# Patient Record
Sex: MALE | Race: WHITE | Employment: OTHER | URBAN - METROPOLITAN AREA
[De-identification: names, ages, dates, MRNs, and addresses within clinical notes are randomized per-mention and may not be internally consistent; named-entity substitution may affect disease eponyms.]

---

## 2019-04-27 ENCOUNTER — APPOINTMENT (OUTPATIENT)
Dept: GENERAL RADIOLOGY | Age: 69
End: 2019-04-27
Payer: COMMERCIAL

## 2019-04-27 ENCOUNTER — HOSPITAL ENCOUNTER (EMERGENCY)
Age: 69
Discharge: HOME | End: 2019-04-27
Attending: EMERGENCY MEDICINE
Payer: COMMERCIAL

## 2019-04-27 VITALS
HEART RATE: 85 BPM | RESPIRATION RATE: 18 BRPM | SYSTOLIC BLOOD PRESSURE: 127 MMHG | BODY MASS INDEX: 26.68 KG/M2 | DIASTOLIC BLOOD PRESSURE: 71 MMHG | TEMPERATURE: 98.5 F | WEIGHT: 170 LBS | HEIGHT: 67 IN | OXYGEN SATURATION: 98 %

## 2019-04-27 DIAGNOSIS — J44.1 COPD EXACERBATION (HCC): Primary | ICD-10-CM

## 2019-04-27 LAB
ALBUMIN SERPL-MCNC: 4.6 G/DL (ref 3.5–5.2)
ALP BLD-CCNC: 67 U/L (ref 40–129)
ALT SERPL-CCNC: 26 U/L (ref 0–40)
ANION GAP SERPL CALCULATED.3IONS-SCNC: 10 MMOL/L (ref 7–16)
AST SERPL-CCNC: 23 U/L (ref 0–39)
BASOPHILS ABSOLUTE: 0.05 E9/L (ref 0–0.2)
BASOPHILS RELATIVE PERCENT: 0.5 % (ref 0–2)
BILIRUB SERPL-MCNC: 0.6 MG/DL (ref 0–1.2)
BUN BLDV-MCNC: 11 MG/DL (ref 8–23)
CALCIUM SERPL-MCNC: 9.2 MG/DL (ref 8.6–10.2)
CHLORIDE BLD-SCNC: 93 MMOL/L (ref 98–107)
CO2: 28 MMOL/L (ref 22–29)
CREAT SERPL-MCNC: 0.6 MG/DL (ref 0.7–1.2)
EKG ATRIAL RATE: 90 BPM
EKG P AXIS: 80 DEGREES
EKG P-R INTERVAL: 132 MS
EKG Q-T INTERVAL: 370 MS
EKG QRS DURATION: 98 MS
EKG QTC CALCULATION (BAZETT): 452 MS
EKG R AXIS: 85 DEGREES
EKG T AXIS: 73 DEGREES
EKG VENTRICULAR RATE: 90 BPM
EOSINOPHILS ABSOLUTE: 0.12 E9/L (ref 0.05–0.5)
EOSINOPHILS RELATIVE PERCENT: 1.1 % (ref 0–6)
GFR AFRICAN AMERICAN: >60
GFR NON-AFRICAN AMERICAN: >60 ML/MIN/1.73
GLUCOSE BLD-MCNC: 146 MG/DL (ref 74–99)
HCT VFR BLD CALC: 43.9 % (ref 37–54)
HEMOGLOBIN: 15.1 G/DL (ref 12.5–16.5)
IMMATURE GRANULOCYTES #: 0.04 E9/L
IMMATURE GRANULOCYTES %: 0.4 % (ref 0–5)
LYMPHOCYTES ABSOLUTE: 0.81 E9/L (ref 1.5–4)
LYMPHOCYTES RELATIVE PERCENT: 7.5 % (ref 20–42)
MCH RBC QN AUTO: 31.8 PG (ref 26–35)
MCHC RBC AUTO-ENTMCNC: 34.4 % (ref 32–34.5)
MCV RBC AUTO: 92.4 FL (ref 80–99.9)
MONOCYTES ABSOLUTE: 0.36 E9/L (ref 0.1–0.95)
MONOCYTES RELATIVE PERCENT: 3.4 % (ref 2–12)
NEUTROPHILS ABSOLUTE: 9.35 E9/L (ref 1.8–7.3)
NEUTROPHILS RELATIVE PERCENT: 87.1 % (ref 43–80)
PDW BLD-RTO: 12.8 FL (ref 11.5–15)
PLATELET # BLD: 195 E9/L (ref 130–450)
PMV BLD AUTO: 9.5 FL (ref 7–12)
POTASSIUM SERPL-SCNC: 3.8 MMOL/L (ref 3.5–5)
RBC # BLD: 4.75 E12/L (ref 3.8–5.8)
SODIUM BLD-SCNC: 131 MMOL/L (ref 132–146)
TOTAL PROTEIN: 7 G/DL (ref 6.4–8.3)
TROPONIN: <0.01 NG/ML (ref 0–0.03)
WBC # BLD: 10.7 E9/L (ref 4.5–11.5)

## 2019-04-27 PROCEDURE — 99285 EMERGENCY DEPT VISIT HI MDM: CPT

## 2019-04-27 PROCEDURE — 80053 COMPREHEN METABOLIC PANEL: CPT

## 2019-04-27 PROCEDURE — 85025 COMPLETE CBC W/AUTO DIFF WBC: CPT

## 2019-04-27 PROCEDURE — 71045 X-RAY EXAM CHEST 1 VIEW: CPT

## 2019-04-27 PROCEDURE — 94640 AIRWAY INHALATION TREATMENT: CPT

## 2019-04-27 PROCEDURE — 93005 ELECTROCARDIOGRAM TRACING: CPT | Performed by: EMERGENCY MEDICINE

## 2019-04-27 PROCEDURE — 84484 ASSAY OF TROPONIN QUANT: CPT

## 2019-04-27 PROCEDURE — 94664 DEMO&/EVAL PT USE INHALER: CPT

## 2019-04-27 PROCEDURE — 6370000000 HC RX 637 (ALT 250 FOR IP): Performed by: EMERGENCY MEDICINE

## 2019-04-27 RX ORDER — PREDNISONE 20 MG/1
TABLET ORAL
Qty: 6 TABLET | Refills: 0 | Status: SHIPPED | OUTPATIENT
Start: 2019-04-27 | End: 2019-04-29

## 2019-04-27 RX ORDER — IPRATROPIUM BROMIDE AND ALBUTEROL SULFATE 2.5; .5 MG/3ML; MG/3ML
3 SOLUTION RESPIRATORY (INHALATION) ONCE
Status: COMPLETED | OUTPATIENT
Start: 2019-04-27 | End: 2019-04-27

## 2019-04-27 RX ORDER — DOXYCYCLINE HYCLATE 100 MG
100 TABLET ORAL 2 TIMES DAILY
Qty: 20 TABLET | Refills: 0 | Status: SHIPPED | OUTPATIENT
Start: 2019-04-27 | End: 2019-05-07

## 2019-04-27 RX ADMIN — IPRATROPIUM BROMIDE AND ALBUTEROL SULFATE 3 AMPULE: .5; 3 SOLUTION RESPIRATORY (INHALATION) at 12:57

## 2019-04-27 ASSESSMENT — ENCOUNTER SYMPTOMS
VOMITING: 0
COLOR CHANGE: 0
SHORTNESS OF BREATH: 1
ABDOMINAL PAIN: 0
NAUSEA: 0
COUGH: 1
SORE THROAT: 0

## 2019-04-27 NOTE — ED PROVIDER NOTES
Exam   Constitutional: He is oriented to person, place, and time. He appears well-developed and well-nourished. No distress. HENT:   Head: Normocephalic and atraumatic. Nose: Nose normal.   Mouth/Throat: Oropharynx is clear and moist and mucous membranes are normal.   Eyes: Conjunctivae are normal.   Neck: Normal range of motion. Neck supple. Cardiovascular: Normal rate and regular rhythm. Pulses:       Radial pulses are 2+ on the right side, and 2+ on the left side. Dorsalis pedis pulses are 2+ on the right side, and 2+ on the left side. Pulmonary/Chest: Accessory muscle usage present. Tachypnea noted. He has decreased breath sounds. He has wheezes. He has no rhonchi. He has no rales. Abdominal: Soft. Bowel sounds are normal. There is no tenderness. There is no rigidity, no rebound and no guarding. Musculoskeletal:   Able to move all extremities   Neurological: He is alert and oriented to person, place, and time. No gross focal motor or sensory deficits. Skin: Skin is warm and dry. Capillary refill takes less than 2 seconds. Psychiatric: He has a normal mood and affect. His speech is normal.   Nursing note and vitals reviewed. Procedures    MDM  Number of Diagnoses or Management Options  COPD exacerbation [facility]):   Diagnosis management comments: Patient presents to the ED for shortness of breath. We initially obtained blood work, imaging and ekg. Patient was given DuoNeb treatment for their symptoms with moderate improvement. Patient was able to ambulate in the emergency department without becoming hypoxic or very short of breath. Blood work unremarkable for anemia or leukocytosis. Chest x-ray showed atelectasis, questionable early pneumonia. Patient had been taking Augmentin over the past 2 days and prednisone for his symptoms. We advised patient discontinue Augmentin and start on doxycycline for COPD exacerbation and also started on prednisone 60 mg.  States he is okay with his drugs. Family History: family history is not on file. The patients home medications have been reviewed. Allergies: Patient has no known allergies.     ------------------------------------------------ RESULTS ---------------------------------------------------    LABS:  Results for orders placed or performed during the hospital encounter of 04/27/19   CBC auto differential   Result Value Ref Range    WBC 10.7 4.5 - 11.5 E9/L    RBC 4.75 3.80 - 5.80 E12/L    Hemoglobin 15.1 12.5 - 16.5 g/dL    Hematocrit 43.9 37.0 - 54.0 %    MCV 92.4 80.0 - 99.9 fL    MCH 31.8 26.0 - 35.0 pg    MCHC 34.4 32.0 - 34.5 %    RDW 12.8 11.5 - 15.0 fL    Platelets 437 443 - 517 E9/L    MPV 9.5 7.0 - 12.0 fL    Neutrophils % 87.1 (H) 43.0 - 80.0 %    Immature Granulocytes % 0.4 0.0 - 5.0 %    Lymphocytes % 7.5 (L) 20.0 - 42.0 %    Monocytes % 3.4 2.0 - 12.0 %    Eosinophils % 1.1 0.0 - 6.0 %    Basophils % 0.5 0.0 - 2.0 %    Neutrophils # 9.35 (H) 1.80 - 7.30 E9/L    Immature Granulocytes # 0.04 E9/L    Lymphocytes # 0.81 (L) 1.50 - 4.00 E9/L    Monocytes # 0.36 0.10 - 0.95 E9/L    Eosinophils # 0.12 0.05 - 0.50 E9/L    Basophils # 0.05 0.00 - 0.20 E9/L   Comprehensive Metabolic Panel   Result Value Ref Range    Sodium 131 (L) 132 - 146 mmol/L    Potassium 3.8 3.5 - 5.0 mmol/L    Chloride 93 (L) 98 - 107 mmol/L    CO2 28 22 - 29 mmol/L    Anion Gap 10 7 - 16 mmol/L    Glucose 146 (H) 74 - 99 mg/dL    BUN 11 8 - 23 mg/dL    CREATININE 0.6 (L) 0.7 - 1.2 mg/dL    GFR Non-African American >60 >=60 mL/min/1.73    GFR African American >60     Calcium 9.2 8.6 - 10.2 mg/dL    Total Protein 7.0 6.4 - 8.3 g/dL    Alb 4.6 3.5 - 5.2 g/dL    Total Bilirubin 0.6 0.0 - 1.2 mg/dL    Alkaline Phosphatase 67 40 - 129 U/L    ALT 26 0 - 40 U/L    AST 23 0 - 39 U/L   Troponin   Result Value Ref Range    Troponin <0.01 0.00 - 0.03 ng/mL   EKG 12 Lead   Result Value Ref Range    Ventricular Rate 90 BPM    Atrial Rate 90 BPM    P-R Interval 132 ms    QRS home.  Patient condition is stable. END OF PROVIDER NOTE.             Varinder Gomez DO  Resident  04/27/19 [id number]

## 2019-04-27 NOTE — ED NOTES
Patient ambulated with beginning SpO2 at 94 % on room air, during ambulation SpO2 92-93% on room air.       Marcia Farah RN  04/27/19 [id number]

## 2019-06-18 ENCOUNTER — HOSPITAL ENCOUNTER (OUTPATIENT)
Dept: NON INVASIVE DIAGNOSTICS | Age: 69
Discharge: HOME | End: 2019-06-18
Payer: COMMERCIAL

## 2019-06-18 LAB
LV EF: 60 %
LVEF MODALITY: NORMAL

## 2019-06-18 PROCEDURE — 93306 TTE W/DOPPLER COMPLETE: CPT

## 2019-10-16 ENCOUNTER — HOSPITAL ENCOUNTER (OUTPATIENT)
Age: 69
Discharge: HOME | End: 2019-10-18

## 2019-10-16 PROCEDURE — 88305 TISSUE EXAM BY PATHOLOGIST: CPT

## 2020-03-02 ENCOUNTER — APPOINTMENT (OUTPATIENT)
Dept: GENERAL RADIOLOGY | Age: 70
DRG: 193 | End: 2020-03-02
Payer: COMMERCIAL

## 2020-03-02 ENCOUNTER — HOSPITAL ENCOUNTER (INPATIENT)
Age: 70
LOS: 7 days | Discharge: HOME | DRG: 193 | End: 2020-03-11
Attending: EMERGENCY MEDICINE | Admitting: INTERNAL MEDICINE
Payer: COMMERCIAL

## 2020-03-02 PROBLEM — J44.1 COPD WITH ACUTE EXACERBATION (HCC): Status: ACTIVE | Noted: 2020-03-02

## 2020-03-02 LAB
ALBUMIN SERPL-MCNC: 4.4 G/DL (ref 3.5–5.2)
ALP BLD-CCNC: 62 U/L (ref 40–129)
ALT SERPL-CCNC: 24 U/L (ref 0–40)
ANION GAP SERPL CALCULATED.3IONS-SCNC: 12 MMOL/L (ref 7–16)
AST SERPL-CCNC: 19 U/L (ref 0–39)
B.E.: 3.8 MMOL/L (ref -3–3)
BASOPHILS ABSOLUTE: 0.03 E9/L (ref 0–0.2)
BASOPHILS RELATIVE PERCENT: 0.3 % (ref 0–2)
BILIRUB SERPL-MCNC: 0.7 MG/DL (ref 0–1.2)
BILIRUBIN DIRECT: 0.2 MG/DL (ref 0–0.3)
BILIRUBIN, INDIRECT: 0.5 MG/DL (ref 0–1)
BUN BLDV-MCNC: 11 MG/DL (ref 8–23)
CALCIUM SERPL-MCNC: 8.7 MG/DL (ref 8.6–10.2)
CHLORIDE BLD-SCNC: 92 MMOL/L (ref 98–107)
CO2: 27 MMOL/L (ref 22–29)
COHB: 1.2 % (ref 0–1.5)
CREAT SERPL-MCNC: 0.7 MG/DL (ref 0.7–1.2)
CRITICAL: ABNORMAL
DATE ANALYZED: ABNORMAL
DATE OF COLLECTION: ABNORMAL
EKG ATRIAL RATE: 108 BPM
EKG P AXIS: 71 DEGREES
EKG P-R INTERVAL: 124 MS
EKG Q-T INTERVAL: 316 MS
EKG QRS DURATION: 78 MS
EKG QTC CALCULATION (BAZETT): 423 MS
EKG R AXIS: 98 DEGREES
EKG T AXIS: 83 DEGREES
EKG VENTRICULAR RATE: 108 BPM
EOSINOPHILS ABSOLUTE: 0.01 E9/L (ref 0.05–0.5)
EOSINOPHILS RELATIVE PERCENT: 0.1 % (ref 0–6)
GFR AFRICAN AMERICAN: >60
GFR NON-AFRICAN AMERICAN: >60 ML/MIN/1.73
GLUCOSE BLD-MCNC: 214 MG/DL (ref 74–99)
HCO3: 28.3 MMOL/L (ref 22–26)
HCT VFR BLD CALC: 43.3 % (ref 37–54)
HEMOGLOBIN: 14.9 G/DL (ref 12.5–16.5)
HHB: 3.5 % (ref 0–5)
IMMATURE GRANULOCYTES #: 0.05 E9/L
IMMATURE GRANULOCYTES %: 0.4 % (ref 0–5)
INFLUENZA A BY PCR: DETECTED
INFLUENZA B BY PCR: NOT DETECTED
INR BLD: 1
LAB: ABNORMAL
LACTIC ACID: 2.9 MMOL/L (ref 0.5–2.2)
LACTIC ACID: 4.5 MMOL/L (ref 0.5–2.2)
LIPASE: 22 U/L (ref 13–60)
LYMPHOCYTES ABSOLUTE: 0.24 E9/L (ref 1.5–4)
LYMPHOCYTES RELATIVE PERCENT: 2.1 % (ref 20–42)
Lab: ABNORMAL
MCH RBC QN AUTO: 32.7 PG (ref 26–35)
MCHC RBC AUTO-ENTMCNC: 34.4 % (ref 32–34.5)
MCV RBC AUTO: 95 FL (ref 80–99.9)
METHB: 0.3 % (ref 0–1.5)
MODE: ABNORMAL
MONOCYTES ABSOLUTE: 0.84 E9/L (ref 0.1–0.95)
MONOCYTES RELATIVE PERCENT: 7.3 % (ref 2–12)
NEUTROPHILS ABSOLUTE: 10.34 E9/L (ref 1.8–7.3)
NEUTROPHILS RELATIVE PERCENT: 89.8 % (ref 43–80)
O2 CONTENT: 21 ML/DL
O2 SATURATION: 96.4 % (ref 92–98.5)
O2HB: 95 % (ref 94–97)
OPERATOR ID: ABNORMAL
PATIENT TEMP: 37 C
PCO2: 41.8 MMHG (ref 35–45)
PDW BLD-RTO: 12.3 FL (ref 11.5–15)
PH BLOOD GAS: 7.45 (ref 7.35–7.45)
PLATELET # BLD: 179 E9/L (ref 130–450)
PMV BLD AUTO: 9.2 FL (ref 7–12)
PO2: 79.8 MMHG (ref 75–100)
POTASSIUM REFLEX MAGNESIUM: 3.7 MMOL/L (ref 3.5–5)
PRO-BNP: 69 PG/ML (ref 0–125)
PROTHROMBIN TIME: 11.6 SEC (ref 9.3–12.4)
RBC # BLD: 4.56 E12/L (ref 3.8–5.8)
RBC # BLD: NORMAL 10*6/UL
SODIUM BLD-SCNC: 131 MMOL/L (ref 132–146)
SOURCE, BLOOD GAS: ABNORMAL
THB: 15.7 G/DL (ref 11.5–16.5)
TIME ANALYZED: 1255
TOTAL PROTEIN: 6.8 G/DL (ref 6.4–8.3)
TROPONIN: <0.01 NG/ML (ref 0–0.03)
WBC # BLD: 11.5 E9/L (ref 4.5–11.5)

## 2020-03-02 PROCEDURE — 87502 INFLUENZA DNA AMP PROBE: CPT

## 2020-03-02 PROCEDURE — 80048 BASIC METABOLIC PNL TOTAL CA: CPT

## 2020-03-02 PROCEDURE — G0378 HOSPITAL OBSERVATION PER HR: HCPCS

## 2020-03-02 PROCEDURE — 96374 THER/PROPH/DIAG INJ IV PUSH: CPT

## 2020-03-02 PROCEDURE — 96372 THER/PROPH/DIAG INJ SC/IM: CPT

## 2020-03-02 PROCEDURE — 96376 TX/PRO/DX INJ SAME DRUG ADON: CPT

## 2020-03-02 PROCEDURE — 6370000000 HC RX 637 (ALT 250 FOR IP): Performed by: EMERGENCY MEDICINE

## 2020-03-02 PROCEDURE — 93005 ELECTROCARDIOGRAM TRACING: CPT | Performed by: EMERGENCY MEDICINE

## 2020-03-02 PROCEDURE — 94664 DEMO&/EVAL PT USE INHALER: CPT

## 2020-03-02 PROCEDURE — 85610 PROTHROMBIN TIME: CPT

## 2020-03-02 PROCEDURE — 85025 COMPLETE CBC W/AUTO DIFF WBC: CPT

## 2020-03-02 PROCEDURE — 6370000000 HC RX 637 (ALT 250 FOR IP): Performed by: INTERNAL MEDICINE

## 2020-03-02 PROCEDURE — 6360000002 HC RX W HCPCS: Performed by: INTERNAL MEDICINE

## 2020-03-02 PROCEDURE — 96361 HYDRATE IV INFUSION ADD-ON: CPT

## 2020-03-02 PROCEDURE — 84484 ASSAY OF TROPONIN QUANT: CPT

## 2020-03-02 PROCEDURE — 6360000002 HC RX W HCPCS: Performed by: EMERGENCY MEDICINE

## 2020-03-02 PROCEDURE — 99285 EMERGENCY DEPT VISIT HI MDM: CPT

## 2020-03-02 PROCEDURE — 83880 ASSAY OF NATRIURETIC PEPTIDE: CPT

## 2020-03-02 PROCEDURE — 2700000000 HC OXYGEN THERAPY PER DAY

## 2020-03-02 PROCEDURE — 2580000003 HC RX 258: Performed by: INTERNAL MEDICINE

## 2020-03-02 PROCEDURE — 80076 HEPATIC FUNCTION PANEL: CPT

## 2020-03-02 PROCEDURE — 94640 AIRWAY INHALATION TREATMENT: CPT

## 2020-03-02 PROCEDURE — 83605 ASSAY OF LACTIC ACID: CPT

## 2020-03-02 PROCEDURE — 36415 COLL VENOUS BLD VENIPUNCTURE: CPT

## 2020-03-02 PROCEDURE — 71045 X-RAY EXAM CHEST 1 VIEW: CPT

## 2020-03-02 PROCEDURE — 82805 BLOOD GASES W/O2 SATURATION: CPT

## 2020-03-02 PROCEDURE — 83690 ASSAY OF LIPASE: CPT

## 2020-03-02 PROCEDURE — 94760 N-INVAS EAR/PLS OXIMETRY 1: CPT

## 2020-03-02 RX ORDER — AMLODIPINE BESYLATE AND BENAZEPRIL HYDROCHLORIDE 5; 20 MG/1; MG/1
1 CAPSULE ORAL DAILY
Status: DISCONTINUED | OUTPATIENT
Start: 2020-03-02 | End: 2020-03-04

## 2020-03-02 RX ORDER — IPRATROPIUM BROMIDE AND ALBUTEROL SULFATE 2.5; .5 MG/3ML; MG/3ML
1 SOLUTION RESPIRATORY (INHALATION) EVERY 4 HOURS
Status: DISCONTINUED | OUTPATIENT
Start: 2020-03-03 | End: 2020-03-02 | Stop reason: CLARIF

## 2020-03-02 RX ORDER — BUDESONIDE 0.25 MG/2ML
250 INHALANT ORAL 2 TIMES DAILY
Status: DISCONTINUED | OUTPATIENT
Start: 2020-03-02 | End: 2020-03-03

## 2020-03-02 RX ORDER — TAMSULOSIN HYDROCHLORIDE 0.4 MG/1
0.8 CAPSULE ORAL DAILY
COMMUNITY

## 2020-03-02 RX ORDER — METHYLPREDNISOLONE SODIUM SUCCINATE 40 MG/ML
40 INJECTION, POWDER, LYOPHILIZED, FOR SOLUTION INTRAMUSCULAR; INTRAVENOUS EVERY 6 HOURS
Status: DISCONTINUED | OUTPATIENT
Start: 2020-03-02 | End: 2020-03-03

## 2020-03-02 RX ORDER — SODIUM CHLORIDE 9 MG/ML
INJECTION, SOLUTION INTRAVENOUS CONTINUOUS
Status: DISCONTINUED | OUTPATIENT
Start: 2020-03-02 | End: 2020-03-04

## 2020-03-02 RX ORDER — OSELTAMIVIR PHOSPHATE 75 MG/1
75 CAPSULE ORAL 2 TIMES DAILY
Status: COMPLETED | OUTPATIENT
Start: 2020-03-02 | End: 2020-03-07

## 2020-03-02 RX ORDER — GUAIFENESIN 400 MG/1
1200 TABLET ORAL 2 TIMES DAILY
Status: ON HOLD | COMMUNITY
End: 2021-06-17 | Stop reason: HOSPADM

## 2020-03-02 RX ORDER — LORAZEPAM 0.5 MG/1
0.5 TABLET ORAL 2 TIMES DAILY PRN
Status: DISCONTINUED | OUTPATIENT
Start: 2020-03-02 | End: 2020-03-11 | Stop reason: HOSPADM

## 2020-03-02 RX ORDER — TAMSULOSIN HYDROCHLORIDE 0.4 MG/1
0.8 CAPSULE ORAL DAILY
Status: DISCONTINUED | OUTPATIENT
Start: 2020-03-02 | End: 2020-03-11 | Stop reason: HOSPADM

## 2020-03-02 RX ORDER — METHYLPREDNISOLONE SODIUM SUCCINATE 125 MG/2ML
125 INJECTION, POWDER, LYOPHILIZED, FOR SOLUTION INTRAMUSCULAR; INTRAVENOUS ONCE
Status: COMPLETED | OUTPATIENT
Start: 2020-03-02 | End: 2020-03-02

## 2020-03-02 RX ORDER — ARFORMOTEROL TARTRATE 15 UG/2ML
15 SOLUTION RESPIRATORY (INHALATION) 2 TIMES DAILY
Status: DISCONTINUED | OUTPATIENT
Start: 2020-03-02 | End: 2020-03-03

## 2020-03-02 RX ORDER — GUAIFENESIN 400 MG/1
1200 TABLET ORAL 2 TIMES DAILY
Status: DISCONTINUED | OUTPATIENT
Start: 2020-03-02 | End: 2020-03-02 | Stop reason: ALTCHOICE

## 2020-03-02 RX ORDER — GABAPENTIN 300 MG/1
600 CAPSULE ORAL 4 TIMES DAILY PRN
Status: DISCONTINUED | OUTPATIENT
Start: 2020-03-02 | End: 2020-03-11 | Stop reason: HOSPADM

## 2020-03-02 RX ORDER — IPRATROPIUM BROMIDE AND ALBUTEROL SULFATE 2.5; .5 MG/3ML; MG/3ML
1 SOLUTION RESPIRATORY (INHALATION) EVERY 4 HOURS
Status: DISPENSED | OUTPATIENT
Start: 2020-03-02 | End: 2020-03-03

## 2020-03-02 RX ORDER — IPRATROPIUM BROMIDE AND ALBUTEROL SULFATE 2.5; .5 MG/3ML; MG/3ML
1 SOLUTION RESPIRATORY (INHALATION)
Status: COMPLETED | OUTPATIENT
Start: 2020-03-02 | End: 2020-03-02

## 2020-03-02 RX ORDER — PREDNISONE 20 MG/1
20 TABLET ORAL DAILY
Status: ON HOLD | COMMUNITY
End: 2020-03-11 | Stop reason: HOSPADM

## 2020-03-02 RX ORDER — OSELTAMIVIR PHOSPHATE 75 MG/1
75 CAPSULE ORAL ONCE
Status: COMPLETED | OUTPATIENT
Start: 2020-03-02 | End: 2020-03-02

## 2020-03-02 RX ORDER — GUAIFENESIN 1200 MG/1
1200 TABLET, EXTENDED RELEASE ORAL EVERY 12 HOURS
Status: DISCONTINUED | OUTPATIENT
Start: 2020-03-02 | End: 2020-03-11 | Stop reason: HOSPADM

## 2020-03-02 RX ADMIN — IPRATROPIUM BROMIDE AND ALBUTEROL SULFATE 1 AMPULE: .5; 3 SOLUTION RESPIRATORY (INHALATION) at 15:47

## 2020-03-02 RX ADMIN — IPRATROPIUM BROMIDE AND ALBUTEROL SULFATE 1 AMPULE: .5; 3 SOLUTION RESPIRATORY (INHALATION) at 14:05

## 2020-03-02 RX ADMIN — IPRATROPIUM BROMIDE AND ALBUTEROL SULFATE 1 AMPULE: .5; 3 SOLUTION RESPIRATORY (INHALATION) at 20:41

## 2020-03-02 RX ADMIN — SODIUM CHLORIDE: 9 INJECTION, SOLUTION INTRAVENOUS at 20:19

## 2020-03-02 RX ADMIN — METHYLPREDNISOLONE SODIUM SUCCINATE 40 MG: 40 INJECTION, POWDER, LYOPHILIZED, FOR SOLUTION INTRAMUSCULAR; INTRAVENOUS at 22:34

## 2020-03-02 RX ADMIN — GUAIFENESIN 1200 MG: 1200 TABLET, EXTENDED RELEASE ORAL at 23:05

## 2020-03-02 RX ADMIN — IPRATROPIUM BROMIDE AND ALBUTEROL SULFATE 1 AMPULE: .5; 3 SOLUTION RESPIRATORY (INHALATION) at 14:08

## 2020-03-02 RX ADMIN — TAMSULOSIN HYDROCHLORIDE 0.8 MG: 0.4 CAPSULE ORAL at 23:05

## 2020-03-02 RX ADMIN — BUDESONIDE 250 MCG: 0.25 SUSPENSION RESPIRATORY (INHALATION) at 23:23

## 2020-03-02 RX ADMIN — OSELTAMIVIR PHOSPHATE 75 MG: 75 CAPSULE ORAL at 22:35

## 2020-03-02 RX ADMIN — IPRATROPIUM BROMIDE AND ALBUTEROL SULFATE 1 AMPULE: .5; 3 SOLUTION RESPIRATORY (INHALATION) at 14:09

## 2020-03-02 RX ADMIN — IPRATROPIUM BROMIDE AND ALBUTEROL SULFATE 1 AMPULE: .5; 3 SOLUTION RESPIRATORY (INHALATION) at 15:46

## 2020-03-02 RX ADMIN — LORAZEPAM 0.5 MG: 0.5 TABLET ORAL at 23:04

## 2020-03-02 RX ADMIN — METHYLPREDNISOLONE SODIUM SUCCINATE 125 MG: 125 INJECTION, POWDER, FOR SOLUTION INTRAMUSCULAR; INTRAVENOUS at 15:00

## 2020-03-02 RX ADMIN — IPRATROPIUM BROMIDE AND ALBUTEROL SULFATE 1 AMPULE: .5; 3 SOLUTION RESPIRATORY (INHALATION) at 15:48

## 2020-03-02 RX ADMIN — IPRATROPIUM BROMIDE AND ALBUTEROL SULFATE 1 AMPULE: .5; 3 SOLUTION RESPIRATORY (INHALATION) at 23:22

## 2020-03-02 RX ADMIN — OSELTAMIVIR PHOSPHATE 75 MG: 75 CAPSULE ORAL at 15:00

## 2020-03-02 RX ADMIN — ENOXAPARIN SODIUM 40 MG: 40 INJECTION SUBCUTANEOUS at 22:35

## 2020-03-02 RX ADMIN — ARFORMOTEROL TARTRATE 15 MCG: 15 SOLUTION RESPIRATORY (INHALATION) at 23:23

## 2020-03-02 ASSESSMENT — ENCOUNTER SYMPTOMS
STRIDOR: 0
EYE PAIN: 0
ABDOMINAL DISTENTION: 0
BACK PAIN: 0
SHORTNESS OF BREATH: 1
WHEEZING: 1
EYE ITCHING: 0
COUGH: 1
ABDOMINAL PAIN: 0

## 2020-03-02 NOTE — CARE COORDINATION
3/2/2020 introduced myself to patient and described my role as a . Discussed the observation stay and the plan of care for dx sob/Influenza A including testing oxygen nebulizers and consults. He affirmed understanding. He lives in a one floor home with his wife Jaxson Black [phone]. He states that he is independent in mobility. He denies AMEE/HHC in the past. He does not have oxygen at home . He does have a nebulizer at home. He sees Dr Scott Rashid as his pulmonologist. He sees Dr Shira Jerry as his PCP. He goes to [facility] [address] [facility] for his prescriptions Informed him that a  and  will follow him through his stay to assist in the transition of care.  Plan is to discharge to [facility]

## 2020-03-02 NOTE — ED PROVIDER NOTES
Gilbert Cheema is a 71 y.o. male presenting to the ED for cough, sob, beginning 3-4 days ago. The complaint has been intermittent, moderate in severity, and worsened by nothing. Patient is a 70-year-old male with history of known COPD. He follows with pulmonary Dr. Dick Hutchison, and his family doctor Dr. Diomedes Machado.  He states for the past 3 to 4 days he has had an exacerbation. His pulmonologist put him on doxycycline. He is currently taking 30 mg of prednisone. He has been using a lot of albuterol at home. He notes thick green-yellowish sputum production. He denies any leg pain calf pain leg swelling. he denies any fever. He does note possibly some body aches. Denies any vomiting or diarrhea denies any abdominal pain. He denies any chest pain. He is not on home oxygen. Review of Systems:   Pertinent positives and negatives are stated within HPI, all other systems reviewed and are negative. Review of Systems   Constitutional: Positive for chills and fatigue. Negative for activity change, appetite change and fever. HENT: Negative for congestion, dental problem, drooling and ear discharge. Eyes: Negative for pain and itching. Respiratory: Positive for cough, shortness of breath and wheezing. Negative for stridor. Cardiovascular: Negative for chest pain and leg swelling. Gastrointestinal: Negative for abdominal distention and abdominal pain. Endocrine: Negative for cold intolerance. Genitourinary: Negative for difficulty urinating and dysuria. Musculoskeletal: Positive for arthralgias. Negative for back pain. Neurological: Positive for dizziness. Negative for light-headedness. Hematological: Negative for adenopathy.    Psychiatric/Behavioral: Negative for agitation.             --------------------------------------------- PAST HISTORY ---------------------------------------------  Past Medical History:  has a past medical history of Asthma, COPD (chronic obstructive pulmonary disease) ([facility] [address] Hypertension, and Neuropathic pain. Past Surgical History:  has a past surgical history that includes hernia repair and back surgery. Social History:  reports that he has been smoking cigarettes. He has never used smokeless tobacco. He reports current alcohol use of about 4.0 standard drinks of alcohol per week. He reports that he does not use drugs. Family History: family history is not on file. The patients home medications have been reviewed. Allergies: Patient has no known allergies.     -------------------------------------------------- RESULTS -------------------------------------------------  All laboratory and radiology results have been personally reviewed by myself   LABS:  Results for orders placed or performed during the hospital encounter of 03/02/20   Rapid influenza A/B antigens   Result Value Ref Range    Influenza A by PCR DETECTED (A) Not Detected    Influenza B by PCR Not Detected Not Detected   CBC Auto Differential   Result Value Ref Range    WBC 11.5 4.5 - 11.5 E9/L    RBC 4.56 3.80 - 5.80 E12/L    Hemoglobin 14.9 12.5 - 16.5 g/dL    Hematocrit 43.3 37.0 - 54.0 %    MCV 95.0 80.0 - 99.9 fL    MCH 32.7 26.0 - 35.0 pg    MCHC 34.4 32.0 - 34.5 %    RDW 12.3 11.5 - 15.0 fL    Platelets 608 381 - 874 E9/L    MPV 9.2 7.0 - 12.0 fL    Neutrophils % 89.8 (H) 43.0 - 80.0 %    Immature Granulocytes % 0.4 0.0 - 5.0 %    Lymphocytes % 2.1 (L) 20.0 - 42.0 %    Monocytes % 7.3 2.0 - 12.0 %    Eosinophils % 0.1 0.0 - 6.0 %    Basophils % 0.3 0.0 - 2.0 %    Neutrophils Absolute 10.34 (H) 1.80 - 7.30 E9/L    Immature Granulocytes # 0.05 E9/L    Lymphocytes Absolute 0.24 (L) 1.50 - 4.00 E9/L    Monocytes Absolute 0.84 0.10 - 0.95 E9/L    Eosinophils Absolute 0.01 (L) 0.05 - 0.50 E9/L    Basophils Absolute 0.03 0.00 - 0.20 E9/L    RBC Morphology Normal    Lactic Acid, Plasma   Result Value Ref Range    Lactic Acid 2.9 (H) 0.5 - 2.2 mmol/L   Basic Metabolic Panel w/ Reflex to MG   Result Value Ref Range    Sodium 131 (L) 132 - 146 mmol/L    Potassium reflex Magnesium 3.7 3.5 - 5.0 mmol/L    Chloride 92 (L) 98 - 107 mmol/L    CO2 27 22 - 29 mmol/L    Anion Gap 12 7 - 16 mmol/L    Glucose 214 (H) 74 - 99 mg/dL    BUN 11 8 - 23 mg/dL    CREATININE 0.7 0.7 - 1.2 mg/dL    GFR Non-African American >60 >=60 mL/min/1.73    GFR African American >60     Calcium 8.7 8.6 - 10.2 mg/dL   Hepatic Function Panel   Result Value Ref Range    Total Protein 6.8 6.4 - 8.3 g/dL    Alb 4.4 3.5 - 5.2 g/dL    Alkaline Phosphatase 62 40 - 129 U/L    ALT 24 0 - 40 U/L    AST 19 0 - 39 U/L    Total Bilirubin 0.7 0.0 - 1.2 mg/dL    Bilirubin, Direct 0.2 0.0 - 0.3 mg/dL    Bilirubin, Indirect 0.5 0.0 - 1.0 mg/dL   Lipase   Result Value Ref Range    Lipase 22 13 - 60 U/L   Troponin   Result Value Ref Range    Troponin <0.01 0.00 - 0.03 ng/mL   Brain Natriuretic Peptide   Result Value Ref Range    Pro-BNP 69 0 - 125 pg/mL   Protime-INR   Result Value Ref Range    Protime 11.6 9.3 - 12.4 sec    INR 1.0    Blood Gas, Arterial   Result Value Ref Range    Date Analyzed 20200302     Time Analyzed 1255     Source: Blood Arterial     pH, Blood Gas 7.448 7.350 - 7.450    PCO2 41.8 35.0 - 45.0 mmHg    PO2 79.8 75.0 - 100.0 mmHg    HCO3 28.3 (H) 22.0 - 26.0 mmol/L    B.E. 3.8 (H) -3.0 - 3.0 mmol/L    O2 Sat 96.4 92.0 - 98.5 %    O2Hb 95.0 94.0 - 97.0 %    COHb 1.2 0.0 - 1.5 %    MetHb 0.3 0.0 - 1.5 %    O2 Content 21.0 mL/dL    HHb 3.5 0.0 - 5.0 %    tHb (est) 15.7 11.5 - 16.5 g/dL    Mode NC- 2 L     Date Of Collection      Time Collected      Pt Temp 37.0 C     ID [id number]     Lab [id number]     Critical(s) Notified .  No Critical Values    EKG 12 Lead   Result Value Ref Range    Ventricular Rate 108 BPM    Atrial Rate 108 BPM    P-R Interval 124 ms    QRS Duration 78 ms    Q-T Interval 316 ms    QTc Calculation (Bazett) 423 ms    P Axis 71 degrees    R Axis 98 degrees    T Axis 83 degrees       RADIOLOGY:  Interpreted by Radiologist.  XR CHEST PORTABLE   Final Result   Mild pulmonary emphysematous air trapping without evidence of acute   cardiopulmonary pathology. ------------------------- NURSING NOTES AND VITALS REVIEWED ---------------------------   The nursing notes within the ED encounter and vital signs as below have been reviewed. /62   Pulse 99   Temp 98.4 °F (36.9 °C) (Oral)   Resp 20   Wt 175 lb (79.4 kg)   SpO2 (!) 87%   BMI 27.41 kg/m²   Oxygen Saturation Interpretation: Abnormal      ---------------------------------------------------PHYSICAL EXAM--------------------------------------    Physical Exam  Vitals signs and nursing note reviewed. Constitutional:       General: He is not in acute distress. Appearance: Normal appearance. He is not toxic-appearing or diaphoretic. HENT:      Head: Normocephalic and atraumatic. Nose: Nose normal.      Mouth/Throat:      Mouth: Mucous membranes are moist.      Pharynx: Oropharynx is clear. No oropharyngeal exudate. Eyes:      Extraocular Movements: Extraocular movements intact. Conjunctiva/sclera: Conjunctivae normal.      Pupils: Pupils are equal, round, and reactive to light. Neck:      Musculoskeletal: Normal range of motion. No neck rigidity or muscular tenderness. Cardiovascular:      Rate and Rhythm: Regular rhythm. Tachycardia present. Pulses: Normal pulses. Heart sounds: Normal heart sounds. No murmur. Pulmonary:      Effort: No respiratory distress. Breath sounds: No stridor. Wheezing and rhonchi present. No rales. Chest:      Chest wall: No tenderness. Abdominal:      General: Abdomen is flat. Bowel sounds are normal. There is no distension. Tenderness: There is no abdominal tenderness. There is no guarding. Musculoskeletal: Normal range of motion. General: No swelling or tenderness. Right lower leg: No edema. Left lower leg: No edema.    Lymphadenopathy:      Cervical: No Counseling: The emergency provider has spoken with the patient and discussed todays results, in addition to providing specific details for the plan of care and counseling regarding the diagnosis and prognosis. Questions are answered at this time and they are agreeable with the plan.      --------------------------------- IMPRESSION AND DISPOSITION ---------------------------------    IMPRESSION  1. Acute respiratory failure with hypoxia ([address]    2. Influenza with respiratory manifestation other than pneumonia        DISPOSITION  Disposition: Admit to telemetry  Patient condition is fair      NOTE: This report was transcribed using voice recognition software.  Every effort was made to ensure accuracy; however, inadvertent computerized transcription errors may be present       Amarilys Carlson DO  03/02/20 [id number]

## 2020-03-03 PROBLEM — F41.9 ANXIETY: Status: ACTIVE | Noted: 2020-03-03

## 2020-03-03 PROBLEM — I10 HYPERTENSION: Status: ACTIVE | Noted: 2020-03-03

## 2020-03-03 PROBLEM — T50.905A HYPERGLYCEMIA, DRUG-INDUCED: Status: ACTIVE | Noted: 2020-03-03

## 2020-03-03 PROBLEM — E87.20 LACTIC ACIDOSIS: Status: ACTIVE | Noted: 2020-03-03

## 2020-03-03 PROBLEM — R73.9 HYPERGLYCEMIA, DRUG-INDUCED: Status: ACTIVE | Noted: 2020-03-03

## 2020-03-03 PROBLEM — J10.1 INFLUENZA A: Status: ACTIVE | Noted: 2020-03-03

## 2020-03-03 LAB
ANION GAP SERPL CALCULATED.3IONS-SCNC: 8 MMOL/L (ref 7–16)
BUN BLDV-MCNC: 11 MG/DL (ref 8–23)
CALCIUM SERPL-MCNC: 8.3 MG/DL (ref 8.6–10.2)
CHLORIDE BLD-SCNC: 95 MMOL/L (ref 98–107)
CO2: 28 MMOL/L (ref 22–29)
CREAT SERPL-MCNC: 0.6 MG/DL (ref 0.7–1.2)
GFR AFRICAN AMERICAN: >60
GFR NON-AFRICAN AMERICAN: >60 ML/MIN/1.73
GLUCOSE BLD-MCNC: 176 MG/DL (ref 74–99)
HCT VFR BLD CALC: 40.4 % (ref 37–54)
HEMOGLOBIN: 13.7 G/DL (ref 12.5–16.5)
LACTIC ACID: 1.3 MMOL/L (ref 0.5–2.2)
MCH RBC QN AUTO: 32.8 PG (ref 26–35)
MCHC RBC AUTO-ENTMCNC: 33.9 % (ref 32–34.5)
MCV RBC AUTO: 96.7 FL (ref 80–99.9)
PDW BLD-RTO: 12.3 FL (ref 11.5–15)
PLATELET # BLD: 173 E9/L (ref 130–450)
PMV BLD AUTO: 9.2 FL (ref 7–12)
POTASSIUM SERPL-SCNC: 4.6 MMOL/L (ref 3.5–5)
RBC # BLD: 4.18 E12/L (ref 3.8–5.8)
SODIUM BLD-SCNC: 131 MMOL/L (ref 132–146)
WBC # BLD: 6.9 E9/L (ref 4.5–11.5)

## 2020-03-03 PROCEDURE — G0378 HOSPITAL OBSERVATION PER HR: HCPCS

## 2020-03-03 PROCEDURE — 96376 TX/PRO/DX INJ SAME DRUG ADON: CPT

## 2020-03-03 PROCEDURE — 6370000000 HC RX 637 (ALT 250 FOR IP): Performed by: INTERNAL MEDICINE

## 2020-03-03 PROCEDURE — 2580000003 HC RX 258: Performed by: INTERNAL MEDICINE

## 2020-03-03 PROCEDURE — 94640 AIRWAY INHALATION TREATMENT: CPT

## 2020-03-03 PROCEDURE — 6360000002 HC RX W HCPCS: Performed by: INTERNAL MEDICINE

## 2020-03-03 PROCEDURE — 85027 COMPLETE CBC AUTOMATED: CPT

## 2020-03-03 PROCEDURE — 96361 HYDRATE IV INFUSION ADD-ON: CPT

## 2020-03-03 PROCEDURE — 2700000000 HC OXYGEN THERAPY PER DAY

## 2020-03-03 PROCEDURE — 80048 BASIC METABOLIC PNL TOTAL CA: CPT

## 2020-03-03 PROCEDURE — 36415 COLL VENOUS BLD VENIPUNCTURE: CPT

## 2020-03-03 PROCEDURE — 83605 ASSAY OF LACTIC ACID: CPT

## 2020-03-03 PROCEDURE — 96372 THER/PROPH/DIAG INJ SC/IM: CPT

## 2020-03-03 RX ORDER — IPRATROPIUM BROMIDE AND ALBUTEROL SULFATE 2.5; .5 MG/3ML; MG/3ML
1 SOLUTION RESPIRATORY (INHALATION) 4 TIMES DAILY
Status: DISCONTINUED | OUTPATIENT
Start: 2020-03-03 | End: 2020-03-04

## 2020-03-03 RX ORDER — METHYLPREDNISOLONE SODIUM SUCCINATE 125 MG/2ML
60 INJECTION, POWDER, LYOPHILIZED, FOR SOLUTION INTRAMUSCULAR; INTRAVENOUS EVERY 6 HOURS
Status: DISCONTINUED | OUTPATIENT
Start: 2020-03-03 | End: 2020-03-06

## 2020-03-03 RX ADMIN — AMLODIPINE BESYLATE AND BENAZEPRIL HYDROCHLORIDE 1 CAPSULE: 5; 20 CAPSULE ORAL at 08:27

## 2020-03-03 RX ADMIN — SODIUM CHLORIDE: 9 INJECTION, SOLUTION INTRAVENOUS at 12:57

## 2020-03-03 RX ADMIN — METHYLPREDNISOLONE SODIUM SUCCINATE 40 MG: 40 INJECTION, POWDER, LYOPHILIZED, FOR SOLUTION INTRAMUSCULAR; INTRAVENOUS at 09:58

## 2020-03-03 RX ADMIN — BUDESONIDE 250 MCG: 0.25 SUSPENSION RESPIRATORY (INHALATION) at 08:39

## 2020-03-03 RX ADMIN — ENOXAPARIN SODIUM 40 MG: 40 INJECTION SUBCUTANEOUS at 08:28

## 2020-03-03 RX ADMIN — GUAIFENESIN 1200 MG: 1200 TABLET, EXTENDED RELEASE ORAL at 21:43

## 2020-03-03 RX ADMIN — TAMSULOSIN HYDROCHLORIDE 0.8 MG: 0.4 CAPSULE ORAL at 21:42

## 2020-03-03 RX ADMIN — LORAZEPAM 0.5 MG: 0.5 TABLET ORAL at 08:26

## 2020-03-03 RX ADMIN — ARFORMOTEROL TARTRATE 15 MCG: 15 SOLUTION RESPIRATORY (INHALATION) at 08:39

## 2020-03-03 RX ADMIN — METHYLPREDNISOLONE SODIUM SUCCINATE 60 MG: 125 INJECTION, POWDER, LYOPHILIZED, FOR SOLUTION INTRAMUSCULAR; INTRAVENOUS at 23:05

## 2020-03-03 RX ADMIN — OSELTAMIVIR PHOSPHATE 75 MG: 75 CAPSULE ORAL at 21:42

## 2020-03-03 RX ADMIN — METHYLPREDNISOLONE SODIUM SUCCINATE 40 MG: 40 INJECTION, POWDER, LYOPHILIZED, FOR SOLUTION INTRAMUSCULAR; INTRAVENOUS at 04:01

## 2020-03-03 RX ADMIN — GABAPENTIN 600 MG: 300 CAPSULE ORAL at 00:11

## 2020-03-03 RX ADMIN — LORAZEPAM 0.5 MG: 0.5 TABLET ORAL at 23:07

## 2020-03-03 RX ADMIN — SODIUM CHLORIDE: 9 INJECTION, SOLUTION INTRAVENOUS at 04:04

## 2020-03-03 RX ADMIN — GABAPENTIN 600 MG: 300 CAPSULE ORAL at 08:27

## 2020-03-03 RX ADMIN — IPRATROPIUM BROMIDE AND ALBUTEROL SULFATE 1 AMPULE: .5; 3 SOLUTION RESPIRATORY (INHALATION) at 15:43

## 2020-03-03 RX ADMIN — METHYLPREDNISOLONE SODIUM SUCCINATE 60 MG: 125 INJECTION, POWDER, LYOPHILIZED, FOR SOLUTION INTRAMUSCULAR; INTRAVENOUS at 16:07

## 2020-03-03 RX ADMIN — GABAPENTIN 600 MG: 300 CAPSULE ORAL at 21:44

## 2020-03-03 RX ADMIN — IPRATROPIUM BROMIDE AND ALBUTEROL SULFATE 1 AMPULE: .5; 3 SOLUTION RESPIRATORY (INHALATION) at 20:55

## 2020-03-03 RX ADMIN — GUAIFENESIN 1200 MG: 1200 TABLET, EXTENDED RELEASE ORAL at 09:58

## 2020-03-03 RX ADMIN — OSELTAMIVIR PHOSPHATE 75 MG: 75 CAPSULE ORAL at 08:26

## 2020-03-03 RX ADMIN — SODIUM CHLORIDE: 9 INJECTION, SOLUTION INTRAVENOUS at 21:46

## 2020-03-03 ASSESSMENT — PAIN SCALES - GENERAL
PAINLEVEL_OUTOF10: 0

## 2020-03-03 NOTE — H&P
CHIEF COMPLAINT:  Worsening shortness of breath, weeakness and cough      HISTORY OF PRESENT ILLNESS:      The patient is a 71 y.o. male patient of Dr. Kenton Diaz who presents with the above. He presented to the ER yesterday for cough, sob, beginning 3-4 days ago. The complaint has been intermittent, moderate in severity, and worsened by nothing. Patient is a 79-year-old male with history of known COPD. He follows with pulmonary Dr. Gabe Boswell, and his family doctor Dr. Arcelia Dobbs.  He states for the past 3 to 4 days he has had an exacerbation. His pulmonologist put him on doxycycline. He is currently taking 30 mg of prednisone. He has been using a lot of albuterol at home. He notes thick green-yellowish sputum production. He denies any leg pain calf pain leg swelling. he denies any fever. He does note possibly some body aches. Denies any vomiting or diarrhea denies any abdominal pain. He denies any chest pain. He is not on home oxygen. Past Medical History:    Past Medical History:   Diagnosis Date    Asthma     COPD (chronic obstructive pulmonary disease) ([facility] [address]     Hypertension     Neuropathic pain     from back fusion       Past Surgical History:    Past Surgical History:   Procedure Laterality Date    BACK SURGERY      spinal fusion    HERNIA REPAIR         Medications Prior to Admission:    Medications Prior to Admission: Fluticasone-Umeclidin-Vilant (TRELEGY ELLIPTA IN), Inhale into the lungs daily Evening  tamsulosin (FLOMAX) 0.4 MG capsule, Take 0.8 mg by mouth daily  predniSONE (DELTASONE) 20 MG tablet, Take 20 mg by mouth daily  guaiFENesin 400 MG tablet, Take 1,200 mg by mouth 2 times daily  Coenzyme Q10 (CO Q 10 PO), Take by mouth 2 times daily   Omega-3 Fatty Acids (FISH OIL) 1000 MG CAPS, Take 1,000 mg by mouth 2 times daily   Flaxseed, Linseed, 1000 MG CAPS, Take  by mouth 2 times daily. amlodipine-benazepril (LOTREL) 5-20 MG per capsule, Take 1 capsule by mouth daily. lorazepam (ATIVAN) 0.5 MG tablet, Take 0.5 mg by mouth 2 times daily as needed. gabapentin (NEURONTIN) 600 MG tablet, Take 600 mg by mouth 3 times daily. ALBUTEROL IN, Inhale into the lungs as needed   [DISCONTINUED] Budesonide-Formoterol Fumarate (SYMBICORT IN), Inhale  into the lungs. [DISCONTINUED] tiotropium (SPIRIVA) 18 MCG inhalation capsule, Inhale 18 mcg into the lungs daily. Allergies:    Patient has no known allergies. Social History:    reports that he has been smoking cigarettes. He has never used smokeless tobacco. He reports current alcohol use of about 4.0 standard drinks of alcohol per week. He reports that he does not use drugs. Family History:   family history is not on file. REVIEW OF SYSTEMS:  As above in the HPI, otherwise negative. Denies headache, chest pain, nausea, vomit, diarrhea. PHYSICAL EXAM:    Vitals:  BP (!) 166/72   Pulse 109   Temp 99.3 °F (37.4 °C) (Oral)   Resp 22   Ht 5' 7\" (1.702 m)   Wt 177 lb 9.6 oz (80.6 kg)   SpO2 95%   BMI 27.82 kg/m²     General:  Awake, alert, oriented X 3. Well developed, well nourished, well groomed. No apparent distress. HEENT:  Normocephalic, atraumatic. Pupils equal, round, reactive to light. No scleral icterus. No conjunctival injection. Normal lips, teeth, and gums. No nasal discharge. Neck:  Supple  Heart:  RRR, no murmurs, gallops, rubs  Lungs:  CTA bilaterally, bilat symmetrical expansion, no wheeze, rales, or rhonchi  Abdomen: Bowel sounds present, soft, nontender, no masses, no organomegaly, no peritoneal signs.   (See yesterday's ER exam)  Extremities:  No clubbing, cyanosis, or edema  Skin:  Warm and dry, no open lesions or rash  Neuro:  Cranial nerves 2-12 intact, no focal deficits  Breast: deferred  Rectal: deferred  Genitalia:  deferred    LABS:  CBC:   Lab Results   Component Value Date    WBC 6.9 03/03/2020    RBC 4.18 03/03/2020    HGB 13.7 03/03/2020    HCT 40.4 03/03/2020    MCV 96.7 03/03/2020 MCH 32.8 03/03/2020    MCHC 33.9 03/03/2020    RDW 12.3 03/03/2020     03/03/2020    MPV 9.2 03/03/2020     CMP:    Lab Results   Component Value Date     03/03/2020    K 4.6 03/03/2020    K 3.7 03/02/2020    CL 95 03/03/2020    CO2 28 03/03/2020    BUN 11 03/03/2020    CREATININE 0.6 03/03/2020    GFRAA >60 03/03/2020    LABGLOM >60 03/03/2020    GLUCOSE 176 03/03/2020    PROT 6.8 03/02/2020    LABALBU 4.4 03/02/2020    CALCIUM 8.3 03/03/2020    BILITOT 0.7 03/02/2020    ALKPHOS 62 03/02/2020    AST 19 03/02/2020    ALT 24 03/02/2020     BMP:    Lab Results   Component Value Date     03/03/2020    K 4.6 03/03/2020    K 3.7 03/02/2020    CL 95 03/03/2020    CO2 28 03/03/2020    BUN 11 03/03/2020    LABALBU 4.4 03/02/2020    CREATININE 0.6 03/03/2020    CALCIUM 8.3 03/03/2020    GFRAA >60 03/03/2020    LABGLOM >60 03/03/2020    GLUCOSE 176 03/03/2020     Last 3 Troponin:    Lab Results   Component Value Date    TROPONINI <0.01 03/02/2020    TROPONINI <0.01 04/27/2019     ABG:    Lab Results   Component Value Date    PH 7.448 03/02/2020    PCO2 41.8 03/02/2020    PO2 79.8 03/02/2020    HCO3 28.3 03/02/2020    BE 3.8 03/02/2020    O2SAT 96.4 03/02/2020         ASSESSMENT:      Active Problems:    COPD with acute exacerbation ([facility] [address] Worsened/precipitated by Influenza A. Plan: Hydrate. Steroids. Resp tx. Hypertension  Plan: Control in progress. Anxiety  Plan: Compensated. Lactic acidosis  Plan: Resolution with hydration    Influenza A  Plan: Tamiflu started. Resp precautions     Hyperglycemia, drug-induced  Plan: Expect improvement with decrease in steroids and clinical status improvement. PLAN:    See orders.     Reida Form  6:52 AM  3/3/2020

## 2020-03-03 NOTE — PROGRESS NOTES
[facility] Quality Flow/Interdisciplinary Rounds Progress Note        Quality Flow Rounds held on March 3, 2020    Disciplines Attending:  Bedside Nurse, ,  and Nursing Unit Leadership    Joseph Meadows was admitted on 3/2/2020 11:23 AM    Anticipated Discharge Date:  Expected Discharge Date: 03/04/20    Disposition:    Alexander Score:  Alexander Scale Score: 23    Readmission Risk              Risk of Unplanned Readmission:        13           Discussed patient goal for the day, patient clinical progression, and barriers to discharge.   The following Goal(s) of the Day/Commitment(s) have been identified:  Other  wean O2      Paulino Patel  March 3, 2020

## 2020-03-03 NOTE — CARE COORDINATION
3/3/2020  Social Work Discharge Planning:Pt resides in a 1 story home with his spouse and is independent. Pt is currently on 2l o2 here and has none at home. Pt does have a nebulizer.  Electronically signed by ALYSE Dasilva on 3/3/2020 at 10:11 AM

## 2020-03-04 LAB
ANION GAP SERPL CALCULATED.3IONS-SCNC: 8 MMOL/L (ref 7–16)
BUN BLDV-MCNC: 13 MG/DL (ref 8–23)
CALCIUM SERPL-MCNC: 8.2 MG/DL (ref 8.6–10.2)
CHLORIDE BLD-SCNC: 96 MMOL/L (ref 98–107)
CO2: 28 MMOL/L (ref 22–29)
CREAT SERPL-MCNC: 0.6 MG/DL (ref 0.7–1.2)
GFR AFRICAN AMERICAN: >60
GFR NON-AFRICAN AMERICAN: >60 ML/MIN/1.73
GLUCOSE BLD-MCNC: 177 MG/DL (ref 74–99)
HCT VFR BLD CALC: 38.9 % (ref 37–54)
HEMOGLOBIN: 13.2 G/DL (ref 12.5–16.5)
MCH RBC QN AUTO: 32.9 PG (ref 26–35)
MCHC RBC AUTO-ENTMCNC: 33.9 % (ref 32–34.5)
MCV RBC AUTO: 97 FL (ref 80–99.9)
PDW BLD-RTO: 12 FL (ref 11.5–15)
PLATELET # BLD: 160 E9/L (ref 130–450)
PMV BLD AUTO: 9.4 FL (ref 7–12)
POTASSIUM SERPL-SCNC: 4.3 MMOL/L (ref 3.5–5)
RBC # BLD: 4.01 E12/L (ref 3.8–5.8)
SODIUM BLD-SCNC: 132 MMOL/L (ref 132–146)
WBC # BLD: 8.5 E9/L (ref 4.5–11.5)

## 2020-03-04 PROCEDURE — 80048 BASIC METABOLIC PNL TOTAL CA: CPT

## 2020-03-04 PROCEDURE — 96361 HYDRATE IV INFUSION ADD-ON: CPT

## 2020-03-04 PROCEDURE — 6370000000 HC RX 637 (ALT 250 FOR IP): Performed by: INTERNAL MEDICINE

## 2020-03-04 PROCEDURE — 6360000002 HC RX W HCPCS: Performed by: INTERNAL MEDICINE

## 2020-03-04 PROCEDURE — 36415 COLL VENOUS BLD VENIPUNCTURE: CPT

## 2020-03-04 PROCEDURE — 96376 TX/PRO/DX INJ SAME DRUG ADON: CPT

## 2020-03-04 PROCEDURE — 94640 AIRWAY INHALATION TREATMENT: CPT

## 2020-03-04 PROCEDURE — 2580000003 HC RX 258: Performed by: INTERNAL MEDICINE

## 2020-03-04 PROCEDURE — 2060000000 HC ICU INTERMEDIATE R&B

## 2020-03-04 PROCEDURE — 2580000003 HC RX 258

## 2020-03-04 PROCEDURE — 2700000000 HC OXYGEN THERAPY PER DAY

## 2020-03-04 PROCEDURE — 85027 COMPLETE CBC AUTOMATED: CPT

## 2020-03-04 PROCEDURE — 96372 THER/PROPH/DIAG INJ SC/IM: CPT

## 2020-03-04 PROCEDURE — 6360000002 HC RX W HCPCS: Performed by: CLINICAL NURSE SPECIALIST

## 2020-03-04 RX ORDER — SODIUM CHLORIDE 0.9 % (FLUSH) 0.9 %
10 SYRINGE (ML) INJECTION PRN
Status: DISCONTINUED | OUTPATIENT
Start: 2020-03-04 | End: 2020-03-11 | Stop reason: HOSPADM

## 2020-03-04 RX ORDER — SODIUM CHLORIDE 0.9 % (FLUSH) 0.9 %
10 SYRINGE (ML) INJECTION 2 TIMES DAILY
Status: DISCONTINUED | OUTPATIENT
Start: 2020-03-04 | End: 2020-03-11 | Stop reason: HOSPADM

## 2020-03-04 RX ORDER — LISINOPRIL 20 MG/1
20 TABLET ORAL DAILY
Status: DISCONTINUED | OUTPATIENT
Start: 2020-03-04 | End: 2020-03-11 | Stop reason: HOSPADM

## 2020-03-04 RX ORDER — AMLODIPINE BESYLATE 10 MG/1
10 TABLET ORAL DAILY
Status: DISCONTINUED | OUTPATIENT
Start: 2020-03-04 | End: 2020-03-11 | Stop reason: HOSPADM

## 2020-03-04 RX ORDER — ALBUTEROL SULFATE 2.5 MG/3ML
2.5 SOLUTION RESPIRATORY (INHALATION) EVERY 4 HOURS PRN
Status: DISCONTINUED | OUTPATIENT
Start: 2020-03-04 | End: 2020-03-06 | Stop reason: DRUGHIGH

## 2020-03-04 RX ADMIN — METHYLPREDNISOLONE SODIUM SUCCINATE 60 MG: 125 INJECTION, POWDER, LYOPHILIZED, FOR SOLUTION INTRAMUSCULAR; INTRAVENOUS at 11:45

## 2020-03-04 RX ADMIN — METHYLPREDNISOLONE SODIUM SUCCINATE 60 MG: 125 INJECTION, POWDER, LYOPHILIZED, FOR SOLUTION INTRAMUSCULAR; INTRAVENOUS at 06:41

## 2020-03-04 RX ADMIN — METHYLPREDNISOLONE SODIUM SUCCINATE 60 MG: 125 INJECTION, POWDER, LYOPHILIZED, FOR SOLUTION INTRAMUSCULAR; INTRAVENOUS at 17:25

## 2020-03-04 RX ADMIN — Medication 10 ML: at 07:44

## 2020-03-04 RX ADMIN — TAMSULOSIN HYDROCHLORIDE 0.8 MG: 0.4 CAPSULE ORAL at 22:04

## 2020-03-04 RX ADMIN — ENOXAPARIN SODIUM 40 MG: 40 INJECTION SUBCUTANEOUS at 07:40

## 2020-03-04 RX ADMIN — OSELTAMIVIR PHOSPHATE 75 MG: 75 CAPSULE ORAL at 22:04

## 2020-03-04 RX ADMIN — ALBUTEROL SULFATE 2.5 MG: 2.5 SOLUTION RESPIRATORY (INHALATION) at 18:47

## 2020-03-04 RX ADMIN — IPRATROPIUM BROMIDE AND ALBUTEROL SULFATE 1 AMPULE: .5; 3 SOLUTION RESPIRATORY (INHALATION) at 09:36

## 2020-03-04 RX ADMIN — GUAIFENESIN 1200 MG: 1200 TABLET, EXTENDED RELEASE ORAL at 12:36

## 2020-03-04 RX ADMIN — ALBUTEROL SULFATE 2.5 MG: 2.5 SOLUTION RESPIRATORY (INHALATION) at 22:28

## 2020-03-04 RX ADMIN — GABAPENTIN 600 MG: 300 CAPSULE ORAL at 22:04

## 2020-03-04 RX ADMIN — WATER 10 ML: 1 INJECTION INTRAMUSCULAR; INTRAVENOUS; SUBCUTANEOUS at 11:45

## 2020-03-04 RX ADMIN — OSELTAMIVIR PHOSPHATE 75 MG: 75 CAPSULE ORAL at 07:42

## 2020-03-04 RX ADMIN — LISINOPRIL 20 MG: 20 TABLET ORAL at 07:42

## 2020-03-04 RX ADMIN — METHYLPREDNISOLONE SODIUM SUCCINATE 60 MG: 125 INJECTION, POWDER, LYOPHILIZED, FOR SOLUTION INTRAMUSCULAR; INTRAVENOUS at 22:04

## 2020-03-04 RX ADMIN — AMLODIPINE BESYLATE 10 MG: 10 TABLET ORAL at 07:42

## 2020-03-04 RX ADMIN — Medication 10 ML: at 22:05

## 2020-03-04 ASSESSMENT — PAIN SCALES - GENERAL
PAINLEVEL_OUTOF10: 0

## 2020-03-04 NOTE — PROGRESS NOTES
Occupational Therapy    OCCUPATIONAL THERAPY INITIAL EVALUATION      Date:3/4/2020  Patient Name: Rpuinder Payne  MRN: [id number]  : 1950  Room: [address] [place] [address]    Referring Provider:  Flakita Bertrand MD    Evaluating OT: Bernardo Huddleston OTR/L [id number]    AM-PAC Daily Activity Raw Score:     Comments:  Eval only. Patient able to complete ADL activity. No skilled OT needs at this time    Diagnosis: COPD with exac    Pertinent Medical History: neuropathic pain, asthma, back surgery    Precautions:  Falls, O2, Droplet      Home Living: Pt lives with wife, 1 story, steps to enter   Prior Level of Function: Independent with ADLs , Independent  with IADLs; ambulated no device     Pain Level: no pain this;   Cognition:  Ox3, alert and conversing     Judgement/safety:  Fair+              Memory WFLs     Functional Assessment:   Initial Eval Status  Date: 3/4/20   Feeding Independent    Grooming Independent    UB Dressing Independent    LB Dressing Mod I  Able to don shoes seated in chair    Bathing    Toileting Independent    Bed Mobility  Out of bed upon entry    Functional Transfers Independent   Sit-stand from chair    Functional Mobility Mod I, no device,O2  Up walking in room    Balance Sitting:     Static:  Independent     Standing: Independent   Standing at bedside, taking medication and colleting items into bag    Activity Tolerance Fair -   Rest breaks with light activity    Visual/  Perceptual Glasses: no             Right  hand dominant    Strength ROM Additional Info:    RUE  4-/5  WFL good  and wfl FMC/dexterity noted during ADL tasks       LUE 4-/5  WFL good  and wfl FMC/dexterity noted during ADL tasks         Hearing: [facility]   Sensation:  No c/o numbness or tingling     Comments:   Upon arrival, patient sitting in chair. At end of session, patient transported out of room  with call light and phone within reach, all lines and tubes intact.   .      Patient / Family Goal: return home       Low Evaluation   [id number]      Evaluation time includes thorough review of current medical information, gathering information on past medical history/social history and prior level of function, completion of standardized testing/informal observation of tasks, assessment of data, and development of POC/Goals      Yvan Cabrera OTR/L [id number]

## 2020-03-04 NOTE — CONSULTS
hemoptysis. He denies any  palpitation. He denies any diarrhea. MEDICATIONS:  Per medication reconciliation list, he uses Trelegy at  home. He is not using home O2. He is using as well prednisone   between 10 to 20 mg p.o. daily. It was recently increased. He also is  taking lorazepam as needed and Neurontin. PAST MEDICAL HISTORY:  Significant for hypertension and he has severe  COPD with some asthmatic component. SOCIAL HISTORY:  He is a smoker. He is still smoking a few cigarettes a  day. He is advised and at this time he is going to stop. He lives with  his wife. He works full-time. FAMILY HISTORY:  Essentially noncontributory. Denies any recent sick contacts so far to the patient's knowledge. PHYSICAL EXAMINATION:  GENERAL:  The patient is not in respiratory distress, able to speak in  full sentences. VITAL SIGNS:  At this time, blood pressure 125/75. Initially, it was  high at 166/70. On 2 L of O2, he is 97%. He was 87% on room air. Afebrile with 99 degrees T-max. Heart rate between 100 to 55 beats per  minute. HEENT:  Oral mucosa moist.  There is no thrush seen. NECK:  Supple. LUNGS:  Auscultation shows bilateral expiratory wheezes throughout both  remi-lung fields. HEART:  S1 and S2 regular. No gallop. No murmur. ABDOMEN:  Soft and nontender. EXTREMITIES:  Symmetric. No edema. No varix, cyanosis, or clubbing. NEUROLOGIC:  Remains intact. SKIN:  Warm and dry. No ulcers. IMAGING DATA:  X-ray did not show any acute pathology. LABORATORY DATA:  Blood work today shows sodium 131, potassium 4.6, BUN  and creatinine within normal limits. Lactic acid was 2.9. It went up  to 4.5, down to 1.3. CBC within normal limits. Influenza A was  positive. Blood gas, which was done on 2 L, showed pH of 7.44, pCO2 of  41, pO2 of 79, bicarb was 28, 96% sat. ASSESSMENT AND PLAN:  At this time, assessment and plan are as follows:    The patient presented with acute

## 2020-03-04 NOTE — PROGRESS NOTES
Pulmo att'n appreciated. Subjective: Improved, but, still weak and dyspneic. The patient is awake and alert. No problems overnight. Denies chest pain, angina, and dyspnea. Denies abdominal pain. Tolerating diet. No nausea or vomiting. Objective:  BP elevated. BP (!) 171/81   Pulse 78   Temp 97.8 °F (36.6 °C) (Oral)   Resp 20   Ht 5' 7\" (1.702 m)   Wt 176 lb 3.2 oz (79.9 kg)   SpO2 98%   BMI 27.60 kg/m²     Heart:  RRR, no murmurs, gallops, or rubs. Lungs:  CTA bilaterally, no wheeze, rales or rhonchi. Protracted exp phase. Abd: bowel sounds present, nontender, nondistended, no masses  Extrem:  No clubbing, cyanosis, or edema  Ora. Mucosa nl  Trach midline  MM str symmetric  Neuro:  No focal deficits. CBC:   Lab Results   Component Value Date    WBC 8.5 03/04/2020    RBC 4.01 03/04/2020    HGB 13.2 03/04/2020    HCT 38.9 03/04/2020    MCV 97.0 03/04/2020    MCH 32.9 03/04/2020    MCHC 33.9 03/04/2020    RDW 12.0 03/04/2020     03/04/2020    MPV 9.4 03/04/2020     BMP:    Lab Results   Component Value Date     03/04/2020    K 4.3 03/04/2020    K 3.7 03/02/2020    CL 96 03/04/2020    CO2 28 03/04/2020    BUN 13 03/04/2020    LABALBU 4.4 03/02/2020    CREATININE 0.6 03/04/2020    CALCIUM 8.2 03/04/2020    GFRAA >60 03/04/2020    LABGLOM >60 03/04/2020    GLUCOSE 177 03/04/2020        Assessment:  Tx continues. As he is taking fluids, will DC IV. Patient Active Problem List   Diagnosis    COPD with acute exacerbation ([address]    Hypertension    Anxiety    Lactic acidosis    Influenza A    Hyperglycemia, drug-induced       Plan:  GMF OK. DC IV fluids.           Sandra Wright  6:44 AM  3/4/2020

## 2020-03-04 NOTE — PROGRESS NOTES
Pulmonary Progress Note    Admit Date: 3/2/2020                            PCP: Andrews Sanders MD  Active Problems:    COPD with acute exacerbation ([facility] [address]    Hypertension    Anxiety    Lactic acidosis    Influenza A    Hyperglycemia, drug-induced  Resolved Problems:    * No resolved hospital problems. *      Subjective:  Resting in bed on 3L NC. Feels very dyspneic on exertion. Occasional dry cough. Medications:       amLODIPine  10 mg Oral Daily    lisinopril  20 mg Oral Daily    sodium chloride flush  10 mL Intravenous BID    methylPREDNISolone  60 mg Intravenous Q6H    ipratropium-albuterol  1 ampule Inhalation 4x daily    fluticasone-umeclidin-vilant  1 puff Inhalation Daily    oseltamivir  75 mg Oral BID    tamsulosin  0.8 mg Oral Daily    enoxaparin  40 mg Subcutaneous Daily    guaiFENesin  1,200 mg Oral Q12H       Vitals:  VITALS:  BP (!) 177/85   Pulse 92   Temp 97.8 °F (36.6 °C) (Oral)   Resp 18   Ht 5' 7\" (1.702 m)   Wt 176 lb 3.2 oz (79.9 kg)   SpO2 97%   BMI 27.60 kg/m²   24HR INTAKE/OUTPUT:      Intake/Output Summary (Last 24 hours) at 3/4/2020 0939  Last data filed at 3/4/2020 0849  Gross per 24 hour   Intake 3028 ml   Output --   Net 3028 ml     CURRENT PULSE OXIMETRY:  SpO2: 97 %  24HR PULSE OXIMETRY RANGE:  SpO2  Av %  Min: 93 %  Max: 98 %  CVP:    VENT SETTINGS:      Additional Respiratory  Assessments  Pulse: 92  Resp: 18  SpO2: 97 %      EXAM:  General: Alert, in NAD  ENT: No discharge. Pharynx clear. membranes moist   Neck: Supple, Trachea midline. Resp: No accessory muscle use. Non-labored. Lungs tight with exp wheezing throughout   CV: Regular rate. Regular rhythm. No murmur . No edema. ABD: Non-tender. Non-distended. Soft, round . Normal bowel sounds. Skin: Warm and dry. M/S: No cyanosis. No joint deformity. No clubbing. Neuro: Awake. Follows commands. O x 3, APODACA  Psych:  calm and interactive     I/O: I/O last 3 completed shifts:   In: 3028 [P.O.:780; Overlying EKG leads and oxygen tubing are present. Mild pulmonary emphysematous air trapping without evidence of acute cardiopulmonary pathology.         Assessment:  · + influenza A  · Acute exacerbation COPD  · Acute hypoxic respiratory failure   · Lactic acidosis-resolved       Plan:  · Continue O2, currently on 3L NC, baseline is room air at home, wean down as able  · Continue Nebs, Stop duoneb c/o thick mucous, add albuterol, ok for trelegy  · Continue steroid 60 mg Q6H, not ready to reduce   · Continue tamiflu x 5 days total   · Isolation   · Continue mucinex         Electronically signed by KELECHI Vidales on 3/4/2020 at 9:39 AM    See and evaluated in am  Agree with above A/P  Hope to cut down Steroids in am

## 2020-03-05 LAB
ANION GAP SERPL CALCULATED.3IONS-SCNC: 11 MMOL/L (ref 7–16)
BUN BLDV-MCNC: 14 MG/DL (ref 8–23)
CALCIUM SERPL-MCNC: 8.5 MG/DL (ref 8.6–10.2)
CHLORIDE BLD-SCNC: 91 MMOL/L (ref 98–107)
CO2: 29 MMOL/L (ref 22–29)
CREAT SERPL-MCNC: 0.5 MG/DL (ref 0.7–1.2)
GFR AFRICAN AMERICAN: >60
GFR NON-AFRICAN AMERICAN: >60 ML/MIN/1.73
GLUCOSE BLD-MCNC: 248 MG/DL (ref 74–99)
HCT VFR BLD CALC: 41.1 % (ref 37–54)
HEMOGLOBIN: 13.8 G/DL (ref 12.5–16.5)
MCH RBC QN AUTO: 32.5 PG (ref 26–35)
MCHC RBC AUTO-ENTMCNC: 33.6 % (ref 32–34.5)
MCV RBC AUTO: 96.7 FL (ref 80–99.9)
PDW BLD-RTO: 11.9 FL (ref 11.5–15)
PLATELET # BLD: 163 E9/L (ref 130–450)
PMV BLD AUTO: 9.5 FL (ref 7–12)
POTASSIUM SERPL-SCNC: 3.7 MMOL/L (ref 3.5–5)
RBC # BLD: 4.25 E12/L (ref 3.8–5.8)
SODIUM BLD-SCNC: 131 MMOL/L (ref 132–146)
WBC # BLD: 9.6 E9/L (ref 4.5–11.5)

## 2020-03-05 PROCEDURE — 2580000003 HC RX 258: Performed by: INTERNAL MEDICINE

## 2020-03-05 PROCEDURE — 6370000000 HC RX 637 (ALT 250 FOR IP): Performed by: INTERNAL MEDICINE

## 2020-03-05 PROCEDURE — 2060000000 HC ICU INTERMEDIATE R&B

## 2020-03-05 PROCEDURE — 94640 AIRWAY INHALATION TREATMENT: CPT

## 2020-03-05 PROCEDURE — 85027 COMPLETE CBC AUTOMATED: CPT

## 2020-03-05 PROCEDURE — 80048 BASIC METABOLIC PNL TOTAL CA: CPT

## 2020-03-05 PROCEDURE — 6360000002 HC RX W HCPCS: Performed by: INTERNAL MEDICINE

## 2020-03-05 PROCEDURE — 2580000003 HC RX 258

## 2020-03-05 PROCEDURE — 36415 COLL VENOUS BLD VENIPUNCTURE: CPT

## 2020-03-05 PROCEDURE — 6360000002 HC RX W HCPCS: Performed by: CLINICAL NURSE SPECIALIST

## 2020-03-05 PROCEDURE — 2700000000 HC OXYGEN THERAPY PER DAY

## 2020-03-05 RX ADMIN — LORAZEPAM 0.5 MG: 0.5 TABLET ORAL at 00:36

## 2020-03-05 RX ADMIN — LISINOPRIL 20 MG: 20 TABLET ORAL at 08:43

## 2020-03-05 RX ADMIN — GUAIFENESIN 1200 MG: 1200 TABLET, EXTENDED RELEASE ORAL at 09:31

## 2020-03-05 RX ADMIN — WATER 10 ML: 1 INJECTION INTRAMUSCULAR; INTRAVENOUS; SUBCUTANEOUS at 12:32

## 2020-03-05 RX ADMIN — Medication 10 ML: at 08:44

## 2020-03-05 RX ADMIN — METHYLPREDNISOLONE SODIUM SUCCINATE 60 MG: 125 INJECTION, POWDER, LYOPHILIZED, FOR SOLUTION INTRAMUSCULAR; INTRAVENOUS at 04:49

## 2020-03-05 RX ADMIN — METHYLPREDNISOLONE SODIUM SUCCINATE 60 MG: 125 INJECTION, POWDER, LYOPHILIZED, FOR SOLUTION INTRAMUSCULAR; INTRAVENOUS at 18:01

## 2020-03-05 RX ADMIN — AMLODIPINE BESYLATE 10 MG: 10 TABLET ORAL at 08:43

## 2020-03-05 RX ADMIN — ALBUTEROL SULFATE 2.5 MG: 2.5 SOLUTION RESPIRATORY (INHALATION) at 16:19

## 2020-03-05 RX ADMIN — METHYLPREDNISOLONE SODIUM SUCCINATE 60 MG: 125 INJECTION, POWDER, LYOPHILIZED, FOR SOLUTION INTRAMUSCULAR; INTRAVENOUS at 22:02

## 2020-03-05 RX ADMIN — TAMSULOSIN HYDROCHLORIDE 0.8 MG: 0.4 CAPSULE ORAL at 22:02

## 2020-03-05 RX ADMIN — LORAZEPAM 0.5 MG: 0.5 TABLET ORAL at 23:55

## 2020-03-05 RX ADMIN — OSELTAMIVIR PHOSPHATE 75 MG: 75 CAPSULE ORAL at 08:43

## 2020-03-05 RX ADMIN — GUAIFENESIN 1200 MG: 1200 TABLET, EXTENDED RELEASE ORAL at 22:02

## 2020-03-05 RX ADMIN — ENOXAPARIN SODIUM 40 MG: 40 INJECTION SUBCUTANEOUS at 08:43

## 2020-03-05 RX ADMIN — GABAPENTIN 600 MG: 300 CAPSULE ORAL at 22:02

## 2020-03-05 RX ADMIN — ALBUTEROL SULFATE 2.5 MG: 2.5 SOLUTION RESPIRATORY (INHALATION) at 11:15

## 2020-03-05 RX ADMIN — Medication 10 ML: at 22:00

## 2020-03-05 RX ADMIN — LORAZEPAM 0.5 MG: 0.5 TABLET ORAL at 08:53

## 2020-03-05 RX ADMIN — ALBUTEROL SULFATE 2.5 MG: 2.5 SOLUTION RESPIRATORY (INHALATION) at 04:08

## 2020-03-05 RX ADMIN — ALBUTEROL SULFATE 2.5 MG: 2.5 SOLUTION RESPIRATORY (INHALATION) at 21:49

## 2020-03-05 RX ADMIN — METHYLPREDNISOLONE SODIUM SUCCINATE 60 MG: 125 INJECTION, POWDER, LYOPHILIZED, FOR SOLUTION INTRAMUSCULAR; INTRAVENOUS at 12:32

## 2020-03-05 RX ADMIN — GABAPENTIN 600 MG: 300 CAPSULE ORAL at 08:53

## 2020-03-05 RX ADMIN — OSELTAMIVIR PHOSPHATE 75 MG: 75 CAPSULE ORAL at 21:30

## 2020-03-05 ASSESSMENT — PAIN SCALES - GENERAL
PAINLEVEL_OUTOF10: 0
PAINLEVEL_OUTOF10: 0

## 2020-03-05 NOTE — PROGRESS NOTES
Pulmonary Progress Note    Admit Date: 3/2/2020                            PCP: Calvert Kussmaul, MD  Active Problems:    COPD with acute exacerbation ([address]    Hypertension    Anxiety    Lactic acidosis    Influenza A    Hyperglycemia, drug-induced  Resolved Problems:    * No resolved hospital problems. *      Subjective:  Resting in bed on 3L NC. Feels much better, less cough. Afebrile. Medications:       amLODIPine  10 mg Oral Daily    lisinopril  20 mg Oral Daily    sodium chloride flush  10 mL Intravenous BID    methylPREDNISolone  60 mg Intravenous Q6H    fluticasone-umeclidin-vilant  1 puff Inhalation Daily    oseltamivir  75 mg Oral BID    tamsulosin  0.8 mg Oral Daily    enoxaparin  40 mg Subcutaneous Daily    guaiFENesin  1,200 mg Oral Q12H       Vitals:  VITALS:  BP (!) 184/74   Pulse 76   Temp 97.8 °F (36.6 °C) (Oral)   Resp 18   Ht 5' 7\" (1.702 m)   Wt 176 lb 3.2 oz (79.9 kg)   SpO2 98%   BMI 27.60 kg/m²   24HR INTAKE/OUTPUT:      Intake/Output Summary (Last 24 hours) at 3/5/2020 1659  Last data filed at 3/5/2020 1500  Gross per 24 hour   Intake 600 ml   Output --   Net 600 ml     CURRENT PULSE OXIMETRY:  SpO2: 98 %  24HR PULSE OXIMETRY RANGE:  SpO2  Av.3 %  Min: 97 %  Max: 98 % 3-4 lts  CVP:    VENT SETTINGS:      Additional Respiratory  Assessments  Pulse: 76  Resp: 18  SpO2: 98 %      EXAM:  General: Alert, in NAD  ENT: No discharge. Pharynx clear. membranes moist. Not thrush  Neck: Supple, Trachea midline. Resp: No accessory muscle use. Non-labored. Lungs t with less exp wheezing throughout   CV: Regular rate. Regular rhythm. No murmur . No edema. ABD: Non-tender. Non-distended. Soft, round . Normal bowel sounds. Skin: Warm and dry. M/S: No cyanosis. No joint deformity. No clubbing. Neuro: Awake. Follows commands. O x 3, APODACA  Psych:  calm and interactive     I/O: I/O last 3 completed shifts: In: 600 [P.O.:600]  Out: -   No intake/output data recorded. Results:  CBC:   Recent Labs     20  0358 20  0300 20  0355   WBC 6.9 8.5 9.6   HGB 13.7 13.2 13.8   HCT 40.4 38.9 41.1   MCV 96.7 97.0 96.7    160 163     BMP:   Recent Labs     20  0358 20  0300 20  0355   * 132 131*   K 4.6 4.3 3.7   CL 95* 96* 91*   CO2 28 28 29   BUN 11 13 14   CREATININE 0.6* 0.6* 0.5*     LFT:   No results for input(s): ALKPHOS, ALT, AST, PROT, BILITOT, BILIDIR, LABALBU in the last 72 hours. PT/INR:   No results for input(s): PROTIME, INR in the last 72 hours. Results for Georgette Young (MRN [id number]) as of 3/4/2020 09:40   Ref. Range 3/2/2020 12:25   Pro-BNP Latest Ref Range: 0 - 125 pg/mL 69       Procalcitonin: No results for input(s): PROCAL in the last 72 hours. Cultures:  No results for input(s): CULTRESP in the last 72 hours. Results for Georgette Young (MRN [id number]) as of 3/4/2020 09:40   Ref. Range 3/2/2020 12:25   RAPID INFLUENZA A/B ANTIGENS Unknown Rpt (A)   Influenza A by PCR Latest Ref Range: Not Detected  DETECTED (A)   Influenza B by PCR Latest Ref Range: Not Detected  Not Detected         ABG:   No results for input(s): PH, PO2, PCO2, HCO3, BE, O2SAT in the last 72 hours. Films:  Xr Chest Portable    Result Date: 3/2/2020  Patient MRN: [id number] : 1950 Age:  71 years Gender: Male Order Date: 3/2/2020 12:00 PM Exam: XR CHEST PORTABLE Number of Images: 1 view Indication:  Shortness of breath, COPD history Comparison: 2 view upright chest study 2019 Findings: The lungs are symmetrically and mildly hyperinflated, and are clear. There is no evidence of pneumothorax or pleural effusion. Cardiovascular shadows show aortic intimal calcification, but otherwise are normal in appearance. There is no evidence of cardiac enlargement or decompensation. Skeletal structures show no evidence of acute pathology. Overlying EKG leads and oxygen tubing are present.      Mild pulmonary emphysematous air trapping without

## 2020-03-05 NOTE — CARE COORDINATION
Met w/ patient. Explained role of  and plan of care. Initial assessment completed per ER CM. Currently on O2 3lNC-does not wear home O2. DME list offered and declined- requesting [facility] if home O2 needed at discharge- Will need O2 testing prior to discharge and order if needed- DME not notified of referral. Has nebulizer. + influenza A.  on iv steroid. Per pt, plan is to return home on discharge.  Will follow Jan Jimenez

## 2020-03-05 NOTE — PROGRESS NOTES
[facility] Quality Flow/Interdisciplinary Rounds Progress Note        Quality Flow Rounds held on March 5, 2020    Disciplines Attending:  Bedside Nurse, ,  and Nursing Unit Leadership    Georgi Heath was admitted on 3/2/2020 11:23 AM    Anticipated Discharge Date:  Expected Discharge Date: 03/06/20    Disposition:    Alexander Score:  Alexander Scale Score: 22    Readmission Risk              Risk of Unplanned Readmission:        13           Discussed patient goal for the day, patient clinical progression, and barriers to discharge.   The following Goal(s) of the Day/Commitment(s) have been identified:  Wean steroids, Monitor Oxygen de [place]  March 5, 2020

## 2020-03-05 NOTE — PROGRESS NOTES
Subjective:  Generally improving---slowly. The patient is awake and alert. No problems overnight. Denies chest pain, angina,\    Denies abdominal pain. Tolerating diet. No nausea or vomiting. Objective:    BP (!) 169/74   Pulse 66   Temp 98.3 °F (36.8 °C) (Oral)   Resp 18   Ht 5' 7\" (1.702 m)   Wt 176 lb 3.2 oz (79.9 kg)   SpO2 97%   BMI 27.60 kg/m²     Heart:  RRR, no murmurs, gallops, or rubs. Lungs:  CTA bilaterally, no wheeze, rales or rhonchi  Abd: bowel sounds present, nontender, nondistended, no masses  Extrem:  No clubbing, cyanosis, or edema    CBC:   Lab Results   Component Value Date    WBC 9.6 03/05/2020    RBC 4.25 03/05/2020    HGB 13.8 03/05/2020    HCT 41.1 03/05/2020    MCV 96.7 03/05/2020    MCH 32.5 03/05/2020    MCHC 33.6 03/05/2020    RDW 11.9 03/05/2020     03/05/2020    MPV 9.5 03/05/2020     BMP:    Lab Results   Component Value Date     03/05/2020    K 3.7 03/05/2020    K 3.7 03/02/2020    CL 91 03/05/2020    CO2 29 03/05/2020    BUN 14 03/05/2020    LABALBU 4.4 03/02/2020    CREATININE 0.5 03/05/2020    CALCIUM 8.5 03/05/2020    GFRAA >60 03/05/2020    LABGLOM >60 03/05/2020    GLUCOSE 248 03/05/2020        Assessment:    Patient Active Problem List   Diagnosis    COPD with acute exacerbation ([facility] [address]    Hypertension    Anxiety    Lactic acidosis    Influenza A    Hyperglycemia, drug-induced       Plan:  Wean steroids. Continue resp tx. O2 assess.         Drea Sic  6:56 AM  3/5/2020

## 2020-03-06 LAB
ANION GAP SERPL CALCULATED.3IONS-SCNC: 10 MMOL/L (ref 7–16)
BUN BLDV-MCNC: 13 MG/DL (ref 8–23)
CALCIUM SERPL-MCNC: 8.9 MG/DL (ref 8.6–10.2)
CHLORIDE BLD-SCNC: 88 MMOL/L (ref 98–107)
CO2: 34 MMOL/L (ref 22–29)
CREAT SERPL-MCNC: 0.5 MG/DL (ref 0.7–1.2)
GFR AFRICAN AMERICAN: >60
GFR NON-AFRICAN AMERICAN: >60 ML/MIN/1.73
GLUCOSE BLD-MCNC: 189 MG/DL (ref 74–99)
HCT VFR BLD CALC: 43.9 % (ref 37–54)
HEMOGLOBIN: 14.7 G/DL (ref 12.5–16.5)
MCH RBC QN AUTO: 32 PG (ref 26–35)
MCHC RBC AUTO-ENTMCNC: 33.5 % (ref 32–34.5)
MCV RBC AUTO: 95.4 FL (ref 80–99.9)
PDW BLD-RTO: 11.7 FL (ref 11.5–15)
PLATELET # BLD: 145 E9/L (ref 130–450)
PMV BLD AUTO: 9.3 FL (ref 7–12)
POTASSIUM SERPL-SCNC: 4 MMOL/L (ref 3.5–5)
RBC # BLD: 4.6 E12/L (ref 3.8–5.8)
SODIUM BLD-SCNC: 132 MMOL/L (ref 132–146)
WBC # BLD: 8.3 E9/L (ref 4.5–11.5)

## 2020-03-06 PROCEDURE — 80048 BASIC METABOLIC PNL TOTAL CA: CPT

## 2020-03-06 PROCEDURE — 2700000000 HC OXYGEN THERAPY PER DAY

## 2020-03-06 PROCEDURE — 2580000003 HC RX 258: Performed by: INTERNAL MEDICINE

## 2020-03-06 PROCEDURE — 6360000002 HC RX W HCPCS: Performed by: INTERNAL MEDICINE

## 2020-03-06 PROCEDURE — 6370000000 HC RX 637 (ALT 250 FOR IP): Performed by: INTERNAL MEDICINE

## 2020-03-06 PROCEDURE — 1200000000 HC SEMI PRIVATE

## 2020-03-06 PROCEDURE — 6360000002 HC RX W HCPCS: Performed by: CLINICAL NURSE SPECIALIST

## 2020-03-06 PROCEDURE — 85027 COMPLETE CBC AUTOMATED: CPT

## 2020-03-06 PROCEDURE — 36415 COLL VENOUS BLD VENIPUNCTURE: CPT

## 2020-03-06 PROCEDURE — 6360000002 HC RX W HCPCS: Performed by: NURSE PRACTITIONER

## 2020-03-06 PROCEDURE — 2580000003 HC RX 258

## 2020-03-06 PROCEDURE — 94640 AIRWAY INHALATION TREATMENT: CPT

## 2020-03-06 RX ORDER — ALBUTEROL SULFATE 2.5 MG/3ML
2.5 SOLUTION RESPIRATORY (INHALATION) 4 TIMES DAILY
Status: DISCONTINUED | OUTPATIENT
Start: 2020-03-06 | End: 2020-03-07

## 2020-03-06 RX ORDER — CLONIDINE HYDROCHLORIDE 0.1 MG/1
0.1 TABLET ORAL 2 TIMES DAILY
Status: DISCONTINUED | OUTPATIENT
Start: 2020-03-06 | End: 2020-03-11 | Stop reason: HOSPADM

## 2020-03-06 RX ORDER — METHYLPREDNISOLONE SODIUM SUCCINATE 40 MG/ML
40 INJECTION, POWDER, LYOPHILIZED, FOR SOLUTION INTRAMUSCULAR; INTRAVENOUS EVERY 8 HOURS
Status: DISCONTINUED | OUTPATIENT
Start: 2020-03-06 | End: 2020-03-07

## 2020-03-06 RX ADMIN — CLONIDINE HYDROCHLORIDE 0.1 MG: 0.1 TABLET ORAL at 20:43

## 2020-03-06 RX ADMIN — Medication 10 ML: at 09:03

## 2020-03-06 RX ADMIN — SODIUM CHLORIDE, PRESERVATIVE FREE 10 ML: 5 INJECTION INTRAVENOUS at 04:41

## 2020-03-06 RX ADMIN — OSELTAMIVIR PHOSPHATE 75 MG: 75 CAPSULE ORAL at 09:03

## 2020-03-06 RX ADMIN — ALBUTEROL SULFATE 2.5 MG: 2.5 SOLUTION RESPIRATORY (INHALATION) at 05:11

## 2020-03-06 RX ADMIN — METHYLPREDNISOLONE SODIUM SUCCINATE 40 MG: 40 INJECTION, POWDER, LYOPHILIZED, FOR SOLUTION INTRAMUSCULAR; INTRAVENOUS at 20:44

## 2020-03-06 RX ADMIN — METHYLPREDNISOLONE SODIUM SUCCINATE 40 MG: 40 INJECTION, POWDER, LYOPHILIZED, FOR SOLUTION INTRAMUSCULAR; INTRAVENOUS at 12:33

## 2020-03-06 RX ADMIN — GABAPENTIN 300 MG: 300 CAPSULE ORAL at 09:03

## 2020-03-06 RX ADMIN — GUAIFENESIN 1200 MG: 1200 TABLET, EXTENDED RELEASE ORAL at 20:43

## 2020-03-06 RX ADMIN — ALBUTEROL SULFATE 2.5 MG: 2.5 SOLUTION RESPIRATORY (INHALATION) at 16:16

## 2020-03-06 RX ADMIN — ALBUTEROL SULFATE 2.5 MG: 2.5 SOLUTION RESPIRATORY (INHALATION) at 12:56

## 2020-03-06 RX ADMIN — ENOXAPARIN SODIUM 40 MG: 40 INJECTION SUBCUTANEOUS at 09:02

## 2020-03-06 RX ADMIN — GABAPENTIN 600 MG: 300 CAPSULE ORAL at 20:42

## 2020-03-06 RX ADMIN — Medication 10 ML: at 20:45

## 2020-03-06 RX ADMIN — CLONIDINE HYDROCHLORIDE 0.1 MG: 0.1 TABLET ORAL at 09:03

## 2020-03-06 RX ADMIN — WATER 10 ML: 1 INJECTION INTRAMUSCULAR; INTRAVENOUS; SUBCUTANEOUS at 20:44

## 2020-03-06 RX ADMIN — GUAIFENESIN 1200 MG: 1200 TABLET, EXTENDED RELEASE ORAL at 09:03

## 2020-03-06 RX ADMIN — ALBUTEROL SULFATE 2.5 MG: 2.5 SOLUTION RESPIRATORY (INHALATION) at 20:13

## 2020-03-06 RX ADMIN — TAMSULOSIN HYDROCHLORIDE 0.8 MG: 0.4 CAPSULE ORAL at 20:42

## 2020-03-06 RX ADMIN — AMLODIPINE BESYLATE 10 MG: 10 TABLET ORAL at 09:03

## 2020-03-06 RX ADMIN — LORAZEPAM 0.5 MG: 0.5 TABLET ORAL at 09:04

## 2020-03-06 RX ADMIN — METHYLPREDNISOLONE SODIUM SUCCINATE 60 MG: 125 INJECTION, POWDER, LYOPHILIZED, FOR SOLUTION INTRAMUSCULAR; INTRAVENOUS at 04:41

## 2020-03-06 RX ADMIN — ALBUTEROL SULFATE 2.5 MG: 2.5 SOLUTION RESPIRATORY (INHALATION) at 09:48

## 2020-03-06 RX ADMIN — LISINOPRIL 20 MG: 20 TABLET ORAL at 09:02

## 2020-03-06 RX ADMIN — OSELTAMIVIR PHOSPHATE 75 MG: 75 CAPSULE ORAL at 20:43

## 2020-03-06 ASSESSMENT — PAIN SCALES - GENERAL
PAINLEVEL_OUTOF10: 0

## 2020-03-06 NOTE — PROGRESS NOTES
[facility] Quality Flow/Interdisciplinary Rounds Progress Note        Quality Flow Rounds held on March 6, 2020    Disciplines Attending:  Bedside Nurse, ,  and Nursing Unit Leadership    Lyn Nation was admitted on 3/2/2020 11:23 AM    Anticipated Discharge Date:  Expected Discharge Date: 03/06/20    Disposition:    Alexander Score:  Alexander Scale Score: 21    Readmission Risk              Risk of Unplanned Readmission:        12           Discussed patient goal for the day, patient clinical progression, and barriers to discharge. The following Goal(s) of the Day/Commitment(s) have been identified:  Wean Steroids and Oxygen as tolerated.        Adboul Merchant  March 6, 2020

## 2020-03-06 NOTE — PROGRESS NOTES
Resting pulse ox on 3L - 95%  Resting pulse ox on RA - 91%  Ambulating pulse ox on RA - 87%  Recovered pulse ox on 3L - 94%    Pt complained of feeling SOB with activity and at rest without oxygen on.

## 2020-03-06 NOTE — PROGRESS NOTES
completed shifts: In: 1500 [P.O.:1500]  Out: -   No intake/output data recorded. Results:  CBC:   Recent Labs     20  03020  03520  0538   WBC 8.5 9.6 8.3   HGB 13.2 13.8 14.7   HCT 38.9 41.1 43.9   MCV 97.0 96.7 95.4    163 145     BMP:   Recent Labs     20  03520  0538    131* 132   K 4.3 3.7 4.0   CL 96* 91* 88*   CO2 28 29 34*   BUN 13 14 13   CREATININE 0.6* 0.5* 0.5*     LFT:   No results for input(s): ALKPHOS, ALT, AST, PROT, BILITOT, BILIDIR, LABALBU in the last 72 hours. PT/INR:   No results for input(s): PROTIME, INR in the last 72 hours. Results for Bayron Hancock (MRN [id number]) as of 3/4/2020 09:40   Ref. Range 3/2/2020 12:25   Pro-BNP Latest Ref Range: 0 - 125 pg/mL 69       Procalcitonin: No results for input(s): PROCAL in the last 72 hours. Cultures:  No results for input(s): CULTRESP in the last 72 hours. Results for Bayron Hancock (MRN [id number]) as of 3/4/2020 09:40   Ref. Range 3/2/2020 12:25   RAPID INFLUENZA A/B ANTIGENS Unknown Rpt (A)   Influenza A by PCR Latest Ref Range: Not Detected  DETECTED (A)   Influenza B by PCR Latest Ref Range: Not Detected  Not Detected         ABG:   No results for input(s): PH, PO2, PCO2, HCO3, BE, O2SAT in the last 72 hours. Films:  Xr Chest Portable    Result Date: 3/2/2020  Patient MRN: [id number] : 1950 Age:  71 years Gender: Male Order Date: 3/2/2020 12:00 PM Exam: XR CHEST PORTABLE Number of Images: 1 view Indication:  Shortness of breath, COPD history Comparison: 2 view upright chest study 2019 Findings: The lungs are symmetrically and mildly hyperinflated, and are clear. There is no evidence of pneumothorax or pleural effusion. Cardiovascular shadows show aortic intimal calcification, but otherwise are normal in appearance. There is no evidence of cardiac enlargement or decompensation. Skeletal structures show no evidence of acute pathology.  Overlying EKG leads and

## 2020-03-06 NOTE — PROGRESS NOTES
Pt and spouse notified that pt is being transferred to room #532. Report called to Arturo Cedeno on 5S. Pt to be transferred when new room is ready.

## 2020-03-07 LAB
ANION GAP SERPL CALCULATED.3IONS-SCNC: 8 MMOL/L (ref 7–16)
BUN BLDV-MCNC: 17 MG/DL (ref 8–23)
CALCIUM SERPL-MCNC: 8.4 MG/DL (ref 8.6–10.2)
CHLORIDE BLD-SCNC: 91 MMOL/L (ref 98–107)
CO2: 33 MMOL/L (ref 22–29)
CREAT SERPL-MCNC: 0.6 MG/DL (ref 0.7–1.2)
GFR AFRICAN AMERICAN: >60
GFR NON-AFRICAN AMERICAN: >60 ML/MIN/1.73
GLUCOSE BLD-MCNC: 200 MG/DL (ref 74–99)
HCT VFR BLD CALC: 37.5 % (ref 37–54)
HEMOGLOBIN: 12.7 G/DL (ref 12.5–16.5)
MCH RBC QN AUTO: 32.1 PG (ref 26–35)
MCHC RBC AUTO-ENTMCNC: 33.9 % (ref 32–34.5)
MCV RBC AUTO: 94.7 FL (ref 80–99.9)
PDW BLD-RTO: 11.5 FL (ref 11.5–15)
PLATELET # BLD: 130 E9/L (ref 130–450)
PMV BLD AUTO: 9.5 FL (ref 7–12)
POTASSIUM SERPL-SCNC: 4.1 MMOL/L (ref 3.5–5)
RBC # BLD: 3.96 E12/L (ref 3.8–5.8)
SODIUM BLD-SCNC: 132 MMOL/L (ref 132–146)
WBC # BLD: 9.1 E9/L (ref 4.5–11.5)

## 2020-03-07 PROCEDURE — 6360000002 HC RX W HCPCS: Performed by: NURSE PRACTITIONER

## 2020-03-07 PROCEDURE — 2700000000 HC OXYGEN THERAPY PER DAY

## 2020-03-07 PROCEDURE — 1200000000 HC SEMI PRIVATE

## 2020-03-07 PROCEDURE — 36415 COLL VENOUS BLD VENIPUNCTURE: CPT

## 2020-03-07 PROCEDURE — 6360000002 HC RX W HCPCS: Performed by: INTERNAL MEDICINE

## 2020-03-07 PROCEDURE — 2580000003 HC RX 258: Performed by: INTERNAL MEDICINE

## 2020-03-07 PROCEDURE — 85027 COMPLETE CBC AUTOMATED: CPT

## 2020-03-07 PROCEDURE — 80048 BASIC METABOLIC PNL TOTAL CA: CPT

## 2020-03-07 PROCEDURE — 2580000003 HC RX 258

## 2020-03-07 PROCEDURE — 94640 AIRWAY INHALATION TREATMENT: CPT

## 2020-03-07 PROCEDURE — 6370000000 HC RX 637 (ALT 250 FOR IP): Performed by: INTERNAL MEDICINE

## 2020-03-07 RX ORDER — ALBUTEROL SULFATE 2.5 MG/3ML
2.5 SOLUTION RESPIRATORY (INHALATION) EVERY 4 HOURS PRN
Status: DISCONTINUED | OUTPATIENT
Start: 2020-03-07 | End: 2020-03-07

## 2020-03-07 RX ORDER — METHYLPREDNISOLONE SODIUM SUCCINATE 40 MG/ML
40 INJECTION, POWDER, LYOPHILIZED, FOR SOLUTION INTRAMUSCULAR; INTRAVENOUS EVERY 6 HOURS
Status: DISCONTINUED | OUTPATIENT
Start: 2020-03-07 | End: 2020-03-09

## 2020-03-07 RX ORDER — ALBUTEROL SULFATE 90 UG/1
2 AEROSOL, METERED RESPIRATORY (INHALATION) EVERY 4 HOURS PRN
Status: DISCONTINUED | OUTPATIENT
Start: 2020-03-07 | End: 2020-03-11 | Stop reason: HOSPADM

## 2020-03-07 RX ADMIN — WATER 10 ML: 1 INJECTION INTRAMUSCULAR; INTRAVENOUS; SUBCUTANEOUS at 04:52

## 2020-03-07 RX ADMIN — TAMSULOSIN HYDROCHLORIDE 0.8 MG: 0.4 CAPSULE ORAL at 20:49

## 2020-03-07 RX ADMIN — ALBUTEROL SULFATE 2.5 MG: 2.5 SOLUTION RESPIRATORY (INHALATION) at 15:58

## 2020-03-07 RX ADMIN — METHYLPREDNISOLONE SODIUM SUCCINATE 40 MG: 40 INJECTION, POWDER, LYOPHILIZED, FOR SOLUTION INTRAMUSCULAR; INTRAVENOUS at 12:32

## 2020-03-07 RX ADMIN — GUAIFENESIN 1200 MG: 1200 TABLET, EXTENDED RELEASE ORAL at 20:50

## 2020-03-07 RX ADMIN — ENOXAPARIN SODIUM 40 MG: 40 INJECTION SUBCUTANEOUS at 09:20

## 2020-03-07 RX ADMIN — LORAZEPAM 0.5 MG: 0.5 TABLET ORAL at 02:10

## 2020-03-07 RX ADMIN — OSELTAMIVIR PHOSPHATE 75 MG: 75 CAPSULE ORAL at 09:20

## 2020-03-07 RX ADMIN — GABAPENTIN 600 MG: 300 CAPSULE ORAL at 09:27

## 2020-03-07 RX ADMIN — LISINOPRIL 20 MG: 20 TABLET ORAL at 09:20

## 2020-03-07 RX ADMIN — GUAIFENESIN 1200 MG: 1200 TABLET, EXTENDED RELEASE ORAL at 09:22

## 2020-03-07 RX ADMIN — Medication 10 ML: at 20:50

## 2020-03-07 RX ADMIN — LORAZEPAM 0.5 MG: 0.5 TABLET ORAL at 09:27

## 2020-03-07 RX ADMIN — CLONIDINE HYDROCHLORIDE 0.1 MG: 0.1 TABLET ORAL at 09:20

## 2020-03-07 RX ADMIN — Medication 10 ML: at 09:20

## 2020-03-07 RX ADMIN — METHYLPREDNISOLONE SODIUM SUCCINATE 40 MG: 40 INJECTION, POWDER, LYOPHILIZED, FOR SOLUTION INTRAMUSCULAR; INTRAVENOUS at 04:52

## 2020-03-07 RX ADMIN — AMLODIPINE BESYLATE 10 MG: 10 TABLET ORAL at 09:20

## 2020-03-07 RX ADMIN — METHYLPREDNISOLONE SODIUM SUCCINATE 40 MG: 40 INJECTION, POWDER, LYOPHILIZED, FOR SOLUTION INTRAMUSCULAR; INTRAVENOUS at 18:30

## 2020-03-07 RX ADMIN — ALBUTEROL SULFATE 2.5 MG: 2.5 SOLUTION RESPIRATORY (INHALATION) at 08:31

## 2020-03-07 RX ADMIN — CLONIDINE HYDROCHLORIDE 0.1 MG: 0.1 TABLET ORAL at 20:48

## 2020-03-07 ASSESSMENT — PAIN SCALES - GENERAL
PAINLEVEL_OUTOF10: 0
PAINLEVEL_OUTOF10: 0

## 2020-03-07 NOTE — PROGRESS NOTES
Subjective:  Still very easily dyspneic and weak. The patient is awake and alert. No problems overnight. Denies chest pain, angina,   Denies abdominal pain. Tolerating diet. No nausea or vomiting. Objective:  O2 desaturation noted. BP (!) 180/81   Pulse 78   Temp 98 °F (36.7 °C) (Oral)   Resp 16   Ht 5' 7\" (1.702 m)   Wt 172 lb 14.4 oz (78.4 kg)   SpO2 98%   BMI 27.08 kg/m²     Heart:  RRR, no murmurs, gallops, or rubs. Lungs:  CTA bilaterally, no wheeze, rales or rhonchi  Abd: bowel sounds present, nontender, nondistended, no masses  Extrem:  No clubbing, cyanosis, or edema  Oral mucosa nl  Trach midline  MM str symmetric    CBC:   Lab Results   Component Value Date    WBC 9.1 03/07/2020    RBC 3.96 03/07/2020    HGB 12.7 03/07/2020    HCT 37.5 03/07/2020    MCV 94.7 03/07/2020    MCH 32.1 03/07/2020    MCHC 33.9 03/07/2020    RDW 11.5 03/07/2020     03/07/2020    MPV 9.5 03/07/2020     BMP:    Lab Results   Component Value Date     03/07/2020    K 4.1 03/07/2020    K 3.7 03/02/2020    CL 91 03/07/2020    CO2 33 03/07/2020    BUN 17 03/07/2020    LABALBU 4.4 03/02/2020    CREATININE 0.6 03/07/2020    CALCIUM 8.4 03/07/2020    GFRAA >60 03/07/2020    LABGLOM >60 03/07/2020    GLUCOSE 200 03/07/2020        Assessment:      Patient Active Problem List   Diagnosis    COPD with acute exacerbation ([facility] [address]    Hypertension    Anxiety    Lactic acidosis    Influenza A    Hyperglycemia, drug-induced       Plan:  Changing to po pred. Will need home O2.           Vincent Botello  8:13 AM  3/7/2020

## 2020-03-07 NOTE — PROGRESS NOTES
[facility]    59-year-old male with chronic obstructive pulmonary disease admitted with influenza A on 3/2/2020    CC/Overnight events:   Continues to have significant shortness of breath even at rest.  No complaint of cough or mucus production. Chest is tight. Chest seems to get tighter with albuterol nebs. He has occasionally noticed this in the past as well.     Current Facility-Administered Medications   Medication Dose Route Frequency Provider Last Rate Last Dose    methylPREDNISolone sodium (SOLU-MEDROL) injection 40 mg  40 mg Intravenous Q6H Shari Chapin MD        cloNIDine (CATAPRES) tablet 0.1 mg  0.1 mg Oral BID Isabella Orozco MD   0.1 mg at 03/07/20 0920    albuterol (PROVENTIL) nebulizer solution 2.5 mg  2.5 mg Nebulization 4x daily [place], APRN - CNP   2.5 mg at 03/07/20 1558    amLODIPine (NORVASC) tablet 10 mg  10 mg Oral Daily Isabella Orozco MD   10 mg at 03/07/20 0920    lisinopril (PRINIVIL;ZESTRIL) tablet 20 mg  20 mg Oral Daily Isabella Orozco MD   20 mg at 03/07/20 0920    sodium chloride flush 0.9 % injection 10 mL  10 mL Intravenous BID Isabella Orozco MD   10 mL at 03/07/20 0920    sodium chloride flush 0.9 % injection 10 mL  10 mL Intravenous PRN Isabella Orozco MD   10 mL at 03/06/20 0441    fluticasone-umeclidin-vilant (TRELEGY ELLIPTA) 100-62.5-25 MCG/INH inhaler 1 puff  1 puff Inhalation Daily Isabella Orozco MD   1 puff at 03/06/20 2014    LORazepam (ATIVAN) tablet 0.5 mg  0.5 mg Oral BID PRN Isabella Orozco MD   0.5 mg at 03/07/20 0927    tamsulosin (FLOMAX) capsule 0.8 mg  0.8 mg Oral Daily Isabella Orozco MD   0.8 mg at 03/06/20 2042    enoxaparin (LOVENOX) injection 40 mg  40 mg Subcutaneous Daily Isabella Orozco MD   40 mg at 03/07/20 0920    guaiFENesin TB12 1,200 mg  1,200 mg Oral Q12H Isabella Orozco MD   1,200 mg at 03/07/20 4989    gabapentin (NEURONTIN) capsule 600 mg  600 mg Oral 4x Daily PRN Isabella Orozco MD   600 mg at 03/07/20 9736       Objective: reviewed:   XR CHEST PORTABLE   Final Result   Mild pulmonary emphysematous air trapping without evidence of acute   cardiopulmonary pathology. Impression:  1. Influenza A  2. COPD acute exacerbation now with persistent/worsening bronchospasm  3. Acute hypoxemic respiratory failure    Active Problems:    COPD with acute exacerbation (HCC)    Hypertension    Anxiety    Lactic acidosis    Influenza A    Hyperglycemia, drug-induced  Resolved Problems:    * No resolved hospital problems. *      Plans:   1. Increase Solu-Medrol to every 6 hours -not ready for oral steroids  2. Continue oxygen to maintain oxygen saturation of 90 to 96%  3. Continue Tamiflu for 5 days total  4.  Continue Trelegy, try albuterol per inhaler prior to going to [facility] as he seems to tolerate this better    Shawn Saavedra MD, FACP, [facility]

## 2020-03-08 LAB
ANION GAP SERPL CALCULATED.3IONS-SCNC: 9 MMOL/L (ref 7–16)
BUN BLDV-MCNC: 17 MG/DL (ref 8–23)
CALCIUM SERPL-MCNC: 8.8 MG/DL (ref 8.6–10.2)
CHLORIDE BLD-SCNC: 92 MMOL/L (ref 98–107)
CO2: 32 MMOL/L (ref 22–29)
CREAT SERPL-MCNC: 0.5 MG/DL (ref 0.7–1.2)
GFR AFRICAN AMERICAN: >60
GFR NON-AFRICAN AMERICAN: >60 ML/MIN/1.73
GLUCOSE BLD-MCNC: 199 MG/DL (ref 74–99)
HCT VFR BLD CALC: 40.3 % (ref 37–54)
HEMOGLOBIN: 14.1 G/DL (ref 12.5–16.5)
MCH RBC QN AUTO: 32.7 PG (ref 26–35)
MCHC RBC AUTO-ENTMCNC: 35 % (ref 32–34.5)
MCV RBC AUTO: 93.5 FL (ref 80–99.9)
PDW BLD-RTO: 11.5 FL (ref 11.5–15)
PLATELET # BLD: 143 E9/L (ref 130–450)
PMV BLD AUTO: 10 FL (ref 7–12)
POTASSIUM SERPL-SCNC: 4 MMOL/L (ref 3.5–5)
RBC # BLD: 4.31 E12/L (ref 3.8–5.8)
SODIUM BLD-SCNC: 133 MMOL/L (ref 132–146)
WBC # BLD: 9.2 E9/L (ref 4.5–11.5)

## 2020-03-08 PROCEDURE — 6370000000 HC RX 637 (ALT 250 FOR IP): Performed by: INTERNAL MEDICINE

## 2020-03-08 PROCEDURE — 36415 COLL VENOUS BLD VENIPUNCTURE: CPT

## 2020-03-08 PROCEDURE — 6360000002 HC RX W HCPCS: Performed by: INTERNAL MEDICINE

## 2020-03-08 PROCEDURE — 2700000000 HC OXYGEN THERAPY PER DAY

## 2020-03-08 PROCEDURE — 85027 COMPLETE CBC AUTOMATED: CPT

## 2020-03-08 PROCEDURE — 80048 BASIC METABOLIC PNL TOTAL CA: CPT

## 2020-03-08 PROCEDURE — 2580000003 HC RX 258: Performed by: INTERNAL MEDICINE

## 2020-03-08 PROCEDURE — 1200000000 HC SEMI PRIVATE

## 2020-03-08 RX ADMIN — TAMSULOSIN HYDROCHLORIDE 0.8 MG: 0.4 CAPSULE ORAL at 21:44

## 2020-03-08 RX ADMIN — CLONIDINE HYDROCHLORIDE 0.1 MG: 0.1 TABLET ORAL at 09:40

## 2020-03-08 RX ADMIN — METHYLPREDNISOLONE SODIUM SUCCINATE 40 MG: 40 INJECTION, POWDER, LYOPHILIZED, FOR SOLUTION INTRAMUSCULAR; INTRAVENOUS at 12:46

## 2020-03-08 RX ADMIN — GUAIFENESIN 1200 MG: 1200 TABLET, EXTENDED RELEASE ORAL at 09:51

## 2020-03-08 RX ADMIN — GUAIFENESIN 1200 MG: 1200 TABLET, EXTENDED RELEASE ORAL at 21:44

## 2020-03-08 RX ADMIN — LORAZEPAM 0.5 MG: 0.5 TABLET ORAL at 09:51

## 2020-03-08 RX ADMIN — Medication 10 ML: at 21:44

## 2020-03-08 RX ADMIN — METHYLPREDNISOLONE SODIUM SUCCINATE 40 MG: 40 INJECTION, POWDER, LYOPHILIZED, FOR SOLUTION INTRAMUSCULAR; INTRAVENOUS at 00:35

## 2020-03-08 RX ADMIN — LORAZEPAM 0.5 MG: 0.5 TABLET ORAL at 00:35

## 2020-03-08 RX ADMIN — CLONIDINE HYDROCHLORIDE 0.1 MG: 0.1 TABLET ORAL at 21:44

## 2020-03-08 RX ADMIN — ENOXAPARIN SODIUM 40 MG: 40 INJECTION SUBCUTANEOUS at 09:40

## 2020-03-08 RX ADMIN — LISINOPRIL 20 MG: 20 TABLET ORAL at 09:40

## 2020-03-08 RX ADMIN — SODIUM CHLORIDE, PRESERVATIVE FREE 10 ML: 5 INJECTION INTRAVENOUS at 00:35

## 2020-03-08 RX ADMIN — Medication 10 ML: at 09:41

## 2020-03-08 RX ADMIN — AMLODIPINE BESYLATE 10 MG: 10 TABLET ORAL at 09:40

## 2020-03-08 RX ADMIN — GABAPENTIN 600 MG: 300 CAPSULE ORAL at 09:51

## 2020-03-08 RX ADMIN — METHYLPREDNISOLONE SODIUM SUCCINATE 40 MG: 40 INJECTION, POWDER, LYOPHILIZED, FOR SOLUTION INTRAMUSCULAR; INTRAVENOUS at 06:40

## 2020-03-08 RX ADMIN — METHYLPREDNISOLONE SODIUM SUCCINATE 40 MG: 40 INJECTION, POWDER, LYOPHILIZED, FOR SOLUTION INTRAMUSCULAR; INTRAVENOUS at 18:43

## 2020-03-08 RX ADMIN — GABAPENTIN 600 MG: 300 CAPSULE ORAL at 00:35

## 2020-03-08 ASSESSMENT — PAIN SCALES - GENERAL
PAINLEVEL_OUTOF10: 0
PAINLEVEL_OUTOF10: 0

## 2020-03-08 NOTE — PLAN OF CARE
Problem: Falls - Risk of:  Goal: Will remain free from falls  Description: Will remain free from falls  3/7/2020 2223 by Ricci Altamirano RN  Outcome: Met This Shift

## 2020-03-09 LAB
ANION GAP SERPL CALCULATED.3IONS-SCNC: 9 MMOL/L (ref 7–16)
BUN BLDV-MCNC: 18 MG/DL (ref 8–23)
CALCIUM SERPL-MCNC: 8.8 MG/DL (ref 8.6–10.2)
CHLORIDE BLD-SCNC: 90 MMOL/L (ref 98–107)
CO2: 31 MMOL/L (ref 22–29)
CREAT SERPL-MCNC: 0.5 MG/DL (ref 0.7–1.2)
GFR AFRICAN AMERICAN: >60
GFR NON-AFRICAN AMERICAN: >60 ML/MIN/1.73
GLUCOSE BLD-MCNC: 183 MG/DL (ref 74–99)
HCT VFR BLD CALC: 41.6 % (ref 37–54)
HEMOGLOBIN: 14.3 G/DL (ref 12.5–16.5)
MCH RBC QN AUTO: 32 PG (ref 26–35)
MCHC RBC AUTO-ENTMCNC: 34.4 % (ref 32–34.5)
MCV RBC AUTO: 93.1 FL (ref 80–99.9)
PDW BLD-RTO: 11.4 FL (ref 11.5–15)
PLATELET # BLD: 138 E9/L (ref 130–450)
PMV BLD AUTO: 9.4 FL (ref 7–12)
POTASSIUM SERPL-SCNC: 4.2 MMOL/L (ref 3.5–5)
RBC # BLD: 4.47 E12/L (ref 3.8–5.8)
SODIUM BLD-SCNC: 130 MMOL/L (ref 132–146)
WBC # BLD: 11.7 E9/L (ref 4.5–11.5)

## 2020-03-09 PROCEDURE — 2700000000 HC OXYGEN THERAPY PER DAY

## 2020-03-09 PROCEDURE — 6360000002 HC RX W HCPCS: Performed by: INTERNAL MEDICINE

## 2020-03-09 PROCEDURE — 36415 COLL VENOUS BLD VENIPUNCTURE: CPT

## 2020-03-09 PROCEDURE — 6360000002 HC RX W HCPCS: Performed by: CLINICAL NURSE SPECIALIST

## 2020-03-09 PROCEDURE — 2580000003 HC RX 258: Performed by: INTERNAL MEDICINE

## 2020-03-09 PROCEDURE — 6370000000 HC RX 637 (ALT 250 FOR IP): Performed by: INTERNAL MEDICINE

## 2020-03-09 PROCEDURE — 85027 COMPLETE CBC AUTOMATED: CPT

## 2020-03-09 PROCEDURE — 80048 BASIC METABOLIC PNL TOTAL CA: CPT

## 2020-03-09 PROCEDURE — 1200000000 HC SEMI PRIVATE

## 2020-03-09 RX ORDER — METHYLPREDNISOLONE SODIUM SUCCINATE 40 MG/ML
40 INJECTION, POWDER, LYOPHILIZED, FOR SOLUTION INTRAMUSCULAR; INTRAVENOUS EVERY 8 HOURS
Status: DISCONTINUED | OUTPATIENT
Start: 2020-03-09 | End: 2020-03-10

## 2020-03-09 RX ADMIN — METHYLPREDNISOLONE SODIUM SUCCINATE 40 MG: 40 INJECTION, POWDER, LYOPHILIZED, FOR SOLUTION INTRAMUSCULAR; INTRAVENOUS at 15:23

## 2020-03-09 RX ADMIN — LORAZEPAM 0.5 MG: 0.5 TABLET ORAL at 01:10

## 2020-03-09 RX ADMIN — GUAIFENESIN 1200 MG: 1200 TABLET, EXTENDED RELEASE ORAL at 08:19

## 2020-03-09 RX ADMIN — Medication 10 ML: at 21:11

## 2020-03-09 RX ADMIN — ALBUTEROL SULFATE 2 PUFF: 90 AEROSOL, METERED RESPIRATORY (INHALATION) at 21:12

## 2020-03-09 RX ADMIN — CLONIDINE HYDROCHLORIDE 0.1 MG: 0.1 TABLET ORAL at 21:05

## 2020-03-09 RX ADMIN — CLONIDINE HYDROCHLORIDE 0.1 MG: 0.1 TABLET ORAL at 08:06

## 2020-03-09 RX ADMIN — METHYLPREDNISOLONE SODIUM SUCCINATE 40 MG: 40 INJECTION, POWDER, LYOPHILIZED, FOR SOLUTION INTRAMUSCULAR; INTRAVENOUS at 06:36

## 2020-03-09 RX ADMIN — ENOXAPARIN SODIUM 40 MG: 40 INJECTION SUBCUTANEOUS at 08:06

## 2020-03-09 RX ADMIN — METHYLPREDNISOLONE SODIUM SUCCINATE 40 MG: 40 INJECTION, POWDER, LYOPHILIZED, FOR SOLUTION INTRAMUSCULAR; INTRAVENOUS at 01:10

## 2020-03-09 RX ADMIN — GABAPENTIN 600 MG: 300 CAPSULE ORAL at 01:10

## 2020-03-09 RX ADMIN — Medication 10 ML: at 08:07

## 2020-03-09 RX ADMIN — AMLODIPINE BESYLATE 10 MG: 10 TABLET ORAL at 08:06

## 2020-03-09 RX ADMIN — LORAZEPAM 0.5 MG: 0.5 TABLET ORAL at 08:19

## 2020-03-09 RX ADMIN — METHYLPREDNISOLONE SODIUM SUCCINATE 40 MG: 40 INJECTION, POWDER, LYOPHILIZED, FOR SOLUTION INTRAMUSCULAR; INTRAVENOUS at 21:06

## 2020-03-09 RX ADMIN — GUAIFENESIN 1200 MG: 1200 TABLET, EXTENDED RELEASE ORAL at 21:05

## 2020-03-09 RX ADMIN — ALBUTEROL SULFATE 2 PUFF: 90 AEROSOL, METERED RESPIRATORY (INHALATION) at 00:00

## 2020-03-09 RX ADMIN — LISINOPRIL 20 MG: 20 TABLET ORAL at 08:06

## 2020-03-09 RX ADMIN — SODIUM CHLORIDE, PRESERVATIVE FREE 10 ML: 5 INJECTION INTRAVENOUS at 15:23

## 2020-03-09 ASSESSMENT — PAIN SCALES - GENERAL
PAINLEVEL_OUTOF10: 0
PAINLEVEL_OUTOF10: 0

## 2020-03-09 NOTE — PLAN OF CARE
Problem: Breathing Pattern - Ineffective:  Goal: Ability to achieve and maintain a regular respiratory rate will improve  Description: Ability to achieve and maintain a regular respiratory rate will improve  3/9/2020 0152 by Tess Enrique RN  Outcome: Met This Shift

## 2020-03-09 NOTE — PROGRESS NOTES
Subjective:  Persists easily dyspneic,but, medically stable    The patient is awake and alert. No problems overnight. Denies chest pain, angina, and dyspnea. Denies abdominal pain. Tolerating diet. No nausea or vomiting. Objective:  Adequate BP control. BP (!) 150/68   Pulse 64   Temp 98.2 °F (36.8 °C) (Oral)   Resp 16   Ht 5' 7\" (1.702 m)   Wt 174 lb 2.6 oz (79 kg)   SpO2 98%   BMI 27.28 kg/m²     Heart:  RRR, no murmurs, gallops, or rubs. Lungs:  CTA bilaterally, no wheeze, rales or rhonchi  Abd: bowel sounds present, nontender, nondistended, no masses  Extrem:  No clubbing, cyanosis, or edema    CBC:   Lab Results   Component Value Date    WBC 11.7 03/09/2020    RBC 4.47 03/09/2020    HGB 14.3 03/09/2020    HCT 41.6 03/09/2020    MCV 93.1 03/09/2020    MCH 32.0 03/09/2020    MCHC 34.4 03/09/2020    RDW 11.4 03/09/2020     03/09/2020    MPV 9.4 03/09/2020     BMP:    Lab Results   Component Value Date     03/09/2020    K 4.2 03/09/2020    K 3.7 03/02/2020    CL 90 03/09/2020    CO2 31 03/09/2020    BUN 18 03/09/2020    LABALBU 4.4 03/02/2020    CREATININE 0.5 03/09/2020    CALCIUM 8.8 03/09/2020    GFRAA >60 03/09/2020    LABGLOM >60 03/09/2020    GLUCOSE 183 03/09/2020        Assessment:    Patient Active Problem List   Diagnosis    COPD with acute exacerbation ([facility] [address]    Hypertension    Anxiety    Lactic acidosis    Influenza A    Hyperglycemia, drug-induced       Plan:  DC planning in progress. Pulmo input.           Jl Leal  7:15 AM  3/9/2020

## 2020-03-09 NOTE — PROGRESS NOTES
[facility]    70-year-old male with chronic obstructive pulmonary disease admitted with influenza A on 3/2/2020    CC/Overnight events:   Continues to have significant shortness of breath even at rest.  No complaint of cough or mucus production. Chest feels tight after nebz.  States his POX dropped to 86-86 with walking on RA    Current Facility-Administered Medications   Medication Dose Route Frequency Provider Last Rate Last Dose    methylPREDNISolone sodium (SOLU-MEDROL) injection 40 mg  40 mg Intravenous Q6H Loi Lewis MD   40 mg at 03/09/20 0636    albuterol sulfate  (90 Base) MCG/ACT inhaler 2 puff  2 puff Inhalation Q4H PRN Loi Lewis MD   2 puff at 03/09/20 0000    cloNIDine (CATAPRES) tablet 0.1 mg  0.1 mg Oral BID Sania Reyna MD   0.1 mg at 03/09/20 0806    amLODIPine (NORVASC) tablet 10 mg  10 mg Oral Daily Sania Reyna MD   10 mg at 03/09/20 0806    lisinopril (PRINIVIL;ZESTRIL) tablet 20 mg  20 mg Oral Daily Sania Reyna MD   20 mg at 03/09/20 0806    sodium chloride flush 0.9 % injection 10 mL  10 mL Intravenous BID Sania Reyna MD   10 mL at 03/09/20 0807    sodium chloride flush 0.9 % injection 10 mL  10 mL Intravenous PRN Sania Reyna MD   10 mL at 03/08/20 0035    fluticasone-umeclidin-vilant (TRELEGY ELLIPTA) 100-62.5-25 MCG/INH inhaler 1 puff  1 puff Inhalation Daily Sania Reyna MD   1 puff at 03/09/20 0113    LORazepam (ATIVAN) tablet 0.5 mg  0.5 mg Oral BID PRN Sania Reyna MD   0.5 mg at 03/09/20 0819    tamsulosin (FLOMAX) capsule 0.8 mg  0.8 mg Oral Daily Sania Reyna MD   0.8 mg at 03/08/20 2144    enoxaparin (LOVENOX) injection 40 mg  40 mg Subcutaneous Daily Sania Reyna MD   40 mg at 03/09/20 0806    guaiFENesin TB12 1,200 mg  1,200 mg Oral Q12H Sania Reyna MD   1,200 mg at 03/09/20 0819    gabapentin (NEURONTIN) capsule 600 mg  600 mg Oral 4x Daily PRN Sania Reyna MD   600 mg at 03/09/20 0110       Objective:   BP (!) 150/68 Pulse 64   Temp 98.2 °F (36.8 °C) (Oral)   Resp 16   Ht 5' 7\" (1.702 m)   Wt 174 lb 2.6 oz (79 kg)   SpO2 98%   BMI 27.28 kg/m²   No intake or output data in the 24 hours ending 03/09/20 0930    WDWN male with mildly labored breathing at rest  Skin: warm, dry, without rash  HEENT: PERRL, face symmetric, normal moist oral mucosa  Chest: symmetric with equal air entry. Lungs: Reduced air entry and prolonged expiratory phase with minimal air moving but without wheezing today   Cardiac:  S1, S2 slightly distant no murmur   Abdomen: Rounded, soft, non-tender, active bowel sounds. Extremities: No clubbing, cyanosis, or edema  Neuro:  Alert, moves all extremities symmetrically.  Oriented to person, time place  Psych:  Normal affect and insight    CBC with Differential:    Lab Results   Component Value Date    WBC 11.7 03/09/2020    RBC 4.47 03/09/2020    HGB 14.3 03/09/2020    HCT 41.6 03/09/2020     03/09/2020    MCV 93.1 03/09/2020    MCH 32.0 03/09/2020    MCHC 34.4 03/09/2020    RDW 11.4 03/09/2020    LYMPHOPCT 2.1 03/02/2020    MONOPCT 7.3 03/02/2020    BASOPCT 0.3 03/02/2020    MONOSABS 0.84 03/02/2020    LYMPHSABS 0.24 03/02/2020    EOSABS 0.01 03/02/2020    BASOSABS 0.03 03/02/2020     CMP:    Lab Results   Component Value Date     03/09/2020    K 4.2 03/09/2020    K 3.7 03/02/2020    CL 90 03/09/2020    CO2 31 03/09/2020    BUN 18 03/09/2020    CREATININE 0.5 03/09/2020    GFRAA >60 03/09/2020    LABGLOM >60 03/09/2020    GLUCOSE 183 03/09/2020    PROT 6.8 03/02/2020    LABALBU 4.4 03/02/2020    CALCIUM 8.8 03/09/2020    BILITOT 0.7 03/02/2020    ALKPHOS 62 03/02/2020    AST 19 03/02/2020    ALT 24 03/02/2020     ABG:    Lab Results   Component Value Date    PH 7.448 03/02/2020    PCO2 41.8 03/02/2020    PO2 79.8 03/02/2020    HCO3 28.3 03/02/2020    BE 3.8 03/02/2020    O2SAT 96.4 03/02/2020       IMAGING:  CXR   3/2/20:  XR CHEST PORTABLE   Final Result   Mild pulmonary emphysematous air trapping without evidence of acute   cardiopulmonary pathology. Impression:  1. Influenza A+  2. COPD acute exacerbation now with persistent/worsening bronchospasm  3. Acute hypoxemic respiratory failure    Active Problems:    COPD with acute exacerbation (HCC)    Hypertension    Anxiety    Lactic acidosis    Influenza A    Hyperglycemia, drug-induced  Resolved Problems:    * No resolved hospital problems. *      Plans:   1. Ok to decrease Solu-Medrol to every 8 hours -not ready for oral steroids  2. Continue oxygen to maintain oxygen saturation of 90 to 96%, currently on 1L. 3. Failed walking oximetry decreased to 87% on room air, required 3L with ambulation. Will need home O2 set up at [place]   4. Completed  Tamiflu for 5 days total  5.  Continue Trelegy, try albuterol per inhaler prior to going to [facility] as he seems to tolerate this better        Eugenia Lesches [facility]

## 2020-03-10 ENCOUNTER — APPOINTMENT (OUTPATIENT)
Dept: GENERAL RADIOLOGY | Age: 70
DRG: 193 | End: 2020-03-10
Payer: COMMERCIAL

## 2020-03-10 LAB
ANION GAP SERPL CALCULATED.3IONS-SCNC: 9 MMOL/L (ref 7–16)
BUN BLDV-MCNC: 16 MG/DL (ref 8–23)
CALCIUM SERPL-MCNC: 8.7 MG/DL (ref 8.6–10.2)
CHLORIDE BLD-SCNC: 87 MMOL/L (ref 98–107)
CO2: 30 MMOL/L (ref 22–29)
CREAT SERPL-MCNC: 0.5 MG/DL (ref 0.7–1.2)
GFR AFRICAN AMERICAN: >60
GFR NON-AFRICAN AMERICAN: >60 ML/MIN/1.73
GLUCOSE BLD-MCNC: 190 MG/DL (ref 74–99)
POTASSIUM SERPL-SCNC: 3.8 MMOL/L (ref 3.5–5)
SODIUM BLD-SCNC: 126 MMOL/L (ref 132–146)

## 2020-03-10 PROCEDURE — 6360000002 HC RX W HCPCS: Performed by: CLINICAL NURSE SPECIALIST

## 2020-03-10 PROCEDURE — 6360000002 HC RX W HCPCS: Performed by: NURSE PRACTITIONER

## 2020-03-10 PROCEDURE — 2580000003 HC RX 258: Performed by: INTERNAL MEDICINE

## 2020-03-10 PROCEDURE — 80048 BASIC METABOLIC PNL TOTAL CA: CPT

## 2020-03-10 PROCEDURE — 1200000000 HC SEMI PRIVATE

## 2020-03-10 PROCEDURE — 6360000002 HC RX W HCPCS: Performed by: INTERNAL MEDICINE

## 2020-03-10 PROCEDURE — 2700000000 HC OXYGEN THERAPY PER DAY

## 2020-03-10 PROCEDURE — 71046 X-RAY EXAM CHEST 2 VIEWS: CPT

## 2020-03-10 PROCEDURE — 36415 COLL VENOUS BLD VENIPUNCTURE: CPT

## 2020-03-10 PROCEDURE — 6370000000 HC RX 637 (ALT 250 FOR IP): Performed by: INTERNAL MEDICINE

## 2020-03-10 RX ORDER — METHYLPREDNISOLONE SODIUM SUCCINATE 40 MG/ML
40 INJECTION, POWDER, LYOPHILIZED, FOR SOLUTION INTRAMUSCULAR; INTRAVENOUS EVERY 12 HOURS
Status: DISCONTINUED | OUTPATIENT
Start: 2020-03-10 | End: 2020-03-11

## 2020-03-10 RX ADMIN — GABAPENTIN 600 MG: 300 CAPSULE ORAL at 02:24

## 2020-03-10 RX ADMIN — LORAZEPAM 0.5 MG: 0.5 TABLET ORAL at 02:24

## 2020-03-10 RX ADMIN — GABAPENTIN 600 MG: 300 CAPSULE ORAL at 09:48

## 2020-03-10 RX ADMIN — GUAIFENESIN 1200 MG: 1200 TABLET, EXTENDED RELEASE ORAL at 09:47

## 2020-03-10 RX ADMIN — CLONIDINE HYDROCHLORIDE 0.1 MG: 0.1 TABLET ORAL at 21:06

## 2020-03-10 RX ADMIN — GUAIFENESIN 1200 MG: 1200 TABLET, EXTENDED RELEASE ORAL at 21:06

## 2020-03-10 RX ADMIN — METHYLPREDNISOLONE SODIUM SUCCINATE 40 MG: 40 INJECTION, POWDER, LYOPHILIZED, FOR SOLUTION INTRAMUSCULAR; INTRAVENOUS at 17:56

## 2020-03-10 RX ADMIN — SODIUM CHLORIDE, PRESERVATIVE FREE 10 ML: 5 INJECTION INTRAVENOUS at 17:56

## 2020-03-10 RX ADMIN — Medication 10 ML: at 21:07

## 2020-03-10 RX ADMIN — METHYLPREDNISOLONE SODIUM SUCCINATE 40 MG: 40 INJECTION, POWDER, LYOPHILIZED, FOR SOLUTION INTRAMUSCULAR; INTRAVENOUS at 06:24

## 2020-03-10 RX ADMIN — CLONIDINE HYDROCHLORIDE 0.1 MG: 0.1 TABLET ORAL at 09:18

## 2020-03-10 RX ADMIN — LISINOPRIL 20 MG: 20 TABLET ORAL at 09:18

## 2020-03-10 RX ADMIN — AMLODIPINE BESYLATE 10 MG: 10 TABLET ORAL at 09:18

## 2020-03-10 RX ADMIN — ENOXAPARIN SODIUM 40 MG: 40 INJECTION SUBCUTANEOUS at 09:18

## 2020-03-10 RX ADMIN — Medication 10 ML: at 09:19

## 2020-03-10 RX ADMIN — TAMSULOSIN HYDROCHLORIDE 0.8 MG: 0.4 CAPSULE ORAL at 02:25

## 2020-03-10 RX ADMIN — LORAZEPAM 0.5 MG: 0.5 TABLET ORAL at 09:47

## 2020-03-10 ASSESSMENT — PAIN SCALES - GENERAL
PAINLEVEL_OUTOF10: 0
PAINLEVEL_OUTOF10: 0

## 2020-03-10 NOTE — PROGRESS NOTES
Internal Medicine  Progress Note  Chino Saucedo MD     Subjective:     Patient is alert. Patient reports slow improvement. Tolerating diet well no other c/o. Scheduled Meds:   methylPREDNISolone  40 mg Intravenous Q8H    cloNIDine  0.1 mg Oral BID    amLODIPine  10 mg Oral Daily    lisinopril  20 mg Oral Daily    sodium chloride flush  10 mL Intravenous BID    fluticasone-umeclidin-vilant  1 puff Inhalation Daily    tamsulosin  0.8 mg Oral Daily    enoxaparin  40 mg Subcutaneous Daily    guaiFENesin  1,200 mg Oral Q12H     Continuous Infusions:  PRN Meds:albuterol sulfate HFA, sodium chloride flush, LORazepam, gabapentin    Objective:      Physical Exam:  Vitals:   /71   Pulse 75   Temp 98.1 °F (36.7 °C) (Oral)   Resp 18   Ht 5' 7\" (1.702 m)   Wt 174 lb 2.6 oz (79 kg)   SpO2 94%   BMI 27.28 kg/m²   I/O's    Intake/Output Summary (Last 24 hours) at 3/10/2020 0648  Last data filed at 3/9/2020 1302  Gross per 24 hour   Intake 300 ml   Output --   Net 300 ml     Exam:  General appearance: alert, appears stated age and cooperative  Head: Normocephalic, without obvious abnormality, atraumatic  Eyes: conjunctivae/corneas clear. PERRL, EOM's intact. Fundi benign. Throat: lips, mucosa, and tongue normal; teeth and gums normal  Neck: no adenopathy, no carotid bruit, no JVD, supple, symmetrical, trachea midline and thyroid not enlarged, symmetric, no tenderness/mass/nodules  Back: symmetric, no curvature. ROM normal. No CVA tenderness.   Lungs: diminished breath sounds bilaterally  Chest wall: no tenderness  Heart: regular rate and rhythm, S1, S2 normal, no murmur, click, rub or gallop  Abdomen: soft, non-tender; bowel sounds normal; no masses,  no organomegaly  Extremities: extremities normal, atraumatic, no cyanosis or edema  Pulses: 2+ and symmetric  Skin: Skin color, texture, turgor normal. No rashes or lesions  Lymph nodes: Cervical, supraclavicular, and axillary nodes normal.  Neurologic: Grossly normal      Imaging     Chest  Xray:  No results found for this or any previous visit. Results for orders placed during the hospital encounter of 20   XR CHEST PORTABLE    Narrative Patient MRN: [id number]  : 1950  Age:  71 years  Gender: Male  Order Date: 3/2/2020 12:00 PM    Exam: XR CHEST PORTABLE    Number of Images: 1 view    Indication:  Shortness of breath, COPD history    Comparison: 2 view upright chest study 2019    Findings: The lungs are symmetrically and mildly hyperinflated, and are clear. There is no evidence of pneumothorax or pleural effusion. Cardiovascular shadows show aortic intimal calcification, but  otherwise are normal in appearance. There is no evidence of cardiac  enlargement or decompensation. Skeletal structures show no evidence of acute pathology. Overlying EKG  leads and oxygen tubing are present. Impression Mild pulmonary emphysematous air trapping without evidence of acute  cardiopulmonary pathology. Additional Imaging:  none    Lab Review   Recent Results (from the past 24 hour(s))   Basic Metabolic Panel    Collection Time: 03/10/20  3:30 AM   Result Value Ref Range    Sodium 126 (L) 132 - 146 mmol/L    Potassium 3.8 3.5 - 5.0 mmol/L    Chloride 87 (L) 98 - 107 mmol/L    CO2 30 (H) 22 - 29 mmol/L    Anion Gap 9 7 - 16 mmol/L    Glucose 190 (H) 74 - 99 mg/dL    BUN 16 8 - 23 mg/dL    CREATININE 0.5 (L) 0.7 - 1.2 mg/dL    GFR Non-African American >60 >=60 mL/min/1.73    GFR African American >60     Calcium 8.7 8.6 - 10.2 mg/dL     Assessment:     Active Problems:    COPD with acute exacerbation (HCC)    Hypertension    Anxiety    Lactic acidosis    Influenza A    Hyperglycemia, drug-induced  Resolved Problems:    * No resolved hospital problems.  *      Plan:   Continue same improving slowly  Henricynthia Millsfei  3/10/2020  6:48 AM

## 2020-03-10 NOTE — PROGRESS NOTES
[facility] Quality Flow/Interdisciplinary Rounds Progress Note        Quality Flow Rounds held on March 10, 2020    Disciplines Attending:  Bedside Nurse, ,  and Nursing Unit 16 Jose Boggs was admitted on 3/2/2020 11:23 AM    Anticipated Discharge Date:  Expected Discharge Date: 03/10/20    Disposition:    Alexander Score:  Alexander Scale Score: 21    Readmission Risk              Risk of Unplanned Readmission:        12           Discussed patient goal for the day, patient clinical progression, and barriers to discharge.   The following Goal(s) of the Day/Commitment(s) have been identified:  wean O2; d/c planning      Servando Rodriguez  March 10, 2020

## 2020-03-10 NOTE — PROGRESS NOTES
IMAGING:  CXR   3/2/20:  XR CHEST PORTABLE   Final Result   Mild pulmonary emphysematous air trapping without evidence of acute   cardiopulmonary pathology. XR CHEST STANDARD (2 VW)    (Results Pending)           Impression:  1. Influenza A+  2. COPD acute exacerbation now with persistent/worsening bronchospasm  3. Acute hypoxemic respiratory failure    Active Problems:    COPD with acute exacerbation (HCC)    Hypertension    Anxiety    Lactic acidosis    Influenza A    Hyperglycemia, drug-induced  Resolved Problems:    * No resolved hospital problems. *      Plans:   1. Will try to drop solu-medrol to bid as does not seem to be adding any extra benefit. 2. Continue oxygen to maintain oxygen saturation of 90 to 96%, currently on 1L. 3. Failed walking oximetry decreased to 87% on room air, required 3L with ambulation. Will need home O2 set up at [place]   4. Completed  Tamiflu for 5 days total  5. Continue Trelegy, tolerates albuterol HFA over nebs. 6. Repeat CXR today for persistent dyspnea and cough.       Durga Lo, [address] Ja

## 2020-03-11 VITALS
HEART RATE: 74 BPM | DIASTOLIC BLOOD PRESSURE: 72 MMHG | RESPIRATION RATE: 18 BRPM | WEIGHT: 175.44 LBS | HEIGHT: 67 IN | TEMPERATURE: 98 F | BODY MASS INDEX: 27.54 KG/M2 | SYSTOLIC BLOOD PRESSURE: 161 MMHG | OXYGEN SATURATION: 94 %

## 2020-03-11 LAB
ANION GAP SERPL CALCULATED.3IONS-SCNC: 8 MMOL/L (ref 7–16)
BUN BLDV-MCNC: 16 MG/DL (ref 8–23)
CALCIUM SERPL-MCNC: 8.7 MG/DL (ref 8.6–10.2)
CHLORIDE BLD-SCNC: 90 MMOL/L (ref 98–107)
CO2: 34 MMOL/L (ref 22–29)
CREAT SERPL-MCNC: 0.6 MG/DL (ref 0.7–1.2)
GFR AFRICAN AMERICAN: >60
GFR NON-AFRICAN AMERICAN: >60 ML/MIN/1.73
GLUCOSE BLD-MCNC: 138 MG/DL (ref 74–99)
POTASSIUM SERPL-SCNC: 4.7 MMOL/L (ref 3.5–5)
SODIUM BLD-SCNC: 132 MMOL/L (ref 132–146)

## 2020-03-11 PROCEDURE — 2700000000 HC OXYGEN THERAPY PER DAY

## 2020-03-11 PROCEDURE — 80048 BASIC METABOLIC PNL TOTAL CA: CPT

## 2020-03-11 PROCEDURE — 6360000002 HC RX W HCPCS: Performed by: INTERNAL MEDICINE

## 2020-03-11 PROCEDURE — 6360000002 HC RX W HCPCS: Performed by: NURSE PRACTITIONER

## 2020-03-11 PROCEDURE — 6370000000 HC RX 637 (ALT 250 FOR IP): Performed by: CLINICAL NURSE SPECIALIST

## 2020-03-11 PROCEDURE — 2580000003 HC RX 258: Performed by: INTERNAL MEDICINE

## 2020-03-11 PROCEDURE — 36415 COLL VENOUS BLD VENIPUNCTURE: CPT

## 2020-03-11 PROCEDURE — 6370000000 HC RX 637 (ALT 250 FOR IP): Performed by: INTERNAL MEDICINE

## 2020-03-11 RX ORDER — PREDNISONE 10 MG/1
10 TABLET ORAL DAILY
Qty: 3 TABLET | Refills: 0 | Status: SHIPPED | OUTPATIENT
Start: 2020-03-20 | End: 2020-03-11

## 2020-03-11 RX ORDER — PREDNISONE 10 MG/1
10 TABLET ORAL DAILY
Qty: 3 TABLET | Refills: 0 | Status: SHIPPED | OUTPATIENT
Start: 2020-03-20 | End: 2020-03-23

## 2020-03-11 RX ORDER — PREDNISONE 20 MG/1
20 TABLET ORAL DAILY
Qty: 10 TABLET | Refills: 0 | Status: SHIPPED | OUTPATIENT
Start: 2020-03-17 | End: 2020-03-11

## 2020-03-11 RX ORDER — CLONIDINE HYDROCHLORIDE 0.1 MG/1
0.1 TABLET ORAL 2 TIMES DAILY
Qty: 60 TABLET | Refills: 3 | Status: ON HOLD
Start: 2020-03-11 | End: 2021-06-17 | Stop reason: HOSPADM

## 2020-03-11 RX ORDER — PREDNISONE 20 MG/1
40 TABLET ORAL DAILY
Status: DISCONTINUED | OUTPATIENT
Start: 2020-03-11 | End: 2020-03-11 | Stop reason: HOSPADM

## 2020-03-11 RX ORDER — PREDNISONE 10 MG/1
30 TABLET ORAL DAILY
Qty: 9 TABLET | Refills: 0 | Status: SHIPPED | OUTPATIENT
Start: 2020-03-14 | End: 2020-03-17

## 2020-03-11 RX ORDER — PREDNISONE 10 MG/1
30 TABLET ORAL DAILY
Qty: 30 TABLET | Refills: 0 | Status: SHIPPED | OUTPATIENT
Start: 2020-03-14 | End: 2020-03-11

## 2020-03-11 RX ORDER — PREDNISONE 10 MG/1
10 TABLET ORAL DAILY
Qty: 10 TABLET | Refills: 0 | Status: SHIPPED | OUTPATIENT
Start: 2020-03-20 | End: 2020-03-11

## 2020-03-11 RX ORDER — PREDNISONE 20 MG/1
40 TABLET ORAL DAILY
Qty: 6 TABLET | Refills: 0 | Status: SHIPPED | OUTPATIENT
Start: 2020-03-11 | End: 2020-03-14

## 2020-03-11 RX ORDER — PREDNISONE 20 MG/1
20 TABLET ORAL DAILY
Status: DISCONTINUED | OUTPATIENT
Start: 2020-03-17 | End: 2020-03-11 | Stop reason: HOSPADM

## 2020-03-11 RX ORDER — PREDNISONE 20 MG/1
40 TABLET ORAL DAILY
Qty: 20 TABLET | Refills: 0 | Status: SHIPPED | OUTPATIENT
Start: 2020-03-11 | End: 2020-03-11

## 2020-03-11 RX ORDER — PREDNISONE 1 MG/1
10 TABLET ORAL DAILY
Status: DISCONTINUED | OUTPATIENT
Start: 2020-03-20 | End: 2020-03-11 | Stop reason: HOSPADM

## 2020-03-11 RX ORDER — PREDNISONE 20 MG/1
20 TABLET ORAL DAILY
Qty: 3 TABLET | Refills: 0 | Status: SHIPPED | OUTPATIENT
Start: 2020-03-17 | End: 2020-03-20

## 2020-03-11 RX ADMIN — GABAPENTIN 600 MG: 300 CAPSULE ORAL at 01:02

## 2020-03-11 RX ADMIN — GABAPENTIN 600 MG: 300 CAPSULE ORAL at 09:46

## 2020-03-11 RX ADMIN — LORAZEPAM 0.5 MG: 0.5 TABLET ORAL at 09:46

## 2020-03-11 RX ADMIN — AMLODIPINE BESYLATE 10 MG: 10 TABLET ORAL at 09:46

## 2020-03-11 RX ADMIN — CLONIDINE HYDROCHLORIDE 0.1 MG: 0.1 TABLET ORAL at 09:46

## 2020-03-11 RX ADMIN — Medication 10 ML: at 09:48

## 2020-03-11 RX ADMIN — ENOXAPARIN SODIUM 40 MG: 40 INJECTION SUBCUTANEOUS at 09:46

## 2020-03-11 RX ADMIN — PREDNISONE 40 MG: 20 TABLET ORAL at 14:46

## 2020-03-11 RX ADMIN — GUAIFENESIN 1200 MG: 1200 TABLET, EXTENDED RELEASE ORAL at 09:46

## 2020-03-11 RX ADMIN — METHYLPREDNISOLONE SODIUM SUCCINATE 40 MG: 40 INJECTION, POWDER, LYOPHILIZED, FOR SOLUTION INTRAMUSCULAR; INTRAVENOUS at 05:48

## 2020-03-11 RX ADMIN — LISINOPRIL 20 MG: 20 TABLET ORAL at 09:46

## 2020-03-11 RX ADMIN — LORAZEPAM 0.5 MG: 0.5 TABLET ORAL at 01:02

## 2020-03-11 RX ADMIN — TAMSULOSIN HYDROCHLORIDE 0.8 MG: 0.4 CAPSULE ORAL at 01:02

## 2020-03-11 ASSESSMENT — PAIN SCALES - GENERAL: PAINLEVEL_OUTOF10: 0

## 2020-03-11 NOTE — PROGRESS NOTES
Type and Reason for Visit: Initial, RD Nutrition Re-Screen(LOS Assessment, RD Re-Screen Negative)    Nutrition Screen:   · Have you recently lost weight without trying? - 0 to 1 pound (0 points)   · Have you been eating poorly because of a decreased appetite? - No (0 points)   · Malnutrition Screening Tool Score - 0    Dietitian Assessment of Nutrition Re-Screen: Pt assessed per LOS protocol. Chart reviewed. Pt currently eating ~% of most meals and w/ no other significant nutritional issues noted at this time. Will follow-up per policy. Please consult if RD needed.          Electronically signed by Rae Almanza RD, GLORIA on 3/11/20 at 10:00 AM EDT    Contact Number: ext [phone]

## 2020-03-11 NOTE — PROGRESS NOTES
Internal Medicine  Progress Note  Jen Whiteside MD     Subjective:     Patient is alert. Patient reports still trouble expectorating phlegm. Tolerating diet well no other c/o. Scheduled Meds:   methylPREDNISolone  40 mg Intravenous Q12H    cloNIDine  0.1 mg Oral BID    amLODIPine  10 mg Oral Daily    lisinopril  20 mg Oral Daily    sodium chloride flush  10 mL Intravenous BID    fluticasone-umeclidin-vilant  1 puff Inhalation Daily    tamsulosin  0.8 mg Oral Daily    enoxaparin  40 mg Subcutaneous Daily    guaiFENesin  1,200 mg Oral Q12H     Continuous Infusions:  PRN Meds:albuterol sulfate HFA, sodium chloride flush, LORazepam, gabapentin    Objective:      Physical Exam:  Vitals:   BP (!) 172/78   Pulse 72   Temp 97.8 °F (36.6 °C) (Oral)   Resp 18   Ht 5' 7\" (1.702 m)   Wt 175 lb 7 oz (79.6 kg)   SpO2 96%   BMI 27.48 kg/m²   I/O's  No intake or output data in the 24 hours ending 03/11/20 9394  Exam:  General appearance: alert, appears stated age and cooperative  Head: Normocephalic, without obvious abnormality, atraumatic  Eyes: conjunctivae/corneas clear. PERRL, EOM's intact. Fundi benign. Throat: lips, mucosa, and tongue normal; teeth and gums normal  Neck: no adenopathy, no carotid bruit, no JVD, supple, symmetrical, trachea midline and thyroid not enlarged, symmetric, no tenderness/mass/nodules  Back: symmetric, no curvature. ROM normal. No CVA tenderness.   Lungs: diminished breath sounds bilaterally and wheezes expiratory  Chest wall: no tenderness  Heart: regular rate and rhythm  Abdomen: soft, non-tender; bowel sounds normal; no masses,  no organomegaly  Extremities: extremities normal, atraumatic, no cyanosis or edema  Pulses: 2+ and symmetric  Skin: Skin color, texture, turgor normal. No rashes or lesions  Lymph nodes: Cervical, supraclavicular, and axillary nodes normal.  Neurologic: Grossly normal      Imaging     Chest  Xray:  Results for orders placed during the hospital encounter of 20   XR CHEST STANDARD (2 VW)    Narrative Patient MRN: [id number]  : 1950  Age:  71 years  Gender: Male  Order Date: 3/10/2020 12:00 PM  Exam: XR CHEST (2 VW)  Number of Images: 2 view  Indication:   persistent dyspnea and cough   persistent dyspnea and cough  Comparison: 2020    Findings: The heart is unremarkable. The lung fields are hyperinflated. Density is seen in the lung fields suggesting scar. The aorta is tortuous and ectatic. Impression Findings of emphysematous changes. This study was dictated by Michelle Miller PA-C and Jeannette Early MD  reviewed and concurred with the findings. Results for orders placed during the hospital encounter of 20   XR CHEST PORTABLE    Narrative Patient MRN: [id number]  : 1950  Age:  71 years  Gender: Male  Order Date: 3/2/2020 12:00 PM    Exam: XR CHEST PORTABLE    Number of Images: 1 view    Indication:  Shortness of breath, COPD history    Comparison: 2 view upright chest study 2019    Findings: The lungs are symmetrically and mildly hyperinflated, and are clear. There is no evidence of pneumothorax or pleural effusion. Cardiovascular shadows show aortic intimal calcification, but  otherwise are normal in appearance. There is no evidence of cardiac  enlargement or decompensation. Skeletal structures show no evidence of acute pathology. Overlying EKG  leads and oxygen tubing are present. Impression Mild pulmonary emphysematous air trapping without evidence of acute  cardiopulmonary pathology. Additional Imaging:  none    Lab Review   No results found for this or any previous visit (from the past 24 hour(s)). Assessment:     Active Problems:    COPD with acute exacerbation (HCC)    Hypertension    Anxiety    Lactic acidosis    Influenza A    Hyperglycemia, drug-induced  Resolved Problems:    * No resolved hospital problems.  *      Plan:   Improving  Disposition as per mickey Dang Maxcine Cooks  3/11/2020  6:32 AM

## 2020-03-11 NOTE — PROGRESS NOTES
CLINICAL PHARMACY NOTE: [facility]  Total # of Prescriptions Filled: 2   The following medications were delivered to the patient:  Completed: 6prednisone 10 mg  Clonidine 0.1 mg    Time Spent (min): 45    Additional Documentation:

## 2020-03-11 NOTE — CARE COORDINATION
Social Work / Discharge Planning :SW updated patient will need home 02 at discharge today. Patient has COPD. Pulse ox noted as well as DME order. Patient did NOT have a DME preference and referral called to [facility] and await response. SW to follow.  Electronically signed by ALYSE Hernández on 3/11/20 at 10:36 AM EDT                                                                                                                                                                                                                                                                                                                                           vv

## 2020-03-11 NOTE — PLAN OF CARE
Problem: Breathing Pattern - Ineffective:  Goal: Ability to achieve and maintain a regular respiratory rate will improve  Description: Ability to achieve and maintain a regular respiratory rate will improve  Outcome: Ongoing     Problem: Falls - Risk of:  Goal: Will remain free from falls  Description: Will remain free from falls  Outcome: Ongoing  Goal: Absence of physical injury  Description: Absence of physical injury  Outcome: Ongoing

## 2020-03-11 NOTE — PLAN OF CARE
Problem: Breathing Pattern - Ineffective:  Goal: Ability to achieve and maintain a regular respiratory rate will improve  Description: Ability to achieve and maintain a regular respiratory rate will improve  Outcome: Met This Shift     Problem: Falls - Risk of:  Goal: Will remain free from falls  Description: Will remain free from falls  Outcome: Met This Shift     Problem: Falls - Risk of:  Goal: Absence of physical injury  Description: Absence of physical injury  Outcome: Met This Shift

## 2020-04-02 PROBLEM — J10.1 INFLUENZA A: Status: RESOLVED | Noted: 2020-03-03 | Resolved: 2020-04-02

## 2020-04-14 NOTE — DISCHARGE SUMMARY
Patient ID:  Kyle Trotter  [id number]  71 y.o.  1950    Admit date: 3/2/2020    Discharge date and time: 3/11/2020  4:26 PM     Admission Diagnoses: COPD with acute exacerbation ([address] [J44.1]  COPD with acute exacerbation ([address] [J44.1]  COPD with acute exacerbation ([address] [J44.1]  COPD with acute exacerbation ([address] [J44.1]    Discharge Diagnoses:   Patient Active Problem List   Diagnosis    COPD with acute exacerbation ([address]    Hypertension    Anxiety    Lactic acidosis    Hyperglycemia, drug-induced       Consults: pulmonary/intensive care    Procedures:   none    Hospital Course:  Pt admitted with Influenza A and COPD exacerbation - he did well Pulmonary was on consultation and was ultimately discharged home. Discharge Exam:  See progress note from today    Disposition: home    Condition at Discharge:  fair    Patient Instructions:   Discharge Medication List as of 3/11/2020  1:37 PM      START taking these medications    Details   cloNIDine (CATAPRES) 0.1 MG tablet Take 1 tablet by mouth 2 times daily, Disp-60 tablet, R-3Normal         CONTINUE these medications which have CHANGED    Details   ! ! predniSONE (DELTASONE) 20 MG tablet Take 2 tablets by mouth daily for 3 days, Disp-6 tablet, R-0Normal      !! predniSONE (DELTASONE) 10 MG tablet Take 3 tablets by mouth daily for 3 days, Disp-9 tablet, R-0Normal      !! predniSONE (DELTASONE) 20 MG tablet Take 1 tablet by mouth daily for 3 days, Disp-3 tablet, R-0Normal      !! predniSONE (DELTASONE) 10 MG tablet Take 1 tablet by mouth daily for 3 days, Disp-3 tablet, R-0Normal       !! - Potential duplicate medications found. Please discuss with provider.       CONTINUE these medications which have NOT CHANGED    Details   Fluticasone-Umeclidin-Vilant (TRELEGY ELLIPTA IN) Inhale into the lungs daily EveningHistorical Med      tamsulosin (FLOMAX) 0.4 MG capsule Take 0.8 mg by mouth dailyHistorical Med      guaiFENesin 400 MG tablet Take 1,200 mg by mouth 2 times dailyHistorical Med      Coenzyme Q10 (CO Q 10 PO) Take by mouth 2 times daily Historical Med      Omega-3 Fatty Acids (FISH OIL) 1000 MG CAPS Take 1,000 mg by mouth 2 times daily Historical Med      Flaxseed, Linseed, 1000 MG CAPS Take  by mouth 2 times daily. amlodipine-benazepril (LOTREL) 5-20 MG per capsule Take 1 capsule by mouth daily. lorazepam (ATIVAN) 0.5 MG tablet Take 0.5 mg by mouth 2 times daily as needed. Historical Med      gabapentin (NEURONTIN) 600 MG tablet Take 600 mg by mouth 3 times daily. ALBUTEROL IN Inhale into the lungs as needed Historical Med         STOP taking these medications       Budesonide-Formoterol Fumarate (SYMBICORT IN) Comments:   Reason for Stopping:         tiotropium (SPIRIVA) 18 MCG inhalation capsule Comments:   Reason for Stopping:             Activity: activity as tolerated  Diet: regular diet    Follow-up with Dr Snehal Wick  in 1 week.     Note that over 30 minutes was spent in preparing discharge papers, discussing discharge with patient, medication review, etc.    Signed:  Yeimy Sarkar  4/14/2020  1:38 PM

## 2021-06-06 ENCOUNTER — APPOINTMENT (OUTPATIENT)
Dept: GENERAL RADIOLOGY | Age: 71
DRG: 193 | End: 2021-06-06
Payer: COMMERCIAL

## 2021-06-06 ENCOUNTER — HOSPITAL ENCOUNTER (INPATIENT)
Age: 71
LOS: 11 days | Discharge: HOME HEALTH | DRG: 193 | End: 2021-06-17
Attending: EMERGENCY MEDICINE | Admitting: INTERNAL MEDICINE
Payer: COMMERCIAL

## 2021-06-06 DIAGNOSIS — J44.1 COPD EXACERBATION (HCC): Primary | ICD-10-CM

## 2021-06-06 DIAGNOSIS — J96.21 ACUTE ON CHRONIC RESPIRATORY FAILURE WITH HYPOXIA (HCC): ICD-10-CM

## 2021-06-06 DIAGNOSIS — R50.9 FEVER, UNSPECIFIED FEVER CAUSE: ICD-10-CM

## 2021-06-06 PROBLEM — J96.01 ACUTE RESPIRATORY FAILURE WITH HYPOXIA (HCC): Status: ACTIVE | Noted: 2021-06-06

## 2021-06-06 LAB
ADENOVIRUS BY PCR: NOT DETECTED
ALBUMIN SERPL-MCNC: 4.3 G/DL (ref 3.5–5.2)
ALP BLD-CCNC: 87 U/L (ref 40–129)
ALT SERPL-CCNC: 38 U/L (ref 0–40)
ANION GAP SERPL CALCULATED.3IONS-SCNC: 10 MMOL/L (ref 7–16)
AST SERPL-CCNC: 28 U/L (ref 0–39)
B.E.: 1.5 MMOL/L (ref -3–3)
BASOPHILS ABSOLUTE: 0.06 E9/L (ref 0–0.2)
BASOPHILS RELATIVE PERCENT: 0.4 % (ref 0–2)
BILIRUB SERPL-MCNC: 1.3 MG/DL (ref 0–1.2)
BORDETELLA PARAPERTUSSIS BY PCR: NOT DETECTED
BORDETELLA PERTUSSIS BY PCR: NOT DETECTED
BUN BLDV-MCNC: 10 MG/DL (ref 6–23)
CALCIUM SERPL-MCNC: 9.1 MG/DL (ref 8.6–10.2)
CHLAMYDOPHILIA PNEUMONIAE BY PCR: NOT DETECTED
CHLORIDE BLD-SCNC: 97 MMOL/L (ref 98–107)
CO2: 29 MMOL/L (ref 22–29)
COHB: 1.3 % (ref 0–1.5)
CORONAVIRUS 229E BY PCR: NOT DETECTED
CORONAVIRUS HKU1 BY PCR: NOT DETECTED
CORONAVIRUS NL63 BY PCR: NOT DETECTED
CORONAVIRUS OC43 BY PCR: NOT DETECTED
CREAT SERPL-MCNC: 0.7 MG/DL (ref 0.7–1.2)
CRITICAL: ABNORMAL
DATE ANALYZED: ABNORMAL
DATE OF COLLECTION: ABNORMAL
EKG ATRIAL RATE: 97 BPM
EKG P AXIS: 77 DEGREES
EKG P-R INTERVAL: 120 MS
EKG Q-T INTERVAL: 334 MS
EKG QRS DURATION: 88 MS
EKG QTC CALCULATION (BAZETT): 424 MS
EKG R AXIS: 104 DEGREES
EKG T AXIS: 50 DEGREES
EKG VENTRICULAR RATE: 97 BPM
EOSINOPHILS ABSOLUTE: 0.04 E9/L (ref 0.05–0.5)
EOSINOPHILS RELATIVE PERCENT: 0.3 % (ref 0–6)
GFR AFRICAN AMERICAN: >60
GFR NON-AFRICAN AMERICAN: >60 ML/MIN/1.73
GLUCOSE BLD-MCNC: 120 MG/DL (ref 74–99)
HCO3: 26 MMOL/L (ref 22–26)
HCT VFR BLD CALC: 47 % (ref 37–54)
HEMOGLOBIN: 15.8 G/DL (ref 12.5–16.5)
HHB: 0.7 % (ref 0–5)
HUMAN METAPNEUMOVIRUS BY PCR: NOT DETECTED
HUMAN RHINOVIRUS/ENTEROVIRUS BY PCR: NOT DETECTED
IMMATURE GRANULOCYTES #: 0.07 E9/L
IMMATURE GRANULOCYTES %: 0.5 % (ref 0–5)
INFLUENZA A BY PCR: NOT DETECTED
INFLUENZA A BY PCR: NOT DETECTED
INFLUENZA B BY PCR: NOT DETECTED
INFLUENZA B BY PCR: NOT DETECTED
LAB: ABNORMAL
LACTIC ACID, SEPSIS: 1.5 MMOL/L (ref 0.5–1.9)
LYMPHOCYTES ABSOLUTE: 1.24 E9/L (ref 1.5–4)
LYMPHOCYTES RELATIVE PERCENT: 8.1 % (ref 20–42)
Lab: ABNORMAL
MCH RBC QN AUTO: 32 PG (ref 26–35)
MCHC RBC AUTO-ENTMCNC: 33.6 % (ref 32–34.5)
MCV RBC AUTO: 95.3 FL (ref 80–99.9)
METHB: 0.4 % (ref 0–1.5)
MODE: ABNORMAL
MONOCYTES ABSOLUTE: 1.31 E9/L (ref 0.1–0.95)
MONOCYTES RELATIVE PERCENT: 8.6 % (ref 2–12)
MYCOPLASMA PNEUMONIAE BY PCR: NOT DETECTED
NEUTROPHILS ABSOLUTE: 12.55 E9/L (ref 1.8–7.3)
NEUTROPHILS RELATIVE PERCENT: 82.1 % (ref 43–80)
O2 CONTENT: 21.4 ML/DL
O2 SATURATION: 99.3 % (ref 92–98.5)
O2HB: 97.6 % (ref 94–97)
OPERATOR ID: 913
PARAINFLUENZA VIRUS 1 BY PCR: NOT DETECTED
PARAINFLUENZA VIRUS 2 BY PCR: NOT DETECTED
PARAINFLUENZA VIRUS 3 BY PCR: NOT DETECTED
PARAINFLUENZA VIRUS 4 BY PCR: NOT DETECTED
PATIENT TEMP: 37 C
PCO2: 40.5 MMHG (ref 35–45)
PDW BLD-RTO: 12.9 FL (ref 11.5–15)
PH BLOOD GAS: 7.42 (ref 7.35–7.45)
PLATELET # BLD: 181 E9/L (ref 130–450)
PMV BLD AUTO: 9.6 FL (ref 7–12)
PO2: 168.7 MMHG (ref 75–100)
POTASSIUM REFLEX MAGNESIUM: 3.8 MMOL/L (ref 3.5–5)
PRO-BNP: 70 PG/ML (ref 0–125)
RBC # BLD: 4.93 E12/L (ref 3.8–5.8)
RESPIRATORY SYNCYTIAL VIRUS BY PCR: NOT DETECTED
SARS-COV-2, NAAT: NOT DETECTED
SARS-COV-2, PCR: NOT DETECTED
SODIUM BLD-SCNC: 136 MMOL/L (ref 132–146)
SOURCE, BLOOD GAS: ABNORMAL
STREP GRP A PCR: NEGATIVE
THB: 15.4 G/DL (ref 11.5–16.5)
TIME ANALYZED: 1258
TOTAL PROTEIN: 6.9 G/DL (ref 6.4–8.3)
TROPONIN, HIGH SENSITIVITY: 19 NG/L (ref 0–11)
TROPONIN, HIGH SENSITIVITY: 20 NG/L (ref 0–11)
WBC # BLD: 15.3 E9/L (ref 4.5–11.5)

## 2021-06-06 PROCEDURE — 87880 STREP A ASSAY W/OPTIC: CPT

## 2021-06-06 PROCEDURE — 6370000000 HC RX 637 (ALT 250 FOR IP): Performed by: STUDENT IN AN ORGANIZED HEALTH CARE EDUCATION/TRAINING PROGRAM

## 2021-06-06 PROCEDURE — 93010 ELECTROCARDIOGRAM REPORT: CPT | Performed by: INTERNAL MEDICINE

## 2021-06-06 PROCEDURE — 87635 SARS-COV-2 COVID-19 AMP PRB: CPT

## 2021-06-06 PROCEDURE — 82805 BLOOD GASES W/O2 SATURATION: CPT

## 2021-06-06 PROCEDURE — 1200000000 HC SEMI PRIVATE

## 2021-06-06 PROCEDURE — 0202U NFCT DS 22 TRGT SARS-COV-2: CPT

## 2021-06-06 PROCEDURE — 87040 BLOOD CULTURE FOR BACTERIA: CPT

## 2021-06-06 PROCEDURE — 6360000002 HC RX W HCPCS: Performed by: INTERNAL MEDICINE

## 2021-06-06 PROCEDURE — 87449 NOS EACH ORGANISM AG IA: CPT

## 2021-06-06 PROCEDURE — 87502 INFLUENZA DNA AMP PROBE: CPT

## 2021-06-06 PROCEDURE — 2580000003 HC RX 258: Performed by: INTERNAL MEDICINE

## 2021-06-06 PROCEDURE — 6370000000 HC RX 637 (ALT 250 FOR IP): Performed by: INTERNAL MEDICINE

## 2021-06-06 PROCEDURE — 99285 EMERGENCY DEPT VISIT HI MDM: CPT

## 2021-06-06 PROCEDURE — 71045 X-RAY EXAM CHEST 1 VIEW: CPT

## 2021-06-06 PROCEDURE — 80053 COMPREHEN METABOLIC PANEL: CPT

## 2021-06-06 PROCEDURE — 93005 ELECTROCARDIOGRAM TRACING: CPT | Performed by: STUDENT IN AN ORGANIZED HEALTH CARE EDUCATION/TRAINING PROGRAM

## 2021-06-06 PROCEDURE — 6360000002 HC RX W HCPCS: Performed by: STUDENT IN AN ORGANIZED HEALTH CARE EDUCATION/TRAINING PROGRAM

## 2021-06-06 PROCEDURE — 84484 ASSAY OF TROPONIN QUANT: CPT

## 2021-06-06 PROCEDURE — 94664 DEMO&/EVAL PT USE INHALER: CPT

## 2021-06-06 PROCEDURE — 83880 ASSAY OF NATRIURETIC PEPTIDE: CPT

## 2021-06-06 PROCEDURE — 85025 COMPLETE CBC W/AUTO DIFF WBC: CPT

## 2021-06-06 PROCEDURE — 94640 AIRWAY INHALATION TREATMENT: CPT

## 2021-06-06 PROCEDURE — 83605 ASSAY OF LACTIC ACID: CPT

## 2021-06-06 RX ORDER — ACETAMINOPHEN 325 MG/1
650 TABLET ORAL EVERY 6 HOURS PRN
Status: DISCONTINUED | OUTPATIENT
Start: 2021-06-06 | End: 2021-06-17 | Stop reason: HOSPADM

## 2021-06-06 RX ORDER — SODIUM CHLORIDE 0.9 % (FLUSH) 0.9 %
5-40 SYRINGE (ML) INJECTION PRN
Status: DISCONTINUED | OUTPATIENT
Start: 2021-06-06 | End: 2021-06-17 | Stop reason: HOSPADM

## 2021-06-06 RX ORDER — LORAZEPAM 0.5 MG/1
0.5 TABLET ORAL 2 TIMES DAILY PRN
Status: DISCONTINUED | OUTPATIENT
Start: 2021-06-06 | End: 2021-06-17 | Stop reason: HOSPADM

## 2021-06-06 RX ORDER — ARFORMOTEROL TARTRATE 15 UG/2ML
15 SOLUTION RESPIRATORY (INHALATION) 2 TIMES DAILY
Status: DISCONTINUED | OUTPATIENT
Start: 2021-06-06 | End: 2021-06-17 | Stop reason: HOSPADM

## 2021-06-06 RX ORDER — BUDESONIDE 0.5 MG/2ML
0.25 INHALANT ORAL 2 TIMES DAILY
Status: DISCONTINUED | OUTPATIENT
Start: 2021-06-06 | End: 2021-06-17 | Stop reason: HOSPADM

## 2021-06-06 RX ORDER — ONDANSETRON 4 MG/1
4 TABLET, ORALLY DISINTEGRATING ORAL EVERY 8 HOURS PRN
Status: DISCONTINUED | OUTPATIENT
Start: 2021-06-06 | End: 2021-06-17 | Stop reason: HOSPADM

## 2021-06-06 RX ORDER — IPRATROPIUM BROMIDE AND ALBUTEROL SULFATE 2.5; .5 MG/3ML; MG/3ML
3 SOLUTION RESPIRATORY (INHALATION) ONCE
Status: COMPLETED | OUTPATIENT
Start: 2021-06-06 | End: 2021-06-06

## 2021-06-06 RX ORDER — GABAPENTIN 300 MG/1
600 CAPSULE ORAL 3 TIMES DAILY
Status: DISCONTINUED | OUTPATIENT
Start: 2021-06-06 | End: 2021-06-17 | Stop reason: HOSPADM

## 2021-06-06 RX ORDER — POLYETHYLENE GLYCOL 3350 17 G/17G
17 POWDER, FOR SOLUTION ORAL DAILY PRN
Status: DISCONTINUED | OUTPATIENT
Start: 2021-06-06 | End: 2021-06-17 | Stop reason: HOSPADM

## 2021-06-06 RX ORDER — ZINC GLUCONATE 50 MG
50 TABLET ORAL DAILY
COMMUNITY

## 2021-06-06 RX ORDER — SODIUM CHLORIDE 0.9 % (FLUSH) 0.9 %
5-40 SYRINGE (ML) INJECTION EVERY 12 HOURS SCHEDULED
Status: DISCONTINUED | OUTPATIENT
Start: 2021-06-06 | End: 2021-06-17 | Stop reason: HOSPADM

## 2021-06-06 RX ORDER — ACETAMINOPHEN 650 MG/1
650 SUPPOSITORY RECTAL EVERY 6 HOURS PRN
Status: DISCONTINUED | OUTPATIENT
Start: 2021-06-06 | End: 2021-06-17 | Stop reason: HOSPADM

## 2021-06-06 RX ORDER — AMLODIPINE BESYLATE AND BENAZEPRIL HYDROCHLORIDE 5; 20 MG/1; MG/1
1 CAPSULE ORAL DAILY
Status: DISCONTINUED | OUTPATIENT
Start: 2021-06-06 | End: 2021-06-06 | Stop reason: CLARIF

## 2021-06-06 RX ORDER — LISINOPRIL 20 MG/1
20 TABLET ORAL DAILY
Status: DISCONTINUED | OUTPATIENT
Start: 2021-06-06 | End: 2021-06-17 | Stop reason: HOSPADM

## 2021-06-06 RX ORDER — TAMSULOSIN HYDROCHLORIDE 0.4 MG/1
0.8 CAPSULE ORAL DAILY
Status: DISCONTINUED | OUTPATIENT
Start: 2021-06-06 | End: 2021-06-17 | Stop reason: HOSPADM

## 2021-06-06 RX ORDER — ALBUTEROL SULFATE 2.5 MG/3ML
2.5 SOLUTION RESPIRATORY (INHALATION)
Status: DISCONTINUED | OUTPATIENT
Start: 2021-06-06 | End: 2021-06-07

## 2021-06-06 RX ORDER — LORATADINE 10 MG/1
10 CAPSULE, LIQUID FILLED ORAL DAILY
COMMUNITY

## 2021-06-06 RX ORDER — AMLODIPINE BESYLATE 5 MG/1
5 TABLET ORAL DAILY
Status: DISCONTINUED | OUTPATIENT
Start: 2021-06-06 | End: 2021-06-17 | Stop reason: HOSPADM

## 2021-06-06 RX ORDER — SODIUM CHLORIDE 9 MG/ML
25 INJECTION, SOLUTION INTRAVENOUS PRN
Status: DISCONTINUED | OUTPATIENT
Start: 2021-06-06 | End: 2021-06-17 | Stop reason: HOSPADM

## 2021-06-06 RX ORDER — METHYLPREDNISOLONE SODIUM SUCCINATE 40 MG/ML
40 INJECTION, POWDER, LYOPHILIZED, FOR SOLUTION INTRAMUSCULAR; INTRAVENOUS EVERY 8 HOURS
Status: DISCONTINUED | OUTPATIENT
Start: 2021-06-06 | End: 2021-06-07

## 2021-06-06 RX ORDER — DEXAMETHASONE SODIUM PHOSPHATE 10 MG/ML
10 INJECTION, SOLUTION INTRAMUSCULAR; INTRAVENOUS ONCE
Status: COMPLETED | OUTPATIENT
Start: 2021-06-06 | End: 2021-06-06

## 2021-06-06 RX ORDER — GUAIFENESIN 400 MG/1
400 TABLET ORAL 4 TIMES DAILY
Status: DISCONTINUED | OUTPATIENT
Start: 2021-06-06 | End: 2021-06-17 | Stop reason: HOSPADM

## 2021-06-06 RX ORDER — SODIUM CHLORIDE 9 MG/ML
INJECTION, SOLUTION INTRAVENOUS CONTINUOUS
Status: ACTIVE | OUTPATIENT
Start: 2021-06-06 | End: 2021-06-07

## 2021-06-06 RX ORDER — CLONIDINE HYDROCHLORIDE 0.1 MG/1
0.1 TABLET ORAL 2 TIMES DAILY
Status: DISCONTINUED | OUTPATIENT
Start: 2021-06-06 | End: 2021-06-17 | Stop reason: HOSPADM

## 2021-06-06 RX ORDER — ACETAMINOPHEN 500 MG
1000 TABLET ORAL ONCE
Status: COMPLETED | OUTPATIENT
Start: 2021-06-06 | End: 2021-06-06

## 2021-06-06 RX ORDER — ONDANSETRON 2 MG/ML
4 INJECTION INTRAMUSCULAR; INTRAVENOUS EVERY 6 HOURS PRN
Status: DISCONTINUED | OUTPATIENT
Start: 2021-06-06 | End: 2021-06-17 | Stop reason: HOSPADM

## 2021-06-06 RX ADMIN — ACETAMINOPHEN 1000 MG: 500 TABLET ORAL at 13:01

## 2021-06-06 RX ADMIN — DEXAMETHASONE SODIUM PHOSPHATE 10 MG: 10 INJECTION, SOLUTION INTRAMUSCULAR; INTRAVENOUS at 15:00

## 2021-06-06 RX ADMIN — Medication 10 ML: at 22:21

## 2021-06-06 RX ADMIN — TAMSULOSIN HYDROCHLORIDE 0.8 MG: 0.4 CAPSULE ORAL at 22:00

## 2021-06-06 RX ADMIN — ARFORMOTEROL TARTRATE 15 MCG: 15 SOLUTION RESPIRATORY (INHALATION) at 20:17

## 2021-06-06 RX ADMIN — ALBUTEROL SULFATE 2.5 MG: 2.5 SOLUTION RESPIRATORY (INHALATION) at 20:16

## 2021-06-06 RX ADMIN — IPRATROPIUM BROMIDE 0.5 MG: 0.5 SOLUTION RESPIRATORY (INHALATION) at 20:17

## 2021-06-06 RX ADMIN — BUDESONIDE 250 MCG: 0.5 SUSPENSION RESPIRATORY (INHALATION) at 20:18

## 2021-06-06 RX ADMIN — GABAPENTIN 600 MG: 300 CAPSULE ORAL at 22:20

## 2021-06-06 RX ADMIN — SODIUM CHLORIDE: 9 INJECTION, SOLUTION INTRAVENOUS at 22:30

## 2021-06-06 RX ADMIN — IPRATROPIUM BROMIDE AND ALBUTEROL SULFATE 3 AMPULE: .5; 3 SOLUTION RESPIRATORY (INHALATION) at 13:38

## 2021-06-06 RX ADMIN — LORAZEPAM 0.5 MG: 0.5 TABLET ORAL at 22:20

## 2021-06-06 RX ADMIN — GUAIFENESIN 400 MG: 400 TABLET ORAL at 22:20

## 2021-06-06 RX ADMIN — ENOXAPARIN SODIUM 40 MG: 40 INJECTION SUBCUTANEOUS at 22:21

## 2021-06-06 ASSESSMENT — ENCOUNTER SYMPTOMS
WHEEZING: 0
EYE PAIN: 0
ABDOMINAL PAIN: 0
EYE DISCHARGE: 0
EYE REDNESS: 0
DIARRHEA: 0
COUGH: 0
BACK PAIN: 0
NAUSEA: 0
VOMITING: 0
SINUS PRESSURE: 0
SHORTNESS OF BREATH: 1
SORE THROAT: 1

## 2021-06-06 ASSESSMENT — PAIN SCALES - GENERAL
PAINLEVEL_OUTOF10: 0
PAINLEVEL_OUTOF10: 0

## 2021-06-06 NOTE — ED PROVIDER NOTES
Patient is a 70-year-old male presenting the emergency department for shortness of breath. He was diagnosed today related to urgent care with influenza A. He has had fevers, arthralgias, shortness of breath, was found to be 86% at home on his home pulse ox, gradual in onset, persistent, worsens with exertion, nothing makes it better, associate with a fever. He denies any chest pain, does state some chest tightness, denies any ear pain, eye pain. Does have a sore throat and stuffy nose. Review of Systems   Constitutional: Positive for fever. Negative for chills. HENT: Positive for sore throat. Negative for ear pain and sinus pressure. Eyes: Negative for pain, discharge and redness. Respiratory: Positive for shortness of breath. Negative for cough and wheezing. Cardiovascular: Negative for chest pain. Gastrointestinal: Negative for abdominal pain, diarrhea, nausea and vomiting. Genitourinary: Negative for dysuria and frequency. Musculoskeletal: Positive for arthralgias and myalgias. Negative for back pain. Skin: Negative for rash and wound. Neurological: Positive for headaches. Negative for weakness. Hematological: Negative for adenopathy. All other systems reviewed and are negative. Physical Exam  Vitals and nursing note reviewed. Constitutional:       Appearance: He is well-developed. HENT:      Head: Normocephalic and atraumatic. Eyes:      Conjunctiva/sclera: Conjunctivae normal.   Cardiovascular:      Rate and Rhythm: Normal rate and regular rhythm. Heart sounds: Normal heart sounds. No murmur heard. Pulmonary:      Effort: Pulmonary effort is normal. No respiratory distress. Breath sounds: Wheezing (Wheezes throughout the lungs) present. No rales. Abdominal:      General: Bowel sounds are normal.      Palpations: Abdomen is soft. Tenderness: There is no abdominal tenderness. There is no guarding or rebound.    Musculoskeletal: General: No tenderness or deformity. Cervical back: Normal range of motion and neck supple. Skin:     General: Skin is warm and dry. Neurological:      Mental Status: He is alert and oriented to person, place, and time. Cranial Nerves: No cranial nerve deficit. Coordination: Coordination normal.          Procedures     [facility]     ED Course as of Jun 06 1548   Sun Jun 06, 2021   1320 EKG: This EKG is signed by emergency department physician. Rate: 97  Rhythm: Sinus  Interpretation: Right axis deviation  Comparison: stable as compared to patient's most recent EKG         [JG]   1334 Patient was fairly wheezing on exam, will give duo nebs,    [JG]   1410 Patient been saturating well on oxygen, 7 elevated troponin, will send off respiratory panel, as well as a strep test, likely admit patient, put out consult to Dr. Polina Corbin to speak about patient    [JG]   [id number] Spoke to Dr. Keeley Moralez for Dr. Jonathon Cochran, will check delta Trope, if significantly elevated, will give a call back    [JG]      ED Course User Index  [JG] Emilee Crooks MD      Patient is a 80-year-old male presenting to emergency department for hypoxic respiratory failure. Took his pulse ox at home was found to be 86%, does not always have to wear oxygen but does have history of COPD. Appear to be in a COPD exacerbation, treated patient for COPD, he was concerned that he might have flu a as he said he tested positive at a urgent care, was negative here, ordered respiratory panel, as well as a strep test in case he was confusing it with group A strep. ED Course as of Jun 06 1549   Sun Jun 06, 2021   1320 EKG: This EKG is signed by emergency department physician.     Rate: 97  Rhythm: Sinus  Interpretation: Right axis deviation  Comparison: stable as compared to patient's most recent EKG         [JG]   1334 Patient was fairly wheezing on exam, will give duo nebs,    [JG]   1410 Patient been saturating well on oxygen, 7 elevated troponin, will send off respiratory panel, as well as a strep test, likely admit patient, put out consult to Dr. Henna Rico to speak about patient    [J]   [id number] Spoke to Dr. Eloy Cooks for Dr. Chin Finley, will check delta Trope, if significantly elevated, will give a call back    [JG]      ED Course User Index  [JG] Adrián Li MD       --------------------------------------------- PAST HISTORY ---------------------------------------------  Past Medical History:  has a past medical history of Asthma, COPD (chronic obstructive pulmonary disease) ([address] Hypertension, and Neuropathic pain. Past Surgical History:  has a past surgical history that includes hernia repair and back surgery. Social History:  reports that he has been smoking cigarettes. He has never used smokeless tobacco. He reports current alcohol use of about 4.0 standard drinks of alcohol per week. He reports that he does not use drugs. Family History: family history is not on file. The patients home medications have been reviewed. Allergies: Patient has no known allergies.     -------------------------------------------------- RESULTS -------------------------------------------------    LABS:  Results for orders placed or performed during the hospital encounter of 06/06/21   COVID-19, Rapid    Specimen: Nasopharyngeal Swab   Result Value Ref Range    SARS-CoV-2, NAAT Not Detected Not Detected   Rapid influenza A/B antigens    Specimen: Nasopharyngeal   Result Value Ref Range    Influenza A by PCR Not Detected Not Detected    Influenza B by PCR Not Detected Not Detected   Strep Screen Group A Throat    Specimen: Throat   Result Value Ref Range    Strep Grp A PCR Negative Negative   CBC Auto Differential   Result Value Ref Range    WBC 15.3 (H) 4.5 - 11.5 E9/L    RBC 4.93 3.80 - 5.80 E12/L    Hemoglobin 15.8 12.5 - 16.5 g/dL    Hematocrit 47.0 37.0 - 54.0 %    MCV 95.3 80.0 - 99.9 fL    MCH 32.0 26.0 - 35.0 pg    MCHC 33.6 32.0 - 34.5 %    RDW 12.9 11.5 - 15.0 fL Platelets 997 156 - 669 E9/L    MPV 9.6 7.0 - 12.0 fL    Neutrophils % 82.1 (H) 43.0 - 80.0 %    Immature Granulocytes % 0.5 0.0 - 5.0 %    Lymphocytes % 8.1 (L) 20.0 - 42.0 %    Monocytes % 8.6 2.0 - 12.0 %    Eosinophils % 0.3 0.0 - 6.0 %    Basophils % 0.4 0.0 - 2.0 %    Neutrophils Absolute 12.55 (H) 1.80 - 7.30 E9/L    Immature Granulocytes # 0.07 E9/L    Lymphocytes Absolute 1.24 (L) 1.50 - 4.00 E9/L    Monocytes Absolute 1.31 (H) 0.10 - 0.95 E9/L    Eosinophils Absolute 0.04 (L) 0.05 - 0.50 E9/L    Basophils Absolute 0.06 0.00 - 0.20 E9/L   Comprehensive Metabolic Panel w/ Reflex to MG   Result Value Ref Range    Sodium 136 132 - 146 mmol/L    Potassium reflex Magnesium 3.8 3.5 - 5.0 mmol/L    Chloride 97 (L) 98 - 107 mmol/L    CO2 29 22 - 29 mmol/L    Anion Gap 10 7 - 16 mmol/L    Glucose 120 (H) 74 - 99 mg/dL    BUN 10 6 - 23 mg/dL    CREATININE 0.7 0.7 - 1.2 mg/dL    GFR Non-African American >60 >=60 mL/min/1.73    GFR African American >60     Calcium 9.1 8.6 - 10.2 mg/dL    Total Protein 6.9 6.4 - 8.3 g/dL    Albumin 4.3 3.5 - 5.2 g/dL    Total Bilirubin 1.3 (H) 0.0 - 1.2 mg/dL    Alkaline Phosphatase 87 40 - 129 U/L    ALT 38 0 - 40 U/L    AST 28 0 - 39 U/L   Troponin   Result Value Ref Range    Troponin, High Sensitivity 20 (H) 0 - 11 ng/L   Brain Natriuretic Peptide   Result Value Ref Range    Pro-BNP 70 0 - 125 pg/mL   Lactate, Sepsis   Result Value Ref Range    Lactic Acid, Sepsis 1.5 0.5 - 1.9 mmol/L   Blood Gas, Arterial   Result Value Ref Range    Date Analyzed 20210606     Time Analyzed 1258     Source: Blood Arterial     pH, Blood Gas 7.425 7.350 - 7.450    PCO2 40.5 35.0 - 45.0 mmHg    PO2 168.7 (H) 75.0 - 100.0 mmHg    HCO3 26.0 22.0 - 26.0 mmol/L    B.E. 1.5 -3.0 - 3.0 mmol/L    O2 Sat 99.3 (H) 92.0 - 98.5 %    O2Hb 97.6 (H) 94.0 - 97.0 %    COHb 1.3 0.0 - 1.5 %    MetHb 0.4 0.0 - 1.5 %    O2 Content 21.4 mL/dL    HHb 0.7 0.0 - 5.0 %    tHb (est) 15.4 11.5 - 16.5 g/dL    Mode NC- 4 L examination, re-evaluation prior to disposition, multiple bedside re-evaluations, IV medications, cardiac monitoring and continuous pulse oximetry    This patient has remained hemodynamically stable during their ED course. Diagnosis:  1. COPD exacerbation ([facility] [address]    2. Acute on chronic respiratory failure with hypoxia (HCC)    3. Fever, unspecified fever cause        Disposition:  Patient's disposition: Admit to telemetry  Patient's condition is stable.               Susannah Rodriges MD  Resident  06/06/21 9210

## 2021-06-07 PROBLEM — R77.8 ELEVATED TROPONIN: Status: ACTIVE | Noted: 2021-06-07

## 2021-06-07 PROBLEM — E11.9 TYPE 2 DIABETES MELLITUS (HCC): Status: ACTIVE | Noted: 2021-06-07

## 2021-06-07 PROBLEM — R79.89 ELEVATED TROPONIN: Status: ACTIVE | Noted: 2021-06-07

## 2021-06-07 LAB
ADENOVIRUS BY PCR: NOT DETECTED
ANION GAP SERPL CALCULATED.3IONS-SCNC: 9 MMOL/L (ref 7–16)
BASOPHILS ABSOLUTE: 0.01 E9/L (ref 0–0.2)
BASOPHILS RELATIVE PERCENT: 0.1 % (ref 0–2)
BORDETELLA PARAPERTUSSIS BY PCR: NOT DETECTED
BORDETELLA PERTUSSIS BY PCR: NOT DETECTED
BUN BLDV-MCNC: 17 MG/DL (ref 6–23)
CALCIUM SERPL-MCNC: 8.9 MG/DL (ref 8.6–10.2)
CHLAMYDOPHILIA PNEUMONIAE BY PCR: NOT DETECTED
CHLORIDE BLD-SCNC: 98 MMOL/L (ref 98–107)
CO2: 29 MMOL/L (ref 22–29)
CORONAVIRUS 229E BY PCR: NOT DETECTED
CORONAVIRUS HKU1 BY PCR: NOT DETECTED
CORONAVIRUS NL63 BY PCR: NOT DETECTED
CORONAVIRUS OC43 BY PCR: NOT DETECTED
CREAT SERPL-MCNC: 0.6 MG/DL (ref 0.7–1.2)
EOSINOPHILS ABSOLUTE: 0 E9/L (ref 0.05–0.5)
EOSINOPHILS RELATIVE PERCENT: 0 % (ref 0–6)
GFR AFRICAN AMERICAN: >60
GFR NON-AFRICAN AMERICAN: >60 ML/MIN/1.73
GLUCOSE BLD-MCNC: 206 MG/DL (ref 74–99)
HCT VFR BLD CALC: 40.2 % (ref 37–54)
HEMOGLOBIN: 13.4 G/DL (ref 12.5–16.5)
HUMAN METAPNEUMOVIRUS BY PCR: NOT DETECTED
HUMAN RHINOVIRUS/ENTEROVIRUS BY PCR: NOT DETECTED
IMMATURE GRANULOCYTES #: 0.07 E9/L
IMMATURE GRANULOCYTES %: 0.5 % (ref 0–5)
INFLUENZA A BY PCR: NOT DETECTED
INFLUENZA B BY PCR: NOT DETECTED
L. PNEUMOPHILA SEROGP 1 UR AG: NORMAL
LYMPHOCYTES ABSOLUTE: 0.37 E9/L (ref 1.5–4)
LYMPHOCYTES RELATIVE PERCENT: 2.5 % (ref 20–42)
MCH RBC QN AUTO: 32.1 PG (ref 26–35)
MCHC RBC AUTO-ENTMCNC: 33.3 % (ref 32–34.5)
MCV RBC AUTO: 96.4 FL (ref 80–99.9)
MONOCYTES ABSOLUTE: 0.5 E9/L (ref 0.1–0.95)
MONOCYTES RELATIVE PERCENT: 3.4 % (ref 2–12)
MYCOPLASMA PNEUMONIAE BY PCR: NOT DETECTED
NEUTROPHILS ABSOLUTE: 13.65 E9/L (ref 1.8–7.3)
NEUTROPHILS RELATIVE PERCENT: 93.5 % (ref 43–80)
PARAINFLUENZA VIRUS 1 BY PCR: NOT DETECTED
PARAINFLUENZA VIRUS 2 BY PCR: NOT DETECTED
PARAINFLUENZA VIRUS 3 BY PCR: NOT DETECTED
PARAINFLUENZA VIRUS 4 BY PCR: NOT DETECTED
PDW BLD-RTO: 12.9 FL (ref 11.5–15)
PLATELET # BLD: 180 E9/L (ref 130–450)
PMV BLD AUTO: 10 FL (ref 7–12)
POTASSIUM REFLEX MAGNESIUM: 4.9 MMOL/L (ref 3.5–5)
PROCALCITONIN: 0.04 NG/ML (ref 0–0.08)
RBC # BLD: 4.17 E12/L (ref 3.8–5.8)
RESPIRATORY SYNCYTIAL VIRUS BY PCR: NOT DETECTED
SARS-COV-2, PCR: NOT DETECTED
SODIUM BLD-SCNC: 136 MMOL/L (ref 132–146)
STREP PNEUMONIAE ANTIGEN, URINE: NORMAL
WBC # BLD: 14.6 E9/L (ref 4.5–11.5)

## 2021-06-07 PROCEDURE — 2580000003 HC RX 258: Performed by: INTERNAL MEDICINE

## 2021-06-07 PROCEDURE — 36415 COLL VENOUS BLD VENIPUNCTURE: CPT

## 2021-06-07 PROCEDURE — 6370000000 HC RX 637 (ALT 250 FOR IP): Performed by: INTERNAL MEDICINE

## 2021-06-07 PROCEDURE — 2580000003 HC RX 258

## 2021-06-07 PROCEDURE — 6360000002 HC RX W HCPCS: Performed by: INTERNAL MEDICINE

## 2021-06-07 PROCEDURE — 0202U NFCT DS 22 TRGT SARS-COV-2: CPT

## 2021-06-07 PROCEDURE — 99223 1ST HOSP IP/OBS HIGH 75: CPT | Performed by: INTERNAL MEDICINE

## 2021-06-07 PROCEDURE — 87070 CULTURE OTHR SPECIMN AEROBIC: CPT

## 2021-06-07 PROCEDURE — 85025 COMPLETE CBC W/AUTO DIFF WBC: CPT

## 2021-06-07 PROCEDURE — 80048 BASIC METABOLIC PNL TOTAL CA: CPT

## 2021-06-07 PROCEDURE — 6370000000 HC RX 637 (ALT 250 FOR IP): Performed by: NURSE PRACTITIONER

## 2021-06-07 PROCEDURE — 1200000000 HC SEMI PRIVATE

## 2021-06-07 PROCEDURE — 6360000002 HC RX W HCPCS: Performed by: NURSE PRACTITIONER

## 2021-06-07 PROCEDURE — 94640 AIRWAY INHALATION TREATMENT: CPT

## 2021-06-07 PROCEDURE — 84145 PROCALCITONIN (PCT): CPT

## 2021-06-07 PROCEDURE — 2700000000 HC OXYGEN THERAPY PER DAY

## 2021-06-07 PROCEDURE — 87205 SMEAR GRAM STAIN: CPT

## 2021-06-07 PROCEDURE — 87206 SMEAR FLUORESCENT/ACID STAI: CPT

## 2021-06-07 RX ORDER — METHYLPREDNISOLONE SODIUM SUCCINATE 40 MG/ML
40 INJECTION, POWDER, LYOPHILIZED, FOR SOLUTION INTRAMUSCULAR; INTRAVENOUS EVERY 12 HOURS
Status: DISCONTINUED | OUTPATIENT
Start: 2021-06-07 | End: 2021-06-12

## 2021-06-07 RX ORDER — TAMSULOSIN HYDROCHLORIDE 0.4 MG/1
0.8 CAPSULE ORAL DAILY
Status: CANCELLED | OUTPATIENT
Start: 2021-06-08

## 2021-06-07 RX ORDER — IPRATROPIUM BROMIDE AND ALBUTEROL SULFATE 2.5; .5 MG/3ML; MG/3ML
1 SOLUTION RESPIRATORY (INHALATION)
Status: DISCONTINUED | OUTPATIENT
Start: 2021-06-07 | End: 2021-06-08

## 2021-06-07 RX ORDER — OSELTAMIVIR PHOSPHATE 6 MG/ML
75 FOR SUSPENSION ORAL 2 TIMES DAILY
Status: DISCONTINUED | OUTPATIENT
Start: 2021-06-07 | End: 2021-06-10 | Stop reason: SDUPTHER

## 2021-06-07 RX ADMIN — METHYLPREDNISOLONE SODIUM SUCCINATE 40 MG: 40 INJECTION, POWDER, LYOPHILIZED, FOR SOLUTION INTRAMUSCULAR; INTRAVENOUS at 21:24

## 2021-06-07 RX ADMIN — BUDESONIDE 250 MCG: 0.5 SUSPENSION RESPIRATORY (INHALATION) at 08:08

## 2021-06-07 RX ADMIN — LORAZEPAM 0.5 MG: 0.5 TABLET ORAL at 09:33

## 2021-06-07 RX ADMIN — GUAIFENESIN 400 MG: 400 TABLET ORAL at 21:23

## 2021-06-07 RX ADMIN — ARFORMOTEROL TARTRATE 15 MCG: 15 SOLUTION RESPIRATORY (INHALATION) at 08:08

## 2021-06-07 RX ADMIN — IPRATROPIUM BROMIDE AND ALBUTEROL SULFATE 1 AMPULE: .5; 3 SOLUTION RESPIRATORY (INHALATION) at 20:23

## 2021-06-07 RX ADMIN — AMLODIPINE BESYLATE 5 MG: 5 TABLET ORAL at 09:09

## 2021-06-07 RX ADMIN — GUAIFENESIN 400 MG: 400 TABLET ORAL at 13:58

## 2021-06-07 RX ADMIN — BUDESONIDE 250 MCG: 0.5 SUSPENSION RESPIRATORY (INHALATION) at 20:23

## 2021-06-07 RX ADMIN — GUAIFENESIN 400 MG: 400 TABLET ORAL at 18:40

## 2021-06-07 RX ADMIN — ARFORMOTEROL TARTRATE 15 MCG: 15 SOLUTION RESPIRATORY (INHALATION) at 20:23

## 2021-06-07 RX ADMIN — TAMSULOSIN HYDROCHLORIDE 0.8 MG: 0.4 CAPSULE ORAL at 21:27

## 2021-06-07 RX ADMIN — OSELTAMIVIR PHOSPHATE 75 MG: 6 POWDER, FOR SUSPENSION ORAL at 14:06

## 2021-06-07 RX ADMIN — ALBUTEROL SULFATE 2.5 MG: 2.5 SOLUTION RESPIRATORY (INHALATION) at 12:28

## 2021-06-07 RX ADMIN — LISINOPRIL 20 MG: 20 TABLET ORAL at 09:09

## 2021-06-07 RX ADMIN — CLONIDINE HYDROCHLORIDE 0.1 MG: 0.1 TABLET ORAL at 09:09

## 2021-06-07 RX ADMIN — IPRATROPIUM BROMIDE 0.5 MG: 0.5 SOLUTION RESPIRATORY (INHALATION) at 12:28

## 2021-06-07 RX ADMIN — IPRATROPIUM BROMIDE 0.5 MG: 0.5 SOLUTION RESPIRATORY (INHALATION) at 08:08

## 2021-06-07 RX ADMIN — CLONIDINE HYDROCHLORIDE 0.1 MG: 0.1 TABLET ORAL at 21:23

## 2021-06-07 RX ADMIN — GUAIFENESIN 400 MG: 400 TABLET ORAL at 09:09

## 2021-06-07 RX ADMIN — Medication 5 ML: at 09:10

## 2021-06-07 RX ADMIN — GABAPENTIN 600 MG: 300 CAPSULE ORAL at 21:24

## 2021-06-07 RX ADMIN — ALBUTEROL SULFATE 2.5 MG: 2.5 SOLUTION RESPIRATORY (INHALATION) at 08:08

## 2021-06-07 RX ADMIN — METHYLPREDNISOLONE SODIUM SUCCINATE 40 MG: 40 INJECTION, POWDER, LYOPHILIZED, FOR SOLUTION INTRAMUSCULAR; INTRAVENOUS at 09:09

## 2021-06-07 RX ADMIN — METHYLPREDNISOLONE SODIUM SUCCINATE 40 MG: 40 INJECTION, POWDER, LYOPHILIZED, FOR SOLUTION INTRAMUSCULAR; INTRAVENOUS at 03:00

## 2021-06-07 RX ADMIN — ENOXAPARIN SODIUM 40 MG: 40 INJECTION SUBCUTANEOUS at 21:23

## 2021-06-07 RX ADMIN — GABAPENTIN 600 MG: 300 CAPSULE ORAL at 09:08

## 2021-06-07 RX ADMIN — WATER 10 ML: 1 INJECTION INTRAMUSCULAR; INTRAVENOUS; SUBCUTANEOUS at 21:24

## 2021-06-07 RX ADMIN — OSELTAMIVIR PHOSPHATE 75 MG: 6 POWDER, FOR SUSPENSION ORAL at 21:24

## 2021-06-07 RX ADMIN — Medication 10 ML: at 21:24

## 2021-06-07 ASSESSMENT — PAIN SCALES - GENERAL
PAINLEVEL_OUTOF10: 0
PAINLEVEL_OUTOF10: 0

## 2021-06-07 NOTE — PLAN OF CARE
Problem: Falls - Risk of:  Goal: Will remain free from falls  Description: Will remain free from falls  Outcome: Ongoing  Goal: Absence of physical injury  Description: Absence of physical injury  Outcome: Ongoing     Problem: Breathing Pattern - Ineffective:  Goal: Ability to achieve and maintain a regular respiratory rate will improve  Description: Ability to achieve and maintain a regular respiratory rate will improve  6/1/4027 [id number] by Misty Bonds RN  Outcome: Ongoing  6/7/2021 0615 by Monika Sarah RN  Outcome: Met This Shift

## 2021-06-07 NOTE — CONSULTS
Pulmonary Consultation    Admit Date: 6/6/2021  Requesting Physician: Say Browning MD    CC:  COPD exacerbation    HPI:  Darrick Lopez is a pleasant 79 y.o. 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 He has a past medical history of COPD/asthma and is being worked up in [place] with Dr. Dave Lennon for transplant or lung reduction surgery. I am unable to obtain those records at this time. He has chronic hypoxic respiratory failure that he has home O2 for but does not wear it unless he needs it. He also has HTN, HLD, lumbar stenosis, chronic pain, anxiety. He presented to [facility] and was told he had a positive result for Influenza A along with hypoxia and he was transferred to the ED via ambulance. Friday night he started with a cough that progressed and now has green sputum. He has increased dyspnea, chest tightness and his home pulse ox was as low as 87%. He had a fever 101.6 at home and 102.9 at [facility]. He has no sick contacts. His respiratory panel in the ED was negative for Influenza A/B and negative for Covid. I personally called [facility] and spoke with OCTAVIO Serrano who confirmed his test there was positive for Flu A.       PMH:    Past Medical History:   Diagnosis Date    Asthma     COPD (chronic obstructive pulmonary disease) ([facility] [address]     Hypertension     Neuropathic pain     from back fusion     PSH:    Past Surgical History:   Procedure Laterality Date    BACK SURGERY      spinal fusion    HERNIA REPAIR            Respiratory ROS: dyspnea, cough, chest tightness, hypoxemia. Otherwise, a complete review of systems is undertaken and is negative.     Social History:  Social History     Socioeconomic History    Marital status:      Spouse name: Not on file    Number of children: Not on file    Years of education: Not on file    Highest education level: Not on file   Occupational History    Not on file Tobacco Use    Smoking status: Current Some Day Smoker     Types: Cigarettes     Last attempt to quit: 10/12/2011     Years since quittin.6    Smokeless tobacco: Never Used   Substance and Sexual Activity    Alcohol use: Yes     Alcohol/week: 4.0 standard drinks     Types: 4 Cans of beer per week     Comment: daily    Drug use: No    Sexual activity: Not on file   Other Topics Concern    Not on file   Social History Narrative    Not on file     Social Determinants of Health     Financial Resource Strain:     Difficulty of Paying Living Expenses:    Food Insecurity:     Worried About Running Out of Food in the Last Year:     [address] in the Last Year:    Transportation Needs:     Lack of Transportation (Medical):  Lack of Transportation (Non-Medical):    Physical Activity:     Days of Exercise per Week:     Minutes of Exercise per Session:    Stress:     Feeling of Stress :    Social Connections:     Frequency of Communication with Friends and Family:     Frequency of Social Gatherings with Friends and Family:     Attends [employer] Services:     Active Member of Clubs or Organizations:     Attends Club or Organization Meetings:     Marital Status:    Intimate Partner Violence:     Fear of Current or Ex-Partner:     Emotionally Abused:     Physically Abused:     Sexually Abused:        Family History:  History reviewed. No pertinent family history.     Medications:     sodium chloride        cloNIDine  0.1 mg Oral BID    gabapentin  600 mg Oral TID    guaiFENesin  400 mg Oral 4x daily    tamsulosin  0.8 mg Oral Daily    sodium chloride flush  5-40 mL Intravenous 2 times per day    enoxaparin  40 mg Subcutaneous Daily    albuterol  2.5 mg Nebulization Q4H WA    methylPREDNISolone  40 mg Intravenous Q8H    budesonide  0.25 mg Nebulization BID    Arformoterol Tartrate  15 mcg Nebulization BID    ipratropium  0.5 mg Nebulization TID    amLODIPine  5 mg Oral Daily    And  lisinopril  20 mg Oral Daily         Vitals:    VITALS:  BP (!) 155/71   Pulse 87   Temp 97.4 °F (36.3 °C) (Oral)   Resp 18   Ht 5' 7\" (1.702 m)   Wt 174 lb 6 oz (79.1 kg)   SpO2 95%   BMI 27.31 kg/m²   24HR INTAKE/OUTPUT:      Intake/Output Summary (Last 24 hours) at 2021 1233  Last data filed at 2021 1035  Gross per 24 hour   Intake 180 ml   Output --   Net 180 ml     CURRENT PULSE OXIMETRY:  SpO2: 95 %  24HR PULSE OXIMETRY RANGE:  SpO2  Av.5 %  Min: 95 %  Max: 99 %      EXAM:  General: No distress. Currently on 3L nc. Eyes: PERRL. No sclera icterus. No conjunctival injection. ENT: No discharge. Pharynx clear. Mardene Morelle noted on tongue. Neck: Trachea midline. Normal thyroid. Resp: No accessory muscle use. Diminished throughout with exp wheezing. No crackles. No rhonchi. CV: Regular rate. Regular rhythm. No mumur or rub. ABD: Non-tender. Non-distended. No masses. No organmegaly. Normal bowel sounds. Skin: Warm and dry. No nodule on exposed extremities. No rash on exposed extremities. Lymph: No cervical LAD. No supraclavicular LAD. Ext: No joint deformity. No clubbing. No cyanosis. No edema  Neuro: Awake. Follows commands      Lab Results:  CBC:   Recent Labs     21  1237 21  0345   WBC 15.3* 14.6*   HGB 15.8 13.4   HCT 47.0 40.2   MCV 95.3 96.4    180     BMP:   Recent Labs     21  1237 21  0345    136   K 3.8 4.9   CL 97* 98   CO2 29 29   BUN 10 17   CREATININE 0.7 0.6*     LFT:   Recent Labs     21  1237   ALKPHOS 87   ALT 38   AST 28   PROT 6.9   BILITOT 1.3*   LABALBU 4.3     PT/INR: No results for input(s): PROTIME, INR in the last 72 hours. Cultures:  No results for input(s): CULTRESP in the last 72 hours. ABG:   Recent Labs     21  1258   PH 7.425   PO2 168.7*   PCO2 40.5   HCO3 26.0   BE 1.5   O2SAT 99.3*     Results for Lexie Dhaliwal (MRN [id number]) as of 2021 12:35   Ref.  Range 2021 12:37   Influenza A by PCR Latest Ref Range: Not Detected  Not Detected   Influenza B by PCR Latest Ref Range: Not Detected  Not Detected   Adenovirus by PCR Latest Ref Range: Not Detected  Not Detected   Coronavirus 229E by PCR Latest Ref Range: Not Detected  Not Detected   Coronavirus HKU1 by PCR Latest Ref Range: Not Detected  Not Detected   Coronavirus NL63 by PCR Latest Ref Range: Not Detected  Not Detected   Coronavirus OC43 by PCR Latest Ref Range: Not Detected  Not Detected   Human Metapneumovirus by PCR Latest Ref Range: Not Detected  Not Detected   Human Rhinovirus/Enterovirus by PCR Latest Ref Range: Not Detected  Not Detected   Parainfluenza Virus 1 by PCR Latest Ref Range: Not Detected  Not Detected   Parainfluenza Virus 2 by PCR Latest Ref Range: Not Detected  Not Detected   Parainfluenza Virus 3 by PCR Latest Ref Range: Not Detected  Not Detected   Parainfluenza Virus 4 by PCR Latest Ref Range: Not Detected  Not Detected   Respiratory Syncytial Virus by PCR Latest Ref Range: Not Detected  Not Detected   Bordetella parapertussis by PCR Latest Ref Range: Not Detected  Not Detected   Chlamydophilia pneumoniae by PCR Latest Ref Range: Not Detected  Not Detected   Mycoplasma pneumoniae by PCR Latest Ref Range: Not Detected  Not Detected   SARS-CoV-2, PCR Latest Ref Range: Not Detected  Not Detected   Bordetella pertussis by PCR Latest Ref Range: Not Detected  Not Detected       Films:  XR CHEST PORTABLE 6/6/2021: No airspace opacity or pleural effusion. The heart is normal size. No pneumothorax. No free air beneath the hemidiaphragms. Hyperinflation of both lungs notable in the upper lobes. 1.  No pneumonia or pleural effusion. 2.  Emphysematous changes. Assessment:  · COPD exacerbation  · Acute on chronic hypoxic respiratory failure  · Hypertension      Plan:  · Continue O2 at 3L nc. Ambulatory pulse prior to d/c.   · Respiratory panel negative, including Flu A. Will repeat respiratory panel and start Tamiflu. · Legionella/strep pneumoniae negative. Sputum culture sent - pending  · Afebrile and elevated WBC. Procalcitonin 0.04   · Will continue nebulizers - pulmicort and brovana;  duonebs  · Continue solumedrol another day - change to q12  · Cardiology consulted with elevated troponin - no further work up per conversation with cards      Thank you for allowing us to see this patient in consultation. Please contact us with any questions. CLAUDINE Mtz    Electronically signed by KELECHI Mtz CNP on 6/7/2021 at 12:33 PM    Attending Attestation Note:    Patient seen and examined with NP. I agree with above.     In addition, the following apply:    - apparent outpatient influenza swab positive for influenza A  - respiratory panel negative 6/6/2021 at [address]   - start tamiflu  - repeat respiratory panel  - patient is known to Dr. Jo Ramsay at [facility] as he is in consideration to lung transplantation but not severe enough at this time  - O2, IS  - flonase  - supportive care to continue     Amol Rodriguez MD  6/7/2021  5:22 PM

## 2021-06-07 NOTE — H&P
daily.    ALBUTEROL IN, Inhale into the lungs as needed     Allergies:    Patient has no known allergies. Social History:    reports that he has been smoking cigarettes. He has never used smokeless tobacco. He reports current alcohol use of about 4.0 standard drinks of alcohol per week. He reports that he does not use drugs. Family History:   family history is not on file. REVIEW OF SYSTEMS:  As above in the HPI, otherwise negative    Vital Signs:  /68   Pulse 69   Temp 98.5 °F (36.9 °C) (Oral)   Resp 20   Ht 5' 7\" (1.702 m)   Wt 174 lb 6 oz (79.1 kg)   SpO2 97%   BMI 27.31 kg/m²     Physical Exam:    General appearance: alert, appears stated age and cooperative  Head: Normocephalic, without obvious abnormality, atraumatic  Eyes: conjunctivae/corneas clear. PERRL, EOM's intact. Fundi benign. Throat: lips, mucosa, and tongue normal; teeth and gums normal  Neck: no adenopathy, no carotid bruit, no JVD, supple, symmetrical, trachea midline and thyroid not enlarged, symmetric, no tenderness/mass/nodules  Back: symmetric, no curvature. ROM normal. No CVA tenderness.   Lungs: clear to auscultation bilaterally  Chest wall: no tenderness  Heart: regular rate and rhythm, S1, S2 normal, no murmur, click, rub or gallop  Abdomen: soft, non-tender; bowel sounds normal; no masses,  no organomegaly  Extremities: extremities normal, atraumatic, no cyanosis or edema  Pulses: 2+ and symmetric  Skin: Skin color, texture, turgor normal. No rashes or lesions  Lymph nodes: Cervical, supraclavicular, and axillary nodes normal.  Neurologic: Grossly normal  Rectal: defferred    LABS:    Recent Results (from the past 24 hour(s))   EKG 12 Lead    Collection Time: 06/06/21 11:59 AM   Result Value Ref Range    Ventricular Rate 97 BPM    Atrial Rate 97 BPM    P-R Interval 120 ms    QRS Duration 88 ms    Q-T Interval 334 ms    QTc Calculation (Bazett) 424 ms    P Axis 77 degrees    R Axis 104 degrees    T Axis 50 degrees CBC Auto Differential    Collection Time: 06/06/21 12:37 PM   Result Value Ref Range    WBC 15.3 (H) 4.5 - 11.5 E9/L    RBC 4.93 3.80 - 5.80 E12/L    Hemoglobin 15.8 12.5 - 16.5 g/dL    Hematocrit 47.0 37.0 - 54.0 %    MCV 95.3 80.0 - 99.9 fL    MCH 32.0 26.0 - 35.0 pg    MCHC 33.6 32.0 - 34.5 %    RDW 12.9 11.5 - 15.0 fL    Platelets 781 030 - 568 E9/L    MPV 9.6 7.0 - 12.0 fL    Neutrophils % 82.1 (H) 43.0 - 80.0 %    Immature Granulocytes % 0.5 0.0 - 5.0 %    Lymphocytes % 8.1 (L) 20.0 - 42.0 %    Monocytes % 8.6 2.0 - 12.0 %    Eosinophils % 0.3 0.0 - 6.0 %    Basophils % 0.4 0.0 - 2.0 %    Neutrophils Absolute 12.55 (H) 1.80 - 7.30 E9/L    Immature Granulocytes # 0.07 E9/L    Lymphocytes Absolute 1.24 (L) 1.50 - 4.00 E9/L    Monocytes Absolute 1.31 (H) 0.10 - 0.95 E9/L    Eosinophils Absolute 0.04 (L) 0.05 - 0.50 E9/L    Basophils Absolute 0.06 0.00 - 0.20 E9/L   Comprehensive Metabolic Panel w/ Reflex to MG    Collection Time: 06/06/21 12:37 PM   Result Value Ref Range    Sodium 136 132 - 146 mmol/L    Potassium reflex Magnesium 3.8 3.5 - 5.0 mmol/L    Chloride 97 (L) 98 - 107 mmol/L    CO2 29 22 - 29 mmol/L    Anion Gap 10 7 - 16 mmol/L    Glucose 120 (H) 74 - 99 mg/dL    BUN 10 6 - 23 mg/dL    CREATININE 0.7 0.7 - 1.2 mg/dL    GFR Non-African American >60 >=60 mL/min/1.73    GFR African American >60     Calcium 9.1 8.6 - 10.2 mg/dL    Total Protein 6.9 6.4 - 8.3 g/dL    Albumin 4.3 3.5 - 5.2 g/dL    Total Bilirubin 1.3 (H) 0.0 - 1.2 mg/dL    Alkaline Phosphatase 87 40 - 129 U/L    ALT 38 0 - 40 U/L    AST 28 0 - 39 U/L   Troponin    Collection Time: 06/06/21 12:37 PM   Result Value Ref Range    Troponin, High Sensitivity 20 (H) 0 - 11 ng/L   Brain Natriuretic Peptide    Collection Time: 06/06/21 12:37 PM   Result Value Ref Range    Pro-BNP 70 0 - 125 pg/mL   Lactate, Sepsis    Collection Time: 06/06/21 12:37 PM   Result Value Ref Range    Lactic Acid, Sepsis 1.5 0.5 - 1.9 mmol/L   COVID-19, Rapid Collection Time: 06/06/21 12:37 PM    Specimen: Nasopharyngeal Swab   Result Value Ref Range    SARS-CoV-2, NAAT Not Detected Not Detected   Rapid influenza A/B antigens    Collection Time: 06/06/21 12:37 PM    Specimen: Nasopharyngeal   Result Value Ref Range    Influenza A by PCR Not Detected Not Detected    Influenza B by PCR Not Detected Not Detected   Respiratory Panel, Molecular, with COVID-19 (Restricted: peds pts or suitable admitted adults)    Collection Time: 06/06/21 12:37 PM    Specimen: Nasopharyngeal   Result Value Ref Range    Adenovirus by PCR Not Detected Not Detected    Bordetella parapertussis by PCR Not Detected Not Detected    Bordetella pertussis by PCR Not Detected Not Detected    Chlamydophilia pneumoniae by PCR Not Detected Not Detected    Coronavirus 229E by PCR Not Detected Not Detected    Coronavirus HKU1 by PCR Not Detected Not Detected    Coronavirus NL63 by PCR Not Detected Not Detected    Coronavirus OC43 by PCR Not Detected Not Detected    SARS-CoV-2, PCR Not Detected Not Detected    Human Metapneumovirus by PCR Not Detected Not Detected    Human Rhinovirus/Enterovirus by PCR Not Detected Not Detected    Influenza A by PCR Not Detected Not Detected    Influenza B by PCR Not Detected Not Detected    Mycoplasma pneumoniae by PCR Not Detected Not Detected    Parainfluenza Virus 1 by PCR Not Detected Not Detected    Parainfluenza Virus 2 by PCR Not Detected Not Detected    Parainfluenza Virus 3 by PCR Not Detected Not Detected    Parainfluenza Virus 4 by PCR Not Detected Not Detected    Respiratory Syncytial Virus by PCR Not Detected Not Detected   Blood Gas, Arterial    Collection Time: 06/06/21 12:58 PM   Result Value Ref Range    Date Analyzed 20210606     Time Analyzed 1258     Source: Blood Arterial     pH, Blood Gas 7.425 7.350 - 7.450    PCO2 40.5 35.0 - 45.0 mmHg    PO2 168.7 (H) 75.0 - 100.0 mmHg    HCO3 26.0 22.0 - 26.0 mmol/L    B.E. 1.5 -3.0 - 3.0 mmol/L    O2 Sat 99.3 (H) 92.0 - 98.5 %    O2Hb 97.6 (H) 94.0 - 97.0 %    COHb 1.3 0.0 - 1.5 %    MetHb 0.4 0.0 - 1.5 %    O2 Content 21.4 mL/dL    HHb 0.7 0.0 - 5.0 %    tHb (est) 15.4 11.5 - 16.5 g/dL    Mode NC- 4 L     Date Of Collection      Time Collected      Pt Temp 37.0 C     ID [id number]     Lab [id number]     Critical(s) Notified .  No Critical Values    Troponin    Collection Time: 06/06/21  3:02 PM   Result Value Ref Range    Troponin, High Sensitivity 19 (H) 0 - 11 ng/L   Strep Screen Group A Throat    Collection Time: 06/06/21  3:02 PM    Specimen: Throat   Result Value Ref Range    Strep Grp A PCR Negative Negative   Basic Metabolic Panel w/ Reflex to MG    Collection Time: 06/07/21  3:45 AM   Result Value Ref Range    Sodium 136 132 - 146 mmol/L    Potassium reflex Magnesium 4.9 3.5 - 5.0 mmol/L    Chloride 98 98 - 107 mmol/L    CO2 29 22 - 29 mmol/L    Anion Gap 9 7 - 16 mmol/L    Glucose 206 (H) 74 - 99 mg/dL    BUN 17 6 - 23 mg/dL    CREATININE 0.6 (L) 0.7 - 1.2 mg/dL    GFR Non-African American >60 >=60 mL/min/1.73    GFR African American >60     Calcium 8.9 8.6 - 10.2 mg/dL   CBC auto differential    Collection Time: 06/07/21  3:45 AM   Result Value Ref Range    WBC 14.6 (H) 4.5 - 11.5 E9/L    RBC 4.17 3.80 - 5.80 E12/L    Hemoglobin 13.4 12.5 - 16.5 g/dL    Hematocrit 40.2 37.0 - 54.0 %    MCV 96.4 80.0 - 99.9 fL    MCH 32.1 26.0 - 35.0 pg    MCHC 33.3 32.0 - 34.5 %    RDW 12.9 11.5 - 15.0 fL    Platelets 998 148 - 038 E9/L    MPV 10.0 7.0 - 12.0 fL    Neutrophils % 93.5 (H) 43.0 - 80.0 %    Immature Granulocytes % 0.5 0.0 - 5.0 %    Lymphocytes % 2.5 (L) 20.0 - 42.0 %    Monocytes % 3.4 2.0 - 12.0 %    Eosinophils % 0.0 0.0 - 6.0 %    Basophils % 0.1 0.0 - 2.0 %    Neutrophils Absolute 13.65 (H) 1.80 - 7.30 E9/L    Immature Granulocytes # 0.07 E9/L    Lymphocytes Absolute 0.37 (L) 1.50 - 4.00 E9/L    Monocytes Absolute 0.50 0.10 - 0.95 E9/L    Eosinophils Absolute 0.00 (L) 0.05 - 0.50 E9/L    Basophils Absolute 0.01 0.00 - 0.20 E9/L       Radiology:     Chest  Xray:  Results for orders placed during the hospital encounter of 20    XR CHEST STANDARD (2 VW)    Narrative  Patient MRN: [id number]  : 1950  Age:  71 years  Gender: Male  Order Date: 3/10/2020 12:00 PM  Exam: XR CHEST (2 VW)  Number of Images: 2 view  Indication:   persistent dyspnea and cough  persistent dyspnea and cough  Comparison: 2020    Findings: The heart is unremarkable. The lung fields are hyperinflated. Density is seen in the lung fields suggesting scar. The aorta is tortuous and ectatic. Impression  Findings of emphysematous changes. This study was dictated by Ofelia Eastman PA-C and Salty Ernst MD  reviewed and concurred with the findings. Results for orders placed during the hospital encounter of 21    XR CHEST PORTABLE    Narrative  EXAMINATION:  ONE XRAY VIEW OF THE CHEST    2021 1:14 pm    COMPARISON:  March 10, 2020    HISTORY:  ORDERING SYSTEM PROVIDED HISTORY: r/o pna  TECHNOLOGIST PROVIDED HISTORY:  Reason for exam:->r/o pna    FINDINGS:  No airspace opacity or pleural effusion. The heart is normal size. No  pneumothorax. No free air beneath the hemidiaphragms. Hyperinflation of both  lungs notable in the upper lobes. Impression  1. No pneumonia or pleural effusion. 2.  Emphysematous changes. Other:  None       ASSESSMENT:      Principal Problem:    Acute respiratory failure with hypoxia (HCC)  Active Problems:    Type 2 diabetes mellitus (HCC)    Elevated troponin  Resolved Problems:    * No resolved hospital problems.  *      PLAN:    Will ask Cardio to see RE elevated troponin    Dora Martino MD  6:24 AM  2021

## 2021-06-07 NOTE — CARE COORDINATION
Social work / Discharge Planning:       Social work met with patient for initial assessment. He lives with his wife and is independent. Patient purchased home oxygen from [employer] and he self regulates his liter flow. Currently on 3 liters and his home device goes up to 5 liters. He denies having any discharge needs. Social work will follow.  Electronically signed by ALYSE Nieto on 6/7/2021 at 12:09 PM

## 2021-06-08 LAB — GRAM STAIN ORDERABLE: NORMAL

## 2021-06-08 PROCEDURE — 6360000002 HC RX W HCPCS: Performed by: NURSE PRACTITIONER

## 2021-06-08 PROCEDURE — 6360000002 HC RX W HCPCS: Performed by: INTERNAL MEDICINE

## 2021-06-08 PROCEDURE — 94640 AIRWAY INHALATION TREATMENT: CPT

## 2021-06-08 PROCEDURE — 2700000000 HC OXYGEN THERAPY PER DAY

## 2021-06-08 PROCEDURE — 1200000000 HC SEMI PRIVATE

## 2021-06-08 PROCEDURE — 2580000003 HC RX 258

## 2021-06-08 PROCEDURE — 6370000000 HC RX 637 (ALT 250 FOR IP): Performed by: INTERNAL MEDICINE

## 2021-06-08 PROCEDURE — 2580000003 HC RX 258: Performed by: INTERNAL MEDICINE

## 2021-06-08 RX ORDER — LEVALBUTEROL 1.25 MG/.5ML
1.25 SOLUTION, CONCENTRATE RESPIRATORY (INHALATION) EVERY 4 HOURS PRN
Status: DISCONTINUED | OUTPATIENT
Start: 2021-06-08 | End: 2021-06-17 | Stop reason: HOSPADM

## 2021-06-08 RX ADMIN — WATER 10 ML: 1 INJECTION INTRAMUSCULAR; INTRAVENOUS; SUBCUTANEOUS at 21:23

## 2021-06-08 RX ADMIN — AMLODIPINE BESYLATE 5 MG: 5 TABLET ORAL at 09:30

## 2021-06-08 RX ADMIN — METHYLPREDNISOLONE SODIUM SUCCINATE 40 MG: 40 INJECTION, POWDER, LYOPHILIZED, FOR SOLUTION INTRAMUSCULAR; INTRAVENOUS at 09:30

## 2021-06-08 RX ADMIN — GUAIFENESIN 400 MG: 400 TABLET ORAL at 21:24

## 2021-06-08 RX ADMIN — GABAPENTIN 600 MG: 300 CAPSULE ORAL at 09:30

## 2021-06-08 RX ADMIN — GABAPENTIN 600 MG: 300 CAPSULE ORAL at 13:50

## 2021-06-08 RX ADMIN — TAMSULOSIN HYDROCHLORIDE 0.8 MG: 0.4 CAPSULE ORAL at 21:24

## 2021-06-08 RX ADMIN — LORAZEPAM 0.5 MG: 0.5 TABLET ORAL at 09:30

## 2021-06-08 RX ADMIN — GUAIFENESIN 400 MG: 400 TABLET ORAL at 13:50

## 2021-06-08 RX ADMIN — LEVALBUTEROL 1.25 MG: 1.25 SOLUTION, CONCENTRATE RESPIRATORY (INHALATION) at 07:25

## 2021-06-08 RX ADMIN — CLONIDINE HYDROCHLORIDE 0.1 MG: 0.1 TABLET ORAL at 21:24

## 2021-06-08 RX ADMIN — Medication 10 ML: at 21:24

## 2021-06-08 RX ADMIN — GUAIFENESIN 400 MG: 400 TABLET ORAL at 15:59

## 2021-06-08 RX ADMIN — ARFORMOTEROL TARTRATE 15 MCG: 15 SOLUTION RESPIRATORY (INHALATION) at 20:35

## 2021-06-08 RX ADMIN — GABAPENTIN 600 MG: 300 CAPSULE ORAL at 21:24

## 2021-06-08 RX ADMIN — LORAZEPAM 0.5 MG: 0.5 TABLET ORAL at 21:24

## 2021-06-08 RX ADMIN — BUDESONIDE 250 MCG: 0.5 SUSPENSION RESPIRATORY (INHALATION) at 20:36

## 2021-06-08 RX ADMIN — BUDESONIDE 250 MCG: 0.5 SUSPENSION RESPIRATORY (INHALATION) at 07:26

## 2021-06-08 RX ADMIN — LISINOPRIL 20 MG: 20 TABLET ORAL at 09:30

## 2021-06-08 RX ADMIN — LEVALBUTEROL 1.25 MG: 1.25 SOLUTION, CONCENTRATE RESPIRATORY (INHALATION) at 20:36

## 2021-06-08 RX ADMIN — CLONIDINE HYDROCHLORIDE 0.1 MG: 0.1 TABLET ORAL at 09:30

## 2021-06-08 RX ADMIN — GUAIFENESIN 400 MG: 400 TABLET ORAL at 09:30

## 2021-06-08 RX ADMIN — OSELTAMIVIR PHOSPHATE 75 MG: 6 POWDER, FOR SUSPENSION ORAL at 21:23

## 2021-06-08 RX ADMIN — LEVALBUTEROL 1.25 MG: 1.25 SOLUTION, CONCENTRATE RESPIRATORY (INHALATION) at 17:16

## 2021-06-08 RX ADMIN — ENOXAPARIN SODIUM 40 MG: 40 INJECTION SUBCUTANEOUS at 21:23

## 2021-06-08 RX ADMIN — Medication 10 ML: at 09:30

## 2021-06-08 RX ADMIN — ARFORMOTEROL TARTRATE 15 MCG: 15 SOLUTION RESPIRATORY (INHALATION) at 07:26

## 2021-06-08 RX ADMIN — METHYLPREDNISOLONE SODIUM SUCCINATE 40 MG: 40 INJECTION, POWDER, LYOPHILIZED, FOR SOLUTION INTRAMUSCULAR; INTRAVENOUS at 21:23

## 2021-06-08 ASSESSMENT — PAIN SCALES - GENERAL
PAINLEVEL_OUTOF10: 0
PAINLEVEL_OUTOF10: 0

## 2021-06-08 NOTE — PROGRESS NOTES
Internal Medicine  Progress Note  Nakul Ramírez MD     Subjective:     Patient is alert. Patient reports worst night of his life! . Tolerating diet well no other c/o. Scheduled Meds:   methylPREDNISolone  40 mg Intravenous Q12H    oseltamivir 6mg/ml  75 mg Oral BID    cloNIDine  0.1 mg Oral BID    gabapentin  600 mg Oral TID    guaiFENesin  400 mg Oral 4x daily    tamsulosin  0.8 mg Oral Daily    sodium chloride flush  5-40 mL Intravenous 2 times per day    enoxaparin  40 mg Subcutaneous Daily    budesonide  0.25 mg Nebulization BID    Arformoterol Tartrate  15 mcg Nebulization BID    amLODIPine  5 mg Oral Daily    And    lisinopril  20 mg Oral Daily     Continuous Infusions:   sodium chloride       PRN Meds:levalbuterol, LORazepam, sodium chloride flush, sodium chloride, ondansetron **OR** ondansetron, polyethylene glycol, acetaminophen **OR** acetaminophen    Objective:      Physical Exam:  Vitals:   BP (!) 157/69   Pulse 86   Temp 98.3 °F (36.8 °C) (Oral)   Resp 18   Ht 5' 7\" (1.702 m)   Wt 174 lb 6 oz (79.1 kg)   SpO2 97%   BMI 27.31 kg/m²   I/O's    Intake/Output Summary (Last 24 hours) at 6/8/2021 5975  Last data filed at 6/7/2021 1035  Gross per 24 hour   Intake 180 ml   Output --   Net 180 ml     Exam:  General appearance: alert, appears stated age, cooperative and moderate distress  Head: Normocephalic, without obvious abnormality, atraumatic  Eyes: conjunctivae/corneas clear. PERRL, EOM's intact. Fundi benign.   Throat: lips, mucosa, and tongue normal; teeth and gums normal  Neck: no adenopathy, no carotid bruit, no JVD and supple, symmetrical, trachea midline  Back: negative  Lungs: diminished breath sounds bilaterally and poor air movement throughout  Chest wall: no tenderness  Heart: tachy  Abdomen: soft, non-tender; bowel sounds normal; no masses,  no organomegaly  Extremities: extremities normal, atraumatic, no cyanosis or edema  Pulses: 2+ and symmetric  Skin: Skin color, texture, turgor normal. No rashes or lesions  Lymph nodes: Cervical, supraclavicular, and axillary nodes normal.  Neurologic: Grossly normal      Imaging     Chest  Xray:  Results for orders placed during the hospital encounter of 20    XR CHEST STANDARD (2 VW)    Narrative  Patient MRN: [id number]  : 1950  Age:  71 years  Gender: Male  Order Date: 3/10/2020 12:00 PM  Exam: XR CHEST (2 VW)  Number of Images: 2 view  Indication:   persistent dyspnea and cough  persistent dyspnea and cough  Comparison: 2020    Findings: The heart is unremarkable. The lung fields are hyperinflated. Density is seen in the lung fields suggesting scar. The aorta is tortuous and ectatic. Impression  Findings of emphysematous changes. This study was dictated by Crow Adams PA-C and Fabiola Elliott MD  reviewed and concurred with the findings. Results for orders placed during the hospital encounter of 21    XR CHEST PORTABLE    Narrative  EXAMINATION:  ONE XRAY VIEW OF THE CHEST    2021 1:14 pm    COMPARISON:  March 10, 2020    HISTORY:  ORDERING SYSTEM PROVIDED HISTORY: r/o pna  TECHNOLOGIST PROVIDED HISTORY:  Reason for exam:->r/o pna    FINDINGS:  No airspace opacity or pleural effusion. The heart is normal size. No  pneumothorax. No free air beneath the hemidiaphragms. Hyperinflation of both  lungs notable in the upper lobes. Impression  1. No pneumonia or pleural effusion. 2.  Emphysematous changes.       Additional Imaging:  none    Lab Review   Recent Results (from the past 24 hour(s))   Respiratory Panel, Molecular, with COVID-19 (Restricted: peds pts or suitable admitted adults)    Collection Time: 21  2:20 PM    Specimen: Nasopharyngeal; Nasal   Result Value Ref Range    Adenovirus by PCR Not Detected Not Detected    Bordetella parapertussis by PCR Not Detected Not Detected    Bordetella pertussis by PCR Not Detected Not Detected    Chlamydophilia pneumoniae by PCR Not Detected Not Detected    Coronavirus 229E by PCR Not Detected Not Detected    Coronavirus HKU1 by PCR Not Detected Not Detected    Coronavirus NL63 by PCR Not Detected Not Detected    Coronavirus OC43 by PCR Not Detected Not Detected    SARS-CoV-2, PCR Not Detected Not Detected    Human Metapneumovirus by PCR Not Detected Not Detected    Human Rhinovirus/Enterovirus by PCR Not Detected Not Detected    Influenza A by PCR Not Detected Not Detected    Influenza B by PCR Not Detected Not Detected    Mycoplasma pneumoniae by PCR Not Detected Not Detected    Parainfluenza Virus 1 by PCR Not Detected Not Detected    Parainfluenza Virus 2 by PCR Not Detected Not Detected    Parainfluenza Virus 3 by PCR Not Detected Not Detected    Parainfluenza Virus 4 by PCR Not Detected Not Detected    Respiratory Syncytial Virus by PCR Not Detected Not Detected     Assessment:     Principal Problem:    Acute respiratory failure with hypoxia (HCC)  Active Problems:    Type 2 diabetes mellitus (HCC)    Elevated troponin  Resolved Problems:    * No resolved hospital problems.  *      Plan:   Seems to be having adverse reaction to albuterol he blames ipratropium - had last year too - in listening seems he may be getting tachy making a bad situation worse - try xopenex see if tolerates better  Shashi Pierre MD  6/8/2021  6:38 AM

## 2021-06-08 NOTE — PLAN OF CARE
Problem: Falls - Risk of:  Goal: Will remain free from falls  Description: Will remain free from falls  Outcome: Met This Shift  Goal: Absence of physical injury  Description: Absence of physical injury  Outcome: Met This Shift     Problem: Breathing Pattern - Ineffective:  Goal: Ability to achieve and maintain a regular respiratory rate will improve  Description: Ability to achieve and maintain a regular respiratory rate will improve  Outcome: Met This Shift     Problem: Daily Care:  Goal: Daily care needs are met  Description: Daily care needs are met  Outcome: Met This Shift

## 2021-06-08 NOTE — CARE COORDINATION
Social work / Discharge Planning:       Discharge plan is home. Has home oxygen he purchased from [employer] which he self regulates. It will accommodate 5 liters. Social work will follow to assist with any discharge needs identified.   Electronically signed by ALYSE Quiñones on 6/8/2021 at 9:42 AM

## 2021-06-08 NOTE — PROGRESS NOTES
and dry. M/S: No cyanosis. No joint deformity. No clubbing. Neuro: Awake. Follows commands. O x 3, [place]  Psych:  calm and interactive     I/O: I/O last 3 completed shifts: In: 180 [P.O.:180]  Out: -   No intake/output data recorded. Results:  CBC:   Recent Labs     06/06/21  1237 06/07/21  0345   WBC 15.3* 14.6*   HGB 15.8 13.4   HCT 47.0 40.2   MCV 95.3 96.4    180     BMP:   Recent Labs     06/06/21  1237 06/07/21  0345    136   K 3.8 4.9   CL 97* 98   CO2 29 29   BUN 10 17   CREATININE 0.7 0.6*     LFT:   Recent Labs     06/06/21  1237   ALKPHOS 87   ALT 38   AST 28   PROT 6.9   BILITOT 1.3*   LABALBU 4.3     PT/INR: No results for input(s): PROTIME, INR in the last 72 hours. Procalcitonin:   Recent Labs     06/07/21  0345   PROCAL 0.04     Cultures:  Recent Labs     06/07/21  0852   CULTRESP Oral Pharyngeal Wendy present   Gram Stain Orderable  Gram Stain  Collected: 06/07/21 6175   Result status: Final   Resulting lab: [facility]   Value: Group 5: >25 PMN's/LPF and <10 Epithelial cells/LPF   Moderate Polymorphonuclear leukocytes   Rare Epithelial cells   Few Gram positive cocci in pairs   Moderate Gram positive cocci in chains   Few Gram negative rods   Few Gram positive cocci in clusters      Results for Neto Panda (MRN [id number]) as of 6/8/2021 10:11   Ref.  Range 6/7/2021 14:20   Influenza A by PCR Latest Ref Range: Not Detected  Not Detected   Influenza B by PCR Latest Ref Range: Not Detected  Not Detected   Adenovirus by PCR Latest Ref Range: Not Detected  Not Detected   Coronavirus 229E by PCR Latest Ref Range: Not Detected  Not Detected   Coronavirus HKU1 by PCR Latest Ref Range: Not Detected  Not Detected   Coronavirus NL63 by PCR Latest Ref Range: Not Detected  Not Detected   Coronavirus OC43 by PCR Latest Ref Range: Not Detected  Not Detected   Human Metapneumovirus by PCR Latest Ref Range: Not Detected  Not Detected   Human Ambulatory pulse prior to d/c to evaluate liter flow  · Respiratory panel negative, including Flu A. Will repeat respiratory panel second test negative    · On  Tamiflu. tested + influenza A at 910 rapid care on Saturday   · Legionella/strep pneumoniae negative. Sputum culture sent - pending  · Afebrile and elevated WBC. Procalcitonin 0.04   · Will continue nebulizers - pulmicort and brovana;  duonebs  · Continue solumedrol q12H for now, o2 needs slightly up and tighness noted on exam   · Cardiology consulted with elevated troponin - no further work up   · IS for pulmonary hygiene     Electronically signed by KELECHI Hammond on 6/8/2021 at 10:10 AM    Attending Attestation Note:    Patient seen and examined with NP. I agree with above.     In addition, the following apply:    - tamiflu for 5 days  - O2, IS  - look to D/C planning   - repeat respiratory panel negative for influenza, outpatient rapid positive, will err on the side of caution and treat with full 5 day course of tamiflu   - wean steroids  - nebulizers to continue     Jason Zheng MD  6/8/2021  5:59 PM

## 2021-06-09 ENCOUNTER — APPOINTMENT (OUTPATIENT)
Dept: CT IMAGING | Age: 71
DRG: 193 | End: 2021-06-09
Payer: COMMERCIAL

## 2021-06-09 LAB
CULTURE, RESPIRATORY: NORMAL
EKG ATRIAL RATE: 108 BPM
EKG P AXIS: 64 DEGREES
EKG P-R INTERVAL: 122 MS
EKG Q-T INTERVAL: 312 MS
EKG QRS DURATION: 80 MS
EKG QTC CALCULATION (BAZETT): 418 MS
EKG R AXIS: 86 DEGREES
EKG T AXIS: 56 DEGREES
EKG VENTRICULAR RATE: 108 BPM
SMEAR, RESPIRATORY: NORMAL
TROPONIN, HIGH SENSITIVITY: 28 NG/L (ref 0–11)

## 2021-06-09 PROCEDURE — 6360000002 HC RX W HCPCS: Performed by: INTERNAL MEDICINE

## 2021-06-09 PROCEDURE — 6370000000 HC RX 637 (ALT 250 FOR IP): Performed by: INTERNAL MEDICINE

## 2021-06-09 PROCEDURE — 1200000000 HC SEMI PRIVATE

## 2021-06-09 PROCEDURE — 36415 COLL VENOUS BLD VENIPUNCTURE: CPT

## 2021-06-09 PROCEDURE — 2700000000 HC OXYGEN THERAPY PER DAY

## 2021-06-09 PROCEDURE — 71275 CT ANGIOGRAPHY CHEST: CPT

## 2021-06-09 PROCEDURE — 2580000003 HC RX 258: Performed by: INTERNAL MEDICINE

## 2021-06-09 PROCEDURE — 94640 AIRWAY INHALATION TREATMENT: CPT

## 2021-06-09 PROCEDURE — 2580000003 HC RX 258

## 2021-06-09 PROCEDURE — 84484 ASSAY OF TROPONIN QUANT: CPT

## 2021-06-09 PROCEDURE — 6360000004 HC RX CONTRAST MEDICATION: Performed by: RADIOLOGY

## 2021-06-09 PROCEDURE — 6370000000 HC RX 637 (ALT 250 FOR IP): Performed by: NURSE PRACTITIONER

## 2021-06-09 PROCEDURE — 93005 ELECTROCARDIOGRAM TRACING: CPT | Performed by: INTERNAL MEDICINE

## 2021-06-09 PROCEDURE — 93010 ELECTROCARDIOGRAM REPORT: CPT | Performed by: INTERNAL MEDICINE

## 2021-06-09 PROCEDURE — 6360000002 HC RX W HCPCS: Performed by: NURSE PRACTITIONER

## 2021-06-09 RX ORDER — DOXYCYCLINE HYCLATE 100 MG/1
100 CAPSULE ORAL EVERY 12 HOURS SCHEDULED
Status: DISCONTINUED | OUTPATIENT
Start: 2021-06-09 | End: 2021-06-15

## 2021-06-09 RX ADMIN — ARFORMOTEROL TARTRATE 15 MCG: 15 SOLUTION RESPIRATORY (INHALATION) at 09:58

## 2021-06-09 RX ADMIN — TAMSULOSIN HYDROCHLORIDE 0.8 MG: 0.4 CAPSULE ORAL at 21:09

## 2021-06-09 RX ADMIN — ARFORMOTEROL TARTRATE 15 MCG: 15 SOLUTION RESPIRATORY (INHALATION) at 20:34

## 2021-06-09 RX ADMIN — DOXYCYCLINE HYCLATE 100 MG: 100 CAPSULE ORAL at 21:09

## 2021-06-09 RX ADMIN — Medication 10 ML: at 10:31

## 2021-06-09 RX ADMIN — METHYLPREDNISOLONE SODIUM SUCCINATE 40 MG: 40 INJECTION, POWDER, LYOPHILIZED, FOR SOLUTION INTRAMUSCULAR; INTRAVENOUS at 21:09

## 2021-06-09 RX ADMIN — Medication 10 ML: at 21:11

## 2021-06-09 RX ADMIN — GUAIFENESIN 400 MG: 400 TABLET ORAL at 21:09

## 2021-06-09 RX ADMIN — GUAIFENESIN 400 MG: 400 TABLET ORAL at 09:40

## 2021-06-09 RX ADMIN — CLONIDINE HYDROCHLORIDE 0.1 MG: 0.1 TABLET ORAL at 09:40

## 2021-06-09 RX ADMIN — LISINOPRIL 20 MG: 20 TABLET ORAL at 09:40

## 2021-06-09 RX ADMIN — BUDESONIDE 250 MCG: 0.5 SUSPENSION RESPIRATORY (INHALATION) at 20:34

## 2021-06-09 RX ADMIN — ENOXAPARIN SODIUM 40 MG: 40 INJECTION SUBCUTANEOUS at 21:08

## 2021-06-09 RX ADMIN — GABAPENTIN 600 MG: 300 CAPSULE ORAL at 15:02

## 2021-06-09 RX ADMIN — LEVALBUTEROL 1.25 MG: 1.25 SOLUTION, CONCENTRATE RESPIRATORY (INHALATION) at 02:08

## 2021-06-09 RX ADMIN — WATER 10 ML: 1 INJECTION INTRAMUSCULAR; INTRAVENOUS; SUBCUTANEOUS at 21:09

## 2021-06-09 RX ADMIN — IOPAMIDOL 80 ML: 755 INJECTION, SOLUTION INTRAVENOUS at 12:28

## 2021-06-09 RX ADMIN — GUAIFENESIN 400 MG: 400 TABLET ORAL at 15:02

## 2021-06-09 RX ADMIN — GABAPENTIN 600 MG: 300 CAPSULE ORAL at 09:40

## 2021-06-09 RX ADMIN — METHYLPREDNISOLONE SODIUM SUCCINATE 40 MG: 40 INJECTION, POWDER, LYOPHILIZED, FOR SOLUTION INTRAMUSCULAR; INTRAVENOUS at 09:40

## 2021-06-09 RX ADMIN — BUDESONIDE 250 MCG: 0.5 SUSPENSION RESPIRATORY (INHALATION) at 09:58

## 2021-06-09 RX ADMIN — OSELTAMIVIR PHOSPHATE 75 MG: 6 POWDER, FOR SUSPENSION ORAL at 09:41

## 2021-06-09 RX ADMIN — GABAPENTIN 600 MG: 300 CAPSULE ORAL at 21:09

## 2021-06-09 RX ADMIN — SODIUM CHLORIDE, PRESERVATIVE FREE 10 ML: 5 INJECTION INTRAVENOUS at 10:59

## 2021-06-09 RX ADMIN — LEVALBUTEROL 1.25 MG: 1.25 SOLUTION, CONCENTRATE RESPIRATORY (INHALATION) at 20:35

## 2021-06-09 RX ADMIN — LORAZEPAM 0.5 MG: 0.5 TABLET ORAL at 21:09

## 2021-06-09 RX ADMIN — LEVALBUTEROL 1.25 MG: 1.25 SOLUTION, CONCENTRATE RESPIRATORY (INHALATION) at 09:58

## 2021-06-09 RX ADMIN — CLONIDINE HYDROCHLORIDE 0.1 MG: 0.1 TABLET ORAL at 21:09

## 2021-06-09 RX ADMIN — LORAZEPAM 0.5 MG: 0.5 TABLET ORAL at 10:16

## 2021-06-09 RX ADMIN — AMLODIPINE BESYLATE 5 MG: 5 TABLET ORAL at 09:40

## 2021-06-09 RX ADMIN — OSELTAMIVIR PHOSPHATE 75 MG: 6 POWDER, FOR SUSPENSION ORAL at 21:12

## 2021-06-09 ASSESSMENT — PAIN SCALES - GENERAL
PAINLEVEL_OUTOF10: 0
PAINLEVEL_OUTOF10: 0

## 2021-06-09 NOTE — PLAN OF CARE
Problem: Daily Care:  Goal: Daily care needs are met  Description: Daily care needs are met  6/9/2021 0147 by Gladys Pena RN  Outcome: Met This Shift  6/8/2021 1821 by Shilpa Patiño RN  Outcome: Met This Shift

## 2021-06-09 NOTE — PROGRESS NOTES
Pulmonary Progress Note    Admit Date: 2021                            PCP: Rayna De Jesus MD  Principal Problem:    Acute respiratory failure with hypoxia [facility])  Active Problems:    Type 2 diabetes mellitus ([address]    Elevated troponin  Resolved Problems:    * No resolved hospital problems. *      Subjective:  Sitting on side of bed  Labored breathing and dyspneic states it started about 2 /12 hrs ago, sudden onset - CTA ordered already  Feel anxious now, asking for increased Ativan order  Complains of cough with dk yellow sputum  On 6L nc with pox 93%    Medications:   sodium chloride          methylPREDNISolone  40 mg Intravenous Q12H    oseltamivir 6mg/ml  75 mg Oral BID    cloNIDine  0.1 mg Oral BID    gabapentin  600 mg Oral TID    guaiFENesin  400 mg Oral 4x daily    tamsulosin  0.8 mg Oral Daily    sodium chloride flush  5-40 mL Intravenous 2 times per day    enoxaparin  40 mg Subcutaneous Daily    budesonide  0.25 mg Nebulization BID    Arformoterol Tartrate  15 mcg Nebulization BID    amLODIPine  5 mg Oral Daily    And    lisinopril  20 mg Oral Daily       Vitals:  VITALS:  /62   Pulse 108   Temp 98.9 °F (37.2 °C) (Oral)   Resp 20   Ht 5' 7\" (1.702 m)   Wt 183 lb 3.2 oz (83.1 kg)   SpO2 91%   BMI 28.69 kg/m²   24HR INTAKE/OUTPUT:      Intake/Output Summary (Last 24 hours) at 2021 1146  Last data filed at 2021 0729  Gross per 24 hour   Intake --   Output 450 ml   Net -450 ml     CURRENT PULSE OXIMETRY:  SpO2: 91 %  24HR PULSE OXIMETRY RANGE:  SpO2  Av.2 %  Min: 86 %  Max: 96 %  CVP:    VENT SETTINGS:   Vent Information  SpO2: 91 %  Additional Respiratory  Assessments  Pulse: 108  Resp: 20  SpO2: 91 %      EXAM:  General: Alert, mild respiratory distress  ENT: No discharge. Pharynx clear. membranes moist   Neck: Supple, Trachea midline. Resp: No accessory muscle use. Non-labored. Lungs diminished minimal air moving with faint exp wheezing upper lobes posterior. No rhonchi, crackles. CV: Regular rate. Regular rhythm. No murmur . No edema. ABD: Non-tender. Non-distended. Soft, round . Normal bowel sounds. Skin: Warm and dry. M/S: No cyanosis. No joint deformity. No clubbing. Neuro: Awake. Follows commands. O x 3, APODACA  Psych: Anxious     I/O: No intake/output data recorded. I/O this shift:  In: -   Out: 450 [Urine:450]     Results:  CBC:   Recent Labs     06/06/21  1237 06/07/21  0345   WBC 15.3* 14.6*   HGB 15.8 13.4   HCT 47.0 40.2   MCV 95.3 96.4    180     BMP:   Recent Labs     06/06/21  1237 06/07/21  0345    136   K 3.8 4.9   CL 97* 98   CO2 29 29   BUN 10 17   CREATININE 0.7 0.6*     LFT:   Recent Labs     06/06/21  1237   ALKPHOS 87   ALT 38   AST 28   PROT 6.9   BILITOT 1.3*   LABALBU 4.3     PT/INR: No results for input(s): PROTIME, INR in the last 72 hours. Procalcitonin:   Recent Labs     06/07/21  0345   PROCAL 0.04     Cultures:  Recent Labs     06/07/21  0852   CULTRESP Oral Pharyngeal Wendy present   Gram Stain Orderable  Gram Stain  Collected: 06/07/21 5025   Result status: Final   Resulting lab: [facility]   Value: Group 5: >25 PMN's/LPF and <10 Epithelial cells/LPF   Moderate Polymorphonuclear leukocytes   Rare Epithelial cells   Few Gram positive cocci in pairs   Moderate Gram positive cocci in chains   Few Gram negative rods   Few Gram positive cocci in clusters      Results for Randy Cazares (MRN [id number]) as of 6/8/2021 10:11   Ref.  Range 6/7/2021 14:20   Influenza A by PCR Latest Ref Range: Not Detected  Not Detected   Influenza B by PCR Latest Ref Range: Not Detected  Not Detected   Adenovirus by PCR Latest Ref Range: Not Detected  Not Detected   Coronavirus 229E by PCR Latest Ref Range: Not Detected  Not Detected   Coronavirus HKU1 by PCR Latest Ref Range: Not Detected  Not Detected   Coronavirus NL63 by PCR Latest Ref Range: Not Detected  Not Detected   Coronavirus OC43 by PCR Latest Ref Range: Not Detected  Not Detected   Human Metapneumovirus by PCR Latest Ref Range: Not Detected  Not Detected   Human Rhinovirus/Enterovirus by PCR Latest Ref Range: Not Detected  Not Detected   Parainfluenza Virus 1 by PCR Latest Ref Range: Not Detected  Not Detected   Parainfluenza Virus 2 by PCR Latest Ref Range: Not Detected  Not Detected   Parainfluenza Virus 3 by PCR Latest Ref Range: Not Detected  Not Detected   Parainfluenza Virus 4 by PCR Latest Ref Range: Not Detected  Not Detected   Respiratory Syncytial Virus by PCR Latest Ref Range: Not Detected  Not Detected   Bordetella parapertussis by PCR Latest Ref Range: Not Detected  Not Detected   Chlamydophilia pneumoniae by PCR Latest Ref Range: Not Detected  Not Detected   Mycoplasma pneumoniae by PCR Latest Ref Range: Not Detected  Not Detected   SARS-CoV-2, PCR Latest Ref Range: Not Detected  Not Detected   Bordetella pertussis by PCR Latest Ref Range: Not Detected  Not Detected         ABG:   Recent Labs     06/06/21  1258   PH 7.425   PO2 168.7*   PCO2 40.5   HCO3 26.0   BE 1.5   O2SAT 99.3*       Films:  XR CHEST PORTABLE 6/6/2021: No airspace opacity or pleural effusion. The heart is normal size. No pneumothorax. No free air beneath the hemidiaphragms. Hyperinflation of both lungs notable in the upper lobes. 1.  No pneumonia or pleural effusion. 2.  Emphysematous changes. Assessment:  Raffaele Frost is a pleasant 79 y.o. male well known to our service, Dr Benji Ch previous smoker of 1ppd for 40 years quitting 1 year ago. He has a past medical history of COPD/asthma and is being worked up in [place] with Dr. Juan David Arteaga for transplant or lung reduction surgery  · COPD exacerbation  · Acute on chronic hypoxic respiratory failure  · Hypertension         Plan:  · Continue O2 currently on 6L nc. Baseline is 2L, keep POX >90% and wean as able. CTA ordered this am - not done yet.  Discussed with nursing who will call radiology to get done STAT.  · Respiratory panel negative, including Flu A. With repeat respiratory panel negative    · Continue Tamiflu for 5 days - Day#3. (Tested + influenza A at [facility] on Saturday)   · Legionella/strep pneumoniae negative. · Sputum culture with moderate Gram + cocci in chains will add doxy. Afebrile and elevated WBC. Procalcitonin 0.04   · Will continue nebulizers - pulmicort and brovana;  duonebs  · Continue solumedrol q12H for now with increased o2 needs and tighness/wheezing noted on exam   · IS for pulmonary hygiene     Electronically signed by KELECHI Mtz CNP on 6/9/2021 at 11:46 AM      KELECHI Mtz CNP  6/9/2021  11:46 AM      Attending Attestation Note:    Patient seen and examined with NP. I agree with above.     In addition, the following apply:    - tamiflu for 5 days  - doxycycline BID x 7 days  - O2, IS  - look to D/C planning   - repeat respiratory panel negative for influenza, outpatient rapid positive, will err on the side of caution and treat with full 5 day course of tamiflu   - wean steroids  - nebulizers to continue   - patient had workup at [facility] for possible Lung Transplantation with Dr. Jo Ramsay who we will contact about direction of care  - CTA negative for pulmonary embolism     Amol Rodriguez MD  6/9/2021  4:31 PM

## 2021-06-09 NOTE — PROGRESS NOTES
Internal Medicine  Progress Note  Janet Nolasco MD     Subjective:     Patient is alert. Patient reports OK. Tolerating diet well no other c/o. Scheduled Meds:   methylPREDNISolone  40 mg Intravenous Q12H    oseltamivir 6mg/ml  75 mg Oral BID    cloNIDine  0.1 mg Oral BID    gabapentin  600 mg Oral TID    guaiFENesin  400 mg Oral 4x daily    tamsulosin  0.8 mg Oral Daily    sodium chloride flush  5-40 mL Intravenous 2 times per day    enoxaparin  40 mg Subcutaneous Daily    budesonide  0.25 mg Nebulization BID    Arformoterol Tartrate  15 mcg Nebulization BID    amLODIPine  5 mg Oral Daily    And    lisinopril  20 mg Oral Daily     Continuous Infusions:   sodium chloride       PRN Meds:levalbuterol, LORazepam, sodium chloride flush, sodium chloride, ondansetron **OR** ondansetron, polyethylene glycol, acetaminophen **OR** acetaminophen    Objective:      Physical Exam:  Vitals:   /62   Pulse 108   Temp 98.9 °F (37.2 °C) (Oral)   Resp 20   Ht 5' 7\" (1.702 m)   Wt 183 lb 3.2 oz (83.1 kg)   SpO2 96%   BMI 28.69 kg/m²   I/O's  No intake or output data in the 24 hours ending 06/09/21 0654  Exam:  General appearance: alert, appears stated age and cooperative  Head: Normocephalic, without obvious abnormality, atraumatic  Eyes: conjunctivae/corneas clear. PERRL, EOM's intact. Fundi benign.   Throat: lips, mucosa, and tongue normal; teeth and gums normal  Neck: no adenopathy, no carotid bruit, no JVD, supple, symmetrical, trachea midline and thyroid not enlarged, symmetric, no tenderness/mass/nodules  Back: negative  Lungs: clear to auscultation bilaterally  Chest wall: no tenderness  Heart: regular rate and rhythm, S1, S2 normal, no murmur, click, rub or gallop  Abdomen: soft, non-tender; bowel sounds normal; no masses,  no organomegaly  Extremities: extremities normal, atraumatic, no cyanosis or edema  Pulses: 2+ and symmetric  Skin: Skin color, texture, turgor normal. No rashes or lesions  Lymph nodes: Cervical, supraclavicular, and axillary nodes normal.  Neurologic: Grossly normal      Imaging     Chest  Xray:  Results for orders placed during the hospital encounter of 20    XR CHEST STANDARD (2 VW)    Narrative  Patient MRN: [id number]  : 1950  Age:  71 years  Gender: Male  Order Date: 3/10/2020 12:00 PM  Exam: XR CHEST (2 VW)  Number of Images: 2 view  Indication:   persistent dyspnea and cough  persistent dyspnea and cough  Comparison: 2020    Findings: The heart is unremarkable. The lung fields are hyperinflated. Density is seen in the lung fields suggesting scar. The aorta is tortuous and ectatic. Impression  Findings of emphysematous changes. This study was dictated by Saranya Schmid PA-C and Miguel A Moreira MD  reviewed and concurred with the findings. Results for orders placed during the hospital encounter of 21    XR CHEST PORTABLE    Narrative  EXAMINATION:  ONE XRAY VIEW OF THE CHEST    2021 1:14 pm    COMPARISON:  March 10, 2020    HISTORY:  ORDERING SYSTEM PROVIDED HISTORY: r/o pna  TECHNOLOGIST PROVIDED HISTORY:  Reason for exam:->r/o pna    FINDINGS:  No airspace opacity or pleural effusion. The heart is normal size. No  pneumothorax. No free air beneath the hemidiaphragms. Hyperinflation of both  lungs notable in the upper lobes. Impression  1. No pneumonia or pleural effusion. 2.  Emphysematous changes. Additional Imaging:  none    Lab Review   No results found for this or any previous visit (from the past 24 hour(s)). Assessment:     Principal Problem:    Acute respiratory failure with hypoxia (HCC)  Active Problems:    Type 2 diabetes mellitus (HCC)    Elevated troponin  Resolved Problems:    * No resolved hospital problems.  *      Plan:   Feels better with Xopenex  Asks about changing trejackygy  Shaji Singh MD  2021  6:54 AM

## 2021-06-09 NOTE — CARE COORDINATION
COVID negative 6/7. Requiring O2 6-8 liters high flow NC. Wears home O2 provided by [employer]-can only provide up to 5 liters. DME list offered and declined- no preference- tentative referral made to [facility] DME- will need O2 testing/ order if unable to wean below 5 liters NC. Continues on iv steroid. Plan remains to return home w/ wife on discharge.  Will follow Will Christiansen RN case manager

## 2021-06-09 NOTE — PROGRESS NOTES
Call placed to Dr Ortega Aaron (Dr Yun Lala covering) about patient increase oxygen demand and dyspnea as well as tachycardia.

## 2021-06-10 PROCEDURE — 6360000002 HC RX W HCPCS: Performed by: INTERNAL MEDICINE

## 2021-06-10 PROCEDURE — 1200000000 HC SEMI PRIVATE

## 2021-06-10 PROCEDURE — 2700000000 HC OXYGEN THERAPY PER DAY

## 2021-06-10 PROCEDURE — 6370000000 HC RX 637 (ALT 250 FOR IP): Performed by: INTERNAL MEDICINE

## 2021-06-10 PROCEDURE — 6370000000 HC RX 637 (ALT 250 FOR IP): Performed by: NURSE PRACTITIONER

## 2021-06-10 PROCEDURE — 94640 AIRWAY INHALATION TREATMENT: CPT

## 2021-06-10 PROCEDURE — 6360000002 HC RX W HCPCS: Performed by: NURSE PRACTITIONER

## 2021-06-10 PROCEDURE — 2580000003 HC RX 258: Performed by: INTERNAL MEDICINE

## 2021-06-10 RX ORDER — OSELTAMIVIR PHOSPHATE 75 MG/1
75 CAPSULE ORAL 2 TIMES DAILY
Status: COMPLETED | OUTPATIENT
Start: 2021-06-10 | End: 2021-06-12

## 2021-06-10 RX ADMIN — OSELTAMIVIR 75 MG: 75 CAPSULE ORAL at 21:14

## 2021-06-10 RX ADMIN — GUAIFENESIN 400 MG: 400 TABLET ORAL at 09:01

## 2021-06-10 RX ADMIN — LISINOPRIL 20 MG: 20 TABLET ORAL at 09:01

## 2021-06-10 RX ADMIN — BUDESONIDE 250 MCG: 0.5 SUSPENSION RESPIRATORY (INHALATION) at 08:22

## 2021-06-10 RX ADMIN — LORAZEPAM 0.5 MG: 0.5 TABLET ORAL at 21:14

## 2021-06-10 RX ADMIN — BUDESONIDE 250 MCG: 0.5 SUSPENSION RESPIRATORY (INHALATION) at 20:58

## 2021-06-10 RX ADMIN — GABAPENTIN 600 MG: 300 CAPSULE ORAL at 09:01

## 2021-06-10 RX ADMIN — GUAIFENESIN 400 MG: 400 TABLET ORAL at 21:14

## 2021-06-10 RX ADMIN — CLONIDINE HYDROCHLORIDE 0.1 MG: 0.1 TABLET ORAL at 21:14

## 2021-06-10 RX ADMIN — GUAIFENESIN 400 MG: 400 TABLET ORAL at 12:58

## 2021-06-10 RX ADMIN — Medication 10 ML: at 09:01

## 2021-06-10 RX ADMIN — GABAPENTIN 600 MG: 300 CAPSULE ORAL at 21:14

## 2021-06-10 RX ADMIN — OSELTAMIVIR 75 MG: 75 CAPSULE ORAL at 09:44

## 2021-06-10 RX ADMIN — DOXYCYCLINE HYCLATE 100 MG: 100 CAPSULE ORAL at 21:13

## 2021-06-10 RX ADMIN — CLONIDINE HYDROCHLORIDE 0.1 MG: 0.1 TABLET ORAL at 09:01

## 2021-06-10 RX ADMIN — ENOXAPARIN SODIUM 40 MG: 40 INJECTION SUBCUTANEOUS at 21:13

## 2021-06-10 RX ADMIN — LEVALBUTEROL 1.25 MG: 1.25 SOLUTION, CONCENTRATE RESPIRATORY (INHALATION) at 01:40

## 2021-06-10 RX ADMIN — ARFORMOTEROL TARTRATE 15 MCG: 15 SOLUTION RESPIRATORY (INHALATION) at 20:57

## 2021-06-10 RX ADMIN — TAMSULOSIN HYDROCHLORIDE 0.8 MG: 0.4 CAPSULE ORAL at 21:13

## 2021-06-10 RX ADMIN — GUAIFENESIN 400 MG: 400 TABLET ORAL at 17:57

## 2021-06-10 RX ADMIN — ARFORMOTEROL TARTRATE 15 MCG: 15 SOLUTION RESPIRATORY (INHALATION) at 08:22

## 2021-06-10 RX ADMIN — AMLODIPINE BESYLATE 5 MG: 5 TABLET ORAL at 09:01

## 2021-06-10 RX ADMIN — METHYLPREDNISOLONE SODIUM SUCCINATE 40 MG: 40 INJECTION, POWDER, LYOPHILIZED, FOR SOLUTION INTRAMUSCULAR; INTRAVENOUS at 21:14

## 2021-06-10 RX ADMIN — Medication 10 ML: at 21:14

## 2021-06-10 RX ADMIN — GABAPENTIN 600 MG: 300 CAPSULE ORAL at 12:58

## 2021-06-10 RX ADMIN — DOXYCYCLINE HYCLATE 100 MG: 100 CAPSULE ORAL at 09:01

## 2021-06-10 RX ADMIN — METHYLPREDNISOLONE SODIUM SUCCINATE 40 MG: 40 INJECTION, POWDER, LYOPHILIZED, FOR SOLUTION INTRAMUSCULAR; INTRAVENOUS at 09:01

## 2021-06-10 ASSESSMENT — PAIN SCALES - GENERAL
PAINLEVEL_OUTOF10: 0
PAINLEVEL_OUTOF10: 0

## 2021-06-10 NOTE — PROGRESS NOTES
Pulmonary Progress Note    Admit Date: 2021                            PCP: Aleyda Downey MD  Principal Problem:    Acute respiratory failure with hypoxia [facility])  Active Problems:    Type 2 diabetes mellitus ([address]    Elevated troponin  Resolved Problems:    * No resolved hospital problems. *      Subjective:  Sitting on side of bed  Labored breathing and dyspneic states it started about 2 /12 hrs ago, sudden onset - CTA ordered already  Feel anxious now, asking for increased Ativan order  Complains of cough with dk yellow sputum  Remains on 6L nc with pox 93%    Medications:   sodium chloride          oseltamivir  75 mg Oral BID    doxycycline hyclate  100 mg Oral 2 times per day    methylPREDNISolone  40 mg Intravenous Q12H    cloNIDine  0.1 mg Oral BID    gabapentin  600 mg Oral TID    guaiFENesin  400 mg Oral 4x daily    tamsulosin  0.8 mg Oral Daily    sodium chloride flush  5-40 mL Intravenous 2 times per day    enoxaparin  40 mg Subcutaneous Daily    budesonide  0.25 mg Nebulization BID    Arformoterol Tartrate  15 mcg Nebulization BID    amLODIPine  5 mg Oral Daily    And    lisinopril  20 mg Oral Daily       Vitals:  VITALS:  BP (!) 145/64   Pulse 101   Temp 97.7 °F (36.5 °C) (Oral)   Resp 22   Ht 5' 7\" (1.702 m)   Wt 183 lb 3.2 oz (83.1 kg)   SpO2 92%   BMI 28.69 kg/m²   24HR INTAKE/OUTPUT:    No intake or output data in the 24 hours ending 06/10/21 1747  CURRENT PULSE OXIMETRY:  SpO2: 92 %  24HR PULSE OXIMETRY RANGE:  SpO2  Av.8 %  Min: 90 %  Max: 97 %  CVP:    VENT SETTINGS:   Vent Information  SpO2: 92 %  Additional Respiratory  Assessments  Pulse: 101  Resp: 22  SpO2: 92 %      EXAM:  General: Alert, mild respiratory distress  ENT: No discharge. Pharynx clear. membranes moist   Neck: Supple, Trachea midline. Resp: No accessory muscle use. Non-labored. Lungs diminished minimal air moving with faint exp wheezing upper lobes posterior. No rhonchi, crackles.    CV: Regular rate. Regular rhythm. No murmur . No edema. ABD: Non-tender. Non-distended. Soft, round . Normal bowel sounds. Skin: Warm and dry. M/S: No cyanosis. No joint deformity. No clubbing. Neuro: Awake. Follows commands. O x 3, APODACA  Psych: Anxious     I/O: No intake/output data recorded. No intake/output data recorded. Results:  CBC:   No results for input(s): WBC, HGB, HCT, MCV, PLT in the last 72 hours. BMP:   No results for input(s): NA, K, CL, CO2, PHOS, BUN, CREATININE in the last 72 hours. Invalid input(s): CA  LFT:   No results for input(s): ALKPHOS, ALT, AST, PROT, BILITOT, BILIDIR, LABALBU in the last 72 hours. PT/INR: No results for input(s): PROTIME, INR in the last 72 hours. Procalcitonin:   No results for input(s): PROCAL in the last 72 hours. Cultures:  No results for input(s): CULTRESP in the last 72 hours. Gram Stain Orderable  Gram Stain  Collected: 06/07/21 0852   Result status: Final   Resulting lab: [facility]   Value: Group 5: >25 PMN's/LPF and <10 Epithelial cells/LPF   Moderate Polymorphonuclear leukocytes   Rare Epithelial cells   Few Gram positive cocci in pairs   Moderate Gram positive cocci in chains   Few Gram negative rods   Few Gram positive cocci in clusters      Results for Debra Ortiz (MRN [id number]) as of 6/8/2021 10:11   Ref.  Range 6/7/2021 14:20   Influenza A by PCR Latest Ref Range: Not Detected  Not Detected   Influenza B by PCR Latest Ref Range: Not Detected  Not Detected   Adenovirus by PCR Latest Ref Range: Not Detected  Not Detected   Coronavirus 229E by PCR Latest Ref Range: Not Detected  Not Detected   Coronavirus HKU1 by PCR Latest Ref Range: Not Detected  Not Detected   Coronavirus NL63 by PCR Latest Ref Range: Not Detected  Not Detected   Coronavirus OC43 by PCR Latest Ref Range: Not Detected  Not Detected   Human Metapneumovirus by PCR Latest Ref Range: Not Detected  Not Detected   Human Rhinovirus/Enterovirus by PCR

## 2021-06-10 NOTE — CARE COORDINATION
Social work / Discharge Planning:       COVID NEGATIVE 6/7/21. Discharge plan is home. Patient has home oxygen which he purchased from [address] [place] [address] but it only goes up to 5 liters. He is currently on 6 liters. CM made a referral to [facility] yesterday if he will need more than 5 liters of oxygen for discharge. Social work continues to follow.   Electronically signed by ALYSE Tello on 6/10/2021 at 8:48 AM

## 2021-06-10 NOTE — PROGRESS NOTES
Internal Medicine  Progress Note  Karen Steinberg MD     Subjective:     Patient is alert. Patient reports worse. Tolerating diet well no other c/o. Scheduled Meds:   doxycycline hyclate  100 mg Oral 2 times per day    methylPREDNISolone  40 mg Intravenous Q12H    oseltamivir 6mg/ml  75 mg Oral BID    cloNIDine  0.1 mg Oral BID    gabapentin  600 mg Oral TID    guaiFENesin  400 mg Oral 4x daily    tamsulosin  0.8 mg Oral Daily    sodium chloride flush  5-40 mL Intravenous 2 times per day    enoxaparin  40 mg Subcutaneous Daily    budesonide  0.25 mg Nebulization BID    Arformoterol Tartrate  15 mcg Nebulization BID    amLODIPine  5 mg Oral Daily    And    lisinopril  20 mg Oral Daily     Continuous Infusions:   sodium chloride       PRN Meds:levalbuterol, LORazepam, sodium chloride flush, sodium chloride, ondansetron **OR** ondansetron, polyethylene glycol, acetaminophen **OR** acetaminophen    Objective:      Physical Exam:  Vitals:   BP (!) 175/77   Pulse 106   Temp 99.1 °F (37.3 °C) (Oral)   Resp 26   Ht 5' 7\" (1.702 m)   Wt 183 lb 3.2 oz (83.1 kg)   SpO2 90%   BMI 28.69 kg/m²   I/O's    Intake/Output Summary (Last 24 hours) at 6/10/2021 0647  Last data filed at 6/9/2021 1206  Gross per 24 hour   Intake 180 ml   Output 450 ml   Net -270 ml     Exam:  General appearance: alert, appears stated age, cooperative and moderate distress  Head: Normocephalic, without obvious abnormality, atraumatic  Eyes: conjunctivae/corneas clear. PERRL, EOM's intact. Fundi benign.   Throat: lips, mucosa, and tongue normal; teeth and gums normal  Neck: no adenopathy, no carotid bruit, no JVD and supple, symmetrical, trachea midline  Back: negative  Lungs: clear to auscultation bilaterally  Chest wall: no tenderness  Heart: regular rate and rhythm  Abdomen: soft, non-tender; bowel sounds normal; no masses,  no organomegaly  Extremities: extremities normal, atraumatic, no cyanosis or edema  Pulses: 2+ and symmetric  Skin: Skin color, texture, turgor normal. No rashes or lesions  Lymph nodes: Cervical, supraclavicular, and axillary nodes normal.  Neurologic: Grossly normal      Imaging     Chest  Xray:  Results for orders placed during the hospital encounter of 20    XR CHEST STANDARD (2 VW)    Narrative  Patient MRN: [id number]  : 1950  Age:  71 years  Gender: Male  Order Date: 3/10/2020 12:00 PM  Exam: XR CHEST (2 VW)  Number of Images: 2 view  Indication:   persistent dyspnea and cough  persistent dyspnea and cough  Comparison: 2020    Findings: The heart is unremarkable. The lung fields are hyperinflated. Density is seen in the lung fields suggesting scar. The aorta is tortuous and ectatic. Impression  Findings of emphysematous changes. This study was dictated by Kathrine Blevins PA-C and Danuta Contreras MD  reviewed and concurred with the findings. Results for orders placed during the hospital encounter of 21    XR CHEST PORTABLE    Narrative  EXAMINATION:  ONE XRAY VIEW OF THE CHEST    2021 1:14 pm    COMPARISON:  March 10, 2020    HISTORY:  ORDERING SYSTEM PROVIDED HISTORY: r/o pna  TECHNOLOGIST PROVIDED HISTORY:  Reason for exam:->r/o pna    FINDINGS:  No airspace opacity or pleural effusion. The heart is normal size. No  pneumothorax. No free air beneath the hemidiaphragms. Hyperinflation of both  lungs notable in the upper lobes. Impression  1. No pneumonia or pleural effusion. 2.  Emphysematous changes.       Additional Imaging:  none    Lab Review   Recent Results (from the past 24 hour(s))   Troponin    Collection Time: 21  9:40 AM   Result Value Ref Range    Troponin, High Sensitivity 28 (H) 0 - 11 ng/L   EKG 12 Lead    Collection Time: 21  9:56 AM   Result Value Ref Range    Ventricular Rate 108 BPM    Atrial Rate 108 BPM    P-R Interval 122 ms    QRS Duration 80 ms    Q-T Interval 312 ms    QTc Calculation (Bazett) 418 ms    P Axis 64 degrees R Axis 86 degrees    T Axis 56 degrees     Assessment:     Principal Problem:    Acute respiratory failure with hypoxia (HCC)  Active Problems:    Type 2 diabetes mellitus (HCC)    Elevated troponin  Resolved Problems:    * No resolved hospital problems.  *      Plan:   CLEARLY no better - one could argue worse  Halley Andres MD  6/10/2021  6:47 AM

## 2021-06-10 NOTE — PROGRESS NOTES
Physician Progress Note      Nash Cote  CSN #:                  [id number]  :                       1950  ADMIT DATE:       2021 11:47 AM  DISCH DATE:  Steffanie Burciaga  PROVIDER #:        Yanely Cotton MD          QUERY TEXT:    Dear Dr. Lida Montano,    Pt admitted with respiratory failure. Noted documentation of COPD   exacerbation in pulmonology consult and PN's. If possible, please document in   progress notes and discharge summary:    The medical record reflects the following:  Risk Factors: COPD, tobacco use  Clinical Indicators: ED \"COPD exacerbation. \"  Pulmonology consult and PN's   \"COPD exacerbation. \"  H&P \"Acute respiratory failure with hypoxia. Elevated troponin. \"  Treatment: oxygen, solumedrol, continue nebulizers-pulmicort and brovana,   duonebs    Thank you,  Shilpa Strong RN, CCDS  Clinical Documentation Improvement Specialist  [email or web address]  Office: [phone]  Cell: [phone]  Options provided:  -- COPD exacerbation confirmed present on admission  -- COPD exacerbation ruled out  -- Other - I will add my own diagnosis  -- Disagree - Not applicable / Not valid  -- Disagree - Clinically unable to determine / Unknown  -- Refer to Clinical Documentation Reviewer    PROVIDER RESPONSE TEXT:    The diagnosis of COPD exacerbation was confirmed as present on admission.     Query created by: Fabián Pollard on 2021 10:56 AM      Electronically signed by:  Yanely Cotton MD 6/10/2021 6:46 AM

## 2021-06-11 LAB
BLOOD CULTURE, ROUTINE: NORMAL
CULTURE, BLOOD 2: NORMAL

## 2021-06-11 PROCEDURE — 94640 AIRWAY INHALATION TREATMENT: CPT

## 2021-06-11 PROCEDURE — 2700000000 HC OXYGEN THERAPY PER DAY

## 2021-06-11 PROCEDURE — 6360000002 HC RX W HCPCS: Performed by: INTERNAL MEDICINE

## 2021-06-11 PROCEDURE — 6370000000 HC RX 637 (ALT 250 FOR IP): Performed by: NURSE PRACTITIONER

## 2021-06-11 PROCEDURE — 6370000000 HC RX 637 (ALT 250 FOR IP): Performed by: INTERNAL MEDICINE

## 2021-06-11 PROCEDURE — 1200000000 HC SEMI PRIVATE

## 2021-06-11 PROCEDURE — 2580000003 HC RX 258: Performed by: INTERNAL MEDICINE

## 2021-06-11 PROCEDURE — 6360000002 HC RX W HCPCS: Performed by: NURSE PRACTITIONER

## 2021-06-11 RX ADMIN — METHYLPREDNISOLONE SODIUM SUCCINATE 40 MG: 40 INJECTION, POWDER, LYOPHILIZED, FOR SOLUTION INTRAMUSCULAR; INTRAVENOUS at 21:34

## 2021-06-11 RX ADMIN — GABAPENTIN 600 MG: 300 CAPSULE ORAL at 09:46

## 2021-06-11 RX ADMIN — GABAPENTIN 600 MG: 300 CAPSULE ORAL at 14:46

## 2021-06-11 RX ADMIN — CLONIDINE HYDROCHLORIDE 0.1 MG: 0.1 TABLET ORAL at 09:47

## 2021-06-11 RX ADMIN — TAMSULOSIN HYDROCHLORIDE 0.8 MG: 0.4 CAPSULE ORAL at 21:44

## 2021-06-11 RX ADMIN — METHYLPREDNISOLONE SODIUM SUCCINATE 40 MG: 40 INJECTION, POWDER, LYOPHILIZED, FOR SOLUTION INTRAMUSCULAR; INTRAVENOUS at 09:47

## 2021-06-11 RX ADMIN — Medication 10 ML: at 09:47

## 2021-06-11 RX ADMIN — ARFORMOTEROL TARTRATE 15 MCG: 15 SOLUTION RESPIRATORY (INHALATION) at 21:26

## 2021-06-11 RX ADMIN — DOXYCYCLINE HYCLATE 100 MG: 100 CAPSULE ORAL at 09:46

## 2021-06-11 RX ADMIN — LISINOPRIL 20 MG: 20 TABLET ORAL at 09:47

## 2021-06-11 RX ADMIN — ENOXAPARIN SODIUM 40 MG: 40 INJECTION SUBCUTANEOUS at 21:33

## 2021-06-11 RX ADMIN — GABAPENTIN 600 MG: 300 CAPSULE ORAL at 21:33

## 2021-06-11 RX ADMIN — LORAZEPAM 0.5 MG: 0.5 TABLET ORAL at 23:48

## 2021-06-11 RX ADMIN — DOXYCYCLINE HYCLATE 100 MG: 100 CAPSULE ORAL at 21:33

## 2021-06-11 RX ADMIN — GUAIFENESIN 400 MG: 400 TABLET ORAL at 09:46

## 2021-06-11 RX ADMIN — OSELTAMIVIR 75 MG: 75 CAPSULE ORAL at 09:46

## 2021-06-11 RX ADMIN — CLONIDINE HYDROCHLORIDE 0.1 MG: 0.1 TABLET ORAL at 21:33

## 2021-06-11 RX ADMIN — GUAIFENESIN 400 MG: 400 TABLET ORAL at 17:21

## 2021-06-11 RX ADMIN — BUDESONIDE 250 MCG: 0.5 SUSPENSION RESPIRATORY (INHALATION) at 10:21

## 2021-06-11 RX ADMIN — GUAIFENESIN 400 MG: 400 TABLET ORAL at 21:32

## 2021-06-11 RX ADMIN — AMLODIPINE BESYLATE 5 MG: 5 TABLET ORAL at 09:46

## 2021-06-11 RX ADMIN — GUAIFENESIN 400 MG: 400 TABLET ORAL at 12:21

## 2021-06-11 RX ADMIN — BUDESONIDE 250 MCG: 0.5 SUSPENSION RESPIRATORY (INHALATION) at 21:27

## 2021-06-11 RX ADMIN — LEVALBUTEROL 1.25 MG: 1.25 SOLUTION, CONCENTRATE RESPIRATORY (INHALATION) at 06:22

## 2021-06-11 RX ADMIN — ARFORMOTEROL TARTRATE 15 MCG: 15 SOLUTION RESPIRATORY (INHALATION) at 10:20

## 2021-06-11 RX ADMIN — Medication 10 ML: at 21:34

## 2021-06-11 RX ADMIN — OSELTAMIVIR 75 MG: 75 CAPSULE ORAL at 21:33

## 2021-06-11 ASSESSMENT — PAIN SCALES - GENERAL
PAINLEVEL_OUTOF10: 0
PAINLEVEL_OUTOF10: 0

## 2021-06-11 NOTE — PROGRESS NOTES
Nutrition Note    Pt adm  with Acute respiratory failure with hypoxia. After reviewing the EMR pt is at low nutritional risk. Pt has no wt loss, poor appetite or wounds. Current PO intake is at %.     Electronically signed by Delfino Steven RD, LD on 6/11/21 at 1:22 PM EDT    Contact: [phone]

## 2021-06-11 NOTE — CARE COORDINATION
Pt still dyspneic; on 6lnc; tamiflu rx for flu A.has [employer] , but only goes up to 5L; any increased 02 demands will require HOME 02; will need pox testing prior to discharge. Per pulm note;  at [facility] following for eventual lung reduction surgery. Not a lung transplant surgery. Will need orders for home 02 if needed. plan is home with family. Ro Bates.

## 2021-06-11 NOTE — PROGRESS NOTES
Pulmonary Progress Note    Admit Date: 2021                            PCP: Celso Waterman MD  Principal Problem:    Acute respiratory failure with hypoxia [facility])  Active Problems:    Type 2 diabetes mellitus ([address]    Elevated troponin  Resolved Problems:    * No resolved hospital problems. *      Subjective:  Sitting up on side of bed with wife and daughter. On 6L. Feeling better today. May questions answered regarding prognosis . Medications:   sodium chloride          oseltamivir  75 mg Oral BID    doxycycline hyclate  100 mg Oral 2 times per day    methylPREDNISolone  40 mg Intravenous Q12H    cloNIDine  0.1 mg Oral BID    gabapentin  600 mg Oral TID    guaiFENesin  400 mg Oral 4x daily    tamsulosin  0.8 mg Oral Daily    sodium chloride flush  5-40 mL Intravenous 2 times per day    enoxaparin  40 mg Subcutaneous Daily    budesonide  0.25 mg Nebulization BID    Arformoterol Tartrate  15 mcg Nebulization BID    amLODIPine  5 mg Oral Daily    And    lisinopril  20 mg Oral Daily       Vitals:  VITALS:  BP (!) 150/63   Pulse 93   Temp 97.5 °F (36.4 °C) (Oral)   Resp 19   Ht 5' 7\" (1.702 m)   Wt 183 lb 3.2 oz (83.1 kg)   SpO2 93%   BMI 28.69 kg/m²   24HR INTAKE/OUTPUT:  No intake or output data in the 24 hours ending 21 1432  CURRENT PULSE OXIMETRY:  SpO2: 93 %  24HR PULSE OXIMETRY RANGE:  SpO2  Av %  Min: 92 %  Max: 98 %  CVP:    VENT SETTINGS:   Vent Information  SpO2: 93 %  Additional Respiratory  Assessments  Pulse: 93  Resp: 19  SpO2: 93 %      EXAM:  General: Alert, in NAD  ENT: No discharge. Pharynx clear. membranes moist   Neck: Supple, Trachea midline. Resp: No accessory muscle use. Non-labored. Lungs diminished fine exp wheeze and rales at bases   CV: Regular rate. Regular rhythm. No murmur . No edema. ABD: Non-tender. Non-distended. Soft, round . Normal bowel sounds. Skin: Warm and dry. M/S: No cyanosis. No joint deformity. No clubbing. Neuro: Awake. Follows commands. O x 3, APODACA  Psych:  calm and interactive     I/O: No intake/output data recorded. No intake/output data recorded. Results:  Results for Arya Colon (MRN [id number]) as of 6/11/2021 14:33   Ref.  Range 6/7/2021 03:45   Sodium Latest Ref Range: 132 - 146 mmol/L 136   Potassium Latest Ref Range: 3.5 - 5.0 mmol/L 4.9   Chloride Latest Ref Range: 98 - 107 mmol/L 98   CO2 Latest Ref Range: 22 - 29 mmol/L 29   BUN Latest Ref Range: 6 - 23 mg/dL 17   Creatinine Latest Ref Range: 0.7 - 1.2 mg/dL 0.6 (L)   Anion Gap Latest Ref Range: 7 - 16 mmol/L 9   GFR Non- Latest Ref Range: >=60 mL/min/1.73 >60   GFR African American Unknown >60   Glucose Latest Ref Range: 74 - 99 mg/dL 206 (H)   Calcium Latest Ref Range: 8.6 - 10.2 mg/dL 8.9   Procalcitonin Latest Ref Range: 0.00 - 0.08 ng/mL 0.04   WBC Latest Ref Range: 4.5 - 11.5 E9/L 14.6 (H)   RBC Latest Ref Range: 3.80 - 5.80 E12/L 4.17   Hemoglobin Quant Latest Ref Range: 12.5 - 16.5 g/dL 13.4   Hematocrit Latest Ref Range: 37.0 - 54.0 % 40.2   MCV Latest Ref Range: 80.0 - 99.9 fL 96.4   MCH Latest Ref Range: 26.0 - 35.0 pg 32.1   MCHC Latest Ref Range: 32.0 - 34.5 % 33.3   MPV Latest Ref Range: 7.0 - 12.0 fL 10.0   RDW Latest Ref Range: 11.5 - 15.0 fL 12.9   Platelet Count Latest Ref Range: 130 - 450 E9/L 180   Neutrophils % Latest Ref Range: 43.0 - 80.0 % 93.5 (H)   Immature Granulocytes % Latest Ref Range: 0.0 - 5.0 % 0.5   Lymphocyte % Latest Ref Range: 20.0 - 42.0 % 2.5 (L)   Monocytes % Latest Ref Range: 2.0 - 12.0 % 3.4   Eosinophils % Latest Ref Range: 0.0 - 6.0 % 0.0   Basophils % Latest Ref Range: 0.0 - 2.0 % 0.1   Neutrophils Absolute Latest Ref Range: 1.80 - 7.30 E9/L 13.65 (H)   Immature Granulocytes # Latest Units: E9/L 0.07   Lymphocytes Absolute Latest Ref Range: 1.50 - 4.00 E9/L 0.37 (L)   Monocytes Absolute Latest Ref Range: 0.10 - 0.95 E9/L 0.50   Eosinophils Absolute Latest Ref Range: 0.05 - 0.50 E9/L 0.00 (L)   Basophils Absolute Latest Ref Range: 0.00 - 0.20 E9/L 0.01   Procalcitonin: Results for Bella Langley (MRN [id number]) as of 6/11/2021 14:33   Ref. Range 6/7/2021 03:45   Procalcitonin Latest Ref Range: 0.00 - 0.08 ng/mL 0.04     Cultures:  Results for Bella Langley (MRN [id number]) as of 6/11/2021 14:33   Ref. Range 6/6/2021 12:37   Influenza A by PCR Latest Ref Range: Not Detected  Not Detected   Influenza B by PCR Latest Ref Range: Not Detected  Not Detected   Adenovirus by PCR Latest Ref Range: Not Detected  Not Detected   Coronavirus 229E by PCR Latest Ref Range: Not Detected  Not Detected   Coronavirus HKU1 by PCR Latest Ref Range: Not Detected  Not Detected   Coronavirus NL63 by PCR Latest Ref Range: Not Detected  Not Detected   Coronavirus OC43 by PCR Latest Ref Range: Not Detected  Not Detected   Human Metapneumovirus by PCR Latest Ref Range: Not Detected  Not Detected   Human Rhinovirus/Enterovirus by PCR Latest Ref Range: Not Detected  Not Detected   Parainfluenza Virus 1 by PCR Latest Ref Range: Not Detected  Not Detected   Parainfluenza Virus 2 by PCR Latest Ref Range: Not Detected  Not Detected   Parainfluenza Virus 3 by PCR Latest Ref Range: Not Detected  Not Detected   Parainfluenza Virus 4 by PCR Latest Ref Range: Not Detected  Not Detected   Respiratory Syncytial Virus by PCR Latest Ref Range: Not Detected  Not Detected   Bordetella parapertussis by PCR Latest Ref Range: Not Detected  Not Detected   Chlamydophilia pneumoniae by PCR Latest Ref Range: Not Detected  Not Detected   Mycoplasma pneumoniae by PCR Latest Ref Range: Not Detected  Not Detected   SARS-CoV-2, PCR Latest Ref Range: Not Detected  Not Detected   Bordetella pertussis by PCR Latest Ref Range: Not Detected  Not Detected     Gram Stain Orderable  Gram Stain  Collected: 06/07/21 0856   Result status: Final   Resulting lab: [facility]   Value: Group 5: >25 PMN's/LPF and <10 Epithelial cells/LPF   Moderate Polymorphonuclear leukocytes   Rare Epithelial cells   Few Gram positive cocci in pairs   Moderate Gram positive cocci in chains   Few Gram negative rods   Few Gram positive cocci in clusters          ABG:   Results for Suze Ariza (MRN [id number]) as of 6/11/2021 14:33   Ref. Range 6/6/2021 12:58   Source: Unknown Blood Arterial   pH, Blood Gas Latest Ref Range: 7.350 - 7.450  7.425   PCO2 Latest Ref Range: 35.0 - 45.0 mmHg 40.5   pO2 Latest Ref Range: 75.0 - 100.0 mmHg 168.7 (H)   HCO3 Latest Ref Range: 22.0 - 26.0 mmol/L 26.0   Base Excess Latest Ref Range: -3.0 - 3.0 mmol/L 1.5   O2 Sat Latest Ref Range: 92.0 - 98.5 % 99.3 (H)   tHb (est) Latest Ref Range: 11.5 - 16.5 g/dL 15.4   O2Hb Latest Ref Range: 94.0 - 97.0 % 97.6 (H)   COHb Latest Ref Range: 0.0 - 1.5 % 1.3   MetHb Latest Ref Range: 0.0 - 1.5 % 0.4   HHb Latest Ref Range: 0.0 - 5.0 % 0.7   O2 Content Latest Units: mL/dL 21.4   Pt Temp Latest Units: C 37.0   Critical(s) Notified Unknown . No Critical Values   Time Analyzed Unknown 1258   Mode Unknown NC- 4 L       Films:  XR CHEST PORTABLE    Result Date: 6/6/2021  EXAMINATION: ONE XRAY VIEW OF THE CHEST 6/6/2021 1:14 pm COMPARISON: March 10, 2020 HISTORY: ORDERING SYSTEM PROVIDED HISTORY: r/o pna TECHNOLOGIST PROVIDED HISTORY: Reason for exam:->r/o pna FINDINGS: No airspace opacity or pleural effusion. The heart is normal size. No pneumothorax. No free air beneath the hemidiaphragms. Hyperinflation of both lungs notable in the upper lobes. 1.  No pneumonia or pleural effusion. 2.  Emphysematous changes.          Assessment:  Torsten Stanley is a pleasant 70 y. o. 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  He has a past medical history of COPD/asthma and is being worked up in [facility] with Dr. Rosemarie Billingsley for transplant or lung reduction surgery  · COPD exacerbation  · Acute on chronic hypoxic respiratory failure  · Hypertension       Plan:  · Continue O2 currently on 6L nc. Baseline is 2L as needed , keep POX >90% and wean as able. · CTA negative for PE   · Respiratory panel negative, including Flu A. With repeat respiratory panel negative    · Continue Tamiflu for 5 days - Day#4.  (Tested + influenza A at [facility] on Saturday)   · Legionella/strep pneumoniae negative. · Sputum culture with moderate Gram + cocci in chains continue  Doxy x 7 days . Afebrile and elevated WBC. Procalcitonin 0.04   · Will continue nebulizers - pulmicort and brovana;  duonebs stopped made tachycardic, on xopenex now   · Continue solumedrol q12H , keep one more day and plan for oral taper tomorrow   · IS for pulmonary hygiene   · Activity as tolerated   Dr. Jorge A Beltrán at [facility] 6/10/2021 and patient not candidate for lung transplantation but can be soon assessed for lung volume reduction surgery vs endobronchial stent placement for advanced emphysematous disease       Electronically signed by KELECHI De La Garza on 6/11/2021 at 2:32 PM    Attending Attestation Note:    Patient seen and examined with NP. I agree with above.     In addition, the following apply:    - await D/C planning, patient will ambulate today around the floor with oxygen supplementation in place  - supportive care to continue     Canelo Reed MD  6/11/2021  3:09 PM

## 2021-06-12 LAB
ANION GAP SERPL CALCULATED.3IONS-SCNC: 7 MMOL/L (ref 7–16)
BASOPHILS ABSOLUTE: 0.02 E9/L (ref 0–0.2)
BASOPHILS RELATIVE PERCENT: 0.2 % (ref 0–2)
BUN BLDV-MCNC: 23 MG/DL (ref 6–23)
CALCIUM SERPL-MCNC: 9 MG/DL (ref 8.6–10.2)
CHLORIDE BLD-SCNC: 93 MMOL/L (ref 98–107)
CO2: 32 MMOL/L (ref 22–29)
CREAT SERPL-MCNC: 0.6 MG/DL (ref 0.7–1.2)
EOSINOPHILS ABSOLUTE: 0 E9/L (ref 0.05–0.5)
EOSINOPHILS RELATIVE PERCENT: 0 % (ref 0–6)
GFR AFRICAN AMERICAN: >60
GFR NON-AFRICAN AMERICAN: >60 ML/MIN/1.73
GLUCOSE BLD-MCNC: 208 MG/DL (ref 74–99)
HCT VFR BLD CALC: 38.5 % (ref 37–54)
HEMOGLOBIN: 13.4 G/DL (ref 12.5–16.5)
IMMATURE GRANULOCYTES #: 0.11 E9/L
IMMATURE GRANULOCYTES %: 1 % (ref 0–5)
LYMPHOCYTES ABSOLUTE: 0.48 E9/L (ref 1.5–4)
LYMPHOCYTES RELATIVE PERCENT: 4.4 % (ref 20–42)
MCH RBC QN AUTO: 33.2 PG (ref 26–35)
MCHC RBC AUTO-ENTMCNC: 34.8 % (ref 32–34.5)
MCV RBC AUTO: 95.3 FL (ref 80–99.9)
MONOCYTES ABSOLUTE: 0.54 E9/L (ref 0.1–0.95)
MONOCYTES RELATIVE PERCENT: 4.9 % (ref 2–12)
NEUTROPHILS ABSOLUTE: 9.79 E9/L (ref 1.8–7.3)
NEUTROPHILS RELATIVE PERCENT: 89.5 % (ref 43–80)
PDW BLD-RTO: 12.1 FL (ref 11.5–15)
PLATELET # BLD: 193 E9/L (ref 130–450)
PMV BLD AUTO: 9.5 FL (ref 7–12)
POTASSIUM REFLEX MAGNESIUM: 4.7 MMOL/L (ref 3.5–5)
RBC # BLD: 4.04 E12/L (ref 3.8–5.8)
RBC # BLD: NORMAL 10*6/UL
SODIUM BLD-SCNC: 132 MMOL/L (ref 132–146)
WBC # BLD: 10.9 E9/L (ref 4.5–11.5)

## 2021-06-12 PROCEDURE — 6360000002 HC RX W HCPCS: Performed by: INTERNAL MEDICINE

## 2021-06-12 PROCEDURE — 94640 AIRWAY INHALATION TREATMENT: CPT

## 2021-06-12 PROCEDURE — 80048 BASIC METABOLIC PNL TOTAL CA: CPT

## 2021-06-12 PROCEDURE — 2580000003 HC RX 258: Performed by: INTERNAL MEDICINE

## 2021-06-12 PROCEDURE — 6370000000 HC RX 637 (ALT 250 FOR IP): Performed by: INTERNAL MEDICINE

## 2021-06-12 PROCEDURE — 85025 COMPLETE CBC W/AUTO DIFF WBC: CPT

## 2021-06-12 PROCEDURE — 2700000000 HC OXYGEN THERAPY PER DAY

## 2021-06-12 PROCEDURE — 1200000000 HC SEMI PRIVATE

## 2021-06-12 PROCEDURE — 6360000002 HC RX W HCPCS: Performed by: NURSE PRACTITIONER

## 2021-06-12 PROCEDURE — 6370000000 HC RX 637 (ALT 250 FOR IP): Performed by: NURSE PRACTITIONER

## 2021-06-12 PROCEDURE — 36415 COLL VENOUS BLD VENIPUNCTURE: CPT

## 2021-06-12 RX ORDER — PREDNISONE 20 MG/1
40 TABLET ORAL DAILY
Status: COMPLETED | OUTPATIENT
Start: 2021-06-13 | End: 2021-06-15

## 2021-06-12 RX ORDER — PREDNISONE 1 MG/1
10 TABLET ORAL DAILY
Status: DISCONTINUED | OUTPATIENT
Start: 2021-06-22 | End: 2021-06-17 | Stop reason: HOSPADM

## 2021-06-12 RX ORDER — PREDNISONE 20 MG/1
20 TABLET ORAL DAILY
Status: DISCONTINUED | OUTPATIENT
Start: 2021-06-19 | End: 2021-06-17 | Stop reason: HOSPADM

## 2021-06-12 RX ADMIN — ENOXAPARIN SODIUM 40 MG: 40 INJECTION SUBCUTANEOUS at 20:31

## 2021-06-12 RX ADMIN — DOXYCYCLINE HYCLATE 100 MG: 100 CAPSULE ORAL at 09:00

## 2021-06-12 RX ADMIN — LEVALBUTEROL 1.25 MG: 1.25 SOLUTION, CONCENTRATE RESPIRATORY (INHALATION) at 00:00

## 2021-06-12 RX ADMIN — DOXYCYCLINE HYCLATE 100 MG: 100 CAPSULE ORAL at 20:30

## 2021-06-12 RX ADMIN — GUAIFENESIN 400 MG: 400 TABLET ORAL at 14:05

## 2021-06-12 RX ADMIN — LORAZEPAM 0.5 MG: 0.5 TABLET ORAL at 21:58

## 2021-06-12 RX ADMIN — LEVALBUTEROL 1.25 MG: 1.25 SOLUTION, CONCENTRATE RESPIRATORY (INHALATION) at 20:33

## 2021-06-12 RX ADMIN — GABAPENTIN 600 MG: 300 CAPSULE ORAL at 09:04

## 2021-06-12 RX ADMIN — GUAIFENESIN 400 MG: 400 TABLET ORAL at 08:59

## 2021-06-12 RX ADMIN — LISINOPRIL 20 MG: 20 TABLET ORAL at 09:00

## 2021-06-12 RX ADMIN — CLONIDINE HYDROCHLORIDE 0.1 MG: 0.1 TABLET ORAL at 20:30

## 2021-06-12 RX ADMIN — GABAPENTIN 600 MG: 300 CAPSULE ORAL at 20:30

## 2021-06-12 RX ADMIN — Medication 10 ML: at 20:31

## 2021-06-12 RX ADMIN — CLONIDINE HYDROCHLORIDE 0.1 MG: 0.1 TABLET ORAL at 09:00

## 2021-06-12 RX ADMIN — BUDESONIDE 250 MCG: 0.5 SUSPENSION RESPIRATORY (INHALATION) at 08:31

## 2021-06-12 RX ADMIN — GUAIFENESIN 400 MG: 400 TABLET ORAL at 20:30

## 2021-06-12 RX ADMIN — ARFORMOTEROL TARTRATE 15 MCG: 15 SOLUTION RESPIRATORY (INHALATION) at 20:33

## 2021-06-12 RX ADMIN — AMLODIPINE BESYLATE 5 MG: 5 TABLET ORAL at 09:00

## 2021-06-12 RX ADMIN — BUDESONIDE 250 MCG: 0.5 SUSPENSION RESPIRATORY (INHALATION) at 20:33

## 2021-06-12 RX ADMIN — TAMSULOSIN HYDROCHLORIDE 0.8 MG: 0.4 CAPSULE ORAL at 20:30

## 2021-06-12 RX ADMIN — GUAIFENESIN 400 MG: 400 TABLET ORAL at 16:59

## 2021-06-12 RX ADMIN — OSELTAMIVIR 75 MG: 75 CAPSULE ORAL at 09:00

## 2021-06-12 RX ADMIN — ARFORMOTEROL TARTRATE 15 MCG: 15 SOLUTION RESPIRATORY (INHALATION) at 08:31

## 2021-06-12 RX ADMIN — METHYLPREDNISOLONE SODIUM SUCCINATE 40 MG: 40 INJECTION, POWDER, LYOPHILIZED, FOR SOLUTION INTRAMUSCULAR; INTRAVENOUS at 09:01

## 2021-06-12 RX ADMIN — GABAPENTIN 600 MG: 300 CAPSULE ORAL at 14:05

## 2021-06-12 RX ADMIN — LEVALBUTEROL 1.25 MG: 1.25 SOLUTION, CONCENTRATE RESPIRATORY (INHALATION) at 08:32

## 2021-06-12 RX ADMIN — Medication 10 ML: at 09:04

## 2021-06-12 ASSESSMENT — PAIN SCALES - GENERAL
PAINLEVEL_OUTOF10: 0
PAINLEVEL_OUTOF10: 0

## 2021-06-12 NOTE — PROGRESS NOTES
Patient not in room when I attempted to see patient. I will see at a later time.     Saroj Quinones MD  6/12/2021  2:00 PM

## 2021-06-12 NOTE — PROGRESS NOTES
Subjective:  Still very easily dyspneic. The patient is awake and alert. Denies chest pain, angina. Denies abdominal pain. Tolerating diet. No nausea or vomiting. Objective:  Weaning O2 slightly. Resp tx continues. Appetite better. BP (!) 143/72   Pulse 60   Temp 97.5 °F (36.4 °C) (Oral)   Resp 16   Ht 5' 7\" (1.702 m)   Wt 184 lb 5 oz (83.6 kg)   SpO2 93%   BMI 28.87 kg/m²     Heart:  RRR, no murmurs, gallops, or rubs. Lungs:  Scattered rhonchi with prolonged exp phase. Abd: bowel sounds present, nontender, nondistended, no masses  Extrem:  No clubbing, cyanosis, or edema  Oral mucosa OK    CBC:   Lab Results   Component Value Date    WBC 10.9 06/12/2021    RBC 4.04 06/12/2021    HGB 13.4 06/12/2021    HCT 38.5 06/12/2021    MCV 95.3 06/12/2021    MCH 33.2 06/12/2021    MCHC 34.8 06/12/2021    RDW 12.1 06/12/2021     06/12/2021    MPV 9.5 06/12/2021     CMP:    Lab Results   Component Value Date     06/12/2021    K 4.7 06/12/2021    CL 93 06/12/2021    CO2 32 06/12/2021    BUN 23 06/12/2021    CREATININE 0.6 06/12/2021    GFRAA >60 06/12/2021    LABGLOM >60 06/12/2021    GLUCOSE 208 06/12/2021    PROT 6.9 06/06/2021    LABALBU 4.3 06/06/2021    CALCIUM 9.0 06/12/2021    BILITOT 1.3 06/06/2021    ALKPHOS 87 06/06/2021    AST 28 06/06/2021    ALT 38 06/06/2021     BMP:    Lab Results   Component Value Date     06/12/2021    K 4.7 06/12/2021    CL 93 06/12/2021    CO2 32 06/12/2021    BUN 23 06/12/2021    LABALBU 4.3 06/06/2021    CREATININE 0.6 06/12/2021    CALCIUM 9.0 06/12/2021    GFRAA >60 06/12/2021    LABGLOM >60 06/12/2021    GLUCOSE 208 06/12/2021        Assessment:    Patient Active Problem List   Diagnosis    COPD with acute exacerbation ([facility] [address]    Hypertension    Anxiety    Lactic acidosis    Hyperglycemia, drug-induced    Acute respiratory failure with hypoxia (HCC)    Type 2 diabetes mellitus (HCC)    Elevated troponin       Plan:  Present tx.   Wean steroids as tolerated.           Carolyn Steen MD  6:55 AM  6/12/2021

## 2021-06-12 NOTE — PROGRESS NOTES
Pulmonary Progress Note    Admit Date: 2021                            PCP: Carolyn Steen MD  Principal Problem:    Acute respiratory failure with hypoxia [facility])  Active Problems:    Type 2 diabetes mellitus ([address]    Elevated troponin  Resolved Problems:    * No resolved hospital problems. *      Subjective:  Resting in bed on 6L. l feels about the same today. States he ambulated with 6L on and his personal POX dropped into 80's. Medications:   sodium chloride          doxycycline hyclate  100 mg Oral 2 times per day    methylPREDNISolone  40 mg Intravenous Q12H    cloNIDine  0.1 mg Oral BID    gabapentin  600 mg Oral TID    guaiFENesin  400 mg Oral 4x daily    tamsulosin  0.8 mg Oral Daily    sodium chloride flush  5-40 mL Intravenous 2 times per day    enoxaparin  40 mg Subcutaneous Daily    budesonide  0.25 mg Nebulization BID    Arformoterol Tartrate  15 mcg Nebulization BID    amLODIPine  5 mg Oral Daily    And    lisinopril  20 mg Oral Daily       Vitals:  VITALS:  BP (!) 149/70   Pulse 68   Temp 97.9 °F (36.6 °C) (Oral)   Resp 14   Ht 5' 7\" (1.702 m)   Wt 184 lb 5 oz (83.6 kg)   SpO2 94%   BMI 28.87 kg/m²   24HR INTAKE/OUTPUT:  No intake or output data in the 24 hours ending 21 0958  CURRENT PULSE OXIMETRY:  SpO2: 94 %  24HR PULSE OXIMETRY RANGE:  SpO2  Av %  Min: 93 %  Max: 97 %  CVP:    VENT SETTINGS:   Vent Information  SpO2: 94 %  Additional Respiratory  Assessments  Pulse: 68  Resp: 14  SpO2: 94 %      EXAM:  General: Alert, in NAD  ENT: No discharge. Pharynx clear. membranes moist   Neck: Supple, Trachea midline. Resp: No accessory muscle use. Non-labored. Lungs diminished  Throughout   CV: Regular rate. Regular rhythm. No murmur . No edema. ABD: Non-tender. Non-distended. Soft, round . Normal bowel sounds. Skin: Warm and dry. M/S: No cyanosis. No joint deformity. No clubbing. Neuro: Awake. Follows commands.  O x 3, APODACA  Psych:  calm and interactive I/O: No intake/output data recorded. No intake/output data recorded. Results:  Results for Neto Panda (MRN [id number]) as of 6/11/2021 14:33   Ref.  Range 6/7/2021 03:45   Sodium Latest Ref Range: 132 - 146 mmol/L 136   Potassium Latest Ref Range: 3.5 - 5.0 mmol/L 4.9   Chloride Latest Ref Range: 98 - 107 mmol/L 98   CO2 Latest Ref Range: 22 - 29 mmol/L 29   BUN Latest Ref Range: 6 - 23 mg/dL 17   Creatinine Latest Ref Range: 0.7 - 1.2 mg/dL 0.6 (L)   Anion Gap Latest Ref Range: 7 - 16 mmol/L 9   GFR Non- Latest Ref Range: >=60 mL/min/1.73 >60   GFR African American Unknown >60   Glucose Latest Ref Range: 74 - 99 mg/dL 206 (H)   Calcium Latest Ref Range: 8.6 - 10.2 mg/dL 8.9   Procalcitonin Latest Ref Range: 0.00 - 0.08 ng/mL 0.04   WBC Latest Ref Range: 4.5 - 11.5 E9/L 14.6 (H)   RBC Latest Ref Range: 3.80 - 5.80 E12/L 4.17   Hemoglobin Quant Latest Ref Range: 12.5 - 16.5 g/dL 13.4   Hematocrit Latest Ref Range: 37.0 - 54.0 % 40.2   MCV Latest Ref Range: 80.0 - 99.9 fL 96.4   MCH Latest Ref Range: 26.0 - 35.0 pg 32.1   MCHC Latest Ref Range: 32.0 - 34.5 % 33.3   MPV Latest Ref Range: 7.0 - 12.0 fL 10.0   RDW Latest Ref Range: 11.5 - 15.0 fL 12.9   Platelet Count Latest Ref Range: 130 - 450 E9/L 180   Neutrophils % Latest Ref Range: 43.0 - 80.0 % 93.5 (H)   Immature Granulocytes % Latest Ref Range: 0.0 - 5.0 % 0.5   Lymphocyte % Latest Ref Range: 20.0 - 42.0 % 2.5 (L)   Monocytes % Latest Ref Range: 2.0 - 12.0 % 3.4   Eosinophils % Latest Ref Range: 0.0 - 6.0 % 0.0   Basophils % Latest Ref Range: 0.0 - 2.0 % 0.1   Neutrophils Absolute Latest Ref Range: 1.80 - 7.30 E9/L 13.65 (H)   Immature Granulocytes # Latest Units: E9/L 0.07   Lymphocytes Absolute Latest Ref Range: 1.50 - 4.00 E9/L 0.37 (L)   Monocytes Absolute Latest Ref Range: 0.10 - 0.95 E9/L 0.50   Eosinophils Absolute Latest Ref Range: 0.05 - 0.50 E9/L 0.00 (L)   Basophils Absolute Latest Ref Range: 0.00 - 0.20 E9/L 0.01 Procalcitonin: Results for Jessica Harris (MRN [id number]) as of 6/11/2021 14:33   Ref. Range 6/7/2021 03:45   Procalcitonin Latest Ref Range: 0.00 - 0.08 ng/mL 0.04     Cultures:  Results for Jessica Harris (MRN [id number]) as of 6/11/2021 14:33   Ref. Range 6/6/2021 12:37   Influenza A by PCR Latest Ref Range: Not Detected  Not Detected   Influenza B by PCR Latest Ref Range: Not Detected  Not Detected   Adenovirus by PCR Latest Ref Range: Not Detected  Not Detected   Coronavirus 229E by PCR Latest Ref Range: Not Detected  Not Detected   Coronavirus HKU1 by PCR Latest Ref Range: Not Detected  Not Detected   Coronavirus NL63 by PCR Latest Ref Range: Not Detected  Not Detected   Coronavirus OC43 by PCR Latest Ref Range: Not Detected  Not Detected   Human Metapneumovirus by PCR Latest Ref Range: Not Detected  Not Detected   Human Rhinovirus/Enterovirus by PCR Latest Ref Range: Not Detected  Not Detected   Parainfluenza Virus 1 by PCR Latest Ref Range: Not Detected  Not Detected   Parainfluenza Virus 2 by PCR Latest Ref Range: Not Detected  Not Detected   Parainfluenza Virus 3 by PCR Latest Ref Range: Not Detected  Not Detected   Parainfluenza Virus 4 by PCR Latest Ref Range: Not Detected  Not Detected   Respiratory Syncytial Virus by PCR Latest Ref Range: Not Detected  Not Detected   Bordetella parapertussis by PCR Latest Ref Range: Not Detected  Not Detected   Chlamydophilia pneumoniae by PCR Latest Ref Range: Not Detected  Not Detected   Mycoplasma pneumoniae by PCR Latest Ref Range: Not Detected  Not Detected   SARS-CoV-2, PCR Latest Ref Range: Not Detected  Not Detected   Bordetella pertussis by PCR Latest Ref Range: Not Detected  Not Detected     Gram Stain Orderable  Gram Stain  Collected: 06/07/21 0852   Result status: Final   Resulting lab: [facility]   Value: Group 5: >25 PMN's/LPF and <10 Epithelial cells/LPF   Moderate Polymorphonuclear leukocytes   Rare Epithelial cells needed , keep POX >90% and wean as able. · Will need ambulatory POX to evaluate home o2 needs. He has a inogen machine only at home he purchased, no stationary tank or portables--will need set up before DC   · CTA negative for PE   · Respiratory panel negative, including Flu A. With repeat respiratory panel negative    · Continue Tamiflu for 5 days - Day#5.  (Tested + influenza A at [facility] on Saturday)   · Legionella/strep pneumoniae negative. · Sputum culture with moderate Gram + cocci in chains continue  Doxy x 7 days . Afebrile and elevated WBC.  Procalcitonin 0.04   · Will continue nebulizers - pulmicort and brovana;  duonebs stopped made tachycardic, on xopenex now , make sure to order xopenex nebz for home at DC all he has is albuterol at home   · [id number] Sandy Vazquez for oral prednisone taper 40x3, 30x3, 20x3, 10x3   · IS for pulmonary hygiene   · Activity as tolerated   Dr. Michelle Velez at [facility] 6/10/2021 and patient not candidate for lung transplantation but can be soon assessed for lung volume reduction surgery vs endobronchial stent placement for advanced emphysematous disease       Electronically signed by KELECHI Patel on 6/12/2021 at 9:58 AM

## 2021-06-13 LAB
ANION GAP SERPL CALCULATED.3IONS-SCNC: 5 MMOL/L (ref 7–16)
BASOPHILS ABSOLUTE: 0.04 E9/L (ref 0–0.2)
BASOPHILS RELATIVE PERCENT: 0.3 % (ref 0–2)
BUN BLDV-MCNC: 19 MG/DL (ref 6–23)
CALCIUM SERPL-MCNC: 8.6 MG/DL (ref 8.6–10.2)
CHLORIDE BLD-SCNC: 93 MMOL/L (ref 98–107)
CO2: 32 MMOL/L (ref 22–29)
CREAT SERPL-MCNC: 0.6 MG/DL (ref 0.7–1.2)
EOSINOPHILS ABSOLUTE: 0.01 E9/L (ref 0.05–0.5)
EOSINOPHILS RELATIVE PERCENT: 0.1 % (ref 0–6)
GFR AFRICAN AMERICAN: >60
GFR NON-AFRICAN AMERICAN: >60 ML/MIN/1.73
GLUCOSE BLD-MCNC: 117 MG/DL (ref 74–99)
HCT VFR BLD CALC: 37.4 % (ref 37–54)
HEMOGLOBIN: 12.3 G/DL (ref 12.5–16.5)
IMMATURE GRANULOCYTES #: 0.32 E9/L
IMMATURE GRANULOCYTES %: 2.3 % (ref 0–5)
LYMPHOCYTES ABSOLUTE: 1.39 E9/L (ref 1.5–4)
LYMPHOCYTES RELATIVE PERCENT: 9.9 % (ref 20–42)
MCH RBC QN AUTO: 31.5 PG (ref 26–35)
MCHC RBC AUTO-ENTMCNC: 32.9 % (ref 32–34.5)
MCV RBC AUTO: 95.7 FL (ref 80–99.9)
MONOCYTES ABSOLUTE: 1.4 E9/L (ref 0.1–0.95)
MONOCYTES RELATIVE PERCENT: 10 % (ref 2–12)
NEUTROPHILS ABSOLUTE: 10.81 E9/L (ref 1.8–7.3)
NEUTROPHILS RELATIVE PERCENT: 77.4 % (ref 43–80)
PDW BLD-RTO: 12.2 FL (ref 11.5–15)
PLATELET # BLD: 200 E9/L (ref 130–450)
PMV BLD AUTO: 9.5 FL (ref 7–12)
POTASSIUM REFLEX MAGNESIUM: 4.1 MMOL/L (ref 3.5–5)
RBC # BLD: 3.91 E12/L (ref 3.8–5.8)
SODIUM BLD-SCNC: 130 MMOL/L (ref 132–146)
WBC # BLD: 14 E9/L (ref 4.5–11.5)

## 2021-06-13 PROCEDURE — 2580000003 HC RX 258: Performed by: INTERNAL MEDICINE

## 2021-06-13 PROCEDURE — 85025 COMPLETE CBC W/AUTO DIFF WBC: CPT

## 2021-06-13 PROCEDURE — 6370000000 HC RX 637 (ALT 250 FOR IP): Performed by: CLINICAL NURSE SPECIALIST

## 2021-06-13 PROCEDURE — 6360000002 HC RX W HCPCS: Performed by: INTERNAL MEDICINE

## 2021-06-13 PROCEDURE — 1200000000 HC SEMI PRIVATE

## 2021-06-13 PROCEDURE — 2700000000 HC OXYGEN THERAPY PER DAY

## 2021-06-13 PROCEDURE — 36415 COLL VENOUS BLD VENIPUNCTURE: CPT

## 2021-06-13 PROCEDURE — 94640 AIRWAY INHALATION TREATMENT: CPT

## 2021-06-13 PROCEDURE — 6370000000 HC RX 637 (ALT 250 FOR IP): Performed by: INTERNAL MEDICINE

## 2021-06-13 PROCEDURE — 80048 BASIC METABOLIC PNL TOTAL CA: CPT

## 2021-06-13 PROCEDURE — 6370000000 HC RX 637 (ALT 250 FOR IP): Performed by: NURSE PRACTITIONER

## 2021-06-13 RX ADMIN — GABAPENTIN 600 MG: 300 CAPSULE ORAL at 09:30

## 2021-06-13 RX ADMIN — ARFORMOTEROL TARTRATE 15 MCG: 15 SOLUTION RESPIRATORY (INHALATION) at 20:15

## 2021-06-13 RX ADMIN — DOXYCYCLINE HYCLATE 100 MG: 100 CAPSULE ORAL at 21:24

## 2021-06-13 RX ADMIN — LEVALBUTEROL 1.25 MG: 1.25 SOLUTION, CONCENTRATE RESPIRATORY (INHALATION) at 08:19

## 2021-06-13 RX ADMIN — PREDNISONE 40 MG: 20 TABLET ORAL at 09:29

## 2021-06-13 RX ADMIN — LORAZEPAM 0.5 MG: 0.5 TABLET ORAL at 23:08

## 2021-06-13 RX ADMIN — Medication 10 ML: at 09:31

## 2021-06-13 RX ADMIN — LORAZEPAM 0.5 MG: 0.5 TABLET ORAL at 09:30

## 2021-06-13 RX ADMIN — GABAPENTIN 600 MG: 300 CAPSULE ORAL at 13:30

## 2021-06-13 RX ADMIN — DOXYCYCLINE HYCLATE 100 MG: 100 CAPSULE ORAL at 09:30

## 2021-06-13 RX ADMIN — ARFORMOTEROL TARTRATE 15 MCG: 15 SOLUTION RESPIRATORY (INHALATION) at 08:18

## 2021-06-13 RX ADMIN — LEVALBUTEROL 1.25 MG: 1.25 SOLUTION, CONCENTRATE RESPIRATORY (INHALATION) at 23:20

## 2021-06-13 RX ADMIN — ENOXAPARIN SODIUM 40 MG: 40 INJECTION SUBCUTANEOUS at 21:24

## 2021-06-13 RX ADMIN — GABAPENTIN 600 MG: 300 CAPSULE ORAL at 21:24

## 2021-06-13 RX ADMIN — CLONIDINE HYDROCHLORIDE 0.1 MG: 0.1 TABLET ORAL at 09:30

## 2021-06-13 RX ADMIN — GUAIFENESIN 400 MG: 400 TABLET ORAL at 21:24

## 2021-06-13 RX ADMIN — BUDESONIDE 250 MCG: 0.5 SUSPENSION RESPIRATORY (INHALATION) at 20:15

## 2021-06-13 RX ADMIN — Medication 10 ML: at 21:24

## 2021-06-13 RX ADMIN — TAMSULOSIN HYDROCHLORIDE 0.8 MG: 0.4 CAPSULE ORAL at 21:24

## 2021-06-13 RX ADMIN — GUAIFENESIN 400 MG: 400 TABLET ORAL at 09:30

## 2021-06-13 RX ADMIN — GUAIFENESIN 400 MG: 400 TABLET ORAL at 17:31

## 2021-06-13 RX ADMIN — AMLODIPINE BESYLATE 5 MG: 5 TABLET ORAL at 09:29

## 2021-06-13 RX ADMIN — CLONIDINE HYDROCHLORIDE 0.1 MG: 0.1 TABLET ORAL at 21:24

## 2021-06-13 RX ADMIN — LISINOPRIL 20 MG: 20 TABLET ORAL at 09:29

## 2021-06-13 RX ADMIN — GUAIFENESIN 400 MG: 400 TABLET ORAL at 13:30

## 2021-06-13 RX ADMIN — BUDESONIDE 250 MCG: 0.5 SUSPENSION RESPIRATORY (INHALATION) at 08:18

## 2021-06-13 ASSESSMENT — PAIN SCALES - GENERAL
PAINLEVEL_OUTOF10: 0
PAINLEVEL_OUTOF10: 0

## 2021-06-13 NOTE — PROGRESS NOTES
Subjective:  Easily dyspneic. The patient is awake and alert. No new problems overnight. Denies chest pain, angina. Still very easily dyspneic. Denies abdominal pain. Tolerating diet. No nausea or vomiting. Objective:  Desaturates easily. BP (!) 148/89   Pulse 68   Temp 98.2 °F (36.8 °C) (Oral)   Resp 22   Ht 5' 7\" (1.702 m)   Wt 184 lb 5 oz (83.6 kg)   SpO2 92%   BMI 28.87 kg/m²     Heart:  RRR, no murmurs, gallops, or rubs. Lungs:  CTA bilaterally, no wheeze, rales or rhonchi(just had a treatment)  Abd: bowel sounds present, nontender, nondistended, no masses  Extrem:  No clubbing, cyanosis, or edema    BMP:    Lab Results   Component Value Date     06/13/2021    K 4.1 06/13/2021    CL 93 06/13/2021    CO2 32 06/13/2021    BUN 19 06/13/2021    LABALBU 4.3 06/06/2021    CREATININE 0.6 06/13/2021    CALCIUM 8.6 06/13/2021    GFRAA >60 06/13/2021    LABGLOM >60 06/13/2021    GLUCOSE 117 06/13/2021        Assessment:    Patient Active Problem List   Diagnosis    COPD with acute exacerbation ([facility] [address]    Hypertension    Anxiety    Lactic acidosis    Hyperglycemia, drug-induced    Acute respiratory failure with hypoxia (HCC)    Type 2 diabetes mellitus (HCC)    Elevated troponin       Plan:   Present tx. Will need home O2 arrangements.         Jeremy Hart MD  9:07 AM  6/13/2021

## 2021-06-13 NOTE — PROGRESS NOTES
Pulmonary Progress Note    Admit Date: 2021                            PCP: Ivory Thomas MD  Principal Problem:    Acute respiratory failure with hypoxia [facility])  Active Problems:    Type 2 diabetes mellitus ([facility] [address]    Elevated troponin  Resolved Problems:    * No resolved hospital problems. *      Subjective:  Resting in bed on 5L. Feels about the same. Winded and desaturates with any exertion. Cough is improved. Medications:   sodium chloride          predniSONE  40 mg Oral Daily    Followed by   Wylene Socks ON 2021] predniSONE  30 mg Oral Daily    Followed by   Wylene Socks ON 2021] predniSONE  20 mg Oral Daily    Followed by   Wylene Socks ON 2021] predniSONE  10 mg Oral Daily    doxycycline hyclate  100 mg Oral 2 times per day    cloNIDine  0.1 mg Oral BID    gabapentin  600 mg Oral TID    guaiFENesin  400 mg Oral 4x daily    tamsulosin  0.8 mg Oral Daily    sodium chloride flush  5-40 mL Intravenous 2 times per day    enoxaparin  40 mg Subcutaneous Daily    budesonide  0.25 mg Nebulization BID    Arformoterol Tartrate  15 mcg Nebulization BID    amLODIPine  5 mg Oral Daily    And    lisinopril  20 mg Oral Daily       Vitals:  VITALS:  BP (!) 148/89   Pulse 68   Temp 98.2 °F (36.8 °C) (Oral)   Resp 22   Ht 5' 7\" (1.702 m)   Wt 184 lb 5 oz (83.6 kg)   SpO2 92%   BMI 28.87 kg/m²   24HR INTAKE/OUTPUT:  No intake or output data in the 24 hours ending 21 0920  CURRENT PULSE OXIMETRY:  SpO2: 92 %  24HR PULSE OXIMETRY RANGE:  SpO2  Av %  Min: 92 %  Max: 94 %  CVP:    VENT SETTINGS:   Vent Information  SpO2: 92 %  Additional Respiratory  Assessments  Pulse: 68  Resp: 22  SpO2: 92 %      EXAM:  General: Alert, in NAD  ENT: No discharge. Pharynx clear. membranes moist   Neck: Supple, Trachea midline. Resp: No accessory muscle use. Non-labored. Lungs diminished  Throughout   CV: Regular rate. Regular rhythm. No murmur . No edema. ABD: Non-tender. Non-distended. Soft, round . [facility]   Value: Group 5: >25 PMN's/LPF and <10 Epithelial cells/LPF   Moderate Polymorphonuclear leukocytes   Rare Epithelial cells   Few Gram positive cocci in pairs   Moderate Gram positive cocci in chains   Few Gram negative rods   Few Gram positive cocci in clusters          ABG:   Results for Yehuda Perry (MRN [id number]) as of 6/11/2021 14:33   Ref. Range 6/6/2021 12:58   Source: Unknown Blood Arterial   pH, Blood Gas Latest Ref Range: 7.350 - 7.450  7.425   PCO2 Latest Ref Range: 35.0 - 45.0 mmHg 40.5   pO2 Latest Ref Range: 75.0 - 100.0 mmHg 168.7 (H)   HCO3 Latest Ref Range: 22.0 - 26.0 mmol/L 26.0   Base Excess Latest Ref Range: -3.0 - 3.0 mmol/L 1.5   O2 Sat Latest Ref Range: 92.0 - 98.5 % 99.3 (H)   tHb (est) Latest Ref Range: 11.5 - 16.5 g/dL 15.4   O2Hb Latest Ref Range: 94.0 - 97.0 % 97.6 (H)   COHb Latest Ref Range: 0.0 - 1.5 % 1.3   MetHb Latest Ref Range: 0.0 - 1.5 % 0.4   HHb Latest Ref Range: 0.0 - 5.0 % 0.7   O2 Content Latest Units: mL/dL 21.4   Pt Temp Latest Units: C 37.0   Critical(s) Notified Unknown . No Critical Values   Time Analyzed Unknown 1258   Mode Unknown NC- 4 L       Films:  XR CHEST PORTABLE    Result Date: 6/6/2021  EXAMINATION: ONE XRAY VIEW OF THE CHEST 6/6/2021 1:14 pm COMPARISON: March 10, 2020 HISTORY: ORDERING SYSTEM PROVIDED HISTORY: r/o pna TECHNOLOGIST PROVIDED HISTORY: Reason for exam:->r/o pna FINDINGS: No airspace opacity or pleural effusion. The heart is normal size. No pneumothorax. No free air beneath the hemidiaphragms. Hyperinflation of both lungs notable in the upper lobes. 1.  No pneumonia or pleural effusion. 2.  Emphysematous changes.          Assessment:  Torsten Stanley is a pleasant 70 y. o. male well known to our service, Dr Morris Clarity previous smoker of 1ppd for 40 years quitting 1 year ago.  He has a past medical history of COPD/asthma and is being worked up in [facility] with Dr. Mikal Hancock for transplant or lung reduction surgery  · COPD exacerbation  · Acute on chronic hypoxic respiratory failure  · Hypertension         Plan:  · Continue O2 currently on 5L nc. Baseline is 2L as needed , keep POX >90% and wean as able. · Will need ambulatory POX to evaluate home o2 needs. He has a inogen machine only at home he purchased, no stationary tank or portables--will need set up before DC   · CTA negative for PE   · Respiratory panel negative, including Flu A. With repeat respiratory panel negative    · Continue Tamiflu for 5 days - Day#5.  (Tested + influenza A at [facility] care on Saturday)   · Legionella/strep pneumoniae negative. · Sputum culture with moderate Gram + cocci in chains continue  Doxy x 7 days . (day 4) Afebrile and elevated WBC. Procalcitonin 0.04   · Will continue nebulizers - pulmicort and brovana;  duonebs stopped made tachycardic, on xopenex now , make sure to order xopenex nebz for home at DC all he has is albuterol at home   · [facility] for oral prednisone taper 40x3, 30x3, 20x3, 10x3   · IS for pulmonary hygiene   · Activity as tolerated   Dr. Caitlin Damon at [facility] 6/10/2021 and patient not candidate for lung transplantation but can be soon assessed for lung volume reduction surgery vs endobronchial stent placement for advanced emphysematous disease  · Needs physical conditioning very weak--outpatient pulmonary rehab?       Electronically signed by KELECHI Xie on 6/13/2021 at 9:20 AM    Attending Attestation Note:    Patient seen and examined with NP. I agree with above.     In addition, the following apply:    - slow prednisone taper   - PT/OT, weight bearing exercises  - supportive care to continue     Wes Macias MD  6/13/2021  3:12 PM

## 2021-06-14 LAB
ANION GAP SERPL CALCULATED.3IONS-SCNC: 7 MMOL/L (ref 7–16)
BASOPHILS ABSOLUTE: 0.04 E9/L (ref 0–0.2)
BASOPHILS RELATIVE PERCENT: 0.3 % (ref 0–2)
BUN BLDV-MCNC: 13 MG/DL (ref 6–23)
CALCIUM SERPL-MCNC: 8.9 MG/DL (ref 8.6–10.2)
CHLORIDE BLD-SCNC: 93 MMOL/L (ref 98–107)
CO2: 34 MMOL/L (ref 22–29)
CREAT SERPL-MCNC: 0.6 MG/DL (ref 0.7–1.2)
EOSINOPHILS ABSOLUTE: 0.07 E9/L (ref 0.05–0.5)
EOSINOPHILS RELATIVE PERCENT: 0.6 % (ref 0–6)
GFR AFRICAN AMERICAN: >60
GFR NON-AFRICAN AMERICAN: >60 ML/MIN/1.73
GLUCOSE BLD-MCNC: 128 MG/DL (ref 74–99)
HCT VFR BLD CALC: 40.8 % (ref 37–54)
HEMOGLOBIN: 14.4 G/DL (ref 12.5–16.5)
IMMATURE GRANULOCYTES #: 0.55 E9/L
IMMATURE GRANULOCYTES %: 4.5 % (ref 0–5)
LYMPHOCYTES ABSOLUTE: 2.03 E9/L (ref 1.5–4)
LYMPHOCYTES RELATIVE PERCENT: 16.7 % (ref 20–42)
MCH RBC QN AUTO: 33.1 PG (ref 26–35)
MCHC RBC AUTO-ENTMCNC: 35.3 % (ref 32–34.5)
MCV RBC AUTO: 93.8 FL (ref 80–99.9)
MONOCYTES ABSOLUTE: 0.97 E9/L (ref 0.1–0.95)
MONOCYTES RELATIVE PERCENT: 8 % (ref 2–12)
NEUTROPHILS ABSOLUTE: 8.49 E9/L (ref 1.8–7.3)
NEUTROPHILS RELATIVE PERCENT: 69.9 % (ref 43–80)
PDW BLD-RTO: 12.2 FL (ref 11.5–15)
PLATELET # BLD: 224 E9/L (ref 130–450)
PMV BLD AUTO: 9.4 FL (ref 7–12)
POTASSIUM REFLEX MAGNESIUM: 3.8 MMOL/L (ref 3.5–5)
RBC # BLD: 4.35 E12/L (ref 3.8–5.8)
SODIUM BLD-SCNC: 134 MMOL/L (ref 132–146)
WBC # BLD: 12.2 E9/L (ref 4.5–11.5)

## 2021-06-14 PROCEDURE — 36415 COLL VENOUS BLD VENIPUNCTURE: CPT

## 2021-06-14 PROCEDURE — 6360000002 HC RX W HCPCS: Performed by: INTERNAL MEDICINE

## 2021-06-14 PROCEDURE — 6370000000 HC RX 637 (ALT 250 FOR IP): Performed by: CLINICAL NURSE SPECIALIST

## 2021-06-14 PROCEDURE — 6370000000 HC RX 637 (ALT 250 FOR IP): Performed by: INTERNAL MEDICINE

## 2021-06-14 PROCEDURE — 94640 AIRWAY INHALATION TREATMENT: CPT

## 2021-06-14 PROCEDURE — 1200000000 HC SEMI PRIVATE

## 2021-06-14 PROCEDURE — 85025 COMPLETE CBC W/AUTO DIFF WBC: CPT

## 2021-06-14 PROCEDURE — 2700000000 HC OXYGEN THERAPY PER DAY

## 2021-06-14 PROCEDURE — 2580000003 HC RX 258: Performed by: INTERNAL MEDICINE

## 2021-06-14 PROCEDURE — 6370000000 HC RX 637 (ALT 250 FOR IP): Performed by: NURSE PRACTITIONER

## 2021-06-14 PROCEDURE — 80048 BASIC METABOLIC PNL TOTAL CA: CPT

## 2021-06-14 RX ADMIN — TAMSULOSIN HYDROCHLORIDE 0.8 MG: 0.4 CAPSULE ORAL at 21:34

## 2021-06-14 RX ADMIN — AMLODIPINE BESYLATE 5 MG: 5 TABLET ORAL at 08:51

## 2021-06-14 RX ADMIN — GABAPENTIN 600 MG: 300 CAPSULE ORAL at 08:51

## 2021-06-14 RX ADMIN — CLONIDINE HYDROCHLORIDE 0.1 MG: 0.1 TABLET ORAL at 21:34

## 2021-06-14 RX ADMIN — GABAPENTIN 600 MG: 300 CAPSULE ORAL at 12:30

## 2021-06-14 RX ADMIN — LISINOPRIL 20 MG: 20 TABLET ORAL at 08:52

## 2021-06-14 RX ADMIN — LORAZEPAM 0.5 MG: 0.5 TABLET ORAL at 23:16

## 2021-06-14 RX ADMIN — ARFORMOTEROL TARTRATE 15 MCG: 15 SOLUTION RESPIRATORY (INHALATION) at 08:45

## 2021-06-14 RX ADMIN — GUAIFENESIN 400 MG: 400 TABLET ORAL at 08:51

## 2021-06-14 RX ADMIN — LEVALBUTEROL 1.25 MG: 1.25 SOLUTION, CONCENTRATE RESPIRATORY (INHALATION) at 17:41

## 2021-06-14 RX ADMIN — BUDESONIDE 250 MCG: 0.5 SUSPENSION RESPIRATORY (INHALATION) at 21:45

## 2021-06-14 RX ADMIN — GABAPENTIN 600 MG: 300 CAPSULE ORAL at 21:34

## 2021-06-14 RX ADMIN — LEVALBUTEROL 1.25 MG: 1.25 SOLUTION, CONCENTRATE RESPIRATORY (INHALATION) at 21:45

## 2021-06-14 RX ADMIN — ARFORMOTEROL TARTRATE 15 MCG: 15 SOLUTION RESPIRATORY (INHALATION) at 21:46

## 2021-06-14 RX ADMIN — GUAIFENESIN 400 MG: 400 TABLET ORAL at 12:30

## 2021-06-14 RX ADMIN — DOXYCYCLINE HYCLATE 100 MG: 100 CAPSULE ORAL at 08:51

## 2021-06-14 RX ADMIN — BUDESONIDE 250 MCG: 0.5 SUSPENSION RESPIRATORY (INHALATION) at 08:45

## 2021-06-14 RX ADMIN — CLONIDINE HYDROCHLORIDE 0.1 MG: 0.1 TABLET ORAL at 08:51

## 2021-06-14 RX ADMIN — ENOXAPARIN SODIUM 40 MG: 40 INJECTION SUBCUTANEOUS at 21:34

## 2021-06-14 RX ADMIN — Medication 10 ML: at 21:35

## 2021-06-14 RX ADMIN — DOXYCYCLINE HYCLATE 100 MG: 100 CAPSULE ORAL at 21:34

## 2021-06-14 RX ADMIN — Medication 10 ML: at 08:52

## 2021-06-14 RX ADMIN — PREDNISONE 40 MG: 20 TABLET ORAL at 08:51

## 2021-06-14 RX ADMIN — GUAIFENESIN 400 MG: 400 TABLET ORAL at 16:54

## 2021-06-14 RX ADMIN — GUAIFENESIN 400 MG: 400 TABLET ORAL at 21:34

## 2021-06-14 ASSESSMENT — PAIN SCALES - GENERAL
PAINLEVEL_OUTOF10: 0
PAINLEVEL_OUTOF10: 0

## 2021-06-14 NOTE — PLAN OF CARE
Problem: Falls - Risk of:  Goal: Will remain free from falls  Description: Will remain free from falls  6/14/2021 0520 by Dariana Lorenzana RN  Outcome: Met This Shift  6/13/2021 1843 by Rich Corbett RN  Outcome: Met This Shift  Goal: Absence of physical injury  Description: Absence of physical injury  6/14/2021 0520 by Dariana Lorenzana RN  Outcome: Met This Shift  6/13/2021 1843 by Rich Corbett RN  Outcome: Met This Shift     Problem: Breathing Pattern - Ineffective:  Goal: Ability to achieve and maintain a regular respiratory rate will improve  Description: Ability to achieve and maintain a regular respiratory rate will improve  6/14/2021 0520 by Dariana Lorenzana RN  Outcome: Met This Shift  6/13/2021 1843 by Rich Corbett RN  Outcome: Met This Shift     Problem: Daily Care:  Goal: Daily care needs are met  Description: Daily care needs are met  6/14/2021 0520 by Dariana Lorenzana RN  Outcome: Met This Shift  6/13/2021 1843 by Rich Corbett RN  Outcome: Met This Shift

## 2021-06-14 NOTE — PROGRESS NOTES
Pulmonary Progress Note    Admit Date: 2021                            PCP: Hector Ruelas MD  Principal Problem:    Acute respiratory failure with hypoxia [facility])  Active Problems:    Type 2 diabetes mellitus ([facility] [address]    Elevated troponin  Resolved Problems:    * No resolved hospital problems. *      Subjective:  Sitting in bed  On 5L nc  Continues to complain of increase dyspnea  Cough with occ sputum   States pox decreased to low/mid 80's while sitting, pox 89-91%  Pt discussed possible [facility] for eval as he feels like nothing can be done here    Medications:   sodium chloride          predniSONE  40 mg Oral Daily    Followed by   Buddie Blush ON 2021] predniSONE  30 mg Oral Daily    Followed by   Buddie Blush ON 2021] predniSONE  20 mg Oral Daily    Followed by   Buddie Blush ON 2021] predniSONE  10 mg Oral Daily    doxycycline hyclate  100 mg Oral 2 times per day    cloNIDine  0.1 mg Oral BID    gabapentin  600 mg Oral TID    guaiFENesin  400 mg Oral 4x daily    tamsulosin  0.8 mg Oral Daily    sodium chloride flush  5-40 mL Intravenous 2 times per day    enoxaparin  40 mg Subcutaneous Daily    budesonide  0.25 mg Nebulization BID    Arformoterol Tartrate  15 mcg Nebulization BID    amLODIPine  5 mg Oral Daily    And    lisinopril  20 mg Oral Daily       Vitals:  VITALS:  BP (!) 140/67   Pulse 73   Temp 98 °F (36.7 °C) (Oral)   Resp 18   Ht 5' 7\" (1.702 m)   Wt 184 lb 5 oz (83.6 kg)   SpO2 92%   BMI 28.87 kg/m²   24HR INTAKE/OUTPUT:  No intake or output data in the 24 hours ending 21 1131  CURRENT PULSE OXIMETRY:  SpO2: 92 %  24HR PULSE OXIMETRY RANGE:  SpO2  Av %  Min: 90 %  Max: 94 %  CVP:    VENT SETTINGS:   Vent Information  SpO2: 92 %  Additional Respiratory  Assessments  Pulse: 73  Resp: 18  SpO2: 92 %      EXAM:  General: Alert, in NAD  ENT: No discharge. Pharynx clear. membranes moist   Neck: Supple, Trachea midline. Resp: No accessory muscle use. Non-labored. Lungs diminished throughout with exp wheezing  CV: Regular rate. Regular rhythm. No murmur . No edema. ABD: Non-tender. Non-distended. Soft, round . Normal bowel sounds. Skin: Warm and dry. M/S: No cyanosis. No joint deformity. No clubbing. BLE edema, LUE with edema  Neuro: Awake. Follows commands. O x 3, APODACA  Psych:  calm and interactive     I/O: No intake/output data recorded. No intake/output data recorded. Results:  Results for Yosef Evans (MRN [id number]) as of 6/11/2021 14:33   Ref.  Range 6/7/2021 03:45   Sodium Latest Ref Range: 132 - 146 mmol/L 136   Potassium Latest Ref Range: 3.5 - 5.0 mmol/L 4.9   Chloride Latest Ref Range: 98 - 107 mmol/L 98   CO2 Latest Ref Range: 22 - 29 mmol/L 29   BUN Latest Ref Range: 6 - 23 mg/dL 17   Creatinine Latest Ref Range: 0.7 - 1.2 mg/dL 0.6 (L)   Anion Gap Latest Ref Range: 7 - 16 mmol/L 9   GFR Non- Latest Ref Range: >=60 mL/min/1.73 >60   GFR African American Unknown >60   Glucose Latest Ref Range: 74 - 99 mg/dL 206 (H)   Calcium Latest Ref Range: 8.6 - 10.2 mg/dL 8.9   Procalcitonin Latest Ref Range: 0.00 - 0.08 ng/mL 0.04   WBC Latest Ref Range: 4.5 - 11.5 E9/L 14.6 (H)   RBC Latest Ref Range: 3.80 - 5.80 E12/L 4.17   Hemoglobin Quant Latest Ref Range: 12.5 - 16.5 g/dL 13.4   Hematocrit Latest Ref Range: 37.0 - 54.0 % 40.2   MCV Latest Ref Range: 80.0 - 99.9 fL 96.4   MCH Latest Ref Range: 26.0 - 35.0 pg 32.1   MCHC Latest Ref Range: 32.0 - 34.5 % 33.3   MPV Latest Ref Range: 7.0 - 12.0 fL 10.0   RDW Latest Ref Range: 11.5 - 15.0 fL 12.9   Platelet Count Latest Ref Range: 130 - 450 E9/L 180   Neutrophils % Latest Ref Range: 43.0 - 80.0 % 93.5 (H)   Immature Granulocytes % Latest Ref Range: 0.0 - 5.0 % 0.5   Lymphocyte % Latest Ref Range: 20.0 - 42.0 % 2.5 (L)   Monocytes % Latest Ref Range: 2.0 - 12.0 % 3.4   Eosinophils % Latest Ref Range: 0.0 - 6.0 % 0.0   Basophils % Latest Ref Range: 0.0 - 2.0 % 0.1   Neutrophils Absolute Latest Ref Range: 1.80 - 7.30 E9/L 13.65 (H)   Immature Granulocytes # Latest Units: E9/L 0.07   Lymphocytes Absolute Latest Ref Range: 1.50 - 4.00 E9/L 0.37 (L)   Monocytes Absolute Latest Ref Range: 0.10 - 0.95 E9/L 0.50   Eosinophils Absolute Latest Ref Range: 0.05 - 0.50 E9/L 0.00 (L)   Basophils Absolute Latest Ref Range: 0.00 - 0.20 E9/L 0.01   Procalcitonin: Results for Juan Byrnes (MRN [id number]) as of 6/11/2021 14:33   Ref. Range 6/7/2021 03:45   Procalcitonin Latest Ref Range: 0.00 - 0.08 ng/mL 0.04     Cultures:  Results for Juan Byrnes (MRN [id number]) as of 6/11/2021 14:33   Ref.  Range 6/6/2021 12:37   Influenza A by PCR Latest Ref Range: Not Detected  Not Detected   Influenza B by PCR Latest Ref Range: Not Detected  Not Detected   Adenovirus by PCR Latest Ref Range: Not Detected  Not Detected   Coronavirus 229E by PCR Latest Ref Range: Not Detected  Not Detected   Coronavirus HKU1 by PCR Latest Ref Range: Not Detected  Not Detected   Coronavirus NL63 by PCR Latest Ref Range: Not Detected  Not Detected   Coronavirus OC43 by PCR Latest Ref Range: Not Detected  Not Detected   Human Metapneumovirus by PCR Latest Ref Range: Not Detected  Not Detected   Human Rhinovirus/Enterovirus by PCR Latest Ref Range: Not Detected  Not Detected   Parainfluenza Virus 1 by PCR Latest Ref Range: Not Detected  Not Detected   Parainfluenza Virus 2 by PCR Latest Ref Range: Not Detected  Not Detected   Parainfluenza Virus 3 by PCR Latest Ref Range: Not Detected  Not Detected   Parainfluenza Virus 4 by PCR Latest Ref Range: Not Detected  Not Detected   Respiratory Syncytial Virus by PCR Latest Ref Range: Not Detected  Not Detected   Bordetella parapertussis by PCR Latest Ref Range: Not Detected  Not Detected   Chlamydophilia pneumoniae by PCR Latest Ref Range: Not Detected  Not Detected   Mycoplasma pneumoniae by PCR Latest Ref Range: Not Detected  Not Detected   SARS-CoV-2, PCR Latest Ref Range: Not Detected  Not Detected Bordetella pertussis by PCR Latest Ref Range: Not Detected  Not Detected     Gram Stain Orderable  Gram Stain  Collected: 06/07/21 0852   Result status: Final   Resulting lab[facility]   Value: Group 5: >25 PMN's/LPF and <10 Epithelial cells/LPF   Moderate Polymorphonuclear leukocytes   Rare Epithelial cells   Few Gram positive cocci in pairs   Moderate Gram positive cocci in chains   Few Gram negative rods   Few Gram positive cocci in clusters          ABG:   Results for Mert Lloyd (MRN [id number]) as of 6/11/2021 14:33   Ref. Range 6/6/2021 12:58   Source: Unknown Blood Arterial   pH, Blood Gas Latest Ref Range: 7.350 - 7.450  7.425   PCO2 Latest Ref Range: 35.0 - 45.0 mmHg 40.5   pO2 Latest Ref Range: 75.0 - 100.0 mmHg 168.7 (H)   HCO3 Latest Ref Range: 22.0 - 26.0 mmol/L 26.0   Base Excess Latest Ref Range: -3.0 - 3.0 mmol/L 1.5   O2 Sat Latest Ref Range: 92.0 - 98.5 % 99.3 (H)   tHb (est) Latest Ref Range: 11.5 - 16.5 g/dL 15.4   O2Hb Latest Ref Range: 94.0 - 97.0 % 97.6 (H)   COHb Latest Ref Range: 0.0 - 1.5 % 1.3   MetHb Latest Ref Range: 0.0 - 1.5 % 0.4   HHb Latest Ref Range: 0.0 - 5.0 % 0.7   O2 Content Latest Units: mL/dL 21.4   Pt Temp Latest Units: C 37.0   Critical(s) Notified Unknown . No Critical Values   Time Analyzed Unknown 1258   Mode Unknown NC- 4 L       Films:  XR CHEST PORTABLE    Result Date: 6/6/2021  EXAMINATION: ONE XRAY VIEW OF THE CHEST 6/6/2021 1:14 pm COMPARISON: March 10, 2020 HISTORY: ORDERING SYSTEM PROVIDED HISTORY: r/o pna TECHNOLOGIST PROVIDED HISTORY: Reason for exam:->r/o pna FINDINGS: No airspace opacity or pleural effusion. The heart is normal size. No pneumothorax. No free air beneath the hemidiaphragms. Hyperinflation of both lungs notable in the upper lobes. 1.  No pneumonia or pleural effusion. 2.  Emphysematous changes.          Assessment:  Torsten Stanley is a pleasant 70 y. o. male well known to our service, Dr Jabari Ceron previous smoker of 1ppd for 40 years quitting 1 year ago. He has a past medical history of COPD/asthma and is being worked up in [facility] with Dr. Wayne Matthew for transplant or lung reduction surgery    Patient [facility] for [facility] but negative here patient was treated with Tamiflu    Covid: (-) / Influenza A/B: (-) / Strep: (-) /RPanel: (-) / Trop: 20, 19   BC x2: ___ / Strep/Leg: presump(-)   CXR: Emphysematous changes. CTA Chest: No PE or aortic dissection. Advanced centrilobular emphysema with patchy infiltrates/consolidation in the left lower lobe and to a lesser extent in the right upper lobe and right middle lobe concerning for multifocal pneumonia. · COPD exacerbation  · Acute on chronic hypoxic respiratory failure usually on 2 L at rest and 3 L with exertion  · Hypertension  · Severe emphysema   · History of left effusion  · previous admission hospitalized 3/11/2020 with influenza A         Plan:  · Continue O2 currently on 5L nc. Baseline is 2L as needed , keep POX >90% and wean as able. · Ambulatory pulse decreased to 89% when walking to bathroom WITH 5L O2 on. He has a inogen machine only at home he purchased, no stationary tank or portables--will need set up before DC   · CTA negative for PE   · Respiratory panel negative, including Flu A. With repeat respiratory panel negative    · Completed Tamiflu for + influenza A [facility]   · Sputum culture with moderate Gram + cocci in chains continue  Doxy x 7 days, day #6. Afebrile and elevated WBC.  Procalcitonin 0.04   · Will continue nebulizers - pulmicort and brovana;  duonebs stopped made tachycardic, on xopenex now , make sure to order xopenex nebz for home at DC all he has is albuterol at home   · Continue prednisone taper 40x3, 30x3, 20x3, 10x3   · IS for pulmonary hygiene   · Activity as tolerated   · Follows with Dr. Wayne Matthew at [facility].  6/10/2021 Patient not candidate for lung transplantation but can be soon assessed for lung volume reduction surgery vs endobronchial stent placement for advanced emphysematous disease. Will discuss with Dr Fercho Duvall for possible transfer to [facility] since being worked up there? KELECHI Hatch - CNP  6/14/2021  11:31 AM      I have personally participated in the history, exam, medical decision making with the NP on the date of service and I agree with all of the pertinent clinical information unless otherwise noted. I have also reviewed and agree with the past medical, family, and social history unless otherwise noted.   Complain that he is requiring 4 L when he moves around  Coughing up productive secretions  Agree with doxycycline for bronchitis  Patient needs to go in as an outpatient for evaluation for any sort of elective procedures  On prednisone taper  Last time he had influenza A it took a long time for him to recover and get his oxygen level back to baseline

## 2021-06-14 NOTE — PROGRESS NOTES
Subjective:  Still weak and easily dyspneic. The patient is awake and alert. No new problems overnight. Denies chest pain, angina. Denies abdominal pain. Tolerating diet. No nausea or vomiting. Objective:    BP (!) 159/74   Pulse 70   Temp 97.4 °F (36.3 °C) (Oral)   Resp 20   Ht 5' 7\" (1.702 m)   Wt 184 lb 5 oz (83.6 kg)   SpO2 94%   BMI 28.87 kg/m²     Heart:  RRR, no murmurs, gallops, or rubs. Lungs:  Diminished breath sounds with scattered rhonchi and tightness. Abd: bowel sounds present, nontender, nondistended, no masses  Extrem:  No clubbing, cyanosis, or edema  Oral mucosa nl    CBC:   Lab Results   Component Value Date    WBC 14.0 06/13/2021    RBC 3.91 06/13/2021    HGB 12.3 06/13/2021    HCT 37.4 06/13/2021    MCV 95.7 06/13/2021    MCH 31.5 06/13/2021    MCHC 32.9 06/13/2021    RDW 12.2 06/13/2021     06/13/2021    MPV 9.5 06/13/2021     BMP:    Lab Results   Component Value Date     06/13/2021    K 4.1 06/13/2021    CL 93 06/13/2021    CO2 32 06/13/2021    BUN 19 06/13/2021    LABALBU 4.3 06/06/2021    CREATININE 0.6 06/13/2021    CALCIUM 8.6 06/13/2021    GFRAA >60 06/13/2021    LABGLOM >60 06/13/2021    GLUCOSE 117 06/13/2021        Assessment:    Patient Active Problem List   Diagnosis    COPD with acute exacerbation ([facility] [address]    Hypertension    Anxiety    Lactic acidosis    Hyperglycemia, drug-induced    Acute respiratory failure with hypoxia (HCC)    Type 2 diabetes mellitus (HCC)    Elevated troponin       Plan:  Resp tx. Up. Diet encouraged. Per Pulmonary.           Cher Haq MD  7:08 AM  6/14/2021

## 2021-06-14 NOTE — CARE COORDINATION
Pt remains on 5Lnc; if home is the plan; will need switched to [facility] as pt has [employer], only goes up to [facility]. Left VM message for wife Artis re; d/c plans. await call back. PT/OT evals ordered. pulm note made mention of poss [facility] referral? Will follow. Jimmy Duran. Pt was  Sleeping;Spoke with pt's wife debbie re; d/c planning. Artis states she works and would not be able to care for Ray if discharged to home. PT/OT evals ordered; if [facility] indicated; she would be agreeable. Await evals and will advise her and provide list. Jimmy Duran.

## 2021-06-15 LAB
ANION GAP SERPL CALCULATED.3IONS-SCNC: 5 MMOL/L (ref 7–16)
BASOPHILS ABSOLUTE: 0 E9/L (ref 0–0.2)
BASOPHILS RELATIVE PERCENT: 0 % (ref 0–2)
BUN BLDV-MCNC: 12 MG/DL (ref 6–23)
CALCIUM SERPL-MCNC: 8.5 MG/DL (ref 8.6–10.2)
CHLORIDE BLD-SCNC: 95 MMOL/L (ref 98–107)
CO2: 34 MMOL/L (ref 22–29)
CREAT SERPL-MCNC: 0.6 MG/DL (ref 0.7–1.2)
EOSINOPHILS ABSOLUTE: 0 E9/L (ref 0.05–0.5)
EOSINOPHILS RELATIVE PERCENT: 0 % (ref 0–6)
GFR AFRICAN AMERICAN: >60
GFR NON-AFRICAN AMERICAN: >60 ML/MIN/1.73
GLUCOSE BLD-MCNC: 110 MG/DL (ref 74–99)
HCT VFR BLD CALC: 36.5 % (ref 37–54)
HEMOGLOBIN: 12.6 G/DL (ref 12.5–16.5)
LYMPHOCYTES ABSOLUTE: 2.37 E9/L (ref 1.5–4)
LYMPHOCYTES RELATIVE PERCENT: 20.8 % (ref 20–42)
MCH RBC QN AUTO: 32.8 PG (ref 26–35)
MCHC RBC AUTO-ENTMCNC: 34.5 % (ref 32–34.5)
MCV RBC AUTO: 95.1 FL (ref 80–99.9)
METAMYELOCYTES RELATIVE PERCENT: 0.9 % (ref 0–1)
MONOCYTES ABSOLUTE: 0.56 E9/L (ref 0.1–0.95)
MONOCYTES RELATIVE PERCENT: 5.2 % (ref 2–12)
MYELOCYTE PERCENT: 0.9 % (ref 0–0)
NEUTROPHILS ABSOLUTE: 8.36 E9/L (ref 1.8–7.3)
NEUTROPHILS RELATIVE PERCENT: 72.2 % (ref 43–80)
NUCLEATED RED BLOOD CELLS: 0 /100 WBC
PDW BLD-RTO: 12.2 FL (ref 11.5–15)
PLATELET # BLD: 228 E9/L (ref 130–450)
PMV BLD AUTO: 9.6 FL (ref 7–12)
POTASSIUM REFLEX MAGNESIUM: 4 MMOL/L (ref 3.5–5)
RBC # BLD: 3.84 E12/L (ref 3.8–5.8)
RBC # BLD: NORMAL 10*6/UL
SODIUM BLD-SCNC: 134 MMOL/L (ref 132–146)
WBC # BLD: 11.3 E9/L (ref 4.5–11.5)

## 2021-06-15 PROCEDURE — 97161 PT EVAL LOW COMPLEX 20 MIN: CPT

## 2021-06-15 PROCEDURE — 2700000000 HC OXYGEN THERAPY PER DAY

## 2021-06-15 PROCEDURE — 94640 AIRWAY INHALATION TREATMENT: CPT

## 2021-06-15 PROCEDURE — 80048 BASIC METABOLIC PNL TOTAL CA: CPT

## 2021-06-15 PROCEDURE — 6370000000 HC RX 637 (ALT 250 FOR IP): Performed by: NURSE PRACTITIONER

## 2021-06-15 PROCEDURE — 6360000002 HC RX W HCPCS: Performed by: INTERNAL MEDICINE

## 2021-06-15 PROCEDURE — 36415 COLL VENOUS BLD VENIPUNCTURE: CPT

## 2021-06-15 PROCEDURE — 2580000003 HC RX 258: Performed by: INTERNAL MEDICINE

## 2021-06-15 PROCEDURE — 85025 COMPLETE CBC W/AUTO DIFF WBC: CPT

## 2021-06-15 PROCEDURE — 97530 THERAPEUTIC ACTIVITIES: CPT

## 2021-06-15 PROCEDURE — 1200000000 HC SEMI PRIVATE

## 2021-06-15 PROCEDURE — 97165 OT EVAL LOW COMPLEX 30 MIN: CPT

## 2021-06-15 PROCEDURE — 6370000000 HC RX 637 (ALT 250 FOR IP): Performed by: INTERNAL MEDICINE

## 2021-06-15 PROCEDURE — 6370000000 HC RX 637 (ALT 250 FOR IP): Performed by: CLINICAL NURSE SPECIALIST

## 2021-06-15 RX ADMIN — LISINOPRIL 20 MG: 20 TABLET ORAL at 09:25

## 2021-06-15 RX ADMIN — ARFORMOTEROL TARTRATE 15 MCG: 15 SOLUTION RESPIRATORY (INHALATION) at 21:36

## 2021-06-15 RX ADMIN — ENOXAPARIN SODIUM 40 MG: 40 INJECTION SUBCUTANEOUS at 21:16

## 2021-06-15 RX ADMIN — Medication 10 ML: at 09:25

## 2021-06-15 RX ADMIN — GABAPENTIN 600 MG: 300 CAPSULE ORAL at 21:17

## 2021-06-15 RX ADMIN — BUDESONIDE 250 MCG: 0.5 SUSPENSION RESPIRATORY (INHALATION) at 21:36

## 2021-06-15 RX ADMIN — CLONIDINE HYDROCHLORIDE 0.1 MG: 0.1 TABLET ORAL at 09:30

## 2021-06-15 RX ADMIN — LEVALBUTEROL 1.25 MG: 1.25 SOLUTION, CONCENTRATE RESPIRATORY (INHALATION) at 12:34

## 2021-06-15 RX ADMIN — LEVALBUTEROL 1.25 MG: 1.25 SOLUTION, CONCENTRATE RESPIRATORY (INHALATION) at 21:36

## 2021-06-15 RX ADMIN — GUAIFENESIN 400 MG: 400 TABLET ORAL at 16:59

## 2021-06-15 RX ADMIN — Medication 10 ML: at 21:17

## 2021-06-15 RX ADMIN — LORAZEPAM 0.5 MG: 0.5 TABLET ORAL at 23:08

## 2021-06-15 RX ADMIN — LEVALBUTEROL 1.25 MG: 1.25 SOLUTION, CONCENTRATE RESPIRATORY (INHALATION) at 16:52

## 2021-06-15 RX ADMIN — PREDNISONE 40 MG: 20 TABLET ORAL at 09:28

## 2021-06-15 RX ADMIN — LORAZEPAM 0.5 MG: 0.5 TABLET ORAL at 09:25

## 2021-06-15 RX ADMIN — ARFORMOTEROL TARTRATE 15 MCG: 15 SOLUTION RESPIRATORY (INHALATION) at 09:19

## 2021-06-15 RX ADMIN — GUAIFENESIN 400 MG: 400 TABLET ORAL at 09:25

## 2021-06-15 RX ADMIN — DOXYCYCLINE HYCLATE 100 MG: 100 CAPSULE ORAL at 09:25

## 2021-06-15 RX ADMIN — GABAPENTIN 600 MG: 300 CAPSULE ORAL at 13:00

## 2021-06-15 RX ADMIN — CLONIDINE HYDROCHLORIDE 0.1 MG: 0.1 TABLET ORAL at 21:17

## 2021-06-15 RX ADMIN — GUAIFENESIN 400 MG: 400 TABLET ORAL at 13:00

## 2021-06-15 RX ADMIN — AMLODIPINE BESYLATE 5 MG: 5 TABLET ORAL at 09:25

## 2021-06-15 RX ADMIN — GUAIFENESIN 400 MG: 400 TABLET ORAL at 21:18

## 2021-06-15 RX ADMIN — LEVALBUTEROL 1.25 MG: 1.25 SOLUTION, CONCENTRATE RESPIRATORY (INHALATION) at 09:19

## 2021-06-15 RX ADMIN — TAMSULOSIN HYDROCHLORIDE 0.8 MG: 0.4 CAPSULE ORAL at 21:16

## 2021-06-15 RX ADMIN — BUDESONIDE 250 MCG: 0.5 SUSPENSION RESPIRATORY (INHALATION) at 09:21

## 2021-06-15 RX ADMIN — GABAPENTIN 600 MG: 300 CAPSULE ORAL at 09:25

## 2021-06-15 ASSESSMENT — PAIN SCALES - GENERAL
PAINLEVEL_OUTOF10: 0
PAINLEVEL_OUTOF10: 0

## 2021-06-15 NOTE — CARE COORDINATION
Discussed d/c planning with pt; discussed [facility] score 18/24; [facility]; pt desires to return [facility] at discharge ; [facility]; [facility] to [facility]; accepted ; orders on chart. Will also need home 02 ; prefers [address] DME; referral called to [facility]; will need pox testing; orders at discharge. pt states the plan is for further work up at [facility] as opt. Pt agreeable to plan. Left VM message for wife Ade Hinton to update her. Await call back. Sosa Jimenez. Spoke to wife debbie by phone; updated her as to discharge plan; she understands and is agreeable. Sosa Jimenez.

## 2021-06-15 NOTE — PROGRESS NOTES
Physical Therapy    Facility/Department: [address] MED SURG/TELE  Initial Assessment    NAME: Harry Sauceda  : 1950  MRN: [id number]    Date of Service: 6/15/2021       REQUIRES PT FOLLOW UP: Yes       Patient Diagnosis(es): The primary encounter diagnosis was COPD exacerbation ([facility] [address] Diagnoses of Acute on chronic respiratory failure with hypoxia (HCC) and Fever, unspecified fever cause were also pertinent to this visit. has a past medical history of Asthma, COPD (chronic obstructive pulmonary disease) ([facility] [address] Hypertension, and Neuropathic pain. has a past surgical history that includes hernia repair and back surgery. Evaluating Therapist: Kirit Casas, PT     Referring Provider:   Dr. Belinda Cabezas     PT order :  PT eval and treat     Room #:  546   DIAGNOSIS:  Acute resp failure with hypoxia     PRECAUTIONS: falls, O2 @ 3 LNC     Social:  Pt lives with  Wife  in a  1 floor plan  3  steps and  1  rails to enter. Prior to admission pt walked with no AD in his home. Used pulsed O2 PRN at home      Initial Evaluation  Date:  6/15/2021  Treatment      Short Term/ Long Term   Goals   Was pt agreeable to Eval/treatment? yes       Does pt have pain?  None reported      Bed Mobility  Rolling:  Independent   Supine to sit:  S/I  Sit to supine:  S/I  Scooting:  S/I    independent    Transfers Sit to stand:  S/I  Stand to sit:  S/I  Stand pivot:  NT    independent    Ambulation     40  feet with no AD  with S/I    150  feet with  No AD  with  Independent        Stair negotiation: ascended and descended NT    4  steps with  1  rail with  Independent    LE ROM  WFL     LE strength  4/ 5      AM- PAC RAW score   18/ 24            Pt is alert and Oriented x  4     Balance:  S/I, no LOB with gait , but fall risk due to decreased activity tolerance and decreased sensation B LEs   Sensation:  Neuropathy B LEs, L  Worse than R , reports neuropathic pain/weakness  if he walks too much   Edema: mild B LEs   Endurance: Endurance Training to improve activity tolerance during functional mobility   [x] Transfer Training to improve safety and independence with all functional transfers   [x] Gait Training to improve gait mechanics, endurance and assess need for appropriate assistive device  [x] Stair Training in preparation for safe discharge home and/or into the community   [] Positioning to prevent skin breakdown and contractures  [x] Safety and Education Training   [x] Patient/Caregiver Education   [] HEP  [] Other     Frequency of treatments will be 2-5x/week x  7-10days. Time in: 1002   Time out: 1028       Evaluation Time includes thorough review of current medical information, gathering information on past medical history/social history and prior level of function, completion of standardized testing/informal observation of tasks, assessment of data and education on plan of care and goals.     CPT codes:  [x] Low Complexity PT evaluation 35909  [] Moderate Complexity PT evaluation 51153  [] High Complexity PT evaluation 73605  [] PT Re-evaluation 12306  [] Gait training 93739  minutes  [x] Therapeutic activities 24670 8 minutes  [] Therapeutic exercises 97000  minutes  [] Neuromuscular reeducation 19492  minutes       Anabel Barnett number:  PT [phone]

## 2021-06-15 NOTE — PROGRESS NOTES
Physician Progress Note      Priyanka Peterson  [facility] #:                  [id number]  :                       1950  ADMIT DATE:       2021 11:47 AM  DISCH DATE:  RESPONDING  PROVIDER #:        Vianney Perez MD          QUERY TEXT:    Patient admitted with COPD exacerbation. Noted documentation of + Influenza A   at [facility] and negative respiratory panel x2 this admission. In order   to support the diagnosis of Influenza A, please include additional clinical   indicators in your documentation. Or please document if the diagnosis of   Influenza A has been ruled out after further study. The medical record reflects the following:  Risk Factors: COPD exacerbation on lung transplant list  Clinical Indicators: ED \"he was concerned that he might have flu a as he said   he tested positive at a urgent care, was negative here, ordered respiratory   panel, as well as a strep test in case he was confusing it with group A   strep. \"  Pulmonology consult  \"I personally called [facility] and spoke with   OCTAVIO Serrano who confirmed his test there was positive for Flu A... Respiratory panel negative, including Flu A. Will repeat respiratory panel and   start Tamiflu. \"  Pulmonology 6/10 PN \" Respiratory panel negative, including Flu A. With repeat   respiratory panel negative. Continue Tamiflu for 5 days - Day#3. (Tested +   influenza A at [facility] on Saturday)\"  Treatment: Tamiflu    Thank you,  Twan Horan RN, CCDS  Clinical Documentation Improvement Specialist  [email or web address]  Office: [phone]  Cell: [phone]  Options provided:  -- Influenza A present as evidenced by, Please document evidence.   -- Influenza A was ruled out  -- Other - I will add my own diagnosis  -- Disagree - Not applicable / Not valid  -- Disagree - Clinically unable to determine / Unknown  -- Refer to Clinical Documentation Reviewer    PROVIDER RESPONSE TEXT:    Influenza A is present as evidenced by + influenza test done at outpt facility   and concurrent treatment    Query created by: Yeni Pepe on 6/14/2021 1:34 PM      Electronically signed by:  Andreas Fu MD 6/15/2021 2:03 PM

## 2021-06-15 NOTE — PROGRESS NOTES
Occupational Therapy  OCCUPATIONAL THERAPY INITIAL EVALUATION      Date:6/15/2021  Patient Name: David Egan  MRN: [id number]  : 1950  [facility] M.D. [facility], [place]         Date:6/15/2021                                                  Patient Name: David Egan    MRN: [id number]    : 1950    Room: 8683/9564-V      Evaluating OT: Remigio Min OTR/L   [id number]      Referring Meenakshi Smith MD    Specific Provider Orders/Date:OT eval and treat 2021      Diagnosis: Acute Resp with hypoxia       Pertinent Medical History: asthma,COPD, neuropathic pain,back surgery    Precautions:  Fall Risk, O2, alarm      Assessment of current deficits    [x] Functional mobility  [x]ADLs  [x] Strength               [x]Cognition    [x] Functional transfers   [x] IADLs         [x] Safety Awareness   [x]Endurance    [] Fine Coordination              [x] Balance      [] Vision/perception   [x]Sensation     []Gross Motor Coordination  [] ROM  [] Delirium                   [] Motor Control     OT PLAN OF CARE   OT POC based on physician orders, patient diagnosis and results of clinical assessment    Frequency/Duration  1-3days/wk for 2 weeks PRN   Specific OT Treatment Interventions to include:   [x]ADL retraining/adapted techniques and AE recommendations to increase functional independence within precautions                    [x]Energy conservation techniques to improve tolerance for selfcare routine   [x]Functional transfer/mobility training/DME recommendations for increased independence, safety and fall prevention         [x]Patient/family education to increase safety and functional independence             [x]Environmental modifications for safe mobility and completion of ADLs                             [x]Therapeutic activity to improve functional performance during ADLs.                                         [x]Therapeutic exercise to improve tolerance and functional strength for ADLs   [x]Balance retraining/tolerance tasks for facilitation of postural control with dynamic challenges during ADLs .    [x]Positioning to improve functional independence  []    Recommended Adaptive Equipment: TBD     Home Living: Pt lives with wife, 1 story with 3 steps/rail to enter   Bathroom setup: walk in [employer] owned: Home O2,walker,cane, shower chair    Prior Level of Function: Independent  with ADLs , assist with IADLs; ambulated with no device  Driving: yes   Occupation: retired banker     Pain Level: no pain;   Cognition: A&O: 4/4;    Memory:  Good    Sequencing:  good   Problem solving:  Fair    Judgement/safety:  Fair      Functional Assessment:  AM-PAC Daily Activity Raw Score: 17/24   Initial Eval Status  Date: 6/15/21 Treatment Status  Date: STGs = LTGs  Time frame: 10-14 days   Feeding Independent      Grooming SBA/set-up,seated   Decrease standing tolerance   Mod I    UB Dressing SBA/set-up   Mod I    LB Dressing Min A  Able to lift/cross leg to reach socks    Increase time and assist  needed to complete ADL activity d/t SOB    Mod I    Bathing Min A  Mod I    Toileting Independent      Bed Mobility  Independent   Supine to sit      Functional Transfers Supervision   Sit-stand from bed, commode   Mod I    Functional Mobility SBA, no device, O2  Ambulated to/from bathroom     Mod I  with good tolerance    Balance Sitting:     Static:  Independent     Dynamic:SBA   Standing: SBA  Independent    Activity Tolerance Patient on 3 1/2 L   At rest 90  Ambulating to bathroom and returning O2 sats 87- 88%  SOB noted   Seated in chair, resting improved to 89-90%    Respiratory present in room after session for breathing tx  Good  with ADL activity    Visual/  Perceptual Glasses: yes                 Hand Dominance right    AROM (PROM) Strength Additional Info:    CAROLANN WFL WFL good  and wfl FMC/dexterity noted during ADL tasks       LUE WFL WFL good  and wfl FMC/dexterity noted during ADL tasks       Hearing: WFL   Sensation:  No c/o numbness or tingling   Tone: WFL   Edema: none observed     Comments: Upon arrival patient lying in bed . At end of session, patient sitting in chair with call light and phone within reach, all lines and tubes intact. Overall patient demonstrated  decreased independence and safety during completion of ADL/functional transfer/mobility tasks. Pt would benefit from continued skilled OT to increase safety and independence with completion of ADL/IADL tasks for functional independence and quality of life. Rehab Potential: good for established goals     Patient / Family Goal: return home      Patient and/or family were instructed on functional diagnosis, prognosis/goals and OT plan of care. Demonstrated good  understanding. Eval Complexity: Low    Time In: 0900  Time Out: 0920      Min Units   OT Eval Low 97165 x  1   OT Eval Medium 85695      OT Eval High 63272      OT Re-Eval [id number]       Therapeutic Ex 49200       Therapeutic Activities 29142       ADL/Self Care 76357       Orthotic Management 97572       Manual [id number]     Neuro Re-Ed 14425       Non-Billable Time          Evaluation Time additionally includes thorough review of current medical information, gathering information on past medical history/social history and prior level of function, interpretation of standardized testing/informal observation of tasks, assessment of data and development of plan of care and goals.             Tuan Dennis  OTR/L  OT [id number]

## 2021-06-15 NOTE — CARE COORDINATION
Await further PT/OT evals; pt was sleeping yesterday; spoke to wife re; d/c planning; (see cm note). wife Corrine Slaughter works, for now cannot care for pt at home. Investigating possible ST skilled if recommended by therapy. will D/W pt/wife  If indicated. Also awaiting further decision by pulmonary as to whether plan is for transfer to [facility] or not. Will follow. Lavern Cid.

## 2021-06-15 NOTE — PROGRESS NOTES
bowel sounds. Skin: Warm and dry. M/S: No cyanosis. No joint deformity. No clubbing. BLE edema, LUE with edema  Neuro: Awake. Follows commands. O x 3, [place]  Psych:  calm and interactive     I/O: No intake/output data recorded. No intake/output data recorded. Results:  Results for Nate Chin (MRN [id number]) as of 6/11/2021 14:33   Ref.  Range 6/7/2021 03:45   Sodium Latest Ref Range: 132 - 146 mmol/L 136   Potassium Latest Ref Range: 3.5 - 5.0 mmol/L 4.9   Chloride Latest Ref Range: 98 - 107 mmol/L 98   CO2 Latest Ref Range: 22 - 29 mmol/L 29   BUN Latest Ref Range: 6 - 23 mg/dL 17   Creatinine Latest Ref Range: 0.7 - 1.2 mg/dL 0.6 (L)   Anion Gap Latest Ref Range: 7 - 16 mmol/L 9   GFR Non- Latest Ref Range: >=60 mL/min/1.73 >60   GFR African American Unknown >60   Glucose Latest Ref Range: 74 - 99 mg/dL 206 (H)   Calcium Latest Ref Range: 8.6 - 10.2 mg/dL 8.9   Procalcitonin Latest Ref Range: 0.00 - 0.08 ng/mL 0.04   WBC Latest Ref Range: 4.5 - 11.5 E9/L 14.6 (H)   RBC Latest Ref Range: 3.80 - 5.80 E12/L 4.17   Hemoglobin Quant Latest Ref Range: 12.5 - 16.5 g/dL 13.4   Hematocrit Latest Ref Range: 37.0 - 54.0 % 40.2   MCV Latest Ref Range: 80.0 - 99.9 fL 96.4   MCH Latest Ref Range: 26.0 - 35.0 pg 32.1   MCHC Latest Ref Range: 32.0 - 34.5 % 33.3   MPV Latest Ref Range: 7.0 - 12.0 fL 10.0   RDW Latest Ref Range: 11.5 - 15.0 fL 12.9   Platelet Count Latest Ref Range: 130 - 450 E9/L 180   Neutrophils % Latest Ref Range: 43.0 - 80.0 % 93.5 (H)   Immature Granulocytes % Latest Ref Range: 0.0 - 5.0 % 0.5   Lymphocyte % Latest Ref Range: 20.0 - 42.0 % 2.5 (L)   Monocytes % Latest Ref Range: 2.0 - 12.0 % 3.4   Eosinophils % Latest Ref Range: 0.0 - 6.0 % 0.0   Basophils % Latest Ref Range: 0.0 - 2.0 % 0.1   Neutrophils Absolute Latest Ref Range: 1.80 - 7.30 E9/L 13.65 (H)   Immature Granulocytes # Latest Units: E9/L 0.07   Lymphocytes Absolute Latest Ref Range: 1.50 - 4.00 E9/L 0.37 (L) Monocytes Absolute Latest Ref Range: 0.10 - 0.95 E9/L 0.50   Eosinophils Absolute Latest Ref Range: 0.05 - 0.50 E9/L 0.00 (L)   Basophils Absolute Latest Ref Range: 0.00 - 0.20 E9/L 0.01   Procalcitonin: Results for Matt Casillas (MRN [id number]) as of 6/11/2021 14:33   Ref. Range 6/7/2021 03:45   Procalcitonin Latest Ref Range: 0.00 - 0.08 ng/mL 0.04     Cultures:  Results for Matt Casillas (MRN [id number]) as of 6/11/2021 14:33   Ref.  Range 6/6/2021 12:37   Influenza A by PCR Latest Ref Range: Not Detected  Not Detected   Influenza B by PCR Latest Ref Range: Not Detected  Not Detected   Adenovirus by PCR Latest Ref Range: Not Detected  Not Detected   Coronavirus 229E by PCR Latest Ref Range: Not Detected  Not Detected   Coronavirus HKU1 by PCR Latest Ref Range: Not Detected  Not Detected   Coronavirus NL63 by PCR Latest Ref Range: Not Detected  Not Detected   Coronavirus OC43 by PCR Latest Ref Range: Not Detected  Not Detected   Human Metapneumovirus by PCR Latest Ref Range: Not Detected  Not Detected   Human Rhinovirus/Enterovirus by PCR Latest Ref Range: Not Detected  Not Detected   Parainfluenza Virus 1 by PCR Latest Ref Range: Not Detected  Not Detected   Parainfluenza Virus 2 by PCR Latest Ref Range: Not Detected  Not Detected   Parainfluenza Virus 3 by PCR Latest Ref Range: Not Detected  Not Detected   Parainfluenza Virus 4 by PCR Latest Ref Range: Not Detected  Not Detected   Respiratory Syncytial Virus by PCR Latest Ref Range: Not Detected  Not Detected   Bordetella parapertussis by PCR Latest Ref Range: Not Detected  Not Detected   Chlamydophilia pneumoniae by PCR Latest Ref Range: Not Detected  Not Detected   Mycoplasma pneumoniae by PCR Latest Ref Range: Not Detected  Not Detected   SARS-CoV-2, PCR Latest Ref Range: Not Detected  Not Detected   Bordetella pertussis by PCR Latest Ref Range: Not Detected  Not Detected     Gram Stain Orderable  Gram Stain  Collected: 06/07/21 0852   Result status: Final Resulting lab: [facility]   Value: Group 5: >25 PMN's/LPF and <10 Epithelial cells/LPF   Moderate Polymorphonuclear leukocytes   Rare Epithelial cells   Few Gram positive cocci in pairs   Moderate Gram positive cocci in chains   Few Gram negative rods   Few Gram positive cocci in clusters          ABG:   Results for Janet Servin (MRN [id number]) as of 6/11/2021 14:33   Ref. Range 6/6/2021 12:58   Source: Unknown Blood Arterial   pH, Blood Gas Latest Ref Range: 7.350 - 7.450  7.425   PCO2 Latest Ref Range: 35.0 - 45.0 mmHg 40.5   pO2 Latest Ref Range: 75.0 - 100.0 mmHg 168.7 (H)   HCO3 Latest Ref Range: 22.0 - 26.0 mmol/L 26.0   Base Excess Latest Ref Range: -3.0 - 3.0 mmol/L 1.5   O2 Sat Latest Ref Range: 92.0 - 98.5 % 99.3 (H)   tHb (est) Latest Ref Range: 11.5 - 16.5 g/dL 15.4   O2Hb Latest Ref Range: 94.0 - 97.0 % 97.6 (H)   COHb Latest Ref Range: 0.0 - 1.5 % 1.3   MetHb Latest Ref Range: 0.0 - 1.5 % 0.4   HHb Latest Ref Range: 0.0 - 5.0 % 0.7   O2 Content Latest Units: mL/dL 21.4   Pt Temp Latest Units: C 37.0   Critical(s) Notified Unknown . No Critical Values   Time Analyzed Unknown 1258   Mode Unknown NC- 4 L       Films:  XR CHEST PORTABLE    Result Date: 6/6/2021  EXAMINATION: ONE XRAY VIEW OF THE CHEST 6/6/2021 1:14 pm COMPARISON: March 10, 2020 HISTORY: ORDERING SYSTEM PROVIDED HISTORY: r/o pna TECHNOLOGIST PROVIDED HISTORY: Reason for exam:->r/o pna FINDINGS: No airspace opacity or pleural effusion. The heart is normal size. No pneumothorax. No free air beneath the hemidiaphragms. Hyperinflation of both lungs notable in the upper lobes. 1.  No pneumonia or pleural effusion. 2.  Emphysematous changes.          Assessment:  Torsten Stanley is a pleasant 70 y. o. male well known to our service, Dr Caldwell Dear previous smoker of 1ppd for 40 years quitting 1 year ago.  He has a past medical history of COPD/asthma and is being worked up in [facility] [employer] with Dr. Jennifer Hernández for transplant or lung reduction surgery    Patient tested positive for influenza A at rapid care but negative here patient was treated with Tamiflu    Covid: (-) / Influenza A/B: (-) / Strep: (-) /RPanel: (-) / Trop: 20, 19   BC x2: ___ / Strep/Leg: presump(-)   CXR: Emphysematous changes. CTA Chest: No PE or aortic dissection. Advanced centrilobular emphysema with patchy infiltrates/consolidation in the left lower lobe and to a lesser extent in the right upper lobe and right middle lobe concerning for multifocal pneumonia. · COPD exacerbation  · Acute on chronic hypoxic respiratory failure usually on 2 L at rest and 3 L with exertion  · Hypertension  · Severe emphysema   · History of left effusion  · previous admission hospitalized 3/11/2020 with influenza A         Plan:  · Continue O2 currently on 3L nc. Baseline is 2L as needed , keep POX >90% and wean as able. · Ambulatory pulse decreased to 89% when walking to bathroom WITH 5L O2 on. He has a inogen machine only at home he purchased, no stationary tank or portables--will need set up before DC !!  · CTA negative for PE   · Respiratory panel negative, including Flu A. With repeat respiratory panel negative    · Completed Tamiflu for + influenza A at [facility]   · Sputum culture with moderate Gram + cocci in chains completed doxy x 7 days . Afebrile and elevated WBC. Procalcitonin 0.04   · Will continue nebulizers - pulmicort and brovana;  duonebs stopped made tachycardic, on xopenex now , make sure to order xopenex nebz for home at DC all he has is albuterol at home   · Continue prednisone taper 40x3, 30x3, 20x3, 10x3   · IS for pulmonary hygiene   · Activity as tolerated   Follows with [facility]. Marek Whaley at [facility].  6/10/2021 Patient not candidate for lung transplantation but can be soon assessed for lung volume reduction surgery vs endobronchial stent placement for advanced emphysematous disease.    Patient will need to per joe more extensive work up and possible lung surgeries as an out patient . Continue therapy, may benefit from pulmonary rehab as outpatient .     Lina Burger, KELECHI - CNS  6/15/2021  12:49 PM    Seen and evaluated, agree with above assessment and plan  Hope to be discharged soon, if so follow-up with pulmonary in 2 to 4 weeks  Definitely benefit of outpatient rehab  Walking O2 prior to discharge

## 2021-06-15 NOTE — PLAN OF CARE
Problem: Falls - Risk of:  Goal: Will remain free from falls  Description: Will remain free from falls  6/15/2021 1730 by Kalin Gonzalez RN  Outcome: Met This Shift  6/15/2021 0615 by Agata Dave RN  Outcome: Met This Shift  Goal: Absence of physical injury  Description: Absence of physical injury  6/15/2021 1730 by Kalin Gonzalez RN  Outcome: Met This Shift  6/15/2021 0615 by Agata Dave RN  Outcome: Met This Shift     Problem: Breathing Pattern - Ineffective:  Goal: Ability to achieve and maintain a regular respiratory rate will improve  Description: Ability to achieve and maintain a regular respiratory rate will improve  6/15/2021 1730 by Kalin Gonzalez RN  Outcome: Met This Shift  6/15/2021 0615 by Agata Dave RN  Outcome: Met This Shift     Problem: Daily Care:  Goal: Daily care needs are met  Description: Daily care needs are met  6/15/2021 1730 by Kalin Gonzalez RN  Outcome: Met This Shift  6/15/2021 0615 by Agata Dave RN  Outcome: Met This Shift

## 2021-06-16 LAB
ANION GAP SERPL CALCULATED.3IONS-SCNC: 4 MMOL/L (ref 7–16)
BASOPHILS ABSOLUTE: 0 E9/L (ref 0–0.2)
BASOPHILS RELATIVE PERCENT: 0 % (ref 0–2)
BUN BLDV-MCNC: 10 MG/DL (ref 6–23)
CALCIUM SERPL-MCNC: 8.8 MG/DL (ref 8.6–10.2)
CHLORIDE BLD-SCNC: 96 MMOL/L (ref 98–107)
CO2: 34 MMOL/L (ref 22–29)
CREAT SERPL-MCNC: 0.7 MG/DL (ref 0.7–1.2)
EOSINOPHILS ABSOLUTE: 0.13 E9/L (ref 0.05–0.5)
EOSINOPHILS RELATIVE PERCENT: 1 % (ref 0–6)
GFR AFRICAN AMERICAN: >60
GFR NON-AFRICAN AMERICAN: >60 ML/MIN/1.73
GLUCOSE BLD-MCNC: 149 MG/DL (ref 74–99)
HCT VFR BLD CALC: 38.1 % (ref 37–54)
HEMOGLOBIN: 13 G/DL (ref 12.5–16.5)
LYMPHOCYTES ABSOLUTE: 3.18 E9/L (ref 1.5–4)
LYMPHOCYTES RELATIVE PERCENT: 25 % (ref 20–42)
MCH RBC QN AUTO: 32.8 PG (ref 26–35)
MCHC RBC AUTO-ENTMCNC: 34.1 % (ref 32–34.5)
MCV RBC AUTO: 96.2 FL (ref 80–99.9)
MONOCYTES ABSOLUTE: 0.89 E9/L (ref 0.1–0.95)
MONOCYTES RELATIVE PERCENT: 7 % (ref 2–12)
MYELOCYTE PERCENT: 2 % (ref 0–0)
NEUTROPHILS ABSOLUTE: 8.51 E9/L (ref 1.8–7.3)
NEUTROPHILS RELATIVE PERCENT: 65 % (ref 43–80)
PDW BLD-RTO: 12.3 FL (ref 11.5–15)
PLATELET # BLD: 254 E9/L (ref 130–450)
PMV BLD AUTO: 9.4 FL (ref 7–12)
POTASSIUM REFLEX MAGNESIUM: 4 MMOL/L (ref 3.5–5)
RBC # BLD: 3.96 E12/L (ref 3.8–5.8)
RBC # BLD: NORMAL 10*6/UL
SODIUM BLD-SCNC: 134 MMOL/L (ref 132–146)
WBC # BLD: 12.7 E9/L (ref 4.5–11.5)

## 2021-06-16 PROCEDURE — 94640 AIRWAY INHALATION TREATMENT: CPT

## 2021-06-16 PROCEDURE — 6360000002 HC RX W HCPCS: Performed by: INTERNAL MEDICINE

## 2021-06-16 PROCEDURE — 36415 COLL VENOUS BLD VENIPUNCTURE: CPT

## 2021-06-16 PROCEDURE — 6370000000 HC RX 637 (ALT 250 FOR IP): Performed by: CLINICAL NURSE SPECIALIST

## 2021-06-16 PROCEDURE — 2580000003 HC RX 258: Performed by: INTERNAL MEDICINE

## 2021-06-16 PROCEDURE — 1200000000 HC SEMI PRIVATE

## 2021-06-16 PROCEDURE — 80048 BASIC METABOLIC PNL TOTAL CA: CPT

## 2021-06-16 PROCEDURE — 85025 COMPLETE CBC W/AUTO DIFF WBC: CPT

## 2021-06-16 PROCEDURE — 2700000000 HC OXYGEN THERAPY PER DAY

## 2021-06-16 PROCEDURE — 6370000000 HC RX 637 (ALT 250 FOR IP): Performed by: INTERNAL MEDICINE

## 2021-06-16 RX ADMIN — CLONIDINE HYDROCHLORIDE 0.1 MG: 0.1 TABLET ORAL at 09:06

## 2021-06-16 RX ADMIN — ARFORMOTEROL TARTRATE 15 MCG: 15 SOLUTION RESPIRATORY (INHALATION) at 08:34

## 2021-06-16 RX ADMIN — GABAPENTIN 600 MG: 300 CAPSULE ORAL at 12:20

## 2021-06-16 RX ADMIN — GUAIFENESIN 400 MG: 400 TABLET ORAL at 21:07

## 2021-06-16 RX ADMIN — GABAPENTIN 600 MG: 300 CAPSULE ORAL at 09:06

## 2021-06-16 RX ADMIN — GUAIFENESIN 400 MG: 400 TABLET ORAL at 12:20

## 2021-06-16 RX ADMIN — BUDESONIDE 250 MCG: 0.5 SUSPENSION RESPIRATORY (INHALATION) at 08:34

## 2021-06-16 RX ADMIN — CLONIDINE HYDROCHLORIDE 0.1 MG: 0.1 TABLET ORAL at 21:07

## 2021-06-16 RX ADMIN — LEVALBUTEROL 1.25 MG: 1.25 SOLUTION, CONCENTRATE RESPIRATORY (INHALATION) at 08:33

## 2021-06-16 RX ADMIN — Medication 5 ML: at 21:31

## 2021-06-16 RX ADMIN — ARFORMOTEROL TARTRATE 15 MCG: 15 SOLUTION RESPIRATORY (INHALATION) at 22:11

## 2021-06-16 RX ADMIN — PREDNISONE 30 MG: 20 TABLET ORAL at 09:06

## 2021-06-16 RX ADMIN — GUAIFENESIN 400 MG: 400 TABLET ORAL at 16:33

## 2021-06-16 RX ADMIN — GABAPENTIN 600 MG: 300 CAPSULE ORAL at 21:07

## 2021-06-16 RX ADMIN — LEVALBUTEROL 1.25 MG: 1.25 SOLUTION, CONCENTRATE RESPIRATORY (INHALATION) at 18:44

## 2021-06-16 RX ADMIN — LISINOPRIL 20 MG: 20 TABLET ORAL at 09:06

## 2021-06-16 RX ADMIN — Medication 10 ML: at 09:07

## 2021-06-16 RX ADMIN — AMLODIPINE BESYLATE 5 MG: 5 TABLET ORAL at 09:06

## 2021-06-16 RX ADMIN — GUAIFENESIN 400 MG: 400 TABLET ORAL at 09:06

## 2021-06-16 RX ADMIN — BUDESONIDE 250 MCG: 0.5 SUSPENSION RESPIRATORY (INHALATION) at 22:12

## 2021-06-16 ASSESSMENT — PAIN SCALES - GENERAL
PAINLEVEL_OUTOF10: 0

## 2021-06-16 NOTE — PLAN OF CARE
Problem: Falls - Risk of:  Goal: Will remain free from falls  Description: Will remain free from falls  6/16/2021 0553 by Ruma Justice RN  Outcome: Met This Shift  6/16/2021 0001 by Ruma Justice RN  Outcome: Met This Shift  6/15/2021 1730 by Romain Galvan RN  Outcome: Met This Shift  Goal: Absence of physical injury  Description: Absence of physical injury  6/16/2021 0553 by Ruma Justice RN  Outcome: Met This Shift  6/16/2021 0001 by Ruma Justice RN  Outcome: Met This Shift  6/15/2021 1730 by Romain Galvan RN  Outcome: Met This Shift     Problem: Breathing Pattern - Ineffective:  Goal: Ability to achieve and maintain a regular respiratory rate will improve  Description: Ability to achieve and maintain a regular respiratory rate will improve  6/16/2021 0553 by Ruma Justice RN  Outcome: Met This Shift  6/16/2021 0001 by Ruma Justice RN  Outcome: Met This Shift  6/15/2021 1730 by Romain Galvan RN  Outcome: Met This Shift     Problem: Daily Care:  Goal: Daily care needs are met  Description: Daily care needs are met  6/16/2021 0553 by Ruma Justice RN  Outcome: Met This Shift  6/16/2021 0001 by Ruma Justice RN  Outcome: Met This Shift  6/15/2021 1730 by Romain Galvan RN  Outcome: Met This Shift

## 2021-06-16 NOTE — PROGRESS NOTES
Subjective: The patient is awake and alert. No problems overnight. Denies chest pain, angina, and worsening dyspnea. Denies abdominal pain. Tolerating diet. No nausea or vomiting. Objective:    /60   Pulse 79   Temp 97.8 °F (36.6 °C) (Oral)   Resp 16   Ht 5' 7\" (1.702 m)   Wt 184 lb 5 oz (83.6 kg)   SpO2 96%   BMI 28.87 kg/m²     Heart:  RRR, no murmurs, gallops, or rubs. Lungs:  CTA bilaterally with generally diminished sounds, no wheeze, rales or rhonchi  Abd: bowel sounds present, nontender, nondistended, no masses  Extrem:  No clubbing, cyanosis, or edema    CBC:   Lab Results   Component Value Date    WBC 11.3 06/15/2021    RBC 3.84 06/15/2021    HGB 12.6 06/15/2021    HCT 36.5 06/15/2021    MCV 95.1 06/15/2021    MCH 32.8 06/15/2021    MCHC 34.5 06/15/2021    RDW 12.2 06/15/2021     06/15/2021    MPV 9.6 06/15/2021     BMP:    Lab Results   Component Value Date     06/15/2021    K 4.0 06/15/2021    CL 95 06/15/2021    CO2 34 06/15/2021    BUN 12 06/15/2021    LABALBU 4.3 06/06/2021    CREATININE 0.6 06/15/2021    CALCIUM 8.5 06/15/2021    GFRAA >60 06/15/2021    LABGLOM >60 06/15/2021    GLUCOSE 110 06/15/2021        Assessment:    Patient Active Problem List   Diagnosis    COPD with acute exacerbation ([address]    Hypertension    Anxiety    Lactic acidosis    Hyperglycemia, drug-induced    Acute respiratory failure with hypoxia (HCC)    Type 2 diabetes mellitus (HCC)    Elevated troponin       Plan:  Present tx. Per Pulmonary. DC planning in progress.           Say Browning MD  7:03 AM  6/16/2021

## 2021-06-16 NOTE — PROGRESS NOTES
Pulmonary Progress Note    Admit Date: 2021                            PCP: Guadalupe Patterson MD  Principal Problem:    Acute respiratory failure with hypoxia [facility])  Active Problems:    Type 2 diabetes mellitus ([facility] [address]    Elevated troponin  Resolved Problems:    * No resolved hospital problems. *      Subjective:  Resting in bed  On 3L nc  Complains of dyspnea  Fearful to go home and have breathing issues - checks pox when ambulating and drops to 90%. He will rest and it will come up to 94%. Medications:   sodium chloride          predniSONE  30 mg Oral Daily    Followed by   Tiffanie Farias ON 2021] predniSONE  20 mg Oral Daily    Followed by   Tiffanie Farias ON 2021] predniSONE  10 mg Oral Daily    cloNIDine  0.1 mg Oral BID    gabapentin  600 mg Oral TID    guaiFENesin  400 mg Oral 4x daily    tamsulosin  0.8 mg Oral Daily    sodium chloride flush  5-40 mL Intravenous 2 times per day    enoxaparin  40 mg Subcutaneous Daily    budesonide  0.25 mg Nebulization BID    Arformoterol Tartrate  15 mcg Nebulization BID    amLODIPine  5 mg Oral Daily    And    lisinopril  20 mg Oral Daily       Vitals:  VITALS:  /60   Pulse 69   Temp 97.3 °F (36.3 °C) (Temporal)   Resp 16   Ht 5' 7\" (1.702 m)   Wt 184 lb 5 oz (83.6 kg)   SpO2 96%   BMI 28.87 kg/m²   24HR INTAKE/OUTPUT:      Intake/Output Summary (Last 24 hours) at 2021 1328  Last data filed at 2021 0753  Gross per 24 hour   Intake 120 ml   Output --   Net 120 ml     CURRENT PULSE OXIMETRY:  SpO2: 96 %  24HR PULSE OXIMETRY RANGE:  SpO2  Av.8 %  Min: 90 %  Max: 96 %  CVP:    VENT SETTINGS:   Vent Information  SpO2: 96 %  Additional Respiratory  Assessments  Pulse: 69  Resp: 16  SpO2: 96 %      EXAM:  General: Alert, in NAD  ENT: No discharge. Pharynx clear. membranes moist   Neck: Supple, Trachea midline. Resp: No accessory muscle use. Non-labored. Lungs diminished throughout with fine exp wheezing  CV: Regular rate.  Regular rhythm. No murmur . No edema. ABD: Non-tender. Non-distended. Soft, round . Normal bowel sounds. Skin: Warm and dry. M/S: No cyanosis. No joint deformity. No clubbing. BLE edema, LUE with edema  Neuro: Awake. Follows commands. O x 3, [place]  Psych:  calm and interactive     I/O: No intake/output data recorded. I/O this shift:  In: 120 [P.O.:120]  Out: -      Results:  Results for Randy Cazares (MRN [id number]) as of 6/11/2021 14:33   Ref.  Range 6/7/2021 03:45   Sodium Latest Ref Range: 132 - 146 mmol/L 136   Potassium Latest Ref Range: 3.5 - 5.0 mmol/L 4.9   Chloride Latest Ref Range: 98 - 107 mmol/L 98   CO2 Latest Ref Range: 22 - 29 mmol/L 29   BUN Latest Ref Range: 6 - 23 mg/dL 17   Creatinine Latest Ref Range: 0.7 - 1.2 mg/dL 0.6 (L)   Anion Gap Latest Ref Range: 7 - 16 mmol/L 9   GFR Non- Latest Ref Range: >=60 mL/min/1.73 >60   GFR African American Unknown >60   Glucose Latest Ref Range: 74 - 99 mg/dL 206 (H)   Calcium Latest Ref Range: 8.6 - 10.2 mg/dL 8.9   Procalcitonin Latest Ref Range: 0.00 - 0.08 ng/mL 0.04   WBC Latest Ref Range: 4.5 - 11.5 E9/L 14.6 (H)   RBC Latest Ref Range: 3.80 - 5.80 E12/L 4.17   Hemoglobin Quant Latest Ref Range: 12.5 - 16.5 g/dL 13.4   Hematocrit Latest Ref Range: 37.0 - 54.0 % 40.2   MCV Latest Ref Range: 80.0 - 99.9 fL 96.4   MCH Latest Ref Range: 26.0 - 35.0 pg 32.1   MCHC Latest Ref Range: 32.0 - 34.5 % 33.3   MPV Latest Ref Range: 7.0 - 12.0 fL 10.0   RDW Latest Ref Range: 11.5 - 15.0 fL 12.9   Platelet Count Latest Ref Range: 130 - 450 E9/L 180   Neutrophils % Latest Ref Range: 43.0 - 80.0 % 93.5 (H)   Immature Granulocytes % Latest Ref Range: 0.0 - 5.0 % 0.5   Lymphocyte % Latest Ref Range: 20.0 - 42.0 % 2.5 (L)   Monocytes % Latest Ref Range: 2.0 - 12.0 % 3.4   Eosinophils % Latest Ref Range: 0.0 - 6.0 % 0.0   Basophils % Latest Ref Range: 0.0 - 2.0 % 0.1   Neutrophils Absolute Latest Ref Range: 1.80 - 7.30 E9/L 13.65 (H)   Immature Granulocytes # Latest Units: E9/L 0.07   Lymphocytes Absolute Latest Ref Range: 1.50 - 4.00 E9/L 0.37 (L)   Monocytes Absolute Latest Ref Range: 0.10 - 0.95 E9/L 0.50   Eosinophils Absolute Latest Ref Range: 0.05 - 0.50 E9/L 0.00 (L)   Basophils Absolute Latest Ref Range: 0.00 - 0.20 E9/L 0.01   Procalcitonin: Results for Janet Servin (MRN [id number]) as of 6/11/2021 14:33   Ref. Range 6/7/2021 03:45   Procalcitonin Latest Ref Range: 0.00 - 0.08 ng/mL 0.04     Cultures:  Results for Janet Servin (MRN [id number]) as of 6/11/2021 14:33   Ref.  Range 6/6/2021 12:37   Influenza A by PCR Latest Ref Range: Not Detected  Not Detected   Influenza B by PCR Latest Ref Range: Not Detected  Not Detected   Adenovirus by PCR Latest Ref Range: Not Detected  Not Detected   Coronavirus 229E by PCR Latest Ref Range: Not Detected  Not Detected   Coronavirus HKU1 by PCR Latest Ref Range: Not Detected  Not Detected   Coronavirus NL63 by PCR Latest Ref Range: Not Detected  Not Detected   Coronavirus OC43 by PCR Latest Ref Range: Not Detected  Not Detected   Human Metapneumovirus by PCR Latest Ref Range: Not Detected  Not Detected   Human Rhinovirus/Enterovirus by PCR Latest Ref Range: Not Detected  Not Detected   Parainfluenza Virus 1 by PCR Latest Ref Range: Not Detected  Not Detected   Parainfluenza Virus 2 by PCR Latest Ref Range: Not Detected  Not Detected   Parainfluenza Virus 3 by PCR Latest Ref Range: Not Detected  Not Detected   Parainfluenza Virus 4 by PCR Latest Ref Range: Not Detected  Not Detected   Respiratory Syncytial Virus by PCR Latest Ref Range: Not Detected  Not Detected   Bordetella parapertussis by PCR Latest Ref Range: Not Detected  Not Detected   Chlamydophilia pneumoniae by PCR Latest Ref Range: Not Detected  Not Detected   Mycoplasma pneumoniae by PCR Latest Ref Range: Not Detected  Not Detected   SARS-CoV-2, PCR Latest Ref Range: Not Detected  Not Detected   Bordetella pertussis by PCR Latest Ref Range: Not Detected  Not Detected     Gram Stain Orderable  Gram Stain  Collected: 06/07/21 0852   Result status: Final   Resulting lab: [facility]   Value: Group 5: >25 PMN's/LPF and <10 Epithelial cells/LPF   Moderate Polymorphonuclear leukocytes   Rare Epithelial cells   Few Gram positive cocci in pairs   Moderate Gram positive cocci in chains   Few Gram negative rods   Few Gram positive cocci in clusters          ABG:   Results for Arya Colon (MRN [id number]) as of 6/11/2021 14:33   Ref. Range 6/6/2021 12:58   Source: Unknown Blood Arterial   pH, Blood Gas Latest Ref Range: 7.350 - 7.450  7.425   PCO2 Latest Ref Range: 35.0 - 45.0 mmHg 40.5   pO2 Latest Ref Range: 75.0 - 100.0 mmHg 168.7 (H)   HCO3 Latest Ref Range: 22.0 - 26.0 mmol/L 26.0   Base Excess Latest Ref Range: -3.0 - 3.0 mmol/L 1.5   O2 Sat Latest Ref Range: 92.0 - 98.5 % 99.3 (H)   tHb (est) Latest Ref Range: 11.5 - 16.5 g/dL 15.4   O2Hb Latest Ref Range: 94.0 - 97.0 % 97.6 (H)   COHb Latest Ref Range: 0.0 - 1.5 % 1.3   MetHb Latest Ref Range: 0.0 - 1.5 % 0.4   HHb Latest Ref Range: 0.0 - 5.0 % 0.7   O2 Content Latest Units: mL/dL 21.4   Pt Temp Latest Units: C 37.0   Critical(s) Notified Unknown . No Critical Values   Time Analyzed Unknown 1258   Mode Unknown NC- 4 L       Films:  XR CHEST PORTABLE    Result Date: 6/6/2021  EXAMINATION: ONE XRAY VIEW OF THE CHEST 6/6/2021 1:14 pm COMPARISON: March 10, 2020 HISTORY: ORDERING SYSTEM PROVIDED HISTORY: r/o pna TECHNOLOGIST PROVIDED HISTORY: Reason for exam:->r/o pna FINDINGS: No airspace opacity or pleural effusion. The heart is normal size. No pneumothorax. No free air beneath the hemidiaphragms. Hyperinflation of both lungs notable in the upper lobes. 1.  No pneumonia or pleural effusion. 2.  Emphysematous changes.          Assessment:  Torsten Stanley is a pleasant 70 y. o. male well known to our service, Dr Suri Mitchell previous smoker of 1ppd for 40 years quitting 1 year ago.  He has a past medical history of COPD/asthma and is being worked up in [facility] with Dr. Kasey Severance for transplant or lung reduction surgery    Patient tested positive for influenza [facility] at [facility] but negative here patient was treated with Tamiflu    Covid: (-) / Influenza A/B: (-) / Strep: (-) /RPanel: (-) / Trop: 20, 19   BC x2: ___ / Strep/Leg: presump(-)   CXR: Emphysematous changes. CTA Chest: No PE or aortic dissection. Advanced centrilobular emphysema with patchy infiltrates/consolidation in the left lower lobe and to a lesser extent in the right upper lobe and right middle lobe concerning for multifocal pneumonia. · COPD exacerbation  · Acute on chronic hypoxic respiratory failure usually on 2 L at rest and 3 L with exertion  · Hypertension  · Severe emphysema   · History of left effusion  · previous admission hospitalized 3/11/2020 with influenza A         Plan:  · Continue O2 currently on 3L nc. Wean as able. Keep POX >90%. · Ambulatory pulse ox decreased while on 5L nc. Will repeat ambulatory pulse ox. O2 for home is being set up through [facility]. · CTA negative for PE   · Respiratory panel negative, including Flu A. With repeat respiratory panel negative. Completed Tamiflu for + influenza A at [facility]   · Sputum culture with moderate Gram + cocci in chains completed doxy x 7 days . Afebrile and elevated WBC. Procalcitonin 0.04   · Will continue nebulizers - pulmicort and brovana; xopenex. Will need Xopenex as outpt. Also prefers to continue pulmicort and brovana nebs as outpt. · Continue prednisone taper 30x3, 20x3, 10x3 - continue on d/c.  · IS for pulmonary hygiene   · Activity as tolerated   · Follows with Dr. Kasey Severance at [facility].  6/10/2021 Patient not candidate for lung transplantation but can be soon assessed for lung volume reduction surgery vs endobronchial stent placement for advanced emphysematous disease.  Patient will need to per joe more extensive work up and possible lung surgeries as an out patient .    · Pulmonary rehab as outpatient  · Home tomorrow with follow up with [facility] in 2-4 weeks    KELECHI Smith CNP  6/16/2021  1:28 PM     Seen and evaluated, agree with above assessment and plan  Able to wean down the O2 to 3 L at rest and exertion  Okay to be discharged home from pulmonary perspective  Follow-up with us in 2 to 4-week

## 2021-06-16 NOTE — CARE COORDINATION
Discussed d/c planning with Guillermo Bonds RN NP with pulmonology; await pox testing; will order 02. Plan is for discharge tomorrow with [facility] and home 02; orders on chart. [facility] aware. Updated wife debbie. Margaret Jimenez.    02 orders on chart; [facility] aware; plan for discharge in am. Pt agreeable. Margaret Jimenez.

## 2021-06-16 NOTE — PLAN OF CARE
Problem: Falls - Risk of:  Goal: Will remain free from falls  Description: Will remain free from falls  6/16/2021 0001 by Ruma Justice RN  Outcome: Met This Shift  6/15/2021 1730 by Romain Galvan RN  Outcome: Met This Shift  Goal: Absence of physical injury  Description: Absence of physical injury  6/16/2021 0001 by Ruma Justice RN  Outcome: Met This Shift  6/15/2021 1730 by Romain Galvan RN  Outcome: Met This Shift     Problem: Breathing Pattern - Ineffective:  Goal: Ability to achieve and maintain a regular respiratory rate will improve  Description: Ability to achieve and maintain a regular respiratory rate will improve  6/16/2021 0001 by Ruma Justice RN  Outcome: Met This Shift  6/15/2021 1730 by Romain Galvan RN  Outcome: Met This Shift     Problem: Daily Care:  Goal: Daily care needs are met  Description: Daily care needs are met  6/16/2021 0001 by Ruma Justice RN  Outcome: Met This Shift  6/15/2021 1730 by Romain Galvan RN  Outcome: Met This Shift

## 2021-06-16 NOTE — PLAN OF CARE
Problem: Falls - Risk of:  Goal: Will remain free from falls  Description: Will remain free from falls  0/67/8678 3223 by Lucas Chester RN  Outcome: Ongoing  5/76/9746 0175 by Lucas Chester RN  Outcome: Ongoing  6/16/2021 0553 by Ani Funez RN  Outcome: Met This Shift  Goal: Absence of physical injury  Description: Absence of physical injury  3/66/1913 4429 by Lucas Chester RN  Outcome: Ongoing  2/12/9555 1890 by Lucas Chester RN  Outcome: Ongoing  6/16/2021 0553 by Ani Funez RN  Outcome: Met This Shift     Problem: Breathing Pattern - Ineffective:  Goal: Ability to achieve and maintain a regular respiratory rate will improve  Description: Ability to achieve and maintain a regular respiratory rate will improve  3/43/2156 6500 by Lucas Chester RN  Outcome: Ongoing  8/43/1653 9270 by Lucas Chester RN  Outcome: Ongoing  6/16/2021 0553 by Ani Funez RN  Outcome: Met This Shift     Problem: Daily Care:  Goal: Daily care needs are met  Description: Daily care needs are met  2/69/0127 6058 by Lucas Chester RN  Outcome: Ongoing  9/39/9252 6197 by Lucas Chester RN  Outcome: Ongoing  6/16/2021 0553 by Ani Funez RN  Outcome: Met This Shift

## 2021-06-16 NOTE — CARE COORDINATION
Pt currently on 3lnc; to pox test today; will need home 02 orders; [facility] to follow post discharge; orders on chart. Await clearance for discharge by medical team. Plan is for OPT follow-up at [facility]. Will follow. Jewel Solis.

## 2021-06-16 NOTE — PROGRESS NOTES
Patient currently on 3 liters. Room air sitting 88 percent and ambulating room air 82 percent.  Recovered to 93 percent on 3 liters

## 2021-06-17 VITALS
OXYGEN SATURATION: 93 % | TEMPERATURE: 97.4 F | HEIGHT: 67 IN | BODY MASS INDEX: 28.93 KG/M2 | RESPIRATION RATE: 18 BRPM | SYSTOLIC BLOOD PRESSURE: 138 MMHG | HEART RATE: 66 BPM | DIASTOLIC BLOOD PRESSURE: 65 MMHG | WEIGHT: 184.31 LBS

## 2021-06-17 LAB
ANION GAP SERPL CALCULATED.3IONS-SCNC: 4 MMOL/L (ref 7–16)
BASOPHILS ABSOLUTE: 0.03 E9/L (ref 0–0.2)
BASOPHILS RELATIVE PERCENT: 0.3 % (ref 0–2)
BUN BLDV-MCNC: 12 MG/DL (ref 6–23)
CALCIUM SERPL-MCNC: 8.5 MG/DL (ref 8.6–10.2)
CHLORIDE BLD-SCNC: 97 MMOL/L (ref 98–107)
CO2: 33 MMOL/L (ref 22–29)
CREAT SERPL-MCNC: 0.6 MG/DL (ref 0.7–1.2)
EOSINOPHILS ABSOLUTE: 0.05 E9/L (ref 0.05–0.5)
EOSINOPHILS RELATIVE PERCENT: 0.4 % (ref 0–6)
GFR AFRICAN AMERICAN: >60
GFR NON-AFRICAN AMERICAN: >60 ML/MIN/1.73
GLUCOSE BLD-MCNC: 103 MG/DL (ref 74–99)
HCT VFR BLD CALC: 37.6 % (ref 37–54)
HEMOGLOBIN: 12.4 G/DL (ref 12.5–16.5)
IMMATURE GRANULOCYTES #: 0.48 E9/L
IMMATURE GRANULOCYTES %: 4.2 % (ref 0–5)
LYMPHOCYTES ABSOLUTE: 2.56 E9/L (ref 1.5–4)
LYMPHOCYTES RELATIVE PERCENT: 22.3 % (ref 20–42)
MCH RBC QN AUTO: 31.6 PG (ref 26–35)
MCHC RBC AUTO-ENTMCNC: 33 % (ref 32–34.5)
MCV RBC AUTO: 95.9 FL (ref 80–99.9)
MONOCYTES ABSOLUTE: 0.7 E9/L (ref 0.1–0.95)
MONOCYTES RELATIVE PERCENT: 6.1 % (ref 2–12)
NEUTROPHILS ABSOLUTE: 7.65 E9/L (ref 1.8–7.3)
NEUTROPHILS RELATIVE PERCENT: 66.7 % (ref 43–80)
PDW BLD-RTO: 12.4 FL (ref 11.5–15)
PLATELET # BLD: 254 E9/L (ref 130–450)
PMV BLD AUTO: 9.2 FL (ref 7–12)
POTASSIUM REFLEX MAGNESIUM: 4 MMOL/L (ref 3.5–5)
RBC # BLD: 3.92 E12/L (ref 3.8–5.8)
SODIUM BLD-SCNC: 134 MMOL/L (ref 132–146)
WBC # BLD: 11.5 E9/L (ref 4.5–11.5)

## 2021-06-17 PROCEDURE — 6370000000 HC RX 637 (ALT 250 FOR IP): Performed by: CLINICAL NURSE SPECIALIST

## 2021-06-17 PROCEDURE — 6360000002 HC RX W HCPCS: Performed by: INTERNAL MEDICINE

## 2021-06-17 PROCEDURE — 6370000000 HC RX 637 (ALT 250 FOR IP): Performed by: INTERNAL MEDICINE

## 2021-06-17 PROCEDURE — 94640 AIRWAY INHALATION TREATMENT: CPT

## 2021-06-17 PROCEDURE — 97535 SELF CARE MNGMENT TRAINING: CPT

## 2021-06-17 PROCEDURE — 80048 BASIC METABOLIC PNL TOTAL CA: CPT

## 2021-06-17 PROCEDURE — 2580000003 HC RX 258: Performed by: INTERNAL MEDICINE

## 2021-06-17 PROCEDURE — 2700000000 HC OXYGEN THERAPY PER DAY

## 2021-06-17 PROCEDURE — 36415 COLL VENOUS BLD VENIPUNCTURE: CPT

## 2021-06-17 PROCEDURE — 85025 COMPLETE CBC W/AUTO DIFF WBC: CPT

## 2021-06-17 RX ORDER — BUDESONIDE 0.5 MG/2ML
0.25 INHALANT ORAL 2 TIMES DAILY
Qty: 60 AMPULE | Refills: 3 | Status: ON HOLD | OUTPATIENT
Start: 2021-06-17 | End: 2022-10-01

## 2021-06-17 RX ORDER — GUAIFENESIN 400 MG/1
400 TABLET ORAL 4 TIMES DAILY
Qty: 56 TABLET | Refills: 0 | Status: SHIPPED | OUTPATIENT
Start: 2021-06-17

## 2021-06-17 RX ORDER — ARFORMOTEROL TARTRATE 15 UG/2ML
15 SOLUTION RESPIRATORY (INHALATION) 2 TIMES DAILY
Qty: 120 ML | Refills: 3 | Status: SHIPPED | OUTPATIENT
Start: 2021-06-17

## 2021-06-17 RX ORDER — ONDANSETRON 4 MG/1
4 TABLET, ORALLY DISINTEGRATING ORAL EVERY 8 HOURS PRN
Qty: 20 TABLET | Refills: 0 | Status: ON HOLD | OUTPATIENT
Start: 2021-06-17 | End: 2022-10-01

## 2021-06-17 RX ORDER — LEVALBUTEROL 1.25 MG/.5ML
1 SOLUTION, CONCENTRATE RESPIRATORY (INHALATION) EVERY 4 HOURS PRN
Qty: 60 EACH | Refills: 3 | Status: SHIPPED | OUTPATIENT
Start: 2021-06-17

## 2021-06-17 RX ORDER — PREDNISONE 20 MG/1
20 TABLET ORAL DAILY
Qty: 10 TABLET | Refills: 0 | Status: SHIPPED | OUTPATIENT
Start: 2021-06-19 | End: 2021-06-29

## 2021-06-17 RX ORDER — PREDNISONE 10 MG/1
30 TABLET ORAL DAILY
Qty: 30 TABLET | Refills: 0 | Status: SHIPPED | OUTPATIENT
Start: 2021-06-18 | End: 2021-06-28

## 2021-06-17 RX ORDER — PREDNISONE 10 MG/1
10 TABLET ORAL DAILY
Qty: 10 TABLET | Refills: 0 | Status: SHIPPED | OUTPATIENT
Start: 2021-06-22 | End: 2021-07-02

## 2021-06-17 RX ADMIN — LEVALBUTEROL 1.25 MG: 1.25 SOLUTION, CONCENTRATE RESPIRATORY (INHALATION) at 00:44

## 2021-06-17 RX ADMIN — CLONIDINE HYDROCHLORIDE 0.1 MG: 0.1 TABLET ORAL at 09:20

## 2021-06-17 RX ADMIN — LISINOPRIL 20 MG: 20 TABLET ORAL at 09:20

## 2021-06-17 RX ADMIN — ARFORMOTEROL TARTRATE 15 MCG: 15 SOLUTION RESPIRATORY (INHALATION) at 08:46

## 2021-06-17 RX ADMIN — LEVALBUTEROL 1.25 MG: 1.25 SOLUTION, CONCENTRATE RESPIRATORY (INHALATION) at 16:29

## 2021-06-17 RX ADMIN — GABAPENTIN 600 MG: 300 CAPSULE ORAL at 15:33

## 2021-06-17 RX ADMIN — BUDESONIDE 250 MCG: 0.5 SUSPENSION RESPIRATORY (INHALATION) at 08:46

## 2021-06-17 RX ADMIN — GUAIFENESIN 400 MG: 400 TABLET ORAL at 11:49

## 2021-06-17 RX ADMIN — GUAIFENESIN 400 MG: 400 TABLET ORAL at 09:19

## 2021-06-17 RX ADMIN — AMLODIPINE BESYLATE 5 MG: 5 TABLET ORAL at 09:20

## 2021-06-17 RX ADMIN — Medication 10 ML: at 09:20

## 2021-06-17 RX ADMIN — LEVALBUTEROL 1.25 MG: 1.25 SOLUTION, CONCENTRATE RESPIRATORY (INHALATION) at 08:47

## 2021-06-17 RX ADMIN — PREDNISONE 30 MG: 20 TABLET ORAL at 10:38

## 2021-06-17 RX ADMIN — LORAZEPAM 0.5 MG: 0.5 TABLET ORAL at 00:39

## 2021-06-17 RX ADMIN — GABAPENTIN 600 MG: 300 CAPSULE ORAL at 09:19

## 2021-06-17 ASSESSMENT — PAIN SCALES - GENERAL: PAINLEVEL_OUTOF10: 0

## 2021-06-17 NOTE — PLAN OF CARE
Problem: Falls - Risk of:  Goal: Will remain free from falls  Description: Will remain free from falls  6/17/2021 1650 by Caden Holbrook RN  Outcome: Completed  6/17/2021 1032 by Caden Holbrook RN  Outcome: Ongoing  6/17/2021 0440 by Melba Altamirano RN  Outcome: Ongoing  Goal: Absence of physical injury  Description: Absence of physical injury  6/17/2021 1650 by Caden Holbrook RN  Outcome: Completed  6/17/2021 1032 by Caden Holbrook RN  Outcome: Ongoing  6/17/2021 0440 by Melba Altamirano RN  Outcome: Ongoing     Problem: Breathing Pattern - Ineffective:  Goal: Ability to achieve and maintain a regular respiratory rate will improve  Description: Ability to achieve and maintain a regular respiratory rate will improve  6/17/2021 1650 by Caden Holbrook RN  Outcome: Completed  6/17/2021 1032 by Caden Holbrook RN  Outcome: Ongoing  6/17/2021 0440 by Melba Altamirano RN  Outcome: Ongoing     Problem: Daily Care:  Goal: Daily care needs are met  Description: Daily care needs are met  6/17/2021 1650 by Caden Holbrook RN  Outcome: Completed  6/17/2021 1032 by Caden Holbrook RN  Outcome: Ongoing  6/17/2021 0440 by Melba Altamirano RN  Outcome: Ongoing

## 2021-06-17 NOTE — CARE COORDINATION
Pt for probable d/c today; [facility] for home 02; orders on chart. Agency aware; pt/wife aware. Await discharge by medical team. Bong Thomas.

## 2021-06-17 NOTE — PROGRESS NOTES
6/17/2021 discharge instructions given with verbal understanding of f/o and home o2 and home health. Medications sent to [facility]. Iv removed, tele box removed.

## 2021-06-17 NOTE — PLAN OF CARE
Problem: Falls - Risk of:  Goal: Will remain free from falls  Description: Will remain free from falls  6/17/2021 1032 by Tomer Karimi RN  Outcome: Ongoing  6/17/2021 0440 by Mile Carey RN  Outcome: Ongoing  Goal: Absence of physical injury  Description: Absence of physical injury  6/17/2021 1032 by Tomer Karimi RN  Outcome: Ongoing  6/17/2021 0440 by Mile Carey RN  Outcome: Ongoing     Problem: Breathing Pattern - Ineffective:  Goal: Ability to achieve and maintain a regular respiratory rate will improve  Description: Ability to achieve and maintain a regular respiratory rate will improve  6/17/2021 1032 by Tomer Karimi RN  Outcome: Ongoing  6/17/2021 0440 by Mile Carey RN  Outcome: Ongoing     Problem: Daily Care:  Goal: Daily care needs are met  Description: Daily care needs are met  6/17/2021 1032 by Tomer Karimi RN  Outcome: Ongoing  6/17/2021 0440 by Mile Carey RN  Outcome: Ongoing

## 2021-06-17 NOTE — PROGRESS NOTES
Occupational Therapy  OT BEDSIDE TREATMENT NOTE      Date:2021  Patient Name: Kati Cabrera  MRN: [id number]  : 1950  Room: [facility]-       Evaluating OT: Corine Hale OTR/L   [id number]       Referring Jarred Tracey MD    Specific Provider Orders/Date:OT eval and treat 2021       Diagnosis: Acute Resp with hypoxia       Pertinent Medical History: asthma,COPD, neuropathic pain,back surgery    Precautions:  Fall Risk, O2      Assessment of current deficits    [x]? Functional mobility            [x]?ADLs           [x]? Strength                  [x]? Cognition    [x]? Functional transfers          [x]? IADLs         [x]? Safety Awareness   [x]? Endurance    []? Fine Coordination                         [x]? Balance      []? Vision/perception   [x]? Sensation      []? Gross Motor Coordination             []? ROM           []? Delirium                   []? Motor Control      OT PLAN OF CARE   OT POC based on physician orders, patient diagnosis and results of clinical assessment     Frequency/Duration  1-3days/wk for 2 weeks PRN   Specific OT Treatment Interventions to include:   [x]? ?ADL retraining/adapted techniques and AE recommendations to increase functional independence within precautions                    [x]? ? Energy conservation techniques to improve tolerance for selfcare routine   [x]? ? Functional transfer/mobility training/DME recommendations for increased independence, safety and fall prevention         [x]? ?Patient/family education to increase safety and functional independence             [x]? ? Environmental modifications for safe mobility and completion of ADLs                             [x]? ? Therapeutic activity to improve functional performance during ADLs.                                         [x]? ? Therapeutic exercise to improve tolerance and functional strength for ADLs   [x]? ? Balance retraining/tolerance tasks for facilitation of postural control with dynamic challenges during ADLs .   [x]? Positioning to improve functional independence  []? ?      Recommended Adaptive Equipment: TBD      Home Living: Pt lives with wife, 1 story with 3 steps/rail to enter   Bathroom setup: walk in [employer] owned: Home O2,walker,cane, shower chair     Prior Level of Function: Independent  with ADLs , assist with IADLs; ambulated with no device  Driving: yes   Occupation: retired banker      Pain Level: Pt declined having pain. Cognition: Alert and oriented. Functional Assessment:  AM-PAC Daily Activity Raw Score: 19/24    Initial Eval Status  Date: 6/15/21 Treatment Status  Date: 6/17/21  STGs = LTGs  Time frame: 10-14 days   Feeding Independent        Grooming SBA/set-up,seated   Decrease standing tolerance  Independent   Mod I    UB Dressing SBA/set-up   independent  Mod I    LB Dressing Min A  Able to lift/cross leg to reach socks     Increase time and assist  needed to complete ADL activity d/t SOB    independent  Mod I    Bathing Min A Recommend use of shower chair for energy conservation purposes. Mod I    Toileting Independent        Bed Mobility  Independent   Supine to sit        Functional Transfers Supervision   Sit-stand from bed, commode   independent  Mod I    Functional Mobility SBA, no device, O2  Ambulated to/from bathroom     Independent in room without device.   Mod I  with good tolerance    Balance Sitting:     Static:  Independent     Dynamic:SBA   Standing: SBA   Independent    Activity Tolerance Patient on 3 1/2 L   At rest 90  Ambulating to bathroom and returning O2 sats 87- 88%  SOB noted   Seated in chair, resting improved to 89-90%     Respiratory present in room after session for breathing tx Pt with fair tolerance for activity.   Good knowledge of energy conservation techniques verbalized.    Good  with ADL activity        Comments:  Pt walking in room upon therapist arrival.  States he is feeling better and is anticipating going home today.  Pt with good O2 line management in room. Remained seated on couch at end of the session. Education/treatment:  ADL retraining with facilitation of movement and implementation of energy conservation techniques for self care skills. Pt education of home safety. · Pt has made  progress towards set goals.    ·     Time In: 1:20  Time Out: 1:30      Min Units   Therapeutic Ex 94669     Therapeutic Activities 60424     ADL/Self Care 90833 10 1   Orthotic Management 30982     Neuro Re-Ed 11079     Non-Billable Time     TOTAL TIMED TREATMENT 10 300 [facility] GORDO/L [id number]

## 2021-06-17 NOTE — PROGRESS NOTES
Subjective:  Still easily dyspneic,but improved. The patient is awake and alert. No problems overnight. Denies chest pain, angina, and   Denies abdominal pain. Tolerating diet. No nausea or vomiting. Objective:    /65   Pulse 66   Temp 97.4 °F (36.3 °C) (Oral)   Resp 18   Ht 5' 7\" (1.702 m)   Wt 184 lb 5 oz (83.6 kg)   SpO2 93%   BMI 28.87 kg/m²     Heart:  RRR, no murmurs, gallops, or rubs. Lungs:  CTA bilaterally, no wheeze, rales or rhonchi  Abd: bowel sounds present, nontender, nondistended, no masses  Extrem:  No clubbing, cyanosis, or edema    CBC:   Lab Results   Component Value Date    WBC 11.5 06/17/2021    RBC 3.92 06/17/2021    HGB 12.4 06/17/2021    HCT 37.6 06/17/2021    MCV 95.9 06/17/2021    MCH 31.6 06/17/2021    MCHC 33.0 06/17/2021    RDW 12.4 06/17/2021     06/17/2021    MPV 9.2 06/17/2021     BMP:    Lab Results   Component Value Date     06/17/2021    K 4.0 06/17/2021    CL 97 06/17/2021    CO2 33 06/17/2021    BUN 12 06/17/2021    LABALBU 4.3 06/06/2021    CREATININE 0.6 06/17/2021    CALCIUM 8.5 06/17/2021    GFRAA >60 06/17/2021    LABGLOM >60 06/17/2021    GLUCOSE 103 06/17/2021        Assessment:    Patient Active Problem List   Diagnosis    COPD with acute exacerbation ([address]    Hypertension    Anxiety    Lactic acidosis    Hyperglycemia, drug-induced    Acute respiratory failure with hypoxia (HCC)    Type 2 diabetes mellitus (HCC)    Elevated troponin       Plan:  DC planning in progress. Will DC after Pulmo sees.         Javier Wellington MD  12:31 PM  6/17/2021

## 2021-07-07 PROBLEM — R79.89 ELEVATED TROPONIN: Status: RESOLVED | Noted: 2021-06-07 | Resolved: 2021-07-07

## 2021-07-07 PROBLEM — R77.8 ELEVATED TROPONIN: Status: RESOLVED | Noted: 2021-06-07 | Resolved: 2021-07-07

## 2021-07-18 NOTE — DISCHARGE SUMMARY
Physician Discharge Summary     Patient ID:  Woody Garcia  [id number]  79 y.o.  1950    Admit date: 6/6/2021    Discharge date and time: 6/17/2021  6:07 PM     Admission Diagnoses: Acute respiratory failure with hypoxia ([facility] [J96.01]    Discharge Diagnoses: COPD with acute exacerbation ([facility] [address]    Hypertension    Anxiety    Lactic acidosis    Hyperglycemia, drug-induced    Acute respiratory failure with hypoxia (HCC)    Type 2 diabetes mellitus (HCC)    Elevated troponin      Consults: cardiology and pulmonary/intensive care    Procedures: None    Hospital Course: The patient is a 79 y.o. male patient of Dr Mika Wick who presents with as per ER - Patient is a 72-year-old male presenting the emergency department for shortness of breath. He was diagnosed today related to urgent care with influenza A. He has had fevers, arthralgias, shortness of breath, was found to be 86% at home on his home pulse ox, gradual in onset, persistent, worsens with exertion, nothing makes it better, associate with a fever. He denies any chest pain, does state some chest tightness, denies any ear pain, eye pain. Does have a sore throat and stuffy nose. Follows with Dr. Rosemarie Billingsley at [facility].  6/10/2021 Patient not candidate for lung transplantation but can be soon assessed for lung volume reduction surgery vs endobronchial stent placement for advanced emphysematous disease. Patient will need to per joe more extensive work up and possible lung surgeries as an out patient . Hospital course consisted of continued antiviral/ATBs and respiratory therapy.     Discharge Exam:  See progress note from today    Disposition: home    Condition at discharge:  Stable    Patient Instructions:   Discharge Medication List as of 6/17/2021  4:38 PM      START taking these medications    Details   Arformoterol Tartrate (BROVANA) 15 MCG/2ML NEBU Take 2 mLs by nebulization 2 times daily, Disp-120 mL, R-3Normal      budesonide (PULMICORT) 0.5 MG/2ML nebulizer suspension Take 1 mL by nebulization 2 times daily, Disp-60 ampule, R-3Normal      levalbuterol (XOPENEX) 1.25 MG/0.5ML nebulizer solution Take 0.5 mLs by nebulization every 4 hours as needed for Wheezing, Disp-60 each, R-3Normal      ondansetron (ZOFRAN-ODT) 4 MG disintegrating tablet Take 1 tablet by mouth every 8 hours as needed for Nausea or Vomiting, Disp-20 tablet, R-0Normal      !! predniSONE (DELTASONE) 10 MG tablet Take 3 tablets by mouth daily for 10 days, Disp-30 tablet, R-0Normal      !! predniSONE (DELTASONE) 20 MG tablet Take 1 tablet by mouth daily for 10 days, Disp-10 tablet, R-0Normal      !! predniSONE (DELTASONE) 10 MG tablet Take 1 tablet by mouth daily for 10 days, Disp-10 tablet, R-0Normal       !! - Potential duplicate medications found. Please discuss with provider. CONTINUE these medications which have CHANGED    Details   guaiFENesin 400 MG tablet Take 1 tablet by mouth 4 times daily, Disp-56 tablet, R-0Normal         CONTINUE these medications which have NOT CHANGED    Details   zinc gluconate 50 MG tablet Take 50 mg by mouth dailyHistorical Med      B-COMPLEX-C PO Take by mouthHistorical Med      loratadine (CLARITIN) 10 MG capsule Take 10 mg by mouth dailyHistorical Med      tamsulosin (FLOMAX) 0.4 MG capsule Take 0.8 mg by mouth dailyHistorical Med      Coenzyme Q10 (CO Q 10 PO) Take by mouth 2 times daily Historical Med      Omega-3 Fatty Acids (FISH OIL) 1000 MG CAPS Take 1,000 mg by mouth 2 times daily Historical Med      Flaxseed, Linseed, 1000 MG CAPS Take  by mouth 2 times daily. amlodipine-benazepril (LOTREL) 5-20 MG per capsule Take 1 capsule by mouth daily. lorazepam (ATIVAN) 0.5 MG tablet Take 0.5 mg by mouth 2 times daily as needed. Historical Med      gabapentin (NEURONTIN) 600 MG tablet Take 600 mg by mouth 3 times daily.            STOP taking these medications       cloNIDine (CATAPRES) 0.1 MG tablet Comments:   Reason for Stopping:

## 2021-12-22 ENCOUNTER — HOSPITAL ENCOUNTER (EMERGENCY)
Age: 71
Discharge: HOME | End: 2021-12-22
Payer: COMMERCIAL

## 2021-12-22 ENCOUNTER — APPOINTMENT (OUTPATIENT)
Dept: CT IMAGING | Age: 71
End: 2021-12-22
Payer: COMMERCIAL

## 2021-12-22 VITALS
RESPIRATION RATE: 16 BRPM | OXYGEN SATURATION: 96 % | WEIGHT: 178 LBS | HEIGHT: 67 IN | TEMPERATURE: 97.9 F | SYSTOLIC BLOOD PRESSURE: 162 MMHG | BODY MASS INDEX: 27.94 KG/M2 | DIASTOLIC BLOOD PRESSURE: 74 MMHG | HEART RATE: 92 BPM

## 2021-12-22 DIAGNOSIS — S32.020A COMPRESSION FRACTURE OF L2 VERTEBRA, INITIAL ENCOUNTER (HCC): Primary | ICD-10-CM

## 2021-12-22 PROCEDURE — 6360000002 HC RX W HCPCS: Performed by: PHYSICIAN ASSISTANT

## 2021-12-22 PROCEDURE — 6370000000 HC RX 637 (ALT 250 FOR IP): Performed by: PHYSICIAN ASSISTANT

## 2021-12-22 PROCEDURE — 96372 THER/PROPH/DIAG INJ SC/IM: CPT

## 2021-12-22 PROCEDURE — 99284 EMERGENCY DEPT VISIT MOD MDM: CPT

## 2021-12-22 PROCEDURE — 72131 CT LUMBAR SPINE W/O DYE: CPT

## 2021-12-22 RX ORDER — OXYCODONE HYDROCHLORIDE AND ACETAMINOPHEN 5; 325 MG/1; MG/1
2 TABLET ORAL ONCE
Status: COMPLETED | OUTPATIENT
Start: 2021-12-22 | End: 2021-12-22

## 2021-12-22 RX ORDER — MORPHINE SULFATE 4 MG/ML
4 INJECTION, SOLUTION INTRAMUSCULAR; INTRAVENOUS ONCE
Status: COMPLETED | OUTPATIENT
Start: 2021-12-22 | End: 2021-12-22

## 2021-12-22 RX ORDER — DIAZEPAM 5 MG/1
10 TABLET ORAL EVERY 8 HOURS PRN
Qty: 15 TABLET | Refills: 0 | Status: SHIPPED | OUTPATIENT
Start: 2021-12-22 | End: 2021-12-25

## 2021-12-22 RX ORDER — OXYCODONE HYDROCHLORIDE AND ACETAMINOPHEN 5; 325 MG/1; MG/1
1 TABLET ORAL EVERY 6 HOURS PRN
Qty: 10 TABLET | Refills: 0 | Status: SHIPPED | OUTPATIENT
Start: 2021-12-22 | End: 2021-12-25

## 2021-12-22 RX ADMIN — OXYCODONE AND ACETAMINOPHEN 2 TABLET: 5; 325 TABLET ORAL at 18:58

## 2021-12-22 RX ADMIN — MORPHINE SULFATE 4 MG: 4 INJECTION, SOLUTION INTRAMUSCULAR; INTRAVENOUS at 18:58

## 2021-12-22 ASSESSMENT — PAIN SCALES - GENERAL: PAINLEVEL_OUTOF10: 9

## 2021-12-22 NOTE — ED PROVIDER NOTES
[address] [place] [address]  Department of Emergency Medicine   ED  Encounter Note  Admit Date/RoomTime: 2021  5:46 PM  ED Room: 37/37    NAME: Mariano Demarco  : 1950  MRN: [id number]     Chief Complaint:  No chief complaint on file. FIRST CONTACT WITH PATIENT: 6:17 PM EST    History of Present Illness       Mariano Demarco is a 70 y.o. old male with a prior history of prior spinal fusion of L54-L5 in  by dr. Debbi Recinos in [address] [place] [address], presents to the emergency department by private vehicle, for traumatic acute, sharp midline upper lumbar spine pain without radiation, for a few hour(s) prior to arrival.  He fell off curb outside and landed on back. Since onset the symptoms have been remaining constant and is moderate-to-severe. He has associated signs & symptoms of nothing additional.   He denies any bladder or bowel incontinence , new weakness, tingling or paresthesias, recent back injection, recent spinal surgery, recent spinal/chiropractic manipulation or history of IVDA. The pain is aggraveated by any movement and standing and relieved by sitting. He is not currently enrolled in an pain management program or managed by PCP or back specialist.        ROS   Pertinent positives and negatives are stated within HPI, all other systems reviewed and are negative. Past Medical History:  has a past medical history of Asthma, COPD (chronic obstructive pulmonary disease) ([address] Hypertension, and Neuropathic pain. Surgical History:  has a past surgical history that includes hernia repair and back surgery. Social History:  reports that he has been smoking cigarettes. He has never used smokeless tobacco. He reports current alcohol use of about 4.0 standard drinks of alcohol per week. He reports that he does not use drugs. Family History: family history is not on file.      Allergies: Onion    Physical Exam   Oxygen Saturation Interpretation: Normal.        ED Triage Vitals [12/22/21 1744]   BP Temp Temp src Pulse Resp SpO2 Height Weight   (!) 162/74 97.9 °F (36.6 °C) -- 92 16 96 % 5' 7\" (1.702 m) 178 lb (80.7 kg)         Constitutional:  Alert, development consistent with age. HEENT:  NC/NT. Airway patent. Neck:  Normal ROM. Supple. Respiratory:  Clear to auscultation and breath sounds equal.  CV:  Regular rate and rhythm, normal heart sounds, without pathological murmurs, ectopy, gallops, or rubs. GI:  Abdomen Soft, nontender, good bowel sounds. No firm or pulsatile mass. Back: upper lumbar spine paraspinal.             Tenderness: Moderate. Swelling: no.              Range of Motion: diminished range of motion. CVA Tenderness: No CVA tenderness. Straight leg raising:  Bilateral negative. Skin:  no wounds, erythema, or swelling. Distal Function:              Motor deficit: none. Sensory deficit: none. Pulse deficit: none. Calf Tenderness:  No Bilateral.               Edema:  none Both lower extremity(s). Deep Tendon Reflexes (DTR's): Bilateral Knee/Patellar reflex (L2-L4):  2 - normal  Gait:  Limited d/t pain. Integument:  Normal turgor. Warm, dry, without visible rash. Lymphatics: No lymphangitis or adenopathy noted. Neurological:  Oriented. Motor functions intact. Lab / Imaging Results   (All laboratory and radiology results have been personally reviewed by myself)  Labs:  No results found for this visit on 12/22/21. Imaging: All Radiology results interpreted by Radiologist unless otherwise noted. CT LUMBAR SPINE WO CONTRAST   Final Result   1. Acute L2 vertebral body fracture without significant height loss. 2. Fusion hardware at L4-L5 appears intact without evidence of loosening. 3. Degenerative changes are present. 4. The abdominal aorta is mildly dilated, measuring up to 2.6 cm in diameter. Please see the recommendation section below. RECOMMENDATIONS:   2.6 cm abdominal aortic dilation. Recommend follow-up every 5 years. Reference: J Am Tamela Radiol 5500;33:580-207. ED Course / Medical Decision Making     Medications   oxyCODONE-acetaminophen (PERCOCET) 5-325 MG per tablet 2 tablet (has no administration in time range)   morphine sulfate (PF) injection 4 mg (has no administration in time range)        Medical Decision Making:    Imaging was obtained based on high suspicion for fracture / bony abnormality, internal hardware / prothesis complication as per history/physical findings. .At this time the patient is without objective evidence of an acute process requiring inpatient management. Patient is stable for outpatient management at this point given the nature of his injury and degree of discomfort. I will refer him to neurosurgery on call. Plan of Care/Counseling:  Snow Burnett reviewed today's visit with the patient in addition to providing specific details for the plan of care and counseling regarding the diagnosis and prognosis. Questions are answered at this time and are agreeable with the plan. Assessment      1. Compression fracture of L2 vertebra, initial encounter [facility])      Plan   Discharged home. Patient condition is good    New Medications     New Prescriptions    DIAZEPAM (VALIUM) 5 MG TABLET    Take 2 tablets by mouth every 8 hours as needed for Anxiety for up to 3 days. OXYCODONE-ACETAMINOPHEN (PERCOCET) 5-325 MG PER TABLET    Take 1 tablet by mouth every 6 hours as needed for Pain for up to 3 days. Electronically signed by MINERVA Burnett   DD: 12/22/21  **This report was transcribed using voice recognition software. Every effort was made to ensure accuracy; however, inadvertent computerized transcription errors may be present.   END OF ED PROVIDER NOTE       Snow Osullivan  12/22/21 5946

## 2022-01-19 ENCOUNTER — HOSPITAL ENCOUNTER (OUTPATIENT)
Age: 72
Discharge: HOME | End: 2022-01-21

## 2022-01-19 PROCEDURE — 88311 DECALCIFY TISSUE: CPT

## 2022-01-19 PROCEDURE — 88307 TISSUE EXAM BY PATHOLOGIST: CPT

## 2022-09-30 PROCEDURE — 99285 EMERGENCY DEPT VISIT HI MDM: CPT

## 2022-09-30 PROCEDURE — 96375 TX/PRO/DX INJ NEW DRUG ADDON: CPT

## 2022-09-30 PROCEDURE — 96365 THER/PROPH/DIAG IV INF INIT: CPT

## 2022-10-01 ENCOUNTER — HOSPITAL ENCOUNTER (INPATIENT)
Age: 72
LOS: 7 days | Discharge: HOME | DRG: 190 | End: 2022-10-08
Attending: EMERGENCY MEDICINE | Admitting: INTERNAL MEDICINE
Payer: COMMERCIAL

## 2022-10-01 ENCOUNTER — APPOINTMENT (OUTPATIENT)
Dept: GENERAL RADIOLOGY | Age: 72
DRG: 190 | End: 2022-10-01
Payer: COMMERCIAL

## 2022-10-01 DIAGNOSIS — J18.9 PNEUMONIA DUE TO INFECTIOUS ORGANISM, UNSPECIFIED LATERALITY, UNSPECIFIED PART OF LUNG: ICD-10-CM

## 2022-10-01 DIAGNOSIS — J44.1 COPD EXACERBATION (HCC): Primary | ICD-10-CM

## 2022-10-01 LAB
ADENOVIRUS BY PCR: NOT DETECTED
ALBUMIN SERPL-MCNC: 4 G/DL (ref 3.5–5.2)
ALP BLD-CCNC: 67 U/L (ref 40–129)
ALT SERPL-CCNC: 27 U/L (ref 0–40)
ANION GAP SERPL CALCULATED.3IONS-SCNC: 12 MMOL/L (ref 7–16)
AST SERPL-CCNC: 22 U/L (ref 0–39)
B.E.: 2.7 MMOL/L (ref -3–3)
BACTERIA: ABNORMAL /HPF
BASOPHILS ABSOLUTE: 0 E9/L (ref 0–0.2)
BASOPHILS RELATIVE PERCENT: 0 % (ref 0–2)
BILIRUB SERPL-MCNC: 1.3 MG/DL (ref 0–1.2)
BILIRUBIN URINE: NEGATIVE
BLOOD, URINE: NEGATIVE
BORDETELLA PARAPERTUSSIS BY PCR: NOT DETECTED
BORDETELLA PERTUSSIS BY PCR: NOT DETECTED
BUN BLDV-MCNC: 30 MG/DL (ref 6–23)
CALCIUM SERPL-MCNC: 9.8 MG/DL (ref 8.6–10.2)
CHLAMYDOPHILIA PNEUMONIAE BY PCR: NOT DETECTED
CHLORIDE BLD-SCNC: 94 MMOL/L (ref 98–107)
CLARITY: CLEAR
CO2: 29 MMOL/L (ref 22–29)
COHB: 1.1 % (ref 0–1.5)
COLOR: ABNORMAL
CORONAVIRUS 229E BY PCR: NOT DETECTED
CORONAVIRUS HKU1 BY PCR: NOT DETECTED
CORONAVIRUS NL63 BY PCR: NOT DETECTED
CORONAVIRUS OC43 BY PCR: NOT DETECTED
CREAT SERPL-MCNC: 1.2 MG/DL (ref 0.7–1.2)
CRITICAL: ABNORMAL
DATE ANALYZED: ABNORMAL
DATE OF COLLECTION: ABNORMAL
EOSINOPHILS ABSOLUTE: 0 E9/L (ref 0.05–0.5)
EOSINOPHILS RELATIVE PERCENT: 0 % (ref 0–6)
GFR AFRICAN AMERICAN: >60
GFR NON-AFRICAN AMERICAN: 60 ML/MIN/1.73
GLUCOSE BLD-MCNC: 178 MG/DL (ref 74–99)
GLUCOSE URINE: NEGATIVE MG/DL
HCO3: 27.1 MMOL/L (ref 22–26)
HCT VFR BLD CALC: 41.7 % (ref 37–54)
HEMOGLOBIN: 13.8 G/DL (ref 12.5–16.5)
HHB: 4.5 % (ref 0–5)
HUMAN METAPNEUMOVIRUS BY PCR: NOT DETECTED
HUMAN RHINOVIRUS/ENTEROVIRUS BY PCR: NOT DETECTED
HYALINE CASTS: ABNORMAL /LPF (ref 0–2)
INFLUENZA A BY PCR: NOT DETECTED
INFLUENZA B BY PCR: NOT DETECTED
KETONES, URINE: ABNORMAL MG/DL
LAB: ABNORMAL
LACTIC ACID: 2.8 MMOL/L (ref 0.5–2.2)
LEUKOCYTE ESTERASE, URINE: ABNORMAL
LYMPHOCYTES ABSOLUTE: 0.59 E9/L (ref 1.5–4)
LYMPHOCYTES RELATIVE PERCENT: 2.6 % (ref 20–42)
Lab: ABNORMAL
MCH RBC QN AUTO: 32.1 PG (ref 26–35)
MCHC RBC AUTO-ENTMCNC: 33.1 % (ref 32–34.5)
MCV RBC AUTO: 97 FL (ref 80–99.9)
METHB: 0.3 % (ref 0–1.5)
MODE: ABNORMAL
MONOCYTES ABSOLUTE: 0.78 E9/L (ref 0.1–0.95)
MONOCYTES RELATIVE PERCENT: 3.5 % (ref 2–12)
MYCOPLASMA PNEUMONIAE BY PCR: NOT DETECTED
NEUTROPHILS ABSOLUTE: 18.42 E9/L (ref 1.8–7.3)
NEUTROPHILS RELATIVE PERCENT: 93.9 % (ref 43–80)
NITRITE, URINE: NEGATIVE
NUCLEATED RED BLOOD CELLS: 0 /100 WBC
O2 CONTENT: 17.8 ML/DL
O2 SATURATION: 95.4 % (ref 92–98.5)
O2HB: 94.1 % (ref 94–97)
OPERATOR ID: ABNORMAL
PARAINFLUENZA VIRUS 1 BY PCR: NOT DETECTED
PARAINFLUENZA VIRUS 2 BY PCR: NOT DETECTED
PARAINFLUENZA VIRUS 3 BY PCR: NOT DETECTED
PARAINFLUENZA VIRUS 4 BY PCR: NOT DETECTED
PATIENT TEMP: 37 C
PCO2: 41.1 MMHG (ref 35–45)
PDW BLD-RTO: 12.4 FL (ref 11.5–15)
PH BLOOD GAS: 7.44 (ref 7.35–7.45)
PH UA: 6 (ref 5–9)
PLATELET # BLD: 186 E9/L (ref 130–450)
PMV BLD AUTO: 9.8 FL (ref 7–12)
PO2: 72.8 MMHG (ref 75–100)
POLYCHROMASIA: ABNORMAL
POTASSIUM REFLEX MAGNESIUM: 4.1 MMOL/L (ref 3.5–5)
PRO-BNP: 948 PG/ML (ref 0–125)
PROCALCITONIN: 5.43 NG/ML (ref 0–0.08)
PROTEIN UA: ABNORMAL MG/DL
RBC # BLD: 4.3 E12/L (ref 3.8–5.8)
RBC UA: ABNORMAL /HPF (ref 0–2)
RESPIRATORY SYNCYTIAL VIRUS BY PCR: NOT DETECTED
SARS-COV-2, NAAT: NOT DETECTED
SARS-COV-2, PCR: NOT DETECTED
SODIUM BLD-SCNC: 135 MMOL/L (ref 132–146)
SOURCE, BLOOD GAS: ABNORMAL
SPECIFIC GRAVITY UA: 1.02 (ref 1–1.03)
THB: 13.4 G/DL (ref 11.5–16.5)
TIME ANALYZED: 631
TOTAL PROTEIN: 6.5 G/DL (ref 6.4–8.3)
TROPONIN, HIGH SENSITIVITY: 32 NG/L (ref 0–11)
TROPONIN, HIGH SENSITIVITY: 33 NG/L (ref 0–11)
UROBILINOGEN, URINE: 0.2 E.U./DL
WBC # BLD: 19.6 E9/L (ref 4.5–11.5)
WBC UA: ABNORMAL /HPF (ref 0–5)

## 2022-10-01 PROCEDURE — 94660 CPAP INITIATION&MGMT: CPT

## 2022-10-01 PROCEDURE — 6360000002 HC RX W HCPCS: Performed by: STUDENT IN AN ORGANIZED HEALTH CARE EDUCATION/TRAINING PROGRAM

## 2022-10-01 PROCEDURE — 84145 PROCALCITONIN (PCT): CPT

## 2022-10-01 PROCEDURE — 83605 ASSAY OF LACTIC ACID: CPT

## 2022-10-01 PROCEDURE — 82805 BLOOD GASES W/O2 SATURATION: CPT

## 2022-10-01 PROCEDURE — 71045 X-RAY EXAM CHEST 1 VIEW: CPT

## 2022-10-01 PROCEDURE — 94640 AIRWAY INHALATION TREATMENT: CPT

## 2022-10-01 PROCEDURE — 6360000002 HC RX W HCPCS

## 2022-10-01 PROCEDURE — 87088 URINE BACTERIA CULTURE: CPT

## 2022-10-01 PROCEDURE — 2580000003 HC RX 258: Performed by: STUDENT IN AN ORGANIZED HEALTH CARE EDUCATION/TRAINING PROGRAM

## 2022-10-01 PROCEDURE — 36415 COLL VENOUS BLD VENIPUNCTURE: CPT

## 2022-10-01 PROCEDURE — 85025 COMPLETE CBC W/AUTO DIFF WBC: CPT

## 2022-10-01 PROCEDURE — 6370000000 HC RX 637 (ALT 250 FOR IP): Performed by: INTERNAL MEDICINE

## 2022-10-01 PROCEDURE — 6360000002 HC RX W HCPCS: Performed by: INTERNAL MEDICINE

## 2022-10-01 PROCEDURE — 87040 BLOOD CULTURE FOR BACTERIA: CPT

## 2022-10-01 PROCEDURE — 0202U NFCT DS 22 TRGT SARS-COV-2: CPT

## 2022-10-01 PROCEDURE — 87449 NOS EACH ORGANISM AG IA: CPT

## 2022-10-01 PROCEDURE — 2060000000 HC ICU INTERMEDIATE R&B

## 2022-10-01 PROCEDURE — 93005 ELECTROCARDIOGRAM TRACING: CPT | Performed by: NURSE PRACTITIONER

## 2022-10-01 PROCEDURE — 81001 URINALYSIS AUTO W/SCOPE: CPT

## 2022-10-01 PROCEDURE — 84484 ASSAY OF TROPONIN QUANT: CPT

## 2022-10-01 PROCEDURE — 80053 COMPREHEN METABOLIC PANEL: CPT

## 2022-10-01 PROCEDURE — 2500000003 HC RX 250 WO HCPCS: Performed by: STUDENT IN AN ORGANIZED HEALTH CARE EDUCATION/TRAINING PROGRAM

## 2022-10-01 PROCEDURE — 83880 ASSAY OF NATRIURETIC PEPTIDE: CPT

## 2022-10-01 PROCEDURE — 2700000000 HC OXYGEN THERAPY PER DAY

## 2022-10-01 PROCEDURE — 87635 SARS-COV-2 COVID-19 AMP PRB: CPT

## 2022-10-01 RX ORDER — GABAPENTIN 300 MG/1
600 CAPSULE ORAL 3 TIMES DAILY
Status: DISCONTINUED | OUTPATIENT
Start: 2022-10-01 | End: 2022-10-08 | Stop reason: HOSPADM

## 2022-10-01 RX ORDER — LEVOFLOXACIN 5 MG/ML
750 INJECTION, SOLUTION INTRAVENOUS
Status: DISCONTINUED | OUTPATIENT
Start: 2022-10-01 | End: 2022-10-03 | Stop reason: DRUGHIGH

## 2022-10-01 RX ORDER — TAMSULOSIN HYDROCHLORIDE 0.4 MG/1
0.8 CAPSULE ORAL NIGHTLY
Status: DISCONTINUED | OUTPATIENT
Start: 2022-10-01 | End: 2022-10-08 | Stop reason: HOSPADM

## 2022-10-01 RX ORDER — LISINOPRIL 20 MG/1
20 TABLET ORAL DAILY
Status: DISCONTINUED | OUTPATIENT
Start: 2022-10-01 | End: 2022-10-08 | Stop reason: HOSPADM

## 2022-10-01 RX ORDER — ENOXAPARIN SODIUM 100 MG/ML
40 INJECTION SUBCUTANEOUS DAILY
Status: DISCONTINUED | OUTPATIENT
Start: 2022-10-01 | End: 2022-10-08 | Stop reason: HOSPADM

## 2022-10-01 RX ORDER — MULTIVIT WITH MINERALS/LUTEIN
1000 TABLET ORAL DAILY
COMMUNITY

## 2022-10-01 RX ORDER — METHYLPREDNISOLONE SODIUM SUCCINATE 125 MG/2ML
125 INJECTION, POWDER, LYOPHILIZED, FOR SOLUTION INTRAMUSCULAR; INTRAVENOUS ONCE
Status: COMPLETED | OUTPATIENT
Start: 2022-10-01 | End: 2022-10-01

## 2022-10-01 RX ORDER — LEVALBUTEROL INHALATION SOLUTION 0.63 MG/3ML
0.63 SOLUTION RESPIRATORY (INHALATION) ONCE
Status: COMPLETED | OUTPATIENT
Start: 2022-10-01 | End: 2022-10-01

## 2022-10-01 RX ORDER — METHYLPREDNISOLONE SODIUM SUCCINATE 40 MG/ML
40 INJECTION, POWDER, LYOPHILIZED, FOR SOLUTION INTRAMUSCULAR; INTRAVENOUS EVERY 8 HOURS
Status: DISCONTINUED | OUTPATIENT
Start: 2022-10-01 | End: 2022-10-01 | Stop reason: SDUPTHER

## 2022-10-01 RX ORDER — AMLODIPINE BESYLATE 5 MG/1
5 TABLET ORAL DAILY
Status: DISCONTINUED | OUTPATIENT
Start: 2022-10-01 | End: 2022-10-08 | Stop reason: HOSPADM

## 2022-10-01 RX ORDER — BUDESONIDE 0.5 MG/2ML
0.5 INHALANT ORAL 2 TIMES DAILY
Status: DISCONTINUED | OUTPATIENT
Start: 2022-10-01 | End: 2022-10-08 | Stop reason: HOSPADM

## 2022-10-01 RX ORDER — MULTIVIT WITH MINERALS/LUTEIN
250 TABLET ORAL DAILY
COMMUNITY

## 2022-10-01 RX ORDER — AMLODIPINE BESYLATE AND BENAZEPRIL HYDROCHLORIDE 5; 20 MG/1; MG/1
1 CAPSULE ORAL DAILY
Status: DISCONTINUED | OUTPATIENT
Start: 2022-10-01 | End: 2022-10-01

## 2022-10-01 RX ORDER — ACETAMINOPHEN 325 MG/1
650 TABLET ORAL EVERY 4 HOURS PRN
Status: DISCONTINUED | OUTPATIENT
Start: 2022-10-01 | End: 2022-10-08 | Stop reason: HOSPADM

## 2022-10-01 RX ORDER — LORAZEPAM 0.5 MG/1
0.5 TABLET ORAL 2 TIMES DAILY PRN
Status: DISCONTINUED | OUTPATIENT
Start: 2022-10-01 | End: 2022-10-08 | Stop reason: HOSPADM

## 2022-10-01 RX ORDER — GUAIFENESIN 400 MG/1
400 TABLET ORAL 3 TIMES DAILY
Status: DISCONTINUED | OUTPATIENT
Start: 2022-10-01 | End: 2022-10-08 | Stop reason: HOSPADM

## 2022-10-01 RX ORDER — CALCIUM CARBONATE 500(1250)
1000 TABLET ORAL DAILY
Status: DISCONTINUED | OUTPATIENT
Start: 2022-10-01 | End: 2022-10-08 | Stop reason: HOSPADM

## 2022-10-01 RX ORDER — CETIRIZINE HYDROCHLORIDE 10 MG/1
10 TABLET ORAL DAILY
Status: DISCONTINUED | OUTPATIENT
Start: 2022-10-01 | End: 2022-10-08 | Stop reason: HOSPADM

## 2022-10-01 RX ORDER — LEVALBUTEROL 1.25 MG/.5ML
1.25 SOLUTION, CONCENTRATE RESPIRATORY (INHALATION) EVERY 4 HOURS PRN
Status: DISCONTINUED | OUTPATIENT
Start: 2022-10-01 | End: 2022-10-08 | Stop reason: HOSPADM

## 2022-10-01 RX ORDER — METHYLPREDNISOLONE SODIUM SUCCINATE 40 MG/ML
40 INJECTION, POWDER, LYOPHILIZED, FOR SOLUTION INTRAMUSCULAR; INTRAVENOUS EVERY 8 HOURS
Status: DISCONTINUED | OUTPATIENT
Start: 2022-10-01 | End: 2022-10-02

## 2022-10-01 RX ORDER — LORATADINE 10 MG/1
10 CAPSULE, LIQUID FILLED ORAL DAILY
Status: DISCONTINUED | OUTPATIENT
Start: 2022-10-01 | End: 2022-10-01

## 2022-10-01 RX ORDER — LEVALBUTEROL INHALATION SOLUTION 0.63 MG/3ML
0.63 SOLUTION RESPIRATORY (INHALATION) EVERY 6 HOURS PRN
Status: DISCONTINUED | OUTPATIENT
Start: 2022-10-01 | End: 2022-10-02

## 2022-10-01 RX ORDER — ARFORMOTEROL TARTRATE 15 UG/2ML
15 SOLUTION RESPIRATORY (INHALATION) 2 TIMES DAILY
Status: DISCONTINUED | OUTPATIENT
Start: 2022-10-01 | End: 2022-10-08 | Stop reason: HOSPADM

## 2022-10-01 RX ORDER — CALCIUM CARBONATE 500(1250)
1000 TABLET ORAL DAILY
COMMUNITY

## 2022-10-01 RX ADMIN — WATER 1000 MG: 1 INJECTION INTRAMUSCULAR; INTRAVENOUS; SUBCUTANEOUS at 06:25

## 2022-10-01 RX ADMIN — GABAPENTIN 600 MG: 300 CAPSULE ORAL at 12:31

## 2022-10-01 RX ADMIN — CETIRIZINE HYDROCHLORIDE 10 MG: 10 TABLET, FILM COATED ORAL at 12:32

## 2022-10-01 RX ADMIN — DOXYCYCLINE 100 MG: 100 INJECTION, POWDER, LYOPHILIZED, FOR SOLUTION INTRAVENOUS at 06:50

## 2022-10-01 RX ADMIN — AMLODIPINE BESYLATE 5 MG: 5 TABLET ORAL at 12:36

## 2022-10-01 RX ADMIN — METHYLPREDNISOLONE SODIUM SUCCINATE 125 MG: 125 INJECTION, POWDER, FOR SOLUTION INTRAMUSCULAR; INTRAVENOUS at 06:24

## 2022-10-01 RX ADMIN — LISINOPRIL 20 MG: 20 TABLET ORAL at 12:33

## 2022-10-01 RX ADMIN — LEVALBUTEROL 0.63 MG: 0.63 SOLUTION RESPIRATORY (INHALATION) at 06:46

## 2022-10-01 RX ADMIN — LEVOFLOXACIN 750 MG: 5 INJECTION, SOLUTION INTRAVENOUS at 16:41

## 2022-10-01 RX ADMIN — GABAPENTIN 600 MG: 300 CAPSULE ORAL at 20:45

## 2022-10-01 RX ADMIN — METHYLPREDNISOLONE SODIUM SUCCINATE 40 MG: 40 INJECTION, POWDER, LYOPHILIZED, FOR SOLUTION INTRAMUSCULAR; INTRAVENOUS at 20:45

## 2022-10-01 RX ADMIN — METHYLPREDNISOLONE SODIUM SUCCINATE 40 MG: 40 INJECTION, POWDER, LYOPHILIZED, FOR SOLUTION INTRAMUSCULAR; INTRAVENOUS at 12:35

## 2022-10-01 RX ADMIN — ENOXAPARIN SODIUM 40 MG: 100 INJECTION SUBCUTANEOUS at 12:34

## 2022-10-01 RX ADMIN — TAMSULOSIN HYDROCHLORIDE 0.8 MG: 0.4 CAPSULE ORAL at 20:45

## 2022-10-01 RX ADMIN — GUAIFENESIN 400 MG: 400 TABLET ORAL at 16:41

## 2022-10-01 RX ADMIN — GUAIFENESIN 400 MG: 400 TABLET ORAL at 20:45

## 2022-10-01 RX ADMIN — BUDESONIDE 500 MCG: 0.5 SUSPENSION RESPIRATORY (INHALATION) at 21:06

## 2022-10-01 RX ADMIN — ARFORMOTEROL TARTRATE 15 MCG: 15 SOLUTION RESPIRATORY (INHALATION) at 21:06

## 2022-10-01 RX ADMIN — GUAIFENESIN 400 MG: 400 TABLET ORAL at 12:32

## 2022-10-01 RX ADMIN — Medication 1000 MG: at 12:32

## 2022-10-01 ASSESSMENT — ENCOUNTER SYMPTOMS
ABDOMINAL DISTENTION: 0
SINUS PAIN: 0
COUGH: 1
DIARRHEA: 0
NAUSEA: 0
CHEST TIGHTNESS: 0
SINUS PRESSURE: 0
CONSTIPATION: 0
ABDOMINAL PAIN: 0
EYE DISCHARGE: 0
ANAL BLEEDING: 0
BACK PAIN: 0
VOMITING: 0
RHINORRHEA: 0
SHORTNESS OF BREATH: 1

## 2022-10-01 ASSESSMENT — PAIN - FUNCTIONAL ASSESSMENT
PAIN_FUNCTIONAL_ASSESSMENT: NONE - DENIES PAIN
PAIN_FUNCTIONAL_ASSESSMENT: NONE - DENIES PAIN

## 2022-10-01 ASSESSMENT — PAIN SCALES - GENERAL: PAINLEVEL_OUTOF10: 0

## 2022-10-01 NOTE — ED NOTES
The following labs labeled with pt sticker and tubed to lab:     [] Blue     [] Lavender   [] on ice  [] Green/yellow  [] Green/black [] on ice  [] Yellow  [] Red  [] Pink      [] COVID-19 swab    [] Rapid  [] PCR  [] Flu Swab  [] Strep Swab  [] Peds Viral Panel     [] Urine Sample  [] Pelvic Cultures  [x] Blood Cultures   [] Wound Cultures          Edwin García RN  10/01/22 3347

## 2022-10-01 NOTE — ED PROVIDER NOTES
FIRST PROVIDER CONTACT ASSESSMENT NOTE       Department of Emergency Medicine                 First Provider Note            10/1/22  2:49 AM EDT    Date of Encounter: No admission date for patient encounter. Patient Name: Antonieta Hurtado  : 1950  MRN: [id number]    Chief Complaint: Shortness of Breath (Patient stated he has been SOB all day today. Patient has a hx of Chronic COPD and wears 4L of oxygen at home. Patient's SPO2 is 96% on 4L of Oxygen)      History of Present Illness:   Antonieta Hurtado is a 70 y.o. male who presents to the ED alone and drove self to ED for shortness of breath that started this AM and states he has history of copd and uses 4L nc continuously and is afraid to walk because he feels \"shaky\" . He denies any chest pain, abdominal pain, leg swelling. He does complain of fever 99.9 F and chills. He denies any urinary symptoms. He is using with xopenox no relief. Focused Physical Exam:  VS:    ED Triage Vitals [10/01/22 0001]   BP Temp Temp Source Heart Rate Resp SpO2 Height Weight   104/62 97.3 °F (36.3 °C) Tympanic (!) 110 22 96 % 5' 7\" (1.702 m) 178 lb (80.7 kg)        Physical Ex: Constitutional: Alert and non-toxic. Medical History:  has a past medical history of Asthma, COPD (chronic obstructive pulmonary disease) ([address] Hypertension, and Neuropathic pain. Surgical History:  has a past surgical history that includes hernia repair and back surgery. Social History:  reports that he has been smoking cigarettes. He has never used smokeless tobacco. He reports current alcohol use of about 4.0 standard drinks per week. He reports that he does not use drugs. Family History: family history is not on file.     Allergies: Onion     Initial Plan of Care: Initiate Treatment-Testing, Proceed toTreatment Area When Bed Available for ED Attending/MLP to Continue Care      ---END OF FIRST PROVIDER CONTACT ASSESSMENT NOTE---  Electronically signed by KELECHI Olivier - OCTAVIO DD: 10/1/22      Markie Mcghee, APRN - CNP  10/01/22 5546

## 2022-10-01 NOTE — ED NOTES
The following labs labeled with pt sticker and tubed to lab:     [] Blue     [] Joe Espitia   [] on ice  [] Green/yellow  [] Green/black [] on ice  [] Yellow  [] Red  [] Pink      [x] COVID-19 swab    [x] Rapid  [] PCR  [] Flu Swab  [] Strep Swab  [] Peds Viral Panel     [] Urine Sample  [] Pelvic Cultures  [] Blood Cultures   [] Wound Cultures          Nory Harris RN  10/01/22 1593

## 2022-10-01 NOTE — ED NOTES
Report faxed. Otoniel Pang at ext [phone] confirmed receipt.  Patient updated and respiratory called for transport     Con Das RN  10/01/22 6244

## 2022-10-01 NOTE — ED NOTES
Assumed care of patient. Introduced self to patient as RN. Patient complains about the Bipap mask, small break given placed patient on 6 l nc. Drink of water offered, patient used phone to call family. Bipap mask back on call light within reach. Awaiting room assignment.      Hector Reyez RN  10/01/22 8473

## 2022-10-01 NOTE — ED NOTES
The following labs labeled with pt sticker and tubed to lab:     [] Blue     [x] Lavender   [] on ice  [x] Green/yellow  [] Green/black [] on ice  [] Yellow  [] Red  [] Pink    Aindarius Hannons on ice    [] COVID-19 swab    [] Rapid  [] PCR  [] Flu Swab  [] Strep Swab  [] Peds Viral Panel     [] Urine Sample  [] Pelvic Cultures  [x] Blood Cultures   [] Wound Cultures          Mora Landeros RN  10/01/22 [address], RN  10/01/22 7840

## 2022-10-01 NOTE — CONSULTS
Pulmonary Consultation    Admit Date: 10/1/2022  Requesting Physician: Kings Iraheta MD    CC:  COPD    HPI:  Mike Perez 70 y.o. male known to Dr. Phong Haddad, fully vaccinated for Jar[place]o with 2 boosters, with past medical history significant for chronic respiratory failure with hypoxia, baseline oxygen 2 to 5 L, emphysema, asthma, hypertension who presented to the ED 9/30 with complaints of increased shortness of breath, with no relief obtained after using Xopenex. Upon arrival to the ED, patient was in respiratory distress with tachypnea and retractions, placed on BiPAP for support. ABGs no hypercapnia. Chest x-ray right basilar interstitial opacities concerning for pneumonia. WBC 19.6, procalcitonin 5.43, respiratory panel negative. He received IV Solu-Medrol, Rocephin and IV doxycycline in the ED. Patient was admitted for COPD exacerbation and pneumonia. Pulmonary consulted for COPD    Patient seen on 4 L nasal cannula which is baseline pulse ox 95%. Patient states his shortness of breath began 2 days ago, progressively worsening and associated with chills without fever, and significant fatigue. He was using his rescue inhaler without relief. He denies cough, nausea, vomiting, diarrhea. He denies any recent travel or known sick contacts. PMH:    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     PSH:    Past Surgical History:   Procedure Laterality Date    BACK SURGERY      spinal fusion    HERNIA REPAIR            Respiratory ROS: positive for - shortness of breath and wheezing Otherwise, a complete review of systems is undertaken and is negative.     Social History:  Social History     Socioeconomic History    Marital status:      Spouse name: Not on file    Number of children: Not on file    Years of education: Not on file    Highest education level: Not on file Occupational History    Not on file   Tobacco Use    Smoking status: Some Days     Types: Cigarettes     Last attempt to quit: 10/12/2011     Years since quitting: 10.9    Smokeless tobacco: Never   Substance and Sexual Activity    Alcohol use: Yes     Alcohol/week: 4.0 standard drinks     Types: 4 Cans of beer per week     Comment: daily    Drug use: No    Sexual activity: Not on file   Other Topics Concern    Not on file   Social History Narrative    Not on file     Social Determinants of Health     Financial Resource Strain: Not on file   Food Insecurity: Not on file   Transportation Needs: Not on file   Physical Activity: Not on file   Stress: Not on file   Social Connections: Not on file   Intimate Partner Violence: Not on file   Housing Stability: Not on file       Family History:  No family history on file. Medications:       calcium elemental  1,000 mg Oral Daily    gabapentin  600 mg Oral TID    guaiFENesin  400 mg Oral TID    tamsulosin  0.8 mg Oral Nightly    enoxaparin  40 mg SubCUTAneous Daily    methylPREDNISolone  40 mg IntraVENous Q8H    amLODIPine  5 mg Oral Daily    And    lisinopril  20 mg Oral Daily    cetirizine  10 mg Oral Daily         Vitals:    VITALS:  BP (!) 93/56   Pulse 92   Temp 98.6 °F (37 °C) (Temporal)   Resp (!) 34   Ht 5' 7\" (1.702 m)   Wt 178 lb (80.7 kg)   SpO2 95%   BMI 27.88 kg/m²   24HR INTAKE/OUTPUT:  No intake or output data in the 24 hours ending 10/01/22 1415  CURRENT PULSE OXIMETRY:  SpO2: 95 %  24HR PULSE OXIMETRY RANGE:  SpO2  Av.5 %  Min: 94 %  Max: 98 %      EXAM:  General: No distress. Alert 4 L nasal cannula  Eyes:  No sclera icterus. No conjunctival injection. ENT: No discharge. Pharynx clear. Neck: Trachea midline. Normal thyroid. Resp: No accessory muscle use. No crackles. Expiratory wheezing, bilateral anterior lobes, L posterior. No rhonchi. CV: Regular rate. Regular rhythm. No mumur or rub. ABD: Non-tender. Non-distended.  Normal bowel sounds. Skin: Warm and dry. No nodule on exposed extremities. No rash on exposed extremities. Ext: No joint deformity. No clubbing. No cyanosis. +1 non pitting  Neuro: Awake. Follows commands      Lab Results:  CBC:   Recent Labs     10/01/22  0602   WBC 19.6*   HGB 13.8   HCT 41.7   MCV 97.0        BMP:   Recent Labs     10/01/22  0602      K 4.1   CL 94*   CO2 29   BUN 30*   CREATININE 1.2     LFT:   Recent Labs     10/01/22  0602   ALKPHOS 67   ALT 27   AST 22   PROT 6.5   BILITOT 1.3*   LABALBU 4.0     PT/INR: No results for input(s): PROTIME, INR in the last 72 hours. Cultures:  No results for input(s): CULTRESP in the last 72 hours. ABG:   Recent Labs     10/01/22  0631   PH 7.437   PO2 72.8*   PCO2 41.1   HCO3 27.1*   BE 2.7   O2SAT 95.4       Films:  XR CHEST 1 VIEW 10/1/2022  EXAMINATION: ONE XRAY VIEW OF THE CHEST 10/1/2022 3:49 am COMPARISON: 06/06/2021 HISTORY: ORDERING SYSTEM PROVIDED HISTORY: shortness of breath TECHNOLOGIST PROVIDED HISTORY: Reason for exam:->shortness of breath FINDINGS: The cardiomediastinal silhouette is normal.  There are new right basilar interstitial opacities. Overall background emphysematous changes noted. No pneumothorax or pleural effusion. Right basilar interstitial opacities, which could represent fibrotic change. Pneumonia is not entirely excluded. 6/6/2021      10/1/2022      Assessment:  COPD with acute exacerbation  Community-acquired pneumonia  Chronic respiratory failure with hypoxia on 2 to 5 L baseline nasal cannula      Plan:  Patient seen on 4 L nasal cannula with pulse ox 95%, he wears baseline 2 to 5 L at home. Continue supplemental O2 to maintain pulse ox greater than 90%. BiPAP 12/5 as needed for respiratory distress  Start IV Solu-Medrol 40 mg every 8 hours  With elevated WBC at 19.6, procalcitonin 5.43 and chest x-ray concerning for right basilar interstitial opacities.   Will order CT chest. S/p IV Rocephin and doxycycline. Start IV Levaquin - per office notes patient only responds to Levaquin. Repeat procalcitonin in 48 hours  Send sputum culture with gram stain, strep and Legionella antigens. Respiratory panel negative  Start Brovana, Pulmicort, Xopenex. Patient is on Breztri and Xopenex at home. Add incentive spirometry      Thank you for allowing us to see this patient in consultation. Please contact us with any questions.       Electronically signed by KELECHI Angulo CNP on 10/1/2022 at 2:15 PM

## 2022-10-01 NOTE — ED PROVIDER NOTES
HPI     Patient is a 70 y.o. male presents with a chief complaint of short of breath. This has been occurring for one day. Patient states that it gets better with breathing treatments. Patient states that it gets worse with nothing. Patient states that it is severe in severity. Patient states it was gradual in onset. Patient presents with a chief complaint of shortness of breath. Patient states that this has been going on for the past day. Patient has history of COPD. Patient denies any fevers but states that he has been having some chills. Patient denies any nausea or vomiting. Patient is chronically on 2 to 4 L of oxygen. No recent surgery, recent travel, history of blood clots in the past.  Review of Systems   Constitutional:  Positive for fatigue. Negative for activity change, appetite change and fever. HENT:  Negative for congestion, rhinorrhea, sinus pressure and sinus pain. Eyes:  Negative for discharge. Respiratory:  Positive for cough and shortness of breath. Negative for chest tightness. Cardiovascular:  Negative for chest pain and palpitations. Gastrointestinal:  Negative for abdominal distention, abdominal pain, anal bleeding, constipation, diarrhea, nausea and vomiting. Endocrine: Negative for polydipsia and polyuria. Genitourinary:  Negative for decreased urine volume, difficulty urinating, enuresis, flank pain, frequency and urgency. Musculoskeletal:  Negative for arthralgias, back pain and neck stiffness. Skin:  Negative for rash and wound. Neurological:  Negative for dizziness, weakness, light-headedness and headaches. Psychiatric/Behavioral:  Negative for agitation, behavioral problems and confusion. Physical Exam  Vitals and nursing note reviewed. Constitutional:       Appearance: He is well-developed. He is ill-appearing. HENT:      Head: Normocephalic and atraumatic.    Eyes:      Conjunctiva/sclera: Conjunctivae normal.   Cardiovascular: Rate and Rhythm: Normal rate and regular rhythm. Heart sounds: Normal heart sounds. No murmur heard. Pulmonary:      Effort: Pulmonary effort is normal. No respiratory distress. Breath sounds: Normal breath sounds. No wheezing or rales. Comments: Significant crackles over the right. Bilateral inspiratory and expiratory wheezes. Intercostal retracting. Abdominal:      General: Bowel sounds are normal.      Palpations: Abdomen is soft. Tenderness: There is no abdominal tenderness. There is no guarding or rebound. Musculoskeletal:         General: No tenderness or deformity. Cervical back: Normal range of motion and neck supple. Skin:     General: Skin is warm and dry. Neurological:      Mental Status: He is alert and oriented to person, place, and time. Cranial Nerves: No cranial nerve deficit. Coordination: Coordination normal.        Procedures     MDM   EKG read interpreted by me. Rate 110 bpm.  Right axis. Normal HI interval.  Normal QRS. No ST elevations or depressions. PVCs. Compared to prior EKG was significant changes. ED Course as of 10/01/22 0827   Sat Oct 01, 2022   0711 Discussed case with dr. Wesley Hernandez who agreed to admission [JM]      ED Course User Index  [JM] Sita Jordan MD      Patient is a 70 y.o. male presenting with shortness of breath. Patient clinically appears ill. Patient has significant crackles. Patient is tachypneic and has retractions. Patient was placed on BiPAP for respiratory support. Initial ABG was drawn on nasal cannula that showed no hypercapnia. Patient was using significant effort for respiratory breathing. X-ray was concerning for pneumonia. Patient was covered for community-acquired pneumonia. Cultures were drawn. White count was significantly elevated. Patient was covered with steroids. Patient states that he has a allergy to albuterol and ipratropium. Patient was given Xopenex.   Patient was then admitted to the [facility] for pneumonia and COPD exacerbation. Patient was seen and evaluated by myself and my attending Raheem Christine DO. Assessment and Plan discussed with attending provider, please see attestation for final plan of care. This note was done using dictation software and there may be some grammatical errors associated with this. Shira Collado MD       ED Course as of 10/01/22 0827   Sat Oct 01, 2022   0711 Discussed case with dr. Brii Soares who agreed to admission []      ED Course User Index  [] Shira Collado MD       --------------------------------------------- PAST HISTORY ---------------------------------------------  Past Medical History:  has a past medical history of Asthma, COPD (chronic obstructive pulmonary disease) ([facility] [address] Hypertension, and Neuropathic pain. Past Surgical History:  has a past surgical history that includes hernia repair and back surgery. Social History:  reports that he has been smoking cigarettes. He has never used smokeless tobacco. He reports current alcohol use of about 4.0 standard drinks per week. He reports that he does not use drugs. Family History: family history is not on file. The patients home medications have been reviewed.     Allergies: Onion    -------------------------------------------------- RESULTS -------------------------------------------------    LABS:  Results for orders placed or performed during the hospital encounter of 10/01/22   CBC with Auto Differential   Result Value Ref Range    WBC 19.6 (H) 4.5 - 11.5 E9/L    RBC 4.30 3.80 - 5.80 E12/L    Hemoglobin 13.8 12.5 - 16.5 g/dL    Hematocrit 41.7 37.0 - 54.0 %    MCV 97.0 80.0 - 99.9 fL    MCH 32.1 26.0 - 35.0 pg    MCHC 33.1 32.0 - 34.5 %    RDW 12.4 11.5 - 15.0 fL    Platelets 839 884 - 809 E9/L    MPV 9.8 7.0 - 12.0 fL    Neutrophils % 93.9 (H) 43.0 - 80.0 %    Lymphocytes % 2.6 (L) 20.0 - 42.0 %    Monocytes % 3.5 2.0 - 12.0 %    Eosinophils % 0.0 0.0 - 6.0 %    Basophils % 0.0 0.0 - 2.0 %    Neutrophils Absolute 18.42 (H) 1.80 - 7.30 E9/L    Lymphocytes Absolute 0.59 (L) 1.50 - 4.00 E9/L    Monocytes Absolute 0.78 0.10 - 0.95 E9/L    Eosinophils Absolute 0.00 (L) 0.05 - 0.50 E9/L    Basophils Absolute 0.00 0.00 - 0.20 E9/L    nRBC 0.0 /100 WBC    Polychromasia 1+    Lactic Acid   Result Value Ref Range    Lactic Acid 2.8 (H) 0.5 - 2.2 mmol/L   Comprehensive Metabolic Panel w/ Reflex to MG   Result Value Ref Range    Sodium 135 132 - 146 mmol/L    Potassium reflex Magnesium 4.1 3.5 - 5.0 mmol/L    Chloride 94 (L) 98 - 107 mmol/L    CO2 29 22 - 29 mmol/L    Anion Gap 12 7 - 16 mmol/L    Glucose 178 (H) 74 - 99 mg/dL    BUN 30 (H) 6 - 23 mg/dL    Creatinine 1.2 0.7 - 1.2 mg/dL    GFR Non-African American 60 >=60 mL/min/1.73    GFR African American >60     Calcium 9.8 8.6 - 10.2 mg/dL    Total Protein 6.5 6.4 - 8.3 g/dL    Albumin 4.0 3.5 - 5.2 g/dL    Total Bilirubin 1.3 (H) 0.0 - 1.2 mg/dL    Alkaline Phosphatase 67 40 - 129 U/L    ALT 27 0 - 40 U/L    AST 22 0 - 39 U/L   Troponin   Result Value Ref Range    Troponin, High Sensitivity 33 (H) 0 - 11 ng/L   Brain Natriuretic Peptide   Result Value Ref Range    Pro- (H) 0 - 125 pg/mL   Blood Gas, Arterial   Result Value Ref Range    Date Analyzed 20221001     Time Analyzed 0631     Source: Blood Arterial     pH, Blood Gas 7.437 7.350 - 7.450    PCO2 41.1 35.0 - 45.0 mmHg    PO2 72.8 (L) 75.0 - 100.0 mmHg    HCO3 27.1 (H) 22.0 - 26.0 mmol/L    B.E. 2.7 -3.0 - 3.0 mmol/L    O2 Sat 95.4 92.0 - 98.5 %    O2Hb 94.1 94.0 - 97.0 %    COHb 1.1 0.0 - 1.5 %    MetHb 0.3 0.0 - 1.5 %    O2 Content 17.8 mL/dL    HHb 4.5 0.0 - 5.0 %    tHb (est) 13.4 11.5 - 16.5 g/dL    Mode NC- 4 L     Date Of Collection      Time Collected      Pt Temp 37.0 C     ID [id number]     Lab [id number]     Critical(s) Notified .  No Critical Values    EKG 12 Lead   Result Value Ref Range    Ventricular Rate 110 BPM    Atrial Rate 110 BPM    P-R Interval 118 ms    QRS Duration 92 ms    Q-T Interval 300 ms    QTc Calculation (Bazett) 406 ms    P Axis 21 degrees    R Axis 93 degrees    T Axis 80 degrees       RADIOLOGY:  XR CHEST 1 VIEW   Final Result   Right basilar interstitial opacities, which could represent fibrotic change. Pneumonia is not entirely excluded. ------------------------- NURSING NOTES AND VITALS REVIEWED ---------------------------  Date / Time Roomed:  10/1/2022  5:48 AM  ED Bed Assignment:  21/21    The nursing notes within the ED encounter and vital signs as below have been reviewed. Patient Vitals for the past 24 hrs:   BP Temp Temp src Pulse Resp SpO2 Height Weight   10/01/22 0646 -- -- -- -- -- 98 % -- --   10/01/22 0640 -- -- -- -- (!) 34 -- -- --   10/01/22 0605 (!) 102/56 -- -- (!) 104 22 94 % -- --   10/01/22 0001 104/62 97.3 °F (36.3 °C) Tympanic (!) 110 22 96 % 5' 7\" (1.702 m) 178 lb (80.7 kg)       Oxygen Saturation Interpretation: Normal    ------------------------------------------ PROGRESS NOTES ------------------------------------------  Re-evaluation(s):   Patients symptoms show no change  Repeat physical examination is not changed    Counseling:  I have spoken with the patient and discussed todays results, in addition to providing specific details for the plan of care and counseling regarding the diagnosis and prognosis. Their questions are answered at this time and they are agreeable with the plan of admission.    --------------------------------- ADDITIONAL PROVIDER NOTES ---------------------------------  Consultations:  Spoke with Dr. Tiffanie Latham. Discussed case. They will admit the patient. This patient's ED course included: a personal history and physicial examination, re-evaluation prior to disposition, multiple bedside re-evaluations, IV medications, cardiac monitoring, and continuous pulse oximetry    This patient has remained hemodynamically stable during their ED course. Diagnosis:  1. COPD exacerbation ([address]    2. Pneumonia due to infectious organism, unspecified laterality, unspecified part of lung        Disposition:  Patient's disposition: Admit to telemetry  Patient's condition is Stable         Kendall Barton MD  Resident  10/01/22 Tacho Norton MD  Resident  10/01/22 9259

## 2022-10-02 ENCOUNTER — APPOINTMENT (OUTPATIENT)
Dept: CT IMAGING | Age: 72
DRG: 190 | End: 2022-10-02
Payer: COMMERCIAL

## 2022-10-02 PROBLEM — D64.9 ANEMIA: Status: ACTIVE | Noted: 2022-10-02

## 2022-10-02 LAB
ANION GAP SERPL CALCULATED.3IONS-SCNC: 8 MMOL/L (ref 7–16)
BASOPHILS ABSOLUTE: 0.05 E9/L (ref 0–0.2)
BASOPHILS RELATIVE PERCENT: 0.2 % (ref 0–2)
BUN BLDV-MCNC: 29 MG/DL (ref 6–23)
CALCIUM SERPL-MCNC: 9.4 MG/DL (ref 8.6–10.2)
CHLORIDE BLD-SCNC: 97 MMOL/L (ref 98–107)
CO2: 28 MMOL/L (ref 22–29)
CREAT SERPL-MCNC: 1 MG/DL (ref 0.7–1.2)
EKG ATRIAL RATE: 110 BPM
EKG P AXIS: 21 DEGREES
EKG P-R INTERVAL: 118 MS
EKG Q-T INTERVAL: 300 MS
EKG QRS DURATION: 92 MS
EKG QTC CALCULATION (BAZETT): 406 MS
EKG R AXIS: 93 DEGREES
EKG T AXIS: 80 DEGREES
EKG VENTRICULAR RATE: 110 BPM
EOSINOPHILS ABSOLUTE: 0 E9/L (ref 0.05–0.5)
EOSINOPHILS RELATIVE PERCENT: 0 % (ref 0–6)
GFR AFRICAN AMERICAN: >60
GFR NON-AFRICAN AMERICAN: >60 ML/MIN/1.73
GLUCOSE BLD-MCNC: 141 MG/DL (ref 74–99)
HCT VFR BLD CALC: 36.7 % (ref 37–54)
HEMOGLOBIN: 12.1 G/DL (ref 12.5–16.5)
IMMATURE GRANULOCYTES #: 0.6 E9/L
IMMATURE GRANULOCYTES %: 2.6 % (ref 0–5)
L. PNEUMOPHILA SEROGP 1 UR AG: NORMAL
LYMPHOCYTES ABSOLUTE: 0.41 E9/L (ref 1.5–4)
LYMPHOCYTES RELATIVE PERCENT: 1.8 % (ref 20–42)
MCH RBC QN AUTO: 33.2 PG (ref 26–35)
MCHC RBC AUTO-ENTMCNC: 33 % (ref 32–34.5)
MCV RBC AUTO: 100.5 FL (ref 80–99.9)
MONOCYTES ABSOLUTE: 0.56 E9/L (ref 0.1–0.95)
MONOCYTES RELATIVE PERCENT: 2.4 % (ref 2–12)
NEUTROPHILS ABSOLUTE: 21.38 E9/L (ref 1.8–7.3)
NEUTROPHILS RELATIVE PERCENT: 93 % (ref 43–80)
PDW BLD-RTO: 12.4 FL (ref 11.5–15)
PLATELET # BLD: 147 E9/L (ref 130–450)
PMV BLD AUTO: 9.9 FL (ref 7–12)
POLYCHROMASIA: ABNORMAL
POTASSIUM SERPL-SCNC: 4.6 MMOL/L (ref 3.5–5)
RBC # BLD: 3.65 E12/L (ref 3.8–5.8)
SODIUM BLD-SCNC: 133 MMOL/L (ref 132–146)
STREP PNEUMONIAE ANTIGEN, URINE: NORMAL
VACUOLATED NEUTROPHILS: ABNORMAL
WBC # BLD: 23 E9/L (ref 4.5–11.5)

## 2022-10-02 PROCEDURE — 6370000000 HC RX 637 (ALT 250 FOR IP): Performed by: INTERNAL MEDICINE

## 2022-10-02 PROCEDURE — 6360000002 HC RX W HCPCS

## 2022-10-02 PROCEDURE — 94640 AIRWAY INHALATION TREATMENT: CPT

## 2022-10-02 PROCEDURE — 2700000000 HC OXYGEN THERAPY PER DAY

## 2022-10-02 PROCEDURE — 94660 CPAP INITIATION&MGMT: CPT

## 2022-10-02 PROCEDURE — 2060000000 HC ICU INTERMEDIATE R&B

## 2022-10-02 PROCEDURE — 94669 MECHANICAL CHEST WALL OSCILL: CPT

## 2022-10-02 PROCEDURE — 94667 MNPJ CHEST WALL 1ST: CPT

## 2022-10-02 PROCEDURE — 80048 BASIC METABOLIC PNL TOTAL CA: CPT

## 2022-10-02 PROCEDURE — 71250 CT THORAX DX C-: CPT

## 2022-10-02 PROCEDURE — 85025 COMPLETE CBC W/AUTO DIFF WBC: CPT

## 2022-10-02 PROCEDURE — 36415 COLL VENOUS BLD VENIPUNCTURE: CPT

## 2022-10-02 PROCEDURE — 6360000002 HC RX W HCPCS: Performed by: INTERNAL MEDICINE

## 2022-10-02 RX ORDER — ACETYLCYSTEINE 100 MG/ML
600 SOLUTION ORAL; RESPIRATORY (INHALATION) 2 TIMES DAILY
Status: DISCONTINUED | OUTPATIENT
Start: 2022-10-02 | End: 2022-10-03

## 2022-10-02 RX ORDER — LEVALBUTEROL INHALATION SOLUTION 0.63 MG/3ML
0.63 SOLUTION RESPIRATORY (INHALATION)
Status: DISCONTINUED | OUTPATIENT
Start: 2022-10-02 | End: 2022-10-05

## 2022-10-02 RX ORDER — METHYLPREDNISOLONE SODIUM SUCCINATE 40 MG/ML
40 INJECTION, POWDER, LYOPHILIZED, FOR SOLUTION INTRAMUSCULAR; INTRAVENOUS EVERY 12 HOURS
Status: DISCONTINUED | OUTPATIENT
Start: 2022-10-02 | End: 2022-10-06

## 2022-10-02 RX ADMIN — ACETYLCYSTEINE 600 MG: 100 INHALANT RESPIRATORY (INHALATION) at 19:42

## 2022-10-02 RX ADMIN — TAMSULOSIN HYDROCHLORIDE 0.8 MG: 0.4 CAPSULE ORAL at 20:30

## 2022-10-02 RX ADMIN — ENOXAPARIN SODIUM 40 MG: 100 INJECTION SUBCUTANEOUS at 10:46

## 2022-10-02 RX ADMIN — GABAPENTIN 600 MG: 300 CAPSULE ORAL at 20:30

## 2022-10-02 RX ADMIN — CETIRIZINE HYDROCHLORIDE 10 MG: 10 TABLET, FILM COATED ORAL at 10:46

## 2022-10-02 RX ADMIN — GUAIFENESIN 400 MG: 400 TABLET ORAL at 14:10

## 2022-10-02 RX ADMIN — LISINOPRIL 20 MG: 20 TABLET ORAL at 10:45

## 2022-10-02 RX ADMIN — GUAIFENESIN 400 MG: 400 TABLET ORAL at 10:46

## 2022-10-02 RX ADMIN — LEVALBUTEROL HYDROCHLORIDE 0.63 MG: 0.63 SOLUTION RESPIRATORY (INHALATION) at 14:21

## 2022-10-02 RX ADMIN — METHYLPREDNISOLONE SODIUM SUCCINATE 40 MG: 40 INJECTION, POWDER, LYOPHILIZED, FOR SOLUTION INTRAMUSCULAR; INTRAVENOUS at 03:30

## 2022-10-02 RX ADMIN — ACETYLCYSTEINE 600 MG: 100 INHALANT RESPIRATORY (INHALATION) at 14:25

## 2022-10-02 RX ADMIN — LORAZEPAM 0.5 MG: 0.5 TABLET ORAL at 20:30

## 2022-10-02 RX ADMIN — BUDESONIDE 500 MCG: 0.5 SUSPENSION RESPIRATORY (INHALATION) at 08:02

## 2022-10-02 RX ADMIN — METHYLPREDNISOLONE SODIUM SUCCINATE 40 MG: 40 INJECTION, POWDER, LYOPHILIZED, FOR SOLUTION INTRAMUSCULAR; INTRAVENOUS at 10:46

## 2022-10-02 RX ADMIN — BUDESONIDE 500 MCG: 0.5 SUSPENSION RESPIRATORY (INHALATION) at 19:42

## 2022-10-02 RX ADMIN — Medication 1000 MG: at 10:45

## 2022-10-02 RX ADMIN — AMLODIPINE BESYLATE 5 MG: 5 TABLET ORAL at 10:45

## 2022-10-02 RX ADMIN — LEVALBUTEROL HYDROCHLORIDE 0.63 MG: 0.63 SOLUTION RESPIRATORY (INHALATION) at 19:43

## 2022-10-02 RX ADMIN — METHYLPREDNISOLONE SODIUM SUCCINATE 40 MG: 40 INJECTION, POWDER, LYOPHILIZED, FOR SOLUTION INTRAMUSCULAR; INTRAVENOUS at 23:05

## 2022-10-02 RX ADMIN — ARFORMOTEROL TARTRATE 15 MCG: 15 SOLUTION RESPIRATORY (INHALATION) at 08:02

## 2022-10-02 RX ADMIN — ARFORMOTEROL TARTRATE 15 MCG: 15 SOLUTION RESPIRATORY (INHALATION) at 19:42

## 2022-10-02 RX ADMIN — GABAPENTIN 600 MG: 300 CAPSULE ORAL at 10:45

## 2022-10-02 RX ADMIN — GABAPENTIN 600 MG: 300 CAPSULE ORAL at 14:10

## 2022-10-02 RX ADMIN — GUAIFENESIN 400 MG: 400 TABLET ORAL at 20:30

## 2022-10-02 ASSESSMENT — PAIN SCALES - GENERAL
PAINLEVEL_OUTOF10: 0
PAINLEVEL_OUTOF10: 0

## 2022-10-02 NOTE — PROGRESS NOTES
Pulmonary Progress Note    Admit Date: 10/1/2022                            PCP: Froilan Salguero MD  Principal Problem:    COPD exacerbation [facility])  Active Problems:    Anemia    Hypertension    Anxiety    Hyperglycemia, drug-induced    Type 2 diabetes mellitus ([address]  Resolved Problems:    * No resolved hospital problems. *      Subjective:  Patient seen on 3L NC pulse ox 97%  His breathing is better, still with DUONG with min/moderate activity but notes this to be his baseline at home  Occasional productive cough, clear sputum - was brown after he drank coffee      Medications:       calcium elemental  1,000 mg Oral Daily    gabapentin  600 mg Oral TID    guaiFENesin  400 mg Oral TID    tamsulosin  0.8 mg Oral Nightly    enoxaparin  40 mg SubCUTAneous Daily    methylPREDNISolone  40 mg IntraVENous Q8H    amLODIPine  5 mg Oral Daily    And    lisinopril  20 mg Oral Daily    cetirizine  10 mg Oral Daily    levofloxacin  750 mg IntraVENous Q48H    Arformoterol Tartrate  15 mcg Nebulization BID    budesonide  0.5 mg Nebulization BID       Vitals:  VITALS:  /65   Pulse (!) 103   Temp 98.2 °F (36.8 °C) (Oral)   Resp 18   Ht 5' 7\" (1.702 m)   Wt 178 lb (80.7 kg)   SpO2 96%   BMI 27.88 kg/m²   24HR INTAKE/OUTPUT:    Intake/Output Summary (Last 24 hours) at 10/2/2022 1044  Last data filed at 10/2/2022 0948  Gross per 24 hour   Intake --   Output 1300 ml   Net -1300 ml     CURRENT PULSE OXIMETRY:  SpO2: 96 %  24HR PULSE OXIMETRY RANGE:  SpO2  Av %  Min: 94 %  Max: 97 %  CVP:    VENT SETTINGS:      Additional Respiratory Assessments  Heart Rate: (!) 103  Resp: 18  SpO2: 96 %      EXAM:  General: No distress. Alert. 3L NC  Eyes:  No sclera icterus. No conjunctival injection. ENT: No discharge. Pharynx clear. Neck: Trachea midline. Normal thyroid. Resp: No accessory muscle use. Mild expiratory wheezing anterior uppers. Rales R base. No rhonchi. CV: Regular rate. Regular rhythm. No mumur or rub. No edema. ABD: Non-tender. Non-distended. Normal bowel sounds. Skin: Warm and dry. No nodule on exposed extremities. No rash on exposed extremities. M/S: No cyanosis. No joint deformity. No clubbing. Neuro: Awake. Follows commands. A&Ox3    I/O: I/O last 3 completed shifts:  In: -   Out: 1100 [Urine:1100]  I/O this shift:  In: -   Out: 200 [Urine:200]     Results:  CBC:   Recent Labs     10/01/22  0602 10/02/22  0340   WBC 19.6* 23.0*   HGB 13.8 12.1*   HCT 41.7 36.7*   MCV 97.0 100.5*    147     BMP:   Recent Labs     10/01/22  0602 10/02/22  0340    133   K 4.1 4.6   CL 94* 97*   CO2 29 28   BUN 30* 29*   CREATININE 1.2 1.0     LFT:   Recent Labs     10/01/22  0602   ALKPHOS 67   ALT 27   AST 22   PROT 6.5   BILITOT 1.3*   LABALBU 4.0     PT/INR: No results for input(s): PROTIME, INR in the last 72 hours. Cultures:  No results for input(s): CULTRESP in the last 72 hours. ABG:   Recent Labs     10/01/22  0631   PH 7.437   PO2 72.8*   PCO2 41.1   HCO3 27.1*   BE 2.7   O2SAT 95.4       Films:  XR CHEST 1 VIEW 10/1/2022  EXAMINATION: ONE XRAY VIEW OF THE CHEST 10/1/2022 3:49 am COMPARISON: 06/06/2021 HISTORY: ORDERING SYSTEM PROVIDED HISTORY: shortness of breath TECHNOLOGIST PROVIDED HISTORY: Reason for exam:->shortness of breath FINDINGS: The cardiomediastinal silhouette is normal.  There are new right basilar interstitial opacities. Overall background emphysematous changes noted. No pneumothorax or pleural effusion. Right basilar interstitial opacities, which could represent fibrotic change. Pneumonia is not entirely excluded. 6/6/2021                                                              10/1/2022    CT Chest 10/2: Stent identified in the right middle lobe bronchus. There is consolidative mucus or debris seen within the stent with consolidative infiltrate and collapse identified of the right middle lobe.   Dense consolidative infiltrate with air bronchograms coursing throughout the majority of the right lower lobe. Findings all suggestive of pneumonia. Severe emphysematous and chronic changes seen within the aerated lung fields. Bronchiectasis changes identified centrally. Bullous changes seen in the upper lung fields    Assessment:  70 y.o. male known to Dr. Rima Edmonds, fully vaccinated for COVID with 2 boosters, with past medical history significant for chronic respiratory failure with hypoxia, baseline oxygen 2 to 5 L, emphysema, asthma, hypertension   10/1: 4L  10/2: 3L NC  COPD with acute exacerbation  Community-acquired pneumonia  Bronchiectasis -noted on CT chest 10/2  Leukocytosis r/t above  Chronic respiratory failure with hypoxia on 2 to 5 L baseline nasal cannula      Plan:  Patient seen on 2 L nasal cannula with pulse ox 97%, he wears baseline 2 to 5 L at home. Continue supplemental O2 to maintain pulse ox greater than 90%. BiPAP 12/5 as needed for respiratory distress - refused last night could not tolerate  Decrease IV Solu-Medrol 40 mg to every 12 hours  S/p IV Rocephin and doxycycline. 10/1 IV Levaquin started - per office notes patient only responds to [address] WBC 19.6>23.0 (10/2), procalcitonin 5.43 and chest x-ray concerning for right basilar interstitial opacities. Repeat procal ordered for tomorrow     CT chest reviewed. Aggressive pulmonary hygiene, add chest physiotherapy, scheduled levalbuterol, Mucomyst, Mucinex, flutter valve  Sputum culture with gram stain ordered, awaiting specimen. Strep and Legionella antigens pending. Respiratory panel negative  Continue Brovana, Pulmicort, Xopenex. Patient is on Breztri and Xopenex at home.   Continue incentive spirometry        Electronically signed by KELECHI Fofana CNP on 10/2/2022 at 10:44 AM

## 2022-10-02 NOTE — PROGRESS NOTES
Date: 10/1/2022    Time: 10:50 PM    Patient Placed On BIPAP/CPAP/ Non-Invasive Ventilation? No    If no must comment. Comments: Pt is refusing the v60 with Bilevel settings of 12/5/40%. He is breathing comfortably on a 4L NC sating 96%. He claims the BiPAP hurt and there was too much pressure. Offered to work with him and try and get it at a comfortable level, pt. Still did not want to wear it tonight.         Teresita Fowler RCP

## 2022-10-02 NOTE — H&P
History and Physical  Internal Medicine  Reji Rivas MD    CHIEF COMPLAINT:  SOB      HISTORY OF PRESENT ILLNESS:      The patient is a 70 y.o. male patient of Dr Nick Sanbaria who presents with as per ER - Nano Niels is a 70 y.o. male who presents to the ED alone and drove self to ED for shortness of breath that started this AM and states he has history of copd and uses 4L nc continuously and is afraid to walk because he feels \"shaky\" . He denies any chest pain, abdominal pain, leg swelling. He does complain of fever 99.9 F and chills. He denies any urinary symptoms. He is using with xopenox no relief.             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           Past Surgical History:   Procedure Laterality Date    BACK SURGERY      spinal fusion    HERNIA REPAIR         Medications Prior to Admission:    Medications Prior to Admission: calcium carbonate (OSCAL) 500 MG TABS tablet, Take 1,000 mg by mouth daily  Ascorbic Acid (VITAMIN C) 250 MG tablet, Take 250 mg by mouth daily  vitamin E 1000 units capsule, Take 1,000 Units by mouth daily  Arformoterol Tartrate (BROVANA) 15 MCG/2ML NEBU, Take 2 mLs by nebulization 2 times daily (Patient not taking: Reported on 10/1/2022)  levalbuterol (XOPENEX) 1.25 MG/0.5ML nebulizer solution, Take 0.5 mLs by nebulization every 4 hours as needed for Wheezing  guaiFENesin 400 MG tablet, Take 1 tablet by mouth 4 times daily (Patient taking differently: Take 600 mg by mouth in the morning and at bedtime)  [DISCONTINUED] budesonide (PULMICORT) 0.5 MG/2ML nebulizer suspension, Take 1 mL by nebulization 2 times daily (Patient not taking: Reported on 10/1/2022)  [DISCONTINUED] ondansetron (ZOFRAN-ODT) 4 MG disintegrating tablet, Take 1 tablet by mouth every 8 hours as needed for Nausea or Vomiting (Patient not taking: Reported on 10/1/2022)  zinc gluconate 50 MG tablet, Take 50 mg by mouth daily  B-COMPLEX-C PO, Take by mouth  loratadine (CLARITIN) 10 MG capsule, Take 10 mg by mouth daily  tamsulosin (FLOMAX) 0.4 MG capsule, Take 0.8 mg by mouth daily  Coenzyme Q10 (CO Q 10 PO), Take by mouth 2 times daily   Omega-3 Fatty Acids (FISH OIL) 1000 MG CAPS, Take 1,000 mg by mouth 2 times daily   Flaxseed, Linseed, 1000 MG CAPS, Take  by mouth 2 times daily. amLODIPine-benazepril (LOTREL) 5-20 MG per capsule, Take 1 capsule by mouth daily. lorazepam (ATIVAN) 0.5 MG tablet, Take 0.5 mg by mouth 2 times daily as needed. gabapentin (NEURONTIN) 600 MG tablet, Take 600 mg by mouth 3 times daily. Allergies:    Onion, Albuterol, and Ipratropium bromide [ipratropium]    Social History:    reports that he has been smoking cigarettes. He has never used smokeless tobacco. He reports current alcohol use of about 4.0 standard drinks per week. He reports that he does not use drugs. Family History:   family history is not on file.     REVIEW OF SYSTEMS:  As above in the HPI, otherwise negative    Vital Signs:  /71   Pulse 83   Temp 98.4 °F (36.9 °C) (Oral)   Resp 18   Ht 5' 7\" (1.702 m)   Wt 178 lb (80.7 kg)   SpO2 94%   BMI 27.88 kg/m²     Physical Exam:    General appearance: alert, appears stated age, cooperative, and mild distress  Head: Normocephalic, without obvious abnormality, atraumatic  Eyes: negative  Throat: normal findings: lips normal without lesions  Neck: no adenopathy, no carotid bruit, no JVD, and supple, symmetrical, trachea midline  Back: negative  Lungs: diminished breath sounds bilaterally and wheezes bilaterally and end expiration  Chest wall: no tenderness  Heart: regular rate and rhythm  Abdomen: soft, non-tender; bowel sounds normal; no masses,  no organomegaly  Extremities: extremities normal, atraumatic, no cyanosis or edema  Pulses: 2+ and symmetric  Skin: Skin color, texture, turgor normal. No rashes or lesions  Lymph nodes: Cervical, supraclavicular, and axillary nodes normal.  Neurologic: Grossly normal  Rectal: deferred    LABS:    Recent Results (from the past 24 hour(s))   CBC with Auto Differential    Collection Time: 10/01/22  6:02 AM   Result Value Ref Range    WBC 19.6 (H) 4.5 - 11.5 E9/L    RBC 4.30 3.80 - 5.80 E12/L    Hemoglobin 13.8 12.5 - 16.5 g/dL    Hematocrit 41.7 37.0 - 54.0 %    MCV 97.0 80.0 - 99.9 fL    MCH 32.1 26.0 - 35.0 pg    MCHC 33.1 32.0 - 34.5 %    RDW 12.4 11.5 - 15.0 fL    Platelets 673 054 - 609 E9/L    MPV 9.8 7.0 - 12.0 fL    Neutrophils % 93.9 (H) 43.0 - 80.0 %    Lymphocytes % 2.6 (L) 20.0 - 42.0 %    Monocytes % 3.5 2.0 - 12.0 %    Eosinophils % 0.0 0.0 - 6.0 %    Basophils % 0.0 0.0 - 2.0 %    Neutrophils Absolute 18.42 (H) 1.80 - 7.30 E9/L    Lymphocytes Absolute 0.59 (L) 1.50 - 4.00 E9/L    Monocytes Absolute 0.78 0.10 - 0.95 E9/L    Eosinophils Absolute 0.00 (L) 0.05 - 0.50 E9/L    Basophils Absolute 0.00 0.00 - 0.20 E9/L    nRBC 0.0 /100 WBC    Polychromasia 1+    Lactic Acid    Collection Time: 10/01/22  6:02 AM   Result Value Ref Range    Lactic Acid 2.8 (H) 0.5 - 2.2 mmol/L   Comprehensive Metabolic Panel w/ Reflex to MG    Collection Time: 10/01/22  6:02 AM   Result Value Ref Range    Sodium 135 132 - 146 mmol/L    Potassium reflex Magnesium 4.1 3.5 - 5.0 mmol/L    Chloride 94 (L) 98 - 107 mmol/L    CO2 29 22 - 29 mmol/L    Anion Gap 12 7 - 16 mmol/L    Glucose 178 (H) 74 - 99 mg/dL    BUN 30 (H) 6 - 23 mg/dL    Creatinine 1.2 0.7 - 1.2 mg/dL    GFR Non-African American 60 >=60 mL/min/1.73    GFR African American >60     Calcium 9.8 8.6 - 10.2 mg/dL    Total Protein 6.5 6.4 - 8.3 g/dL    Albumin 4.0 3.5 - 5.2 g/dL    Total Bilirubin 1.3 (H) 0.0 - 1.2 mg/dL    Alkaline Phosphatase 67 40 - 129 U/L    ALT 27 0 - 40 U/L    AST 22 0 - 39 U/L   Troponin    Collection Time: 10/01/22  6:02 AM   Result Value Ref Range    Troponin, High Sensitivity 33 (H) 0 - 11 ng/L   Brain Natriuretic Peptide    Collection Time: 10/01/22  6:02 AM   Result Value Ref Range    Pro- (H) 0 - 125 pg/mL   EKG 12 Lead    Collection Time: 10/01/22  6:02 AM   Result Value Ref Range    Ventricular Rate 110 BPM    Atrial Rate 110 BPM    P-R Interval 118 ms    QRS Duration 92 ms    Q-T Interval 300 ms    QTc Calculation (Bazett) 406 ms    P Axis 21 degrees    R Axis 93 degrees    T Axis 80 degrees   COVID-19, Rapid    Collection Time: 10/01/22  6:28 AM    Specimen: Nasopharyngeal Swab   Result Value Ref Range    SARS-CoV-2, NAAT Not Detected Not Detected   Blood Gas, Arterial    Collection Time: 10/01/22  6:31 AM   Result Value Ref Range    Date Analyzed 20221001     Time Analyzed 0631     Source: Blood Arterial     pH, Blood Gas 7.437 7.350 - 7.450    PCO2 41.1 35.0 - 45.0 mmHg    PO2 72.8 (L) 75.0 - 100.0 mmHg    HCO3 27.1 (H) 22.0 - 26.0 mmol/L    B.E. 2.7 -3.0 - 3.0 mmol/L    O2 Sat 95.4 92.0 - 98.5 %    O2Hb 94.1 94.0 - 97.0 %    COHb 1.1 0.0 - 1.5 %    MetHb 0.3 0.0 - 1.5 %    O2 Content 17.8 mL/dL    HHb 4.5 0.0 - 5.0 %    tHb (est) 13.4 11.5 - 16.5 g/dL    Mode NC- 4 L     Date Of Collection      Time Collected      Pt Temp 37.0 C     ID [id number]     Lab [id number]     Critical(s) Notified .  No Critical Values    Respiratory Panel, Molecular, with COVID-19 (Restricted: peds pts or suitable admitted adults)    Collection Time: 10/01/22  6:43 AM    Specimen: Nasopharyngeal   Result Value Ref Range    Adenovirus by PCR Not Detected Not Detected    Bordetella parapertussis by PCR Not Detected Not Detected    Bordetella pertussis by PCR Not Detected Not Detected    Chlamydophilia pneumoniae by PCR Not Detected Not Detected    Coronavirus 229E by PCR Not Detected Not Detected    Coronavirus HKU1 by PCR Not Detected Not Detected    Coronavirus NL63 by PCR Not Detected Not Detected    Coronavirus OC43 by PCR Not Detected Not Detected    SARS-CoV-2, PCR Not Detected Not Detected    Human Metapneumovirus by PCR Not Detected Not Detected Human Rhinovirus/Enterovirus by PCR Not Detected Not Detected    Influenza A by PCR Not Detected Not Detected    Influenza B by PCR Not Detected Not Detected    Mycoplasma pneumoniae by PCR Not Detected Not Detected    Parainfluenza Virus 1 by PCR Not Detected Not Detected    Parainfluenza Virus 2 by PCR Not Detected Not Detected    Parainfluenza Virus 3 by PCR Not Detected Not Detected    Parainfluenza Virus 4 by PCR Not Detected Not Detected    Respiratory Syncytial Virus by PCR Not Detected Not Detected   Procalcitonin    Collection Time: 10/01/22  6:56 AM   Result Value Ref Range    Procalcitonin 5.43 (H) 0.00 - 0.08 ng/mL   Troponin    Collection Time: 10/01/22  6:56 AM   Result Value Ref Range    Troponin, High Sensitivity 32 (H) 0 - 11 ng/L   Urinalysis    Collection Time: 10/01/22 12:20 PM   Result Value Ref Range    Color, UA DARK YELLOW (A) Straw/Yellow    Clarity, UA Clear Clear    Glucose, Ur Negative Negative mg/dL    Bilirubin Urine Negative Negative    Ketones, Urine TRACE (A) Negative mg/dL    Specific Gravity, UA 1.025 1.005 - 1.030    Blood, Urine Negative Negative    pH, UA 6.0 5.0 - 9.0    Protein, UA TRACE Negative mg/dL    Urobilinogen, Urine 0.2 <2.0 E.U./dL    Nitrite, Urine Negative Negative    Leukocyte Esterase, Urine TRACE (A) Negative   Microscopic Urinalysis    Collection Time: 10/01/22 12:20 PM   Result Value Ref Range    Hyaline Casts, UA 6-10 (A) 0 - 2 /LPF    WBC, UA 1-3 0 - 5 /HPF    RBC, UA NONE 0 - 2 /HPF    Bacteria, UA MANY (A) None Seen /HPF   CBC with Auto Differential    Collection Time: 10/02/22  3:40 AM   Result Value Ref Range    WBC 23.0 (H) 4.5 - 11.5 E9/L    RBC 3.65 (L) 3.80 - 5.80 E12/L    Hemoglobin 12.1 (L) 12.5 - 16.5 g/dL    Hematocrit 36.7 (L) 37.0 - 54.0 %    .5 (H) 80.0 - 99.9 fL    MCH 33.2 26.0 - 35.0 pg    MCHC 33.0 32.0 - 34.5 %    RDW 12.4 11.5 - 15.0 fL    Platelets 570 001 - 082 E9/L    MPV 9.9 7.0 - 12.0 fL    Neutrophils % 93.0 (H) 43.0 - 80.0 %    Immature Granulocytes % 2.6 0.0 - 5.0 %    Lymphocytes % 1.8 (L) 20.0 - 42.0 %    Monocytes % 2.4 2.0 - 12.0 %    Eosinophils % 0.0 0.0 - 6.0 %    Basophils % 0.2 0.0 - 2.0 %    Neutrophils Absolute 21.38 (H) 1.80 - 7.30 E9/L    Immature Granulocytes # 0.60 E9/L    Lymphocytes Absolute 0.41 (L) 1.50 - 4.00 E9/L    Monocytes Absolute 0.56 0.10 - 0.95 E9/L    Eosinophils Absolute 0.00 (L) 0.05 - 0.50 E9/L    Basophils Absolute 0.05 0.00 - 0.20 E9/L    Vacuolated Neutrophils 1+     Polychromasia 1+    Basic Metabolic Panel    Collection Time: 10/02/22  3:40 AM   Result Value Ref Range    Sodium 133 132 - 146 mmol/L    Potassium 4.6 3.5 - 5.0 mmol/L    Chloride 97 (L) 98 - 107 mmol/L    CO2 28 22 - 29 mmol/L    Anion Gap 8 7 - 16 mmol/L    Glucose 141 (H) 74 - 99 mg/dL    BUN 29 (H) 6 - 23 mg/dL    Creatinine 1.0 0.7 - 1.2 mg/dL    GFR Non-African American >60 >=60 mL/min/1.73    GFR African American >60     Calcium 9.4 8.6 - 10.2 mg/dL       Radiology:     Chest  Xray:  Results for orders placed during the hospital encounter of 20    XR CHEST STANDARD (2 VW)    Narrative  Patient MRN: [id number]  : 1950  Age:  71 years  Gender: Male  Order Date: 3/10/2020 12:00 PM  Exam: XR CHEST (2 VW)  Number of Images: 2 view  Indication:   persistent dyspnea and cough  persistent dyspnea and cough  Comparison: 2020    Findings: The heart is unremarkable. The lung fields are hyperinflated. Density is seen in the lung fields suggesting scar. The aorta is tortuous and ectatic. Impression  Findings of emphysematous changes. This study was dictated by Karlee Azar PA-C and Ceci Peoples MD  reviewed and concurred with the findings.     Results for orders placed during the hospital encounter of 21    XR CHEST PORTABLE    Narrative  EXAMINATION:  ONE XRAY VIEW OF THE CHEST    2021 1:14 pm    COMPARISON:  March 10, 2020    HISTORY:  ORDERING SYSTEM PROVIDED HISTORY: r/o pna  TECHNOLOGIST PROVIDED HISTORY:  Reason for exam:->r/o pna    FINDINGS:  No airspace opacity or pleural effusion. The heart is normal size. No  pneumothorax. No free air beneath the hemidiaphragms. Hyperinflation of both  lungs notable in the upper lobes. Impression  1. No pneumonia or pleural effusion. 2.  Emphysematous changes. Other:  none      ASSESSMENT:      Principal Problem:    COPD exacerbation ([address]  Active Problems:    Anemia    Hypertension    Anxiety    Hyperglycemia, drug-induced    Type 2 diabetes mellitus ([facility] [address]  Resolved Problems:    * No resolved hospital problems.  *      PLAN:    Admit  IV steroids  IV Levaquin  Pulm consult (they have already seen - input appreciated)    Fannie Cardoza MD  5:22 AM  10/2/2022

## 2022-10-03 LAB
ANION GAP SERPL CALCULATED.3IONS-SCNC: 8 MMOL/L (ref 7–16)
BASOPHILS ABSOLUTE: 0 E9/L (ref 0–0.2)
BASOPHILS RELATIVE PERCENT: 0 % (ref 0–2)
BUN BLDV-MCNC: 31 MG/DL (ref 6–23)
CALCIUM SERPL-MCNC: 10.2 MG/DL (ref 8.6–10.2)
CHLORIDE BLD-SCNC: 94 MMOL/L (ref 98–107)
CO2: 31 MMOL/L (ref 22–29)
CREAT SERPL-MCNC: 0.8 MG/DL (ref 0.7–1.2)
EOSINOPHILS ABSOLUTE: 0.19 E9/L (ref 0.05–0.5)
EOSINOPHILS RELATIVE PERCENT: 0.9 % (ref 0–6)
GFR AFRICAN AMERICAN: >60
GFR NON-AFRICAN AMERICAN: >60 ML/MIN/1.73
GLUCOSE BLD-MCNC: 179 MG/DL (ref 74–99)
HCT VFR BLD CALC: 38.5 % (ref 37–54)
HEMOGLOBIN: 12.6 G/DL (ref 12.5–16.5)
LYMPHOCYTES ABSOLUTE: 0.63 E9/L (ref 1.5–4)
LYMPHOCYTES RELATIVE PERCENT: 2.6 % (ref 20–42)
MCH RBC QN AUTO: 32.7 PG (ref 26–35)
MCHC RBC AUTO-ENTMCNC: 32.7 % (ref 32–34.5)
MCV RBC AUTO: 100 FL (ref 80–99.9)
MONOCYTES ABSOLUTE: 0.21 E9/L (ref 0.1–0.95)
MONOCYTES RELATIVE PERCENT: 0.9 % (ref 2–12)
NEUTROPHILS ABSOLUTE: 20.06 E9/L (ref 1.8–7.3)
NEUTROPHILS RELATIVE PERCENT: 95.6 % (ref 43–80)
NUCLEATED RED BLOOD CELLS: 0 /100 WBC
PDW BLD-RTO: 12 FL (ref 11.5–15)
PLATELET # BLD: 156 E9/L (ref 130–450)
PMV BLD AUTO: 10.1 FL (ref 7–12)
POLYCHROMASIA: ABNORMAL
POTASSIUM SERPL-SCNC: 4.3 MMOL/L (ref 3.5–5)
PROCALCITONIN: 1.24 NG/ML (ref 0–0.08)
PROCALCITONIN: 3.08 NG/ML (ref 0–0.08)
RBC # BLD: 3.85 E12/L (ref 3.8–5.8)
SODIUM BLD-SCNC: 133 MMOL/L (ref 132–146)
TOXIC GRANULATION: ABNORMAL
URINE CULTURE, ROUTINE: NORMAL
WBC # BLD: 20.9 E9/L (ref 4.5–11.5)

## 2022-10-03 PROCEDURE — 6360000002 HC RX W HCPCS

## 2022-10-03 PROCEDURE — 2060000000 HC ICU INTERMEDIATE R&B

## 2022-10-03 PROCEDURE — 94669 MECHANICAL CHEST WALL OSCILL: CPT

## 2022-10-03 PROCEDURE — 94668 MNPJ CHEST WALL SBSQ: CPT

## 2022-10-03 PROCEDURE — 94660 CPAP INITIATION&MGMT: CPT

## 2022-10-03 PROCEDURE — 85025 COMPLETE CBC W/AUTO DIFF WBC: CPT

## 2022-10-03 PROCEDURE — 36415 COLL VENOUS BLD VENIPUNCTURE: CPT

## 2022-10-03 PROCEDURE — 2700000000 HC OXYGEN THERAPY PER DAY

## 2022-10-03 PROCEDURE — 6370000000 HC RX 637 (ALT 250 FOR IP): Performed by: INTERNAL MEDICINE

## 2022-10-03 PROCEDURE — 87449 NOS EACH ORGANISM AG IA: CPT

## 2022-10-03 PROCEDURE — 6360000002 HC RX W HCPCS: Performed by: INTERNAL MEDICINE

## 2022-10-03 PROCEDURE — 84145 PROCALCITONIN (PCT): CPT

## 2022-10-03 PROCEDURE — 94640 AIRWAY INHALATION TREATMENT: CPT

## 2022-10-03 PROCEDURE — 80048 BASIC METABOLIC PNL TOTAL CA: CPT

## 2022-10-03 RX ORDER — LEVOFLOXACIN 5 MG/ML
750 INJECTION, SOLUTION INTRAVENOUS EVERY 24 HOURS
Status: DISCONTINUED | OUTPATIENT
Start: 2022-10-03 | End: 2022-10-04

## 2022-10-03 RX ADMIN — LEVALBUTEROL HYDROCHLORIDE 0.63 MG: 0.63 SOLUTION RESPIRATORY (INHALATION) at 13:01

## 2022-10-03 RX ADMIN — LEVOFLOXACIN 750 MG: 5 INJECTION, SOLUTION INTRAVENOUS at 08:06

## 2022-10-03 RX ADMIN — BUDESONIDE 500 MCG: 0.5 SUSPENSION RESPIRATORY (INHALATION) at 08:18

## 2022-10-03 RX ADMIN — ARFORMOTEROL TARTRATE 15 MCG: 15 SOLUTION RESPIRATORY (INHALATION) at 21:14

## 2022-10-03 RX ADMIN — AMLODIPINE BESYLATE 5 MG: 5 TABLET ORAL at 08:03

## 2022-10-03 RX ADMIN — BUDESONIDE 500 MCG: 0.5 SUSPENSION RESPIRATORY (INHALATION) at 21:14

## 2022-10-03 RX ADMIN — Medication 1000 MG: at 08:02

## 2022-10-03 RX ADMIN — LISINOPRIL 20 MG: 20 TABLET ORAL at 08:03

## 2022-10-03 RX ADMIN — GUAIFENESIN 400 MG: 400 TABLET ORAL at 08:02

## 2022-10-03 RX ADMIN — ACETYLCYSTEINE 600 MG: 100 INHALANT RESPIRATORY (INHALATION) at 08:18

## 2022-10-03 RX ADMIN — GUAIFENESIN 400 MG: 400 TABLET ORAL at 14:11

## 2022-10-03 RX ADMIN — ENOXAPARIN SODIUM 40 MG: 100 INJECTION SUBCUTANEOUS at 11:06

## 2022-10-03 RX ADMIN — GUAIFENESIN 400 MG: 400 TABLET ORAL at 20:44

## 2022-10-03 RX ADMIN — GABAPENTIN 600 MG: 300 CAPSULE ORAL at 20:44

## 2022-10-03 RX ADMIN — LEVALBUTEROL HYDROCHLORIDE 0.63 MG: 0.63 SOLUTION RESPIRATORY (INHALATION) at 08:18

## 2022-10-03 RX ADMIN — TAMSULOSIN HYDROCHLORIDE 0.8 MG: 0.4 CAPSULE ORAL at 20:44

## 2022-10-03 RX ADMIN — GABAPENTIN 600 MG: 300 CAPSULE ORAL at 14:11

## 2022-10-03 RX ADMIN — GABAPENTIN 600 MG: 300 CAPSULE ORAL at 08:02

## 2022-10-03 RX ADMIN — LEVALBUTEROL HYDROCHLORIDE 0.63 MG: 0.63 SOLUTION RESPIRATORY (INHALATION) at 17:15

## 2022-10-03 RX ADMIN — LEVALBUTEROL HYDROCHLORIDE 0.63 MG: 0.63 SOLUTION RESPIRATORY (INHALATION) at 21:14

## 2022-10-03 RX ADMIN — CETIRIZINE HYDROCHLORIDE 10 MG: 10 TABLET, FILM COATED ORAL at 08:02

## 2022-10-03 RX ADMIN — ARFORMOTEROL TARTRATE 15 MCG: 15 SOLUTION RESPIRATORY (INHALATION) at 08:18

## 2022-10-03 RX ADMIN — METHYLPREDNISOLONE SODIUM SUCCINATE 40 MG: 40 INJECTION, POWDER, LYOPHILIZED, FOR SOLUTION INTRAMUSCULAR; INTRAVENOUS at 11:06

## 2022-10-03 RX ADMIN — METHYLPREDNISOLONE SODIUM SUCCINATE 40 MG: 40 INJECTION, POWDER, LYOPHILIZED, FOR SOLUTION INTRAMUSCULAR; INTRAVENOUS at 22:36

## 2022-10-03 ASSESSMENT — PAIN SCALES - GENERAL
PAINLEVEL_OUTOF10: 0

## 2022-10-03 NOTE — PROGRESS NOTES
PT STATED HE HAD TROUBLE BREATHING AFTER MUCOMYST WAS GIVEN, MEDICATION STOPPED AT THIS TIME.  BROVANA AND PULMICORT GIVEN TO PT, WORK OF BREATHING RETURNED TO BASELINE. SPO2 98% ON 4L NC.

## 2022-10-03 NOTE — PROGRESS NOTES
Internal Medicine  Progress Note  Dhiraj Donnelly MD     Subjective:     Patient is alert. Patient reports OK. Tolerating diet well no other c/o. Scheduled Meds:   methylPREDNISolone  40 mg IntraVENous Q12H    levalbuterol  0.63 mg Nebulization Q4H While awake    acetylcysteine  600 mg Inhalation BID    calcium elemental  1,000 mg Oral Daily    gabapentin  600 mg Oral TID    guaiFENesin  400 mg Oral TID    tamsulosin  0.8 mg Oral Nightly    enoxaparin  40 mg SubCUTAneous Daily    amLODIPine  5 mg Oral Daily    And    lisinopril  20 mg Oral Daily    cetirizine  10 mg Oral Daily    levofloxacin  750 mg IntraVENous Q48H    Arformoterol Tartrate  15 mcg Nebulization BID    budesonide  0.5 mg Nebulization BID     Continuous Infusions:  PRN Meds:LORazepam, levalbuterol, acetaminophen    Objective:      Physical Exam:  Vitals:   /68   Pulse 79   Temp 97.7 °F (36.5 °C) (Oral)   Resp 22   Ht 5' 7\" (1.702 m)   Wt 178 lb (80.7 kg)   SpO2 96%   BMI 27.88 kg/m²   Orthostatic BPs and Heart Rates:     I/O's    Intake/Output Summary (Last 24 hours) at 10/3/2022 4654  Last data filed at 10/2/2022 1634  Gross per 24 hour   Intake --   Output 500 ml   Net -500 ml     Exam:  General appearance: alert, appears stated age, and cooperative  Head: Normocephalic, without obvious abnormality, atraumatic  Eyes: conjunctivae/corneas clear. PERRL, EOM's intact. Fundi benign. Throat: normal findings: lips normal without lesions  Neck: no adenopathy, no carotid bruit, no JVD, and supple, symmetrical, trachea midline  Back: symmetric, no curvature. ROM normal. No CVA tenderness.   Lungs: diminished breath sounds bilaterally and rhonchi bilaterally  Chest wall: no tenderness  Heart: regular rate and rhythm  Abdomen: soft, non-tender; bowel sounds normal; no masses,  no organomegaly  Extremities: extremities normal, atraumatic, no cyanosis or edema  Pulses: 2+ and symmetric  Skin: Skin color, texture, turgor normal. No rashes or lesions  Lymph nodes: Cervical, supraclavicular, and axillary nodes normal.  Neurologic: Grossly normal      Imaging     Chest  Xray:  Results for orders placed during the hospital encounter of 20    XR CHEST STANDARD (2 VW)    Narrative  Patient MRN: [id number]  : 1950  Age:  71 years  Gender: Male  Order Date: 3/10/2020 12:00 PM  Exam: XR CHEST (2 VW)  Number of Images: 2 view  Indication:   persistent dyspnea and cough  persistent dyspnea and cough  Comparison: 2020    Findings: The heart is unremarkable. The lung fields are hyperinflated. Density is seen in the lung fields suggesting scar. The aorta is tortuous and ectatic. Impression  Findings of emphysematous changes. This study was dictated by Woo Fairbanks PA-C and Yimi Banegas MD  reviewed and concurred with the findings. Results for orders placed during the hospital encounter of 21    XR CHEST PORTABLE    Narrative  EXAMINATION:  ONE XRAY VIEW OF THE CHEST    2021 1:14 pm    COMPARISON:  March 10, 2020    HISTORY:  ORDERING SYSTEM PROVIDED HISTORY: r/o pna  TECHNOLOGIST PROVIDED HISTORY:  Reason for exam:->r/o pna    FINDINGS:  No airspace opacity or pleural effusion. The heart is normal size. No  pneumothorax. No free air beneath the hemidiaphragms. Hyperinflation of both  lungs notable in the upper lobes. Impression  1. No pneumonia or pleural effusion. 2.  Emphysematous changes.       Additional Imaging:  none    Lab Review   Recent Results (from the past 24 hour(s))   CBC with Auto Differential    Collection Time: 10/03/22  2:34 AM   Result Value Ref Range    WBC 20.9 (H) 4.5 - 11.5 E9/L    RBC 3.85 3.80 - 5.80 E12/L    Hemoglobin 12.6 12.5 - 16.5 g/dL    Hematocrit 38.5 37.0 - 54.0 %    .0 (H) 80.0 - 99.9 fL    MCH 32.7 26.0 - 35.0 pg    MCHC 32.7 32.0 - 34.5 %    RDW 12.0 11.5 - 15.0 fL    Platelets 646 740 - 301 E9/L    MPV 10.1 7.0 - 12.0 fL    Neutrophils % 95.6 (H) 43.0 - 80.0 % Lymphocytes % 2.6 (L) 20.0 - 42.0 %    Monocytes % 0.9 (L) 2.0 - 12.0 %    Eosinophils % 0.9 0.0 - 6.0 %    Basophils % 0.0 0.0 - 2.0 %    Neutrophils Absolute 20.06 (H) 1.80 - 7.30 E9/L    Lymphocytes Absolute 0.63 (L) 1.50 - 4.00 E9/L    Monocytes Absolute 0.21 0.10 - 0.95 E9/L    Eosinophils Absolute 0.19 0.05 - 0.50 E9/L    Basophils Absolute 0.00 0.00 - 0.20 E9/L    nRBC 0.0 /100 WBC    Toxic Granulation 1+     Polychromasia 1+    Basic Metabolic Panel    Collection Time: 10/03/22  2:34 AM   Result Value Ref Range    Sodium 133 132 - 146 mmol/L    Potassium 4.3 3.5 - 5.0 mmol/L    Chloride 94 (L) 98 - 107 mmol/L    CO2 31 (H) 22 - 29 mmol/L    Anion Gap 8 7 - 16 mmol/L    Glucose 179 (H) 74 - 99 mg/dL    BUN 31 (H) 6 - 23 mg/dL    Creatinine 0.8 0.7 - 1.2 mg/dL    GFR Non-African American >60 >=60 mL/min/1.73    GFR African American >60     Calcium 10.2 8.6 - 10.2 mg/dL     Assessment:     Principal Problem:    COPD exacerbation (HCC)  Active Problems:    Anemia    Hypertension    Anxiety    Hyperglycemia, drug-induced    Type 2 diabetes mellitus ([address]  Resolved Problems:    * No resolved hospital problems.  *      Plan:   Cont same  Up to 5L with chest physiotherapy  Cassi Azar MD  10/3/2022  6:11 AM

## 2022-10-03 NOTE — PROGRESS NOTES
This patient is on medication that requires renal, weight, and/or indication dose adjustment. Date Body Weight IBW  Adjusted BW SCr  CrCl Dialysis status   10/3/2022 178 lb (80.7 kg) Ideal body weight: 66.1 kg (145 lb 11.6 oz)  Adjusted ideal body weight: 72 kg (158 lb 10.1 oz) Serum creatinine: 0.8 mg/dL 10/03/22 0234  Estimated creatinine clearance: 86 mL/min N/a       Pharmacy has dose-adjusted the following medication(s):    Date Previous Order Adjusted Order   10/3/2022 Levofloxacin 750 mg IV every 48 hr Levofloxacin 750 mg IV every 24 hr       These changes were made per protocol according to the [address] for Pharmacists. *Please note this dose may need readjusted if patient's condition changes. Please contact pharmacy with any questions regarding these changes.     marco candelaria [facility]  10/3/2022  6:59 AM

## 2022-10-03 NOTE — PROGRESS NOTES
Pulmonary Progress Note    Admit Date: 10/1/2022                            PCP: Raffaele Bernard MD  Principal Problem:    COPD exacerbation [facility])  Active Problems:    Anemia    Hypertension    Anxiety    Hyperglycemia, drug-induced    Type 2 diabetes mellitus ([address]  Resolved Problems:    * No resolved hospital problems. *      Subjective:  Patient seen on 4L NC pulse ox 100%  still with DUONG with min/moderate activity but notes this to be his baseline at home  Occasional productive cough, clear sputum   Had breathing issues after receiving mucomyst this am       Medications:       levofloxacin  750 mg IntraVENous Q24H    methylPREDNISolone  40 mg IntraVENous Q12H    levalbuterol  0.63 mg Nebulization Q4H While awake    acetylcysteine  600 mg Inhalation BID    calcium elemental  1,000 mg Oral Daily    gabapentin  600 mg Oral TID    guaiFENesin  400 mg Oral TID    tamsulosin  0.8 mg Oral Nightly    enoxaparin  40 mg SubCUTAneous Daily    amLODIPine  5 mg Oral Daily    And    lisinopril  20 mg Oral Daily    cetirizine  10 mg Oral Daily    Arformoterol Tartrate  15 mcg Nebulization BID    budesonide  0.5 mg Nebulization BID       Vitals:  VITALS:  /68   Pulse 74   Temp 97.4 °F (36.3 °C) (Oral)   Resp 20   Ht 5' 7\" (1.702 m)   Wt 178 lb (80.7 kg)   SpO2 100%   BMI 27.88 kg/m²   24HR INTAKE/OUTPUT:    Intake/Output Summary (Last 24 hours) at 10/3/2022 1151  Last data filed at 10/3/2022 0807  Gross per 24 hour   Intake 180 ml   Output 300 ml   Net -120 ml     CURRENT PULSE OXIMETRY:  SpO2: 100 %  24HR PULSE OXIMETRY RANGE:  SpO2  Av.9 %  Min: 90 %  Max: 100 %  CVP:    VENT SETTINGS:      Additional Respiratory Assessments  Heart Rate: 74  Resp: 20  SpO2: 100 %      EXAM:  General: No distress. Alert. 3L NC  Eyes:  No sclera icterus. No conjunctival injection. ENT: No discharge. Pharynx clear. Neck: Trachea midline. Normal thyroid. Resp: No accessory muscle use. Mild expiratory wheezing anterior. Rales R base. No rhonchi. CV: Regular rate. Regular rhythm. No mumur or rub. No edema. ABD: Non-tender. Non-distended. Normal bowel sounds. Skin: Warm and dry. No nodule on exposed extremities. No rash on exposed extremities. M/S: No cyanosis. No joint deformity. No clubbing. Neuro: Awake. Follows commands. A&Ox3    I/O: I/O last 3 completed shifts:  In: -   Out: 1600 [Urine:1600]  I/O this shift:  In: 180 [P.O.:180]  Out: -      Results:  CBC:   Recent Labs     10/01/22  0602 10/02/22  0340 10/03/22  0234   WBC 19.6* 23.0* 20.9*   HGB 13.8 12.1* 12.6   HCT 41.7 36.7* 38.5   MCV 97.0 100.5* 100.0*    147 156     BMP:   Recent Labs     10/01/22  0602 10/02/22  0340 10/03/22  0234    133 133   K 4.1 4.6 4.3   CL 94* 97* 94*   CO2 29 28 31*   BUN 30* 29* 31*   CREATININE 1.2 1.0 0.8     LFT:   Recent Labs     10/01/22  0602   ALKPHOS 67   ALT 27   AST 22   PROT 6.5   BILITOT 1.3*   LABALBU 4.0     PT/INR: No results for input(s): PROTIME, INR in the last 72 hours.   Cultures:   Latest Reference Range & Units 10/1/22 06:28 10/1/22 06:43   Influenza A by PCR Not Detected   Not Detected   Influenza B by PCR Not Detected   Not Detected   Adenovirus by PCR Not Detected   Not Detected   Coronavirus 229E by PCR Not Detected   Not Detected   Coronavirus HKU1 by PCR Not Detected   Not Detected   Coronavirus NL63 by PCR Not Detected   Not Detected   Coronavirus OC43 by PCR Not Detected   Not Detected   Human Metapneumovirus by PCR Not Detected   Not Detected   Human Rhinovirus/Enterovirus by PCR Not Detected   Not Detected   Parainfluenza Virus 1 by PCR Not Detected   Not Detected   Parainfluenza Virus 2 by PCR Not Detected   Not Detected   Parainfluenza Virus 3 by PCR Not Detected   Not Detected   Parainfluenza Virus 4 by PCR Not Detected   Not Detected   Respiratory Syncytial Virus by PCR Not Detected   Not Detected   Bordetella parapertussis by PCR Not Detected   Not Detected   Chlamydophilia pneumoniae by PCR Not Detected   Not Detected   Mycoplasma pneumoniae by PCR Not Detected   Not Detected   SARS-CoV-2, PCR Not Detected   Not Detected   SARS-CoV-2, NAAT Not Detected  Not Detected        ABG:   Recent Labs     10/01/22  0631   PH 7.437   PO2 72.8*   PCO2 41.1   HCO3 27.1*   BE 2.7   O2SAT 95.4       Films:  XR CHEST 1 VIEW 10/1/2022  EXAMINATION: ONE XRAY VIEW OF THE CHEST 10/1/2022 3:49 am COMPARISON: 06/06/2021 HISTORY: ORDERING SYSTEM PROVIDED HISTORY: shortness of breath TECHNOLOGIST PROVIDED HISTORY: Reason for exam:->shortness of breath FINDINGS: The cardiomediastinal silhouette is normal.  There are new right basilar interstitial opacities. Overall background emphysematous changes noted. No pneumothorax or pleural effusion. Right basilar interstitial opacities, which could represent fibrotic change. Pneumonia is not entirely excluded. 6/6/2021                                                              10/1/2022    CT Chest 10/2: Stent identified in the right middle lobe bronchus. There is consolidative mucus or debris seen within the stent with consolidative infiltrate and collapse identified of the right middle lobe. Dense consolidative infiltrate with air bronchograms coursing throughout the majority of the right lower lobe. Findings all suggestive of pneumonia. Severe emphysematous and chronic changes seen within the aerated lung fields. Bronchiectasis changes identified centrally.   Bullous changes seen in the upper lung fields      Assessment:  70 y.o. male known to Dr. Marielena Madison, fully vaccinated for COVID with 2 boosters, with past medical history significant for chronic respiratory failure with hypoxia, baseline oxygen 2 to 5 L, emphysema, asthma, hypertension   10/1: 4L  10/2: 3L NC  10/3: 4L    COPD with acute exacerbation  Community-acquired pneumonia  Bronchiectasis -noted on CT chest 10/2  Leukocytosis r/t above  Chronic respiratory failure with hypoxia on 2 to 5 L baseline nasal cannula      Plan:  Patient seen on 4 L nasal cannula with pulse ox 100%, he wears baseline 2 to 5 L at home. Continue supplemental O2 to maintain pulse ox greater than 90%. BiPAP 12/5 as needed for respiratory distress - refusing, ok to make PRN    IV Solu-Medrol 40 mg to every 12 hours, continue one more day then lizabeth to oral taper , does take prednisone 10mg daily chronic  S/p IV Rocephin and doxycycline. 10/1 IV Levaquin started - per office notes patient only responds to [address] WBC 19.6>23.0 (10/2), procalcitonin 5.43 and chest x-ray concerning for right basilar interstitial opacities. Repeat procal ordered   CT chest reviewed. Aggressive pulmonary hygiene, continue chest physiotherapy, scheduled levalbuterol, (Mucomyst,--Dc'd reaction after allergy to sulfa ) Mucinex, flutter valve-will need vest at dc  Sputum culture with gram stain ordered, awaiting specimen. Strep and Legionella antigens pending. Respiratory panel negative  Continue Brovana, Pulmicort, Xopenex. Patient is on Breztri and Xopenex at home. Continue incentive spirometry        Electronically signed by KELECHI Huitron on 10/3/2022 at 11:51 AM    Attending Attestation Note:    Patient seen and examined with Hospital Staff, NP. I have extensively reviewed the chart lab work and imaging. I agree with above. Modifications and amendment of note made as necessary.     In addition, the following apply:    Current Facility-Administered Medications   Medication Dose Route Frequency Provider Last Rate Last Admin    levoFLOXacin (LEVAQUIN) 750 MG/150ML infusion 750 mg  750 mg IntraVENous Q24H KELECHI Fofana CNP   Stopped at 10/03/22 0936    methylPREDNISolone sodium (SOLU-MEDROL) injection 40 mg  40 mg IntraVENous Q12H KELECHI Landrum CNP   40 mg at 10/03/22 1106    levalbuterol (XOPENEX) nebulization 0.63 mg  0.63 mg Nebulization Q4H While awake KELECHI Fofana CNP   0.63 mg at 10/03/22 1301    calcium elemental (OSCAL) tablet 1,000 mg  1,000 mg Oral Daily Elgie Gilberto, DO   1,000 mg at 10/03/22 0802    gabapentin (NEURONTIN) capsule 600 mg  600 mg Oral TID Elgie Gilberto, DO   600 mg at 10/03/22 1411    guaiFENesin tablet 400 mg  400 mg Oral TID Elgie Gilberto, DO   400 mg at 10/03/22 1411    LORazepam (ATIVAN) tablet 0.5 mg  0.5 mg Oral BID PRN Elgie Gilberto, DO   0.5 mg at 10/02/22 2030    tamsulosin (FLOMAX) capsule 0.8 mg  0.8 mg Oral Nightly Elgie Gilberto, DO   0.8 mg at 10/02/22 2030    levalbuterol (XOPENEX) nebulizer solution 1.25 mg  1.25 mg Nebulization Q4H PRN Elgie Gilberto, DO        enoxaparin (LOVENOX) injection 40 mg  40 mg SubCUTAneous Daily Elgie Gilberto, DO   40 mg at 10/03/22 1106    acetaminophen (TYLENOL) tablet 650 mg  650 mg Oral Q4H PRN Elgie Gilberto, DO        amLODIPine (NORVASC) tablet 5 mg  5 mg Oral Daily Elgie Gilberto, DO   5 mg at 10/03/22 8833    And    lisinopril (PRINIVIL;ZESTRIL) tablet 20 mg  20 mg Oral Daily Elgie Gilberto, DO   20 mg at 10/03/22 0803    cetirizine (ZYRTEC) tablet 10 mg  10 mg Oral Daily Elgie Gilberto, DO   10 mg at 10/03/22 0802    Arformoterol Tartrate (BROVANA) nebulizer solution 15 mcg  15 mcg Nebulization BID Darrian Naval, APRN - CNP   15 mcg at 10/03/22 0818    budesonide (PULMICORT) nebulizer suspension 500 mcg  0.5 mg Nebulization BID Darrian Naval, APRN - CNP   500 mcg at 10/03/22 0818      - Continue Brovana, Pulmicort, Xopenex.   Patient is on Breztri and Xopenex at home.  - O2, IS  - IV levaquin on board   - defer on bronchoscopy at this time  - check ESR, CRP, Procalcitonin, Pro-BNP  - legionella negative   - check urine strep pneumococcal antigen   - outpatient PFT, PSG  - O2, IS  - check swallow evaluation for patient   - bronchopulmonary hygiene     Marco Justin MD  10/3/2022  4:44 PM

## 2022-10-03 NOTE — PROGRESS NOTES
Date: 10/2/2022    Time: 10:43 PM    Patient Placed On BIPAP/CPAP/ Non-Invasive Ventilation? No    If no must comment. Comments: Patient is refusing the BiPAP 12/5/40%, claims it's too much pressure but refusing to try and find a comfortable setting. Patient is on a 4L NC at baseline sating 96%.         Helena Pena, RCP

## 2022-10-04 ENCOUNTER — APPOINTMENT (OUTPATIENT)
Dept: GENERAL RADIOLOGY | Age: 72
DRG: 190 | End: 2022-10-04
Payer: COMMERCIAL

## 2022-10-04 LAB
ANION GAP SERPL CALCULATED.3IONS-SCNC: 9 MMOL/L (ref 7–16)
BASOPHILS ABSOLUTE: 0 E9/L (ref 0–0.2)
BASOPHILS RELATIVE PERCENT: 0 % (ref 0–2)
BUN BLDV-MCNC: 33 MG/DL (ref 6–23)
C-REACTIVE PROTEIN: 7.9 MG/DL (ref 0–0.4)
CALCIUM SERPL-MCNC: 9.8 MG/DL (ref 8.6–10.2)
CHLORIDE BLD-SCNC: 95 MMOL/L (ref 98–107)
CO2: 29 MMOL/L (ref 22–29)
CREAT SERPL-MCNC: 0.7 MG/DL (ref 0.7–1.2)
EOSINOPHILS ABSOLUTE: 0 E9/L (ref 0.05–0.5)
EOSINOPHILS RELATIVE PERCENT: 0 % (ref 0–6)
GFR AFRICAN AMERICAN: >60
GFR NON-AFRICAN AMERICAN: >60 ML/MIN/1.73
GLUCOSE BLD-MCNC: 224 MG/DL (ref 74–99)
HCT VFR BLD CALC: 36.4 % (ref 37–54)
HEMOGLOBIN: 11.9 G/DL (ref 12.5–16.5)
LYMPHOCYTES ABSOLUTE: 0.88 E9/L (ref 1.5–4)
LYMPHOCYTES RELATIVE PERCENT: 5.2 % (ref 20–42)
MCH RBC QN AUTO: 32.2 PG (ref 26–35)
MCHC RBC AUTO-ENTMCNC: 32.7 % (ref 32–34.5)
MCV RBC AUTO: 98.4 FL (ref 80–99.9)
MONOCYTES ABSOLUTE: 0.35 E9/L (ref 0.1–0.95)
MONOCYTES RELATIVE PERCENT: 1.7 % (ref 2–12)
NEUTROPHILS ABSOLUTE: 16.28 E9/L (ref 1.8–7.3)
NEUTROPHILS RELATIVE PERCENT: 93.1 % (ref 43–80)
NUCLEATED RED BLOOD CELLS: 0 /100 WBC
PDW BLD-RTO: 11.9 FL (ref 11.5–15)
PLATELET # BLD: 148 E9/L (ref 130–450)
PMV BLD AUTO: 10 FL (ref 7–12)
POLYCHROMASIA: ABNORMAL
POTASSIUM SERPL-SCNC: 4.4 MMOL/L (ref 3.5–5)
PROCALCITONIN: 1.5 NG/ML (ref 0–0.08)
RBC # BLD: 3.7 E12/L (ref 3.8–5.8)
SEDIMENTATION RATE, ERYTHROCYTE: 85 MM/HR (ref 0–15)
SODIUM BLD-SCNC: 133 MMOL/L (ref 132–146)
STREP PNEUMONIAE ANTIGEN, URINE: NORMAL
TARGET CELLS: ABNORMAL
TOXIC GRANULATION: ABNORMAL
WBC # BLD: 17.5 E9/L (ref 4.5–11.5)

## 2022-10-04 PROCEDURE — 6360000002 HC RX W HCPCS

## 2022-10-04 PROCEDURE — 80048 BASIC METABOLIC PNL TOTAL CA: CPT

## 2022-10-04 PROCEDURE — 36415 COLL VENOUS BLD VENIPUNCTURE: CPT

## 2022-10-04 PROCEDURE — 92526 ORAL FUNCTION THERAPY: CPT | Performed by: SPEECH-LANGUAGE PATHOLOGIST

## 2022-10-04 PROCEDURE — 6370000000 HC RX 637 (ALT 250 FOR IP): Performed by: INTERNAL MEDICINE

## 2022-10-04 PROCEDURE — 85651 RBC SED RATE NONAUTOMATED: CPT

## 2022-10-04 PROCEDURE — 2580000003 HC RX 258: Performed by: INTERNAL MEDICINE

## 2022-10-04 PROCEDURE — 74230 X-RAY XM SWLNG FUNCJ C+: CPT

## 2022-10-04 PROCEDURE — 6360000002 HC RX W HCPCS: Performed by: INTERNAL MEDICINE

## 2022-10-04 PROCEDURE — 92611 MOTION FLUOROSCOPY/SWALLOW: CPT | Performed by: SPEECH-LANGUAGE PATHOLOGIST

## 2022-10-04 PROCEDURE — 94640 AIRWAY INHALATION TREATMENT: CPT

## 2022-10-04 PROCEDURE — 94660 CPAP INITIATION&MGMT: CPT

## 2022-10-04 PROCEDURE — 2700000000 HC OXYGEN THERAPY PER DAY

## 2022-10-04 PROCEDURE — 94668 MNPJ CHEST WALL SBSQ: CPT

## 2022-10-04 PROCEDURE — 2060000000 HC ICU INTERMEDIATE R&B

## 2022-10-04 PROCEDURE — 2500000003 HC RX 250 WO HCPCS: Performed by: RADIOLOGY

## 2022-10-04 PROCEDURE — 85025 COMPLETE CBC W/AUTO DIFF WBC: CPT

## 2022-10-04 PROCEDURE — 84145 PROCALCITONIN (PCT): CPT

## 2022-10-04 PROCEDURE — 86140 C-REACTIVE PROTEIN: CPT

## 2022-10-04 RX ADMIN — GUAIFENESIN 400 MG: 400 TABLET ORAL at 14:26

## 2022-10-04 RX ADMIN — LEVALBUTEROL HYDROCHLORIDE 0.63 MG: 0.63 SOLUTION RESPIRATORY (INHALATION) at 08:34

## 2022-10-04 RX ADMIN — LEVALBUTEROL HYDROCHLORIDE 0.63 MG: 0.63 SOLUTION RESPIRATORY (INHALATION) at 12:17

## 2022-10-04 RX ADMIN — ENOXAPARIN SODIUM 40 MG: 100 INJECTION SUBCUTANEOUS at 10:50

## 2022-10-04 RX ADMIN — BUDESONIDE 500 MCG: 0.5 SUSPENSION RESPIRATORY (INHALATION) at 08:34

## 2022-10-04 RX ADMIN — GABAPENTIN 600 MG: 300 CAPSULE ORAL at 20:36

## 2022-10-04 RX ADMIN — CETIRIZINE HYDROCHLORIDE 10 MG: 10 TABLET, FILM COATED ORAL at 08:09

## 2022-10-04 RX ADMIN — METHYLPREDNISOLONE SODIUM SUCCINATE 40 MG: 40 INJECTION, POWDER, LYOPHILIZED, FOR SOLUTION INTRAMUSCULAR; INTRAVENOUS at 22:26

## 2022-10-04 RX ADMIN — AMPICILLIN SODIUM AND SULBACTAM SODIUM 3000 MG: 2; 1 INJECTION, POWDER, FOR SOLUTION INTRAMUSCULAR; INTRAVENOUS at 18:47

## 2022-10-04 RX ADMIN — BARIUM SULFATE 70 G: 0.81 POWDER, FOR SUSPENSION ORAL at 10:01

## 2022-10-04 RX ADMIN — BARIUM SULFATE 120 ML: 400 SUSPENSION ORAL at 10:00

## 2022-10-04 RX ADMIN — TAMSULOSIN HYDROCHLORIDE 0.8 MG: 0.4 CAPSULE ORAL at 20:36

## 2022-10-04 RX ADMIN — GABAPENTIN 600 MG: 300 CAPSULE ORAL at 14:26

## 2022-10-04 RX ADMIN — BARIUM SULFATE 10 ML: 400 PASTE ORAL at 10:00

## 2022-10-04 RX ADMIN — GUAIFENESIN 400 MG: 400 TABLET ORAL at 20:36

## 2022-10-04 RX ADMIN — LEVOFLOXACIN 750 MG: 5 INJECTION, SOLUTION INTRAVENOUS at 08:20

## 2022-10-04 RX ADMIN — ARFORMOTEROL TARTRATE 15 MCG: 15 SOLUTION RESPIRATORY (INHALATION) at 20:17

## 2022-10-04 RX ADMIN — LEVALBUTEROL HYDROCHLORIDE 0.63 MG: 0.63 SOLUTION RESPIRATORY (INHALATION) at 16:42

## 2022-10-04 RX ADMIN — AMLODIPINE BESYLATE 5 MG: 5 TABLET ORAL at 08:09

## 2022-10-04 RX ADMIN — METHYLPREDNISOLONE SODIUM SUCCINATE 40 MG: 40 INJECTION, POWDER, LYOPHILIZED, FOR SOLUTION INTRAMUSCULAR; INTRAVENOUS at 10:50

## 2022-10-04 RX ADMIN — GUAIFENESIN 400 MG: 400 TABLET ORAL at 08:09

## 2022-10-04 RX ADMIN — BUDESONIDE 500 MCG: 0.5 SUSPENSION RESPIRATORY (INHALATION) at 20:17

## 2022-10-04 RX ADMIN — ARFORMOTEROL TARTRATE 15 MCG: 15 SOLUTION RESPIRATORY (INHALATION) at 08:34

## 2022-10-04 RX ADMIN — GABAPENTIN 600 MG: 300 CAPSULE ORAL at 08:08

## 2022-10-04 RX ADMIN — Medication 1000 MG: at 08:09

## 2022-10-04 RX ADMIN — LISINOPRIL 20 MG: 20 TABLET ORAL at 08:08

## 2022-10-04 RX ADMIN — LEVALBUTEROL HYDROCHLORIDE 0.63 MG: 0.63 SOLUTION RESPIRATORY (INHALATION) at 20:17

## 2022-10-04 RX ADMIN — AMPICILLIN SODIUM AND SULBACTAM SODIUM 3000 MG: 2; 1 INJECTION, POWDER, FOR SOLUTION INTRAMUSCULAR; INTRAVENOUS at 23:58

## 2022-10-04 ASSESSMENT — PAIN SCALES - GENERAL
PAINLEVEL_OUTOF10: 0

## 2022-10-04 NOTE — PROGRESS NOTES
Occupational Therapy    OT eval and treat orders received and chart reviewed. Attempt made but pt declined OOB activity this date, stating he was too tired. Requesting therapy come back at another time. Will check back at another time/date as able/appropriate.     Ria Fierro, OTR/L

## 2022-10-04 NOTE — PROGRESS NOTES
Date: 10/3/2022    Time: 9:59 PM    Patient Placed On BIPAP/CPAP/ Non-Invasive Ventilation? No    If no must comment. Comments: Patient refusing bipap, on standby.         Gerson Cardoza RCP

## 2022-10-04 NOTE — PROGRESS NOTES
Internal Medicine  Progress Note  Marium Huddleston MD     Subjective:     Patient is alert. Patient reports OK. Tolerating diet well no other c/o. Scheduled Meds:   levofloxacin  750 mg IntraVENous Q24H    methylPREDNISolone  40 mg IntraVENous Q12H    levalbuterol  0.63 mg Nebulization Q4H While awake    calcium elemental  1,000 mg Oral Daily    gabapentin  600 mg Oral TID    guaiFENesin  400 mg Oral TID    tamsulosin  0.8 mg Oral Nightly    enoxaparin  40 mg SubCUTAneous Daily    amLODIPine  5 mg Oral Daily    And    lisinopril  20 mg Oral Daily    cetirizine  10 mg Oral Daily    Arformoterol Tartrate  15 mcg Nebulization BID    budesonide  0.5 mg Nebulization BID     Continuous Infusions:  PRN Meds:LORazepam, levalbuterol, acetaminophen    Objective:      Physical Exam:  Vitals:   BP (!) 140/49   Pulse 69   Temp 98.6 °F (37 °C) (Temporal)   Resp 19   Ht 5' 7\" (1.702 m)   Wt 181 lb 3.2 oz (82.2 kg)   SpO2 97%   BMI 28.38 kg/m²   Orthostatic BPs and Heart Rates:     I/O's    Intake/Output Summary (Last 24 hours) at 10/4/2022 3935  Last data filed at 10/4/2022 0430  Gross per 24 hour   Intake 540 ml   Output 400 ml   Net 140 ml     Exam:  General appearance: alert, appears stated age, and cooperative  Head: Normocephalic, without obvious abnormality, atraumatic  Eyes: conjunctivae/corneas clear. PERRL, EOM's intact. Fundi benign.   Throat: normal findings: lips normal without lesions  Neck: no adenopathy, no carotid bruit, no JVD, and supple, symmetrical, trachea midline  Back: negative  Lungs: clear to auscultation bilaterally  Chest wall: no tenderness  Heart: regular rate and rhythm, S1, S2 normal, no murmur, click, rub or gallop  Abdomen: soft, non-tender; bowel sounds normal; no masses,  no organomegaly  Extremities: extremities normal, atraumatic, no cyanosis or edema  Pulses: 2+ and symmetric  Skin: Skin color, texture, turgor normal. No rashes or lesions  Lymph nodes: Cervical, supraclavicular, and axillary nodes normal.  Neurologic: Grossly normal      Imaging     Chest  Xray:  Results for orders placed during the hospital encounter of 20    XR CHEST STANDARD (2 VW)    Narrative  Patient MRN: [id number]  : 1950  Age:  71 years  Gender: Male  Order Date: 3/10/2020 12:00 PM  Exam: XR CHEST (2 VW)  Number of Images: 2 view  Indication:   persistent dyspnea and cough  persistent dyspnea and cough  Comparison: 2020    Findings: The heart is unremarkable. The lung fields are hyperinflated. Density is seen in the lung fields suggesting scar. The aorta is tortuous and ectatic. Impression  Findings of emphysematous changes. This study was dictated by Cherise Avery PA-C and Doris Betts MD  reviewed and concurred with the findings. Results for orders placed during the hospital encounter of 21    XR CHEST PORTABLE    Narrative  EXAMINATION:  ONE XRAY VIEW OF THE CHEST    2021 1:14 pm    COMPARISON:  March 10, 2020    HISTORY:  ORDERING SYSTEM PROVIDED HISTORY: r/o pna  TECHNOLOGIST PROVIDED HISTORY:  Reason for exam:->r/o pna    FINDINGS:  No airspace opacity or pleural effusion. The heart is normal size. No  pneumothorax. No free air beneath the hemidiaphragms. Hyperinflation of both  lungs notable in the upper lobes. Impression  1. No pneumonia or pleural effusion. 2.  Emphysematous changes.       Additional Imaging:  none    Lab Review   Recent Results (from the past 24 hour(s))   Procalcitonin    Collection Time: 10/03/22  2:15 PM   Result Value Ref Range    Procalcitonin 1.24 (H) 0.00 - 0.08 ng/mL   CBC with Auto Differential    Collection Time: 10/04/22  4:30 AM   Result Value Ref Range    WBC 17.5 (H) 4.5 - 11.5 E9/L    RBC 3.70 (L) 3.80 - 5.80 E12/L    Hemoglobin 11.9 (L) 12.5 - 16.5 g/dL    Hematocrit 36.4 (L) 37.0 - 54.0 %    MCV 98.4 80.0 - 99.9 fL    MCH 32.2 26.0 - 35.0 pg    MCHC 32.7 32.0 - 34.5 %    RDW 11.9 11.5 - 15.0 fL    Platelets 697 893 - 671 E9/L    MPV 10.0 7.0 - 12.0 fL   Basic Metabolic Panel    Collection Time: 10/04/22  4:30 AM   Result Value Ref Range    Sodium 133 132 - 146 mmol/L    Potassium 4.4 3.5 - 5.0 mmol/L    Chloride 95 (L) 98 - 107 mmol/L    CO2 29 22 - 29 mmol/L    Anion Gap 9 7 - 16 mmol/L    Glucose 224 (H) 74 - 99 mg/dL    BUN 33 (H) 6 - 23 mg/dL    Creatinine 0.7 0.7 - 1.2 mg/dL    GFR Non-African American >60 >=60 mL/min/1.73    GFR African American >60     Calcium 9.8 8.6 - 10.2 mg/dL     Assessment:     Principal Problem:    COPD exacerbation (HCC)  Active Problems:    Anemia    Hypertension    Anxiety    Hyperglycemia, drug-induced    Type 2 diabetes mellitus ([address]  Resolved Problems:    * No resolved hospital problems.  *      Plan:   As per pulmonary  Emily Alejo MD  10/4/2022  6:42 AM

## 2022-10-04 NOTE — PROGRESS NOTES
Physical Therapy  Facility/Department: [address] INTERMEDIATE 1  Physical Therapy Initial Assessment    Name: Mike Perez  : 1950  MRN: [id number]  Date of Service: 10/4/2022      Attempted to see pt for PT evaluation, pt declined due to fatigue   Felicity AGIULERA [id number]

## 2022-10-04 NOTE — PROGRESS NOTES
SPEECH/LANGUAGE PATHOLOGY  VIDEOFLUOROSCOPIC STUDY OF SWALLOWING (MBS)   and PLAN OF CARE    PATIENT NAME:  Susanna Chris  (male)     MRN:  [id number]    :  1950  (70 y.o.)  STATUS:  Inpatient: Room [facility]1/0431-A    TODAY'S DATE:  10/4/2022  REFERRING PROVIDER:  10/03/22 1700    SLP video swallow  Start:  10/03/22 1700,   End:  10/03/22 1700,   ONE TIME,   Standing Count:  1 Occurrences,   R         Osbaldo Macdonald MD    REASON FOR REFERRAL: ruled out silent aspiration   EVALUATING THERAPIST: ZURI Ferguson      RESULTS:      DYSPHAGIA DIAGNOSIS:  functional swallow for age/premorbid functioning    Aspiration not observed during this study     DIET RECOMMENDATIONS:  Regular consistency solids (IDDSI level 7) with thin liquids (IDDSI level 0)    FEEDING RECOMMENDATIONS:    Assistance level:  No assistance needed     Compensatory strategies recommended: Small bites/sips     Discussed recommendations with nursing and/or faxed report to referring provider: Yes    Laryngeal Penetration and Aspiration:  Penetration WITHOUT aspiration was observed in today's study with thin liquid    SPEECH THERAPY  PLAN OF CARE   The dysphagia POC is established based on physician order and dysphagia diagnosis    Dysphagia therapy is not recommended       Conditions Requiring Skilled Therapeutic Intervention for dysphagia:    Not applicable    SPECIFIC DYSPHAGIA INTERVENTIONS TO INCLUDE:     not applicable    Specific instructions for next treatment:  not applicable   Treatment Goals:    Short Term Goals:  Not applicable no therapy warranted     Long Term Goals:   Not applicable no therapy warranted      Patient/family Goal:    not applicable    Plan of care discussed with Patient   The Patient understand(s) the diagnosis, prognosis and plan of care     Rehabilitation Potential/Prognosis: good                      ADMITTING DIAGNOSIS: COPD exacerbation ([facility] [J44.1]  Pneumonia due to infectious organism, unspecified laterality, unspecified part of lung [J18.9]     VISIT DIAGNOSIS:   Visit Diagnoses         Codes    Pneumonia due to infectious organism, unspecified laterality, unspecified part of lung     J18.9                PATIENT REPORT/COMPLAINT: denies difficulty swallowing    PRIOR LEVEL OF SWALLOW FUNCTION:    Past History of Dysphagia?:  none reported    Home diet: Regular consistency solids (IDDSI level 7) with  thin liquids (IDDSI level 0)  Current Diet Order:  ADULT DIET; Regular; Low Sodium (2 gm)    PROCEDURE:  Consistencies Administered During the Evaluation   Liquids: thin liquid and nectar thick liquid   Solids:  pureed foods and solid foods      Method of Intake:   cup, straw, spoon  Self fed      Position:   Seated, upright, Lateral plane    INSTRUMENTAL ASSESSMENT:    ORAL PREP/ ORAL PHASE:    The oral stage of swallowing was within functional limits for consistencies administered     PHARYNGEAL PHASE:     ONSET TIME       Onset time of the pharyngeal swallow was adequate       PHARYNGEAL RESIDUALS        Vallecula/Pharyngeal Wall           No significant residuals were noted in the vallecula      Pyriform Sinuses      No significant residuals were noted in the pyriform sinuses     LARYNGEAL PENETRATION   Laryngeal penetration occurred in the absence of aspiration DURING the swallow for thin liquid which cleared from the laryngeal vestibule spontaneously (transient). Laryngeal penetration was minimal and occurred with large volume        Mild laryngeal penetration was observed with large bolus liquids. Mild penetration is considered a normal part of the aging swallow and does not warrant liquid modification if not negatively impacting overall swallow function. Recommend the following strategies to optimize safety with PO intake. Recommend small sips at all times. Pt to be upright for all intake. Pt educated and verbalized understanding of compensatory strategies.       ASPIRATION  Aspiration was not present during this evaluation    PENETRATION-ASPIRATION SCALE (PAS):  THIN 2 = Material enters the airway, remains above vocal folds, and is ejected from the airway   MILDLY THICK 1 = Material does not enter the airway  MODERATELY THICK item not administered  PUREE 1 = Material does not enter the airway  HARD SOLID 1 = Material does not enter the airway       COMPENSATORY STRATEGIES    Compensatory strategies that were beneficial included Small bites/sips      STRUCTURAL/FUNCTIONAL ANOMALIES   Cervical osteophytes present per Radiologist-did not affect swallow    CERVICAL ESOPHAGEAL STAGE :     The cervical esophagus appeared adequate          ___________    Cognition:   Within functional limits for this exam    Oral Peripheral Examination   Adequate lingual/labial strength      Parameters of Speech Production  Respiration:  Adequate for speech production  Quality:   Within functional limits  Intensity: Within functional limits    Pain: No pain reported. EDUCATION:   The Speech Language Pathologist (SLP) completed education regarding results of evaluation and that intervention is not warranted at this time. Learner: Patient  Education: Reviewed results and recommendations of this evaluation  Evaluation of Education:  Mitcheal Po understanding    This plan may be re-evaluated and revised as warranted. Evaluation Time includes thorough review of current medical information, gathering information on past medical history/social history and prior level of function, completion of standardized testing/informal observation of tasks, assessment of data and education on plan of care and goals. INTERVENTION    Pt/family educated on above results and plan of care. Pt/family trained on compensatory strategies for safe swallow and signs and symptoms of aspiration (throat clearing, coughing/choking, wet vocal quality. , etc.) and encouraged to notify staff if observed. Handouts provided as warranted.  Pt/family encouraged to engage in question/answer session. All questions answered and pt/family verbalized understanding of above. [x]The admitting diagnosis and active problem list, have been reviewed prior to initiation of this evaluation. CPT Code: 74250  dysphagia study, 46602 dysphagia therapy    ACTIVE PROBLEM LIST:   Patient Active Problem List   Diagnosis    COPD with acute exacerbation ([facility]    Hypertension    Anxiety    Lactic acidosis    Hyperglycemia, drug-induced    Acute respiratory failure with hypoxia (HCC)    Type 2 diabetes mellitus ([facility]    COPD exacerbation ([facility]    Anemia       Ruby Federico TUCKER CCC/SLP [id number]  Speech-Language Pathologist

## 2022-10-04 NOTE — CARE COORDINATION
CASE MANAGEMENT. Iam Cid Met with patient at the bedside. He is sitting up in bed tolerating 4lnc - baseline - has concentrator and portable tanks through [facility] - owns an [employer]. Mr Felton Garcia voices being independent at home with his wife. Uses a cane and rarely his walker. Also, has pulse ox and nebulizer. Has h/o hhc/outpt therapy and pulm therapy with Rola Saul. He will return home at [place]. PT/OT evals pending. In the meantime, patient is receiving iv levaquin qd, iv solumedrol q12hrs, and is ordered Chest PT. Will cont to follow along and assist with needs accordingly. He anticipates no needs, including hhc.

## 2022-10-04 NOTE — PROGRESS NOTES
Pulmonary Progress Note    Admit Date: 10/1/2022                            PCP: Emi Velez MD  Principal Problem:    COPD exacerbation [facility])  Active Problems:    Anemia    Hypertension    Anxiety    Hyperglycemia, drug-induced    Type 2 diabetes mellitus ([facility] [address]  Resolved Problems:    * No resolved hospital problems. *      Subjective:  Patient seen on 4L NC pulse ox 100%  still with DUONG with min/moderate activity but notes this to be his baseline at home  Occasional productive cough, clear sputum   Drops with activity and room air , o2 fell off while brushing his teeth and he dropped to 83% recovered with 4L and rest       Medications:       levofloxacin  750 mg IntraVENous Q24H    methylPREDNISolone  40 mg IntraVENous Q12H    levalbuterol  0.63 mg Nebulization Q4H While awake    calcium elemental  1,000 mg Oral Daily    gabapentin  600 mg Oral TID    guaiFENesin  400 mg Oral TID    tamsulosin  0.8 mg Oral Nightly    enoxaparin  40 mg SubCUTAneous Daily    amLODIPine  5 mg Oral Daily    And    lisinopril  20 mg Oral Daily    cetirizine  10 mg Oral Daily    Arformoterol Tartrate  15 mcg Nebulization BID    budesonide  0.5 mg Nebulization BID       Vitals:  VITALS:  /62   Pulse 70   Temp 97.9 °F (36.6 °C) (Oral)   Resp 18   Ht 5' 7\" (1.702 m)   Wt 181 lb 3.2 oz (82.2 kg)   SpO2 98%   BMI 28.38 kg/m²   24HR INTAKE/OUTPUT:    Intake/Output Summary (Last 24 hours) at 10/4/2022 1522  Last data filed at 10/4/2022 1223  Gross per 24 hour   Intake 480 ml   Output 400 ml   Net 80 ml     CURRENT PULSE OXIMETRY:  SpO2: 98 %  24HR PULSE OXIMETRY RANGE:  SpO2  Av.7 %  Min: 95 %  Max: 98 %  CVP:    VENT SETTINGS:      Additional Respiratory Assessments  Heart Rate: 70  Resp: 18  SpO2: 98 %      EXAM:  General: No distress. Alert. 3L NC  Eyes:  No sclera icterus. No conjunctival injection. ENT: No discharge. Pharynx clear. Neck: Trachea midline. Normal thyroid. Resp: No accessory muscle use.  Mild expiratory wheezing anterior. Rales R base. No rhonchi. CV: Regular rate. Regular rhythm. No mumur or rub. No edema. ABD: Non-tender. Non-distended. Normal bowel sounds. Skin: Warm and dry. No nodule on exposed extremities. No rash on exposed extremities. M/S: No cyanosis. No joint deformity. No clubbing. Neuro: Awake. Follows commands. A&Ox3    I/O: I/O last 3 completed shifts: In: 540 [P.O.:540]  Out: 400 [Urine:400]  I/O this shift:  In: 300 [P.O.:300]  Out: -      Results:  CBC:   Recent Labs     10/02/22  0340 10/03/22  0234 10/04/22  0430   WBC 23.0* 20.9* 17.5*   HGB 12.1* 12.6 11.9*   HCT 36.7* 38.5 36.4*   .5* 100.0* 98.4    156 148     BMP:   Recent Labs     10/02/22  0340 10/03/22  0234 10/04/22  0430    133 133   K 4.6 4.3 4.4   CL 97* 94* 95*   CO2 28 31* 29   BUN 29* 31* 33*   CREATININE 1.0 0.8 0.7     LFT:   No results for input(s): ALKPHOS, ALT, AST, PROT, BILITOT, BILIDIR, LABALBU in the last 72 hours. PT/INR: No results for input(s): PROTIME, INR in the last 72 hours.   Cultures:   Latest Reference Range & Units 10/1/22 06:28 10/1/22 06:43   Influenza A by PCR Not Detected   Not Detected   Influenza B by PCR Not Detected   Not Detected   Adenovirus by PCR Not Detected   Not Detected   Coronavirus 229E by PCR Not Detected   Not Detected   Coronavirus HKU1 by PCR Not Detected   Not Detected   Coronavirus NL63 by PCR Not Detected   Not Detected   Coronavirus OC43 by PCR Not Detected   Not Detected   Human Metapneumovirus by PCR Not Detected   Not Detected   Human Rhinovirus/Enterovirus by PCR Not Detected   Not Detected   Parainfluenza Virus 1 by PCR Not Detected   Not Detected   Parainfluenza Virus 2 by PCR Not Detected   Not Detected   Parainfluenza Virus 3 by PCR Not Detected   Not Detected   Parainfluenza Virus 4 by PCR Not Detected   Not Detected   Respiratory Syncytial Virus by PCR Not Detected   Not Detected   Bordetella parapertussis by PCR Not Detected   Not Detected   Chlamydophilia pneumoniae by PCR Not Detected   Not Detected   Mycoplasma pneumoniae by PCR Not Detected   Not Detected   SARS-CoV-2, PCR Not Detected   Not Detected   SARS-CoV-2, NAAT Not Detected  Not Detected        ABG:   No results for input(s): PH, PO2, PCO2, HCO3, BE, O2SAT in the last 72 hours. Films:  XR CHEST 1 VIEW 10/1/2022  EXAMINATION: ONE XRAY VIEW OF THE CHEST 10/1/2022 3:49 am COMPARISON: 06/06/2021 HISTORY: ORDERING SYSTEM PROVIDED HISTORY: shortness of breath TECHNOLOGIST PROVIDED HISTORY: Reason for exam:->shortness of breath FINDINGS: The cardiomediastinal silhouette is normal.  There are new right basilar interstitial opacities. Overall background emphysematous changes noted. No pneumothorax or pleural effusion. Right basilar interstitial opacities, which could represent fibrotic change. Pneumonia is not entirely excluded. 6/6/2021                                                              10/1/2022    CT Chest 10/2: Stent identified in the right middle lobe bronchus. There is consolidative mucus or debris seen within the stent with consolidative infiltrate and collapse identified of the right middle lobe. Dense consolidative infiltrate with air bronchograms coursing throughout the majority of the right lower lobe. Findings all suggestive of pneumonia. Severe emphysematous and chronic changes seen within the aerated lung fields. Bronchiectasis changes identified centrally.   Bullous changes seen in the upper lung fields      Assessment:  70 y.o. male known to Dr. Felton Blanco, fully vaccinated for COVID with 2 boosters, with past medical history significant for chronic respiratory failure with hypoxia, baseline oxygen 2 to 5 L, emphysema, asthma, hypertension   10/1: 4L  10/2: 3L NC  10/3: 4L    COPD with acute exacerbation  Community-acquired pneumonia vs aspiration   Bronchiectasis -noted on CT chest 10/2  Leukocytosis r/t above  Chronic respiratory failure with hypoxia on 2 to 5 L baseline nasal cannula      Plan:  Patient seen on 4 L nasal cannula with pulse ox 100%, he wears baseline 2 to 5 L at home. Continue supplemental O2 to maintain pulse ox greater than 90%. BiPAP 12/5 as needed for respiratory distress - refusing, ok to make PRN    IV Solu-Medrol 40 mg to every 12 hours, continue one more day then wean to oral taper , does take prednisone 10mg daily chronic  S/p IV Rocephin and doxycycline. 10/1 IV Levaquin started - per office notes patient only responds to [address] WBC 19.6>23.0 (10/2), procalcitonin 5.43 and chest x-ray concerning for right basilar interstitial opacities. Repeat procal ordered   CT chest reviewed. Aggressive pulmonary hygiene, continue chest physiotherapy, scheduled levalbuterol, (Mucomyst,--Dc'd reaction after allergy to sulfa ) Mucinex, flutter valve-will need vest at dc  Sputum culture with gram stain ordered, awaiting specimen. Strep and Legionella antigens pending. Respiratory panel negative  Continue Brovana, Pulmicort, Xopenex. Patient is on Breztri and Xopenex at home. Continue incentive spirometry  Swallow evaluation some laryngeal penetration -ST to follow         Electronically signed by KELECHI Wall on 10/4/2022 at 3:22 PM     Attending Attestation Note:    Patient seen and examined with Hospital Staff, NP. I have extensively reviewed the chart lab work and imaging. I agree with above. Modifications and amendment of note made as necessary.     In addition, the following apply:    Current Facility-Administered Medications   Medication Dose Route Frequency Provider Last Rate Last Admin    levoFLOXacin (LEVAQUIN) 750 MG/150ML infusion 750 mg  750 mg IntraVENous Q24H KELECHI Ghosh CNP   Stopped at 10/04/22 1000    methylPREDNISolone sodium (SOLU-MEDROL) injection 40 mg  40 mg IntraVENous Q12H KELECHI Ghosh CNP   40 mg at 10/04/22 1050    levalbuterol (XOPENEX) nebulization 0.63 mg  0.63 mg Nebulization Q4H While awake Brielle Stable, APRN - CNP   0.63 mg at 10/04/22 1642    calcium elemental (OSCAL) tablet 1,000 mg  1,000 mg Oral Daily Alvaro Leather, DO   1,000 mg at 10/04/22 0809    gabapentin (NEURONTIN) capsule 600 mg  600 mg Oral TID Alvaro Leather, DO   600 mg at 10/04/22 1426    guaiFENesin tablet 400 mg  400 mg Oral TID Alvaro Leather, DO   400 mg at 10/04/22 1426    LORazepam (ATIVAN) tablet 0.5 mg  0.5 mg Oral BID PRN Alvaro Leather, DO   0.5 mg at 10/02/22 2030    tamsulosin (FLOMAX) capsule 0.8 mg  0.8 mg Oral Nightly Alvaro Leather, DO   0.8 mg at 10/03/22 2044    levalbuterol (XOPENEX) nebulizer solution 1.25 mg  1.25 mg Nebulization Q4H PRN Alvaro Leather, DO        enoxaparin (LOVENOX) injection 40 mg  40 mg SubCUTAneous Daily Alvaro Leather, DO   40 mg at 10/04/22 1050    acetaminophen (TYLENOL) tablet 650 mg  650 mg Oral Q4H PRN Alvaro Leather, DO        amLODIPine (NORVASC) tablet 5 mg  5 mg Oral Daily Alvaro Leather, DO   5 mg at 10/04/22 5514    And    lisinopril (PRINIVIL;ZESTRIL) tablet 20 mg  20 mg Oral Daily Alvaro Leather, DO   20 mg at 10/04/22 2813    cetirizine (ZYRTEC) tablet 10 mg  10 mg Oral Daily Alvaro Leather, DO   10 mg at 10/04/22 0809    Arformoterol Tartrate (BROVANA) nebulizer solution 15 mcg  15 mcg Nebulization BID Brielle Stable, APRN - CNP   15 mcg at 10/04/22 0834    budesonide (PULMICORT) nebulizer suspension 500 mcg  0.5 mg Nebulization BID Brielle Stable, APRN - CNP   500 mcg at 10/04/22 7434      - Continue Brovana, Pulmicort, Xopenex.   Patient is on Breztri and Xopenex at home.  - O2, IS  - IV levaquin on board, change to Unasyn  - defer on bronchoscopy at this time  - ESR, CRP, Procalcitonin, Pro-BNP  - legionella negative   - check urine strep pneumococcal antigen   - outpatient PFT, PSG  - O2, IS  - swallow evaluation 10/4/2022 noted transient laryngeal penetration with thin consistency barium   - bronchopulmonary hygiene to continue     Sima Medina MD  10/4/2022  5:52 PM

## 2022-10-05 LAB
ANION GAP SERPL CALCULATED.3IONS-SCNC: 6 MMOL/L (ref 7–16)
BASOPHILS ABSOLUTE: 0.03 E9/L (ref 0–0.2)
BASOPHILS RELATIVE PERCENT: 0.2 % (ref 0–2)
BUN BLDV-MCNC: 31 MG/DL (ref 6–23)
CALCIUM SERPL-MCNC: 9.2 MG/DL (ref 8.6–10.2)
CHLORIDE BLD-SCNC: 96 MMOL/L (ref 98–107)
CO2: 32 MMOL/L (ref 22–29)
CREAT SERPL-MCNC: 0.6 MG/DL (ref 0.7–1.2)
EOSINOPHILS ABSOLUTE: 0 E9/L (ref 0.05–0.5)
EOSINOPHILS RELATIVE PERCENT: 0 % (ref 0–6)
GFR AFRICAN AMERICAN: >60
GFR NON-AFRICAN AMERICAN: >60 ML/MIN/1.73
GLUCOSE BLD-MCNC: 223 MG/DL (ref 74–99)
HCT VFR BLD CALC: 33.7 % (ref 37–54)
HEMOGLOBIN: 11.1 G/DL (ref 12.5–16.5)
IMMATURE GRANULOCYTES #: 0.17 E9/L
IMMATURE GRANULOCYTES %: 1.3 % (ref 0–5)
LYMPHOCYTES ABSOLUTE: 0.43 E9/L (ref 1.5–4)
LYMPHOCYTES RELATIVE PERCENT: 3.3 % (ref 20–42)
MCH RBC QN AUTO: 32.7 PG (ref 26–35)
MCHC RBC AUTO-ENTMCNC: 32.9 % (ref 32–34.5)
MCV RBC AUTO: 99.4 FL (ref 80–99.9)
MONOCYTES ABSOLUTE: 0.38 E9/L (ref 0.1–0.95)
MONOCYTES RELATIVE PERCENT: 2.9 % (ref 2–12)
NEUTROPHILS ABSOLUTE: 12.08 E9/L (ref 1.8–7.3)
NEUTROPHILS RELATIVE PERCENT: 92.3 % (ref 43–80)
PDW BLD-RTO: 11.9 FL (ref 11.5–15)
PLATELET # BLD: 171 E9/L (ref 130–450)
PMV BLD AUTO: 10.5 FL (ref 7–12)
POTASSIUM SERPL-SCNC: 4.5 MMOL/L (ref 3.5–5)
RBC # BLD: 3.39 E12/L (ref 3.8–5.8)
RBC # BLD: NORMAL 10*6/UL
SODIUM BLD-SCNC: 134 MMOL/L (ref 132–146)
WBC # BLD: 13.1 E9/L (ref 4.5–11.5)

## 2022-10-05 PROCEDURE — 36415 COLL VENOUS BLD VENIPUNCTURE: CPT

## 2022-10-05 PROCEDURE — 80048 BASIC METABOLIC PNL TOTAL CA: CPT

## 2022-10-05 PROCEDURE — 6360000002 HC RX W HCPCS

## 2022-10-05 PROCEDURE — 6370000000 HC RX 637 (ALT 250 FOR IP): Performed by: INTERNAL MEDICINE

## 2022-10-05 PROCEDURE — 2700000000 HC OXYGEN THERAPY PER DAY

## 2022-10-05 PROCEDURE — 2060000000 HC ICU INTERMEDIATE R&B

## 2022-10-05 PROCEDURE — 6360000002 HC RX W HCPCS: Performed by: INTERNAL MEDICINE

## 2022-10-05 PROCEDURE — 94640 AIRWAY INHALATION TREATMENT: CPT

## 2022-10-05 PROCEDURE — 2580000003 HC RX 258: Performed by: INTERNAL MEDICINE

## 2022-10-05 PROCEDURE — 94668 MNPJ CHEST WALL SBSQ: CPT

## 2022-10-05 PROCEDURE — 94660 CPAP INITIATION&MGMT: CPT

## 2022-10-05 PROCEDURE — 85025 COMPLETE CBC W/AUTO DIFF WBC: CPT

## 2022-10-05 RX ORDER — LEVALBUTEROL INHALATION SOLUTION 0.63 MG/3ML
0.63 SOLUTION RESPIRATORY (INHALATION)
Status: DISCONTINUED | OUTPATIENT
Start: 2022-10-05 | End: 2022-10-08 | Stop reason: HOSPADM

## 2022-10-05 RX ADMIN — ARFORMOTEROL TARTRATE 15 MCG: 15 SOLUTION RESPIRATORY (INHALATION) at 08:32

## 2022-10-05 RX ADMIN — AMLODIPINE BESYLATE 5 MG: 5 TABLET ORAL at 09:42

## 2022-10-05 RX ADMIN — TAMSULOSIN HYDROCHLORIDE 0.8 MG: 0.4 CAPSULE ORAL at 20:39

## 2022-10-05 RX ADMIN — CETIRIZINE HYDROCHLORIDE 10 MG: 10 TABLET, FILM COATED ORAL at 09:43

## 2022-10-05 RX ADMIN — LEVALBUTEROL HYDROCHLORIDE 0.63 MG: 0.63 SOLUTION RESPIRATORY (INHALATION) at 19:30

## 2022-10-05 RX ADMIN — AMPICILLIN SODIUM AND SULBACTAM SODIUM 3000 MG: 2; 1 INJECTION, POWDER, FOR SOLUTION INTRAMUSCULAR; INTRAVENOUS at 20:43

## 2022-10-05 RX ADMIN — GABAPENTIN 600 MG: 300 CAPSULE ORAL at 20:39

## 2022-10-05 RX ADMIN — Medication 1000 MG: at 09:43

## 2022-10-05 RX ADMIN — GABAPENTIN 600 MG: 300 CAPSULE ORAL at 09:43

## 2022-10-05 RX ADMIN — AMPICILLIN SODIUM AND SULBACTAM SODIUM 3000 MG: 2; 1 INJECTION, POWDER, FOR SOLUTION INTRAMUSCULAR; INTRAVENOUS at 06:16

## 2022-10-05 RX ADMIN — ARFORMOTEROL TARTRATE 15 MCG: 15 SOLUTION RESPIRATORY (INHALATION) at 19:27

## 2022-10-05 RX ADMIN — LEVALBUTEROL HYDROCHLORIDE 0.63 MG: 0.63 SOLUTION RESPIRATORY (INHALATION) at 08:32

## 2022-10-05 RX ADMIN — METHYLPREDNISOLONE SODIUM SUCCINATE 40 MG: 40 INJECTION, POWDER, LYOPHILIZED, FOR SOLUTION INTRAMUSCULAR; INTRAVENOUS at 09:42

## 2022-10-05 RX ADMIN — GABAPENTIN 600 MG: 300 CAPSULE ORAL at 14:31

## 2022-10-05 RX ADMIN — BUDESONIDE 500 MCG: 0.5 SUSPENSION RESPIRATORY (INHALATION) at 19:27

## 2022-10-05 RX ADMIN — LISINOPRIL 20 MG: 20 TABLET ORAL at 09:42

## 2022-10-05 RX ADMIN — LEVALBUTEROL HYDROCHLORIDE 0.63 MG: 0.63 SOLUTION RESPIRATORY (INHALATION) at 16:13

## 2022-10-05 RX ADMIN — LEVALBUTEROL HYDROCHLORIDE 0.63 MG: 0.63 SOLUTION RESPIRATORY (INHALATION) at 13:19

## 2022-10-05 RX ADMIN — GUAIFENESIN 400 MG: 400 TABLET ORAL at 20:39

## 2022-10-05 RX ADMIN — ENOXAPARIN SODIUM 40 MG: 100 INJECTION SUBCUTANEOUS at 09:42

## 2022-10-05 RX ADMIN — AMPICILLIN SODIUM AND SULBACTAM SODIUM 3000 MG: 2; 1 INJECTION, POWDER, FOR SOLUTION INTRAMUSCULAR; INTRAVENOUS at 14:34

## 2022-10-05 RX ADMIN — GUAIFENESIN 400 MG: 400 TABLET ORAL at 14:31

## 2022-10-05 RX ADMIN — BUDESONIDE 500 MCG: 0.5 SUSPENSION RESPIRATORY (INHALATION) at 08:32

## 2022-10-05 RX ADMIN — METHYLPREDNISOLONE SODIUM SUCCINATE 40 MG: 40 INJECTION, POWDER, LYOPHILIZED, FOR SOLUTION INTRAMUSCULAR; INTRAVENOUS at 23:20

## 2022-10-05 RX ADMIN — GUAIFENESIN 400 MG: 400 TABLET ORAL at 09:43

## 2022-10-05 ASSESSMENT — PAIN SCALES - GENERAL
PAINLEVEL_OUTOF10: 0

## 2022-10-05 NOTE — PROGRESS NOTES
Occupational Therapy    OT eval and treat orders received and chart reviewed. Attempt made but pt receiving breathing treatment. Will check back at another time/date as able/appropriate.     Charissa Enrique, OTR/L

## 2022-10-05 NOTE — PROGRESS NOTES
Pulmonary Progress Note    Admit Date: 10/1/2022                            PCP: Amanda Heath MD  Principal Problem:    COPD exacerbation [facility])  Active Problems:    Anemia    Hypertension    Anxiety    Hyperglycemia, drug-induced    Type 2 diabetes mellitus ([facility] [address]  Resolved Problems:    * No resolved hospital problems. *      Subjective:  Patient seen on 4L NC pulse ox 100%  still with DUONG with min/moderate activity but notes this to be his baseline at home  Occasional productive cough, clear sputum   Drops with activity and room air , o2 fell off while brushing his teeth and he dropped to 83% recovered with 4L and rest       Medications:       ampicillin-sulbactam  3,000 mg IntraVENous Q6H    methylPREDNISolone  40 mg IntraVENous Q12H    levalbuterol  0.63 mg Nebulization Q4H While awake    calcium elemental  1,000 mg Oral Daily    gabapentin  600 mg Oral TID    guaiFENesin  400 mg Oral TID    tamsulosin  0.8 mg Oral Nightly    enoxaparin  40 mg SubCUTAneous Daily    amLODIPine  5 mg Oral Daily    And    lisinopril  20 mg Oral Daily    cetirizine  10 mg Oral Daily    Arformoterol Tartrate  15 mcg Nebulization BID    budesonide  0.5 mg Nebulization BID       Vitals:  VITALS:  /63   Pulse 74   Temp 97.8 °F (36.6 °C) (Oral)   Resp 18   Ht 5' 7\" (1.702 m)   Wt 178 lb (80.7 kg)   SpO2 99%   BMI 27.88 kg/m²   24HR INTAKE/OUTPUT:    Intake/Output Summary (Last 24 hours) at 10/5/2022 1427  Last data filed at 10/5/2022 0952  Gross per 24 hour   Intake 180 ml   Output 800 ml   Net -620 ml     CURRENT PULSE OXIMETRY:  SpO2: 99 %  24HR PULSE OXIMETRY RANGE:  SpO2  Av.3 %  Min: 99 %  Max: 100 %  CVP:    VENT SETTINGS:      Additional Respiratory Assessments  Heart Rate: 74  Resp: 18  SpO2: 99 %      EXAM:  General: No distress. Alert. 3L NC  Eyes:  No sclera icterus. No conjunctival injection. ENT: No discharge. Pharynx clear. Neck: Trachea midline. Normal thyroid. Resp: No accessory muscle use.  Mild expiratory wheezing anterior. Rales R base. No rhonchi. CV: Regular rate. Regular rhythm. No mumur or rub. No edema. ABD: Non-tender. Non-distended. Normal bowel sounds. Skin: Warm and dry. No nodule on exposed extremities. No rash on exposed extremities. M/S: No cyanosis. No joint deformity. No clubbing. Neuro: Awake. Follows commands. A&Ox3    I/O: I/O last 3 completed shifts: In: 480 [P.O.:480]  Out: 650 [Urine:650]  I/O this shift:  In: -   Out: 550 [Urine:550]     Results:  CBC:   Recent Labs     10/03/22  0234 10/04/22  0430 10/05/22  0200   WBC 20.9* 17.5* 13.1*   HGB 12.6 11.9* 11.1*   HCT 38.5 36.4* 33.7*   .0* 98.4 99.4    148 171     BMP:   Recent Labs     10/03/22  0234 10/04/22  0430 10/05/22  0200    133 134   K 4.3 4.4 4.5   CL 94* 95* 96*   CO2 31* 29 32*   BUN 31* 33* 31*   CREATININE 0.8 0.7 0.6*     LFT:   No results for input(s): ALKPHOS, ALT, AST, PROT, BILITOT, BILIDIR, LABALBU in the last 72 hours. PT/INR: No results for input(s): PROTIME, INR in the last 72 hours.   Cultures:   Latest Reference Range & Units 10/1/22 06:28 10/1/22 06:43   Influenza A by PCR Not Detected   Not Detected   Influenza B by PCR Not Detected   Not Detected   Adenovirus by PCR Not Detected   Not Detected   Coronavirus 229E by PCR Not Detected   Not Detected   Coronavirus HKU1 by PCR Not Detected   Not Detected   Coronavirus NL63 by PCR Not Detected   Not Detected   Coronavirus OC43 by PCR Not Detected   Not Detected   Human Metapneumovirus by PCR Not Detected   Not Detected   Human Rhinovirus/Enterovirus by PCR Not Detected   Not Detected   Parainfluenza Virus 1 by PCR Not Detected   Not Detected   Parainfluenza Virus 2 by PCR Not Detected   Not Detected   Parainfluenza Virus 3 by PCR Not Detected   Not Detected   Parainfluenza Virus 4 by PCR Not Detected   Not Detected   Respiratory Syncytial Virus by PCR Not Detected   Not Detected   Bordetella parapertussis by PCR Not Detected   Not Detected   Chlamydophilia pneumoniae by PCR Not Detected   Not Detected   Mycoplasma pneumoniae by PCR Not Detected   Not Detected   SARS-CoV-2, PCR Not Detected   Not Detected   SARS-CoV-2, NAAT Not Detected  Not Detected        ABG:   No results for input(s): PH, PO2, PCO2, HCO3, BE, O2SAT in the last 72 hours. Films:  XR CHEST 1 VIEW 10/1/2022  EXAMINATION: ONE XRAY VIEW OF THE CHEST 10/1/2022 3:49 am COMPARISON: 06/06/2021 HISTORY: ORDERING SYSTEM PROVIDED HISTORY: shortness of breath TECHNOLOGIST PROVIDED HISTORY: Reason for exam:->shortness of breath FINDINGS: The cardiomediastinal silhouette is normal.  There are new right basilar interstitial opacities. Overall background emphysematous changes noted. No pneumothorax or pleural effusion. Right basilar interstitial opacities, which could represent fibrotic change. Pneumonia is not entirely excluded. 6/6/2021                                                              10/1/2022    CT Chest 10/2: Stent identified in the right middle lobe bronchus. There is consolidative mucus or debris seen within the stent with consolidative infiltrate and collapse identified of the right middle lobe. Dense consolidative infiltrate with air bronchograms coursing throughout the majority of the right lower lobe. Findings all suggestive of pneumonia. Severe emphysematous and chronic changes seen within the aerated lung fields. Bronchiectasis changes identified centrally.   Bullous changes seen in the upper lung fields      Assessment:  70 y.o. male known to Dr. Juan Pablo Rollins, fully vaccinated for COVID with 2 boosters, with past medical history significant for chronic respiratory failure with hypoxia, baseline oxygen 2 to 5 L, emphysema, asthma, hypertension   10/1: 4L  10/2: 3L NC  10/3: 4L    COPD with acute exacerbation  Community-acquired pneumonia vs aspiration   Bronchiectasis -noted on CT chest 10/2  Leukocytosis r/t above  Chronic respiratory failure with hypoxia on 2 to 5 L baseline nasal cannula      Plan:  Patient seen on 4 L nasal cannula with pulse ox 100%, he wears baseline 2 to 5 L at home. Continue supplemental O2 to maintain pulse ox greater than 90%. BiPAP 12/5 as needed for respiratory distress - refusing, ok to make PRN    IV Solu-Medrol 40 mg to every 12 hours, continue one more day then wean to oral taper , does take prednisone 10mg daily chronic, still not better today continue one more day   S/p IV Rocephin and doxycycline. 10/1 IV Levaquin started - per office notes patient only responds to [address] WBC 19.6>23.0 (10/2), procalcitonin 5.43 and chest x-ray concerning for right basilar interstitial opacities. Repeat procal ordered 1.50, some aspiration found on swallow study changed levaquin to IV unasyn  CT chest reviewed. Aggressive pulmonary hygiene, continue chest physiotherapy, scheduled levalbuterol, (Mucomyst,--Dc'd reaction after allergy to sulfa ) Mucinex, flutter valve-will need vest at dc  Sputum culture with gram stain ordered, awaiting specimen. Strep and Legionella antigens pending. Respiratory panel negative  Continue Brovana, Pulmicort, Xopenex. Patient is on Breztri and Xopenex at home. Continue incentive spirometry  Swallow evaluation some laryngeal penetration -ST to follow and modify diet as appropriate         Electronically signed by KELECHI Daley - CNS on 10/5/2022 at 2:27 PM    2:27 PM    Attending Attestation Note:    Patient seen and examined with Hospital Staff, NP. I have extensively reviewed the chart lab work and imaging. I agree with above. Modifications and amendment of note made as necessary.     In addition, the following apply:    Current Facility-Administered Medications   Medication Dose Route Frequency Provider Last Rate Last Admin    ampicillin-sulbactam (UNASYN) 3000 mg in 100 mL NS IVPB minibag  3,000 mg IntraVENous Q6H Brock Ratliff  mL/hr at 10/05/22 1434 3,000 mg at 10/05/22 1434 methylPREDNISolone sodium (SOLU-MEDROL) injection 40 mg  40 mg IntraVENous Q12H Duwaine Buttner, APRN - CNP   40 mg at 10/05/22 0942    levalbuterol (XOPENEX) nebulization 0.63 mg  0.63 mg Nebulization Q4H While awake Juan Josewaine Buttner, APRN - CNP   0.63 mg at 10/05/22 1319    calcium elemental (OSCAL) tablet 1,000 mg  1,000 mg Oral Daily Oniel Juli, DO   1,000 mg at 10/05/22 4755    gabapentin (NEURONTIN) capsule 600 mg  600 mg Oral TID Oniel Juli, DO   600 mg at 10/05/22 1431    guaiFENesin tablet 400 mg  400 mg Oral TID Oniel Juli, DO   400 mg at 10/05/22 1431    LORazepam (ATIVAN) tablet 0.5 mg  0.5 mg Oral BID PRN Oniel Juli, DO   0.5 mg at 10/02/22 2030    tamsulosin (FLOMAX) capsule 0.8 mg  0.8 mg Oral Nightly Oniel Juli, DO   0.8 mg at 10/04/22 2036    levalbuterol (XOPENEX) nebulizer solution 1.25 mg  1.25 mg Nebulization Q4H PRN Oniel Juli, DO        enoxaparin (LOVENOX) injection 40 mg  40 mg SubCUTAneous Daily Oniel Juli, DO   40 mg at 10/05/22 9479    acetaminophen (TYLENOL) tablet 650 mg  650 mg Oral Q4H PRN Oniel Juli, DO        amLODIPine (NORVASC) tablet 5 mg  5 mg Oral Daily Oniel Juli, DO   5 mg at 10/05/22 9740    And    lisinopril (PRINIVIL;ZESTRIL) tablet 20 mg  20 mg Oral Daily Oinel Juli, DO   20 mg at 10/05/22 0939    cetirizine (ZYRTEC) tablet 10 mg  10 mg Oral Daily Oniel Juli, DO   10 mg at 10/05/22 8810    Arformoterol Tartrate (BROVANA) nebulizer solution 15 mcg  15 mcg Nebulization BID Duwaine Buttner, APRN - CNP   15 mcg at 10/05/22 0832    budesonide (PULMICORT) nebulizer suspension 500 mcg  0.5 mg Nebulization BID KELECHI Merino - CNP   500 mcg at 10/05/22 3006      - Continue Brovana, Pulmicort, Xopenex.   Patient is on Breztri and Xopenex at home.  - O2, IS  - IV Unasyn q 6 hours, look to transition to PO Augmentin upon D/C x total of 7 days  - defer on bronchoscopy at this time  - outpatient PFT, PSG  - O2, IS  - swallow evaluation 10/4/2022 noted transient laryngeal penetration with thin consistency barium   - bronchopulmonary hygiene to continue     Severa Mallet, MD  10/5/2022  2:46 PM

## 2022-10-05 NOTE — PROGRESS NOTES
Physical Therapy  Facility/Department: [facility]      Name: Nano Holtiot  : 1950  MRN: [id number]  Date of Service: 10/5/2022       Attempted to see pt for PT evaluation, pt declined secondary to having a bad night.    Samina Humphries PT [id number]

## 2022-10-05 NOTE — PROGRESS NOTES
Internal Medicine  Progress Note  Varinder Canas MD     Subjective:     Patient is alert. Patient reports OK. Tolerating diet well no other c/o. Scheduled Meds:   ampicillin-sulbactam  3,000 mg IntraVENous Q6H    methylPREDNISolone  40 mg IntraVENous Q12H    levalbuterol  0.63 mg Nebulization Q4H While awake    calcium elemental  1,000 mg Oral Daily    gabapentin  600 mg Oral TID    guaiFENesin  400 mg Oral TID    tamsulosin  0.8 mg Oral Nightly    enoxaparin  40 mg SubCUTAneous Daily    amLODIPine  5 mg Oral Daily    And    lisinopril  20 mg Oral Daily    cetirizine  10 mg Oral Daily    Arformoterol Tartrate  15 mcg Nebulization BID    budesonide  0.5 mg Nebulization BID     Continuous Infusions:  PRN Meds:LORazepam, levalbuterol, acetaminophen    Objective:      Physical Exam:  Vitals:   /65   Pulse 66   Temp 97.7 °F (36.5 °C) (Axillary)   Resp 18   Ht 5' 7\" (1.702 m)   Wt 178 lb (80.7 kg)   SpO2 99%   BMI 27.88 kg/m²   Orthostatic BPs and Heart Rates:     I/O's    Intake/Output Summary (Last 24 hours) at 10/5/2022 1452  Last data filed at 10/4/2022 2038  Gross per 24 hour   Intake 480 ml   Output 250 ml   Net 230 ml     Exam:  General appearance: alert, appears stated age, and cooperative  Head: Normocephalic, without obvious abnormality, atraumatic  Eyes: conjunctivae/corneas clear. PERRL, EOM's intact. Fundi benign. Throat: lips, mucosa, and tongue normal; teeth and gums normal  Neck: no adenopathy, no carotid bruit, no JVD, supple, symmetrical, trachea midline, and thyroid not enlarged, symmetric, no tenderness/mass/nodules  Back: symmetric, no curvature. ROM normal. No CVA tenderness.   Lungs: diminished breath sounds bilaterally  Chest wall: no tenderness  Heart: regular rate and rhythm, S1, S2 normal, no murmur, click, rub or gallop  Abdomen: soft, non-tender; bowel sounds normal; no masses,  no organomegaly  Extremities: extremities normal, atraumatic, no cyanosis or edema  Pulses: 2+ and symmetric  Skin: Skin color, texture, turgor normal. No rashes or lesions  Lymph nodes: Cervical, supraclavicular, and axillary nodes normal.  Neurologic: Grossly normal      Imaging     Chest  Xray:  Results for orders placed during the hospital encounter of 20    XR CHEST STANDARD (2 VW)    Narrative  Patient MRN: [id number]  : 1950  Age:  71 years  Gender: Male  Order Date: 3/10/2020 12:00 PM  Exam: XR CHEST (2 VW)  Number of Images: 2 view  Indication:   persistent dyspnea and cough  persistent dyspnea and cough  Comparison: 2020    Findings: The heart is unremarkable. The lung fields are hyperinflated. Density is seen in the lung fields suggesting scar. The aorta is tortuous and ectatic. Impression  Findings of emphysematous changes. This study was dictated by Marylee Song, PA-C and Alejandrina Bazzi MD  reviewed and concurred with the findings. Results for orders placed during the hospital encounter of 21    XR CHEST PORTABLE    Narrative  EXAMINATION:  ONE XRAY VIEW OF THE CHEST    2021 1:14 pm    COMPARISON:  March 10, 2020    HISTORY:  ORDERING SYSTEM PROVIDED HISTORY: r/o pna  TECHNOLOGIST PROVIDED HISTORY:  Reason for exam:->r/o pna    FINDINGS:  No airspace opacity or pleural effusion. The heart is normal size. No  pneumothorax. No free air beneath the hemidiaphragms. Hyperinflation of both  lungs notable in the upper lobes. Impression  1. No pneumonia or pleural effusion. 2.  Emphysematous changes.       Additional Imaging:  none    Lab Review   Recent Results (from the past 24 hour(s))   Sedimentation Rate    Collection Time: 10/04/22  4:45 PM   Result Value Ref Range    Sed Rate 85 (H) 0 - 15 mm/Hr   C-Reactive Protein    Collection Time: 10/04/22  4:45 PM   Result Value Ref Range    CRP 7.9 (H) 0.0 - 0.4 mg/dL   Procalcitonin    Collection Time: 10/04/22  4:45 PM   Result Value Ref Range    Procalcitonin 1.50 (H) 0.00 - 0.08 ng/mL   CBC with Auto Differential    Collection Time: 10/05/22  2:00 AM   Result Value Ref Range    WBC 13.1 (H) 4.5 - 11.5 E9/L    RBC 3.39 (L) 3.80 - 5.80 E12/L    Hemoglobin 11.1 (L) 12.5 - 16.5 g/dL    Hematocrit 33.7 (L) 37.0 - 54.0 %    MCV 99.4 80.0 - 99.9 fL    MCH 32.7 26.0 - 35.0 pg    MCHC 32.9 32.0 - 34.5 %    RDW 11.9 11.5 - 15.0 fL    Platelets 319 885 - 364 E9/L    MPV 10.5 7.0 - 12.0 fL    Neutrophils % 92.3 (H) 43.0 - 80.0 %    Immature Granulocytes % 1.3 0.0 - 5.0 %    Lymphocytes % 3.3 (L) 20.0 - 42.0 %    Monocytes % 2.9 2.0 - 12.0 %    Eosinophils % 0.0 0.0 - 6.0 %    Basophils % 0.2 0.0 - 2.0 %    Neutrophils Absolute 12.08 (H) 1.80 - 7.30 E9/L    Immature Granulocytes # 0.17 E9/L    Lymphocytes Absolute 0.43 (L) 1.50 - 4.00 E9/L    Monocytes Absolute 0.38 0.10 - 0.95 E9/L    Eosinophils Absolute 0.00 (L) 0.05 - 0.50 E9/L    Basophils Absolute 0.03 0.00 - 0.20 E9/L    RBC Morphology Normal    Basic Metabolic Panel    Collection Time: 10/05/22  2:00 AM   Result Value Ref Range    Sodium 134 132 - 146 mmol/L    Potassium 4.5 3.5 - 5.0 mmol/L    Chloride 96 (L) 98 - 107 mmol/L    CO2 32 (H) 22 - 29 mmol/L    Anion Gap 6 (L) 7 - 16 mmol/L    Glucose 223 (H) 74 - 99 mg/dL    BUN 31 (H) 6 - 23 mg/dL    Creatinine 0.6 (L) 0.7 - 1.2 mg/dL    GFR Non-African American >60 >=60 mL/min/1.73    GFR African American >60     Calcium 9.2 8.6 - 10.2 mg/dL     Assessment:     Principal Problem:    COPD exacerbation (HCC)  Active Problems:    Anemia    Hypertension    Anxiety    Hyperglycemia, drug-induced    Type 2 diabetes mellitus ([facility] [address]  Resolved Problems:    * No resolved hospital problems.  *      Plan:   Slow Improvement  Stephie Felder MD  10/5/2022  6:43 AM

## 2022-10-05 NOTE — CARE COORDINATION
CASE MANAGEMENT. Radha Alatorre Chart reviewed from yesterday. Per pulm, cont aerosols, cont Chest PT, monitor labs, cont iv solumedrol 40 mg q12hrs and change iv levaquin to iv unasyn q6hrs. Tolerating o2 4lnc - baseline. Plan is home. No needs noted at this time. Will follow.

## 2022-10-06 LAB
ANION GAP SERPL CALCULATED.3IONS-SCNC: 6 MMOL/L (ref 7–16)
BASOPHILS ABSOLUTE: 0.02 E9/L (ref 0–0.2)
BASOPHILS RELATIVE PERCENT: 0.2 % (ref 0–2)
BLOOD CULTURE, ROUTINE: NORMAL
BUN BLDV-MCNC: 25 MG/DL (ref 6–23)
CALCIUM SERPL-MCNC: 9.4 MG/DL (ref 8.6–10.2)
CHLORIDE BLD-SCNC: 93 MMOL/L (ref 98–107)
CO2: 32 MMOL/L (ref 22–29)
CREAT SERPL-MCNC: 0.6 MG/DL (ref 0.7–1.2)
CULTURE, BLOOD 2: NORMAL
EOSINOPHILS ABSOLUTE: 0 E9/L (ref 0.05–0.5)
EOSINOPHILS RELATIVE PERCENT: 0 % (ref 0–6)
GFR AFRICAN AMERICAN: >60
GFR NON-AFRICAN AMERICAN: >60 ML/MIN/1.73
GLUCOSE BLD-MCNC: 224 MG/DL (ref 74–99)
HCT VFR BLD CALC: 37.2 % (ref 37–54)
HEMOGLOBIN: 12.6 G/DL (ref 12.5–16.5)
IMMATURE GRANULOCYTES #: 0.48 E9/L
IMMATURE GRANULOCYTES %: 4.1 % (ref 0–5)
LYMPHOCYTES ABSOLUTE: 0.54 E9/L (ref 1.5–4)
LYMPHOCYTES RELATIVE PERCENT: 4.6 % (ref 20–42)
MCH RBC QN AUTO: 33 PG (ref 26–35)
MCHC RBC AUTO-ENTMCNC: 33.9 % (ref 32–34.5)
MCV RBC AUTO: 97.4 FL (ref 80–99.9)
MONOCYTES ABSOLUTE: 0.62 E9/L (ref 0.1–0.95)
MONOCYTES RELATIVE PERCENT: 5.3 % (ref 2–12)
NEUTROPHILS ABSOLUTE: 10.1 E9/L (ref 1.8–7.3)
NEUTROPHILS RELATIVE PERCENT: 85.8 % (ref 43–80)
PDW BLD-RTO: 11.9 FL (ref 11.5–15)
PLATELET # BLD: 188 E9/L (ref 130–450)
PMV BLD AUTO: 10 FL (ref 7–12)
POTASSIUM SERPL-SCNC: 4.4 MMOL/L (ref 3.5–5)
RBC # BLD: 3.82 E12/L (ref 3.8–5.8)
RBC # BLD: NORMAL 10*6/UL
SODIUM BLD-SCNC: 131 MMOL/L (ref 132–146)
WBC # BLD: 11.8 E9/L (ref 4.5–11.5)

## 2022-10-06 PROCEDURE — 6360000002 HC RX W HCPCS: Performed by: INTERNAL MEDICINE

## 2022-10-06 PROCEDURE — 94640 AIRWAY INHALATION TREATMENT: CPT

## 2022-10-06 PROCEDURE — 2700000000 HC OXYGEN THERAPY PER DAY

## 2022-10-06 PROCEDURE — 6360000002 HC RX W HCPCS

## 2022-10-06 PROCEDURE — 36415 COLL VENOUS BLD VENIPUNCTURE: CPT

## 2022-10-06 PROCEDURE — 2580000003 HC RX 258

## 2022-10-06 PROCEDURE — 94669 MECHANICAL CHEST WALL OSCILL: CPT

## 2022-10-06 PROCEDURE — 85025 COMPLETE CBC W/AUTO DIFF WBC: CPT

## 2022-10-06 PROCEDURE — 6370000000 HC RX 637 (ALT 250 FOR IP): Performed by: INTERNAL MEDICINE

## 2022-10-06 PROCEDURE — 2060000000 HC ICU INTERMEDIATE R&B

## 2022-10-06 PROCEDURE — 97165 OT EVAL LOW COMPLEX 30 MIN: CPT

## 2022-10-06 PROCEDURE — 2580000003 HC RX 258: Performed by: INTERNAL MEDICINE

## 2022-10-06 PROCEDURE — 97161 PT EVAL LOW COMPLEX 20 MIN: CPT

## 2022-10-06 PROCEDURE — 80048 BASIC METABOLIC PNL TOTAL CA: CPT

## 2022-10-06 RX ORDER — PREDNISONE 20 MG/1
20 TABLET ORAL DAILY
Status: DISCONTINUED | OUTPATIENT
Start: 2022-10-13 | End: 2022-10-08 | Stop reason: HOSPADM

## 2022-10-06 RX ORDER — AMOXICILLIN AND CLAVULANATE POTASSIUM 250; 125 MG/1; MG/1
1 TABLET, FILM COATED ORAL EVERY 8 HOURS SCHEDULED
Status: DISCONTINUED | OUTPATIENT
Start: 2022-10-07 | End: 2022-10-08 | Stop reason: HOSPADM

## 2022-10-06 RX ORDER — PREDNISONE 20 MG/1
40 TABLET ORAL DAILY
Status: DISCONTINUED | OUTPATIENT
Start: 2022-10-07 | End: 2022-10-08 | Stop reason: HOSPADM

## 2022-10-06 RX ORDER — METHYLPREDNISOLONE SODIUM SUCCINATE 40 MG/ML
40 INJECTION, POWDER, LYOPHILIZED, FOR SOLUTION INTRAMUSCULAR; INTRAVENOUS EVERY 12 HOURS
Status: COMPLETED | OUTPATIENT
Start: 2022-10-06 | End: 2022-10-06

## 2022-10-06 RX ADMIN — BUDESONIDE 500 MCG: 0.5 SUSPENSION RESPIRATORY (INHALATION) at 08:07

## 2022-10-06 RX ADMIN — BUDESONIDE 500 MCG: 0.5 SUSPENSION RESPIRATORY (INHALATION) at 19:47

## 2022-10-06 RX ADMIN — LORAZEPAM 0.5 MG: 0.5 TABLET ORAL at 00:50

## 2022-10-06 RX ADMIN — GUAIFENESIN 400 MG: 400 TABLET ORAL at 08:57

## 2022-10-06 RX ADMIN — TAMSULOSIN HYDROCHLORIDE 0.8 MG: 0.4 CAPSULE ORAL at 20:29

## 2022-10-06 RX ADMIN — LISINOPRIL 20 MG: 20 TABLET ORAL at 08:57

## 2022-10-06 RX ADMIN — GABAPENTIN 600 MG: 300 CAPSULE ORAL at 20:29

## 2022-10-06 RX ADMIN — ENOXAPARIN SODIUM 40 MG: 100 INJECTION SUBCUTANEOUS at 08:58

## 2022-10-06 RX ADMIN — AMPICILLIN SODIUM AND SULBACTAM SODIUM 3000 MG: 2; 1 INJECTION, POWDER, FOR SOLUTION INTRAMUSCULAR; INTRAVENOUS at 20:33

## 2022-10-06 RX ADMIN — AMPICILLIN SODIUM AND SULBACTAM SODIUM 3000 MG: 2; 1 INJECTION, POWDER, FOR SOLUTION INTRAMUSCULAR; INTRAVENOUS at 02:49

## 2022-10-06 RX ADMIN — METHYLPREDNISOLONE SODIUM SUCCINATE 40 MG: 40 INJECTION, POWDER, LYOPHILIZED, FOR SOLUTION INTRAMUSCULAR; INTRAVENOUS at 23:00

## 2022-10-06 RX ADMIN — CETIRIZINE HYDROCHLORIDE 10 MG: 10 TABLET, FILM COATED ORAL at 08:57

## 2022-10-06 RX ADMIN — ARFORMOTEROL TARTRATE 15 MCG: 15 SOLUTION RESPIRATORY (INHALATION) at 19:46

## 2022-10-06 RX ADMIN — AMLODIPINE BESYLATE 5 MG: 5 TABLET ORAL at 08:57

## 2022-10-06 RX ADMIN — GUAIFENESIN 400 MG: 400 TABLET ORAL at 20:29

## 2022-10-06 RX ADMIN — LEVALBUTEROL 0.63 MG: 0.63 SOLUTION RESPIRATORY (INHALATION) at 08:07

## 2022-10-06 RX ADMIN — GABAPENTIN 600 MG: 300 CAPSULE ORAL at 15:43

## 2022-10-06 RX ADMIN — AMPICILLIN SODIUM AND SULBACTAM SODIUM 3000 MG: 2; 1 INJECTION, POWDER, FOR SOLUTION INTRAMUSCULAR; INTRAVENOUS at 15:44

## 2022-10-06 RX ADMIN — LEVALBUTEROL 0.63 MG: 0.63 SOLUTION RESPIRATORY (INHALATION) at 19:47

## 2022-10-06 RX ADMIN — AMPICILLIN SODIUM AND SULBACTAM SODIUM 3000 MG: 2; 1 INJECTION, POWDER, FOR SOLUTION INTRAMUSCULAR; INTRAVENOUS at 09:10

## 2022-10-06 RX ADMIN — GABAPENTIN 600 MG: 300 CAPSULE ORAL at 08:57

## 2022-10-06 RX ADMIN — LEVALBUTEROL 0.63 MG: 0.63 SOLUTION RESPIRATORY (INHALATION) at 12:14

## 2022-10-06 RX ADMIN — ACETAMINOPHEN 650 MG: 325 TABLET ORAL at 23:33

## 2022-10-06 RX ADMIN — ARFORMOTEROL TARTRATE 15 MCG: 15 SOLUTION RESPIRATORY (INHALATION) at 08:07

## 2022-10-06 RX ADMIN — METHYLPREDNISOLONE SODIUM SUCCINATE 40 MG: 40 INJECTION, POWDER, LYOPHILIZED, FOR SOLUTION INTRAMUSCULAR; INTRAVENOUS at 08:58

## 2022-10-06 RX ADMIN — GUAIFENESIN 400 MG: 400 TABLET ORAL at 15:43

## 2022-10-06 RX ADMIN — Medication 1000 MG: at 08:57

## 2022-10-06 ASSESSMENT — PAIN SCALES - GENERAL
PAINLEVEL_OUTOF10: 0

## 2022-10-06 NOTE — PROGRESS NOTES
Pulmonary Progress Note    Admit Date: 10/1/2022                            PCP: Froilan Salguero MD  Principal Problem:    COPD exacerbation [facility])  Active Problems:    Anemia    Hypertension    Anxiety    Hyperglycemia, drug-induced    Type 2 diabetes mellitus ([address]  Resolved Problems:    * No resolved hospital problems. *      Subjective:  Patient seen on 4L NC pulse ox 97%  Still with complaints of DUONG with min/moderate activity but notes this to be his baseline at home  Occasional productive cough, clear sputum - improving      Medications:       levalbuterol  0.63 mg Nebulization Q4H WA    ampicillin-sulbactam  3,000 mg IntraVENous Q6H    methylPREDNISolone  40 mg IntraVENous Q12H    calcium elemental  1,000 mg Oral Daily    gabapentin  600 mg Oral TID    guaiFENesin  400 mg Oral TID    tamsulosin  0.8 mg Oral Nightly    enoxaparin  40 mg SubCUTAneous Daily    amLODIPine  5 mg Oral Daily    And    lisinopril  20 mg Oral Daily    cetirizine  10 mg Oral Daily    Arformoterol Tartrate  15 mcg Nebulization BID    budesonide  0.5 mg Nebulization BID       Vitals:  VITALS:  BP (!) 154/70   Pulse 58   Temp 98.1 °F (36.7 °C) (Oral)   Resp 18   Ht 5' 7\" (1.702 m)   Wt 178 lb (80.7 kg)   SpO2 96%   BMI 27.88 kg/m²   24HR INTAKE/OUTPUT:  No intake or output data in the 24 hours ending 10/06/22 1452    CURRENT PULSE OXIMETRY:  SpO2: 96 %  24HR PULSE OXIMETRY RANGE:  SpO2  Av.7 %  Min: 91 %  Max: 100 %  CVP:    VENT SETTINGS:      Additional Respiratory Assessments  Heart Rate: 58  Resp: 18  SpO2: 96 %      EXAM:  General: No distress. Alert. 4L NC  Eyes:  No sclera icterus. No conjunctival injection. ENT: No discharge. Pharynx clear. Neck: Trachea midline. Normal thyroid. Resp: No accessory muscle use. No wheezing. No rhonchi. CV: Regular rate. Regular rhythm. No mumur or rub. No edema. ABD: Non-tender. Non-distended. Normal bowel sounds. Skin: Warm and dry. No nodule on exposed extremities.  No rash on exposed extremities. M/S: No cyanosis. No joint deformity. No clubbing. Neuro: Awake. Follows commands. A&Ox3    I/O: I/O last 3 completed shifts:  In: -   Out: 800 [Urine:800]  No intake/output data recorded. Results:  CBC:   Recent Labs     10/04/22  0430 10/05/22  0200 10/06/22  0316   WBC 17.5* 13.1* 11.8*   HGB 11.9* 11.1* 12.6   HCT 36.4* 33.7* 37.2   MCV 98.4 99.4 97.4    171 188     BMP:   Recent Labs     10/04/22  0430 10/05/22  0200 10/06/22  0316    134 131*   K 4.4 4.5 4.4   CL 95* 96* 93*   CO2 29 32* 32*   BUN 33* 31* 25*   CREATININE 0.7 0.6* 0.6*     LFT:   No results for input(s): ALKPHOS, ALT, AST, PROT, BILITOT, BILIDIR, LABALBU in the last 72 hours. PT/INR: No results for input(s): PROTIME, INR in the last 72 hours.   Cultures:   Latest Reference Range & Units 10/1/22 06:28 10/1/22 06:43   Influenza A by PCR Not Detected   Not Detected   Influenza B by PCR Not Detected   Not Detected   Adenovirus by PCR Not Detected   Not Detected   Coronavirus 229E by PCR Not Detected   Not Detected   Coronavirus HKU1 by PCR Not Detected   Not Detected   Coronavirus NL63 by PCR Not Detected   Not Detected   Coronavirus OC43 by PCR Not Detected   Not Detected   Human Metapneumovirus by PCR Not Detected   Not Detected   Human Rhinovirus/Enterovirus by PCR Not Detected   Not Detected   Parainfluenza Virus 1 by PCR Not Detected   Not Detected   Parainfluenza Virus 2 by PCR Not Detected   Not Detected   Parainfluenza Virus 3 by PCR Not Detected   Not Detected   Parainfluenza Virus 4 by PCR Not Detected   Not Detected   Respiratory Syncytial Virus by PCR Not Detected   Not Detected   Bordetella parapertussis by PCR Not Detected   Not Detected   Chlamydophilia pneumoniae by PCR Not Detected   Not Detected   Mycoplasma pneumoniae by PCR Not Detected   Not Detected   SARS-CoV-2, PCR Not Detected   Not Detected   SARS-CoV-2, NAAT Not Detected  Not Detected        ABG:   No results for input(s): PH, PO2, PCO2, HCO3, BE, O2SAT in the last 72 hours. Films:  XR CHEST 1 VIEW 10/1/2022  EXAMINATION: ONE XRAY VIEW OF THE CHEST 10/1/2022 3:49 am COMPARISON: 06/06/2021 HISTORY: ORDERING SYSTEM PROVIDED HISTORY: shortness of breath TECHNOLOGIST PROVIDED HISTORY: Reason for exam:->shortness of breath FINDINGS: The cardiomediastinal silhouette is normal.  There are new right basilar interstitial opacities. Overall background emphysematous changes noted. No pneumothorax or pleural effusion. Right basilar interstitial opacities, which could represent fibrotic change. Pneumonia is not entirely excluded. 6/6/2021                                                              10/1/2022    CT Chest 10/2: Stent identified in the right middle lobe bronchus. There is consolidative mucus or debris seen within the stent with consolidative infiltrate and collapse identified of the right middle lobe. Dense consolidative infiltrate with air bronchograms coursing throughout the majority of the right lower lobe. Findings all suggestive of pneumonia. Severe emphysematous and chronic changes seen within the aerated lung fields. Bronchiectasis changes identified centrally. Bullous changes seen in the upper lung fields      Assessment:  70 y.o. male known to Dr. Teddy Spears, fully vaccinated for COVID with 2 boosters, with past medical history significant for chronic respiratory failure with hypoxia, baseline oxygen 2 to 5 L, emphysema, asthma, hypertension   10/1: 4L  10/2: 3L NC  10/3: 4L  10/6: 4L 100%    COPD with acute exacerbation  Community-acquired pneumonia vs aspiration   Bronchiectasis -noted on CT chest 10/2  Leukocytosis r/t above  Chronic respiratory failure with hypoxia on 2 to 5 L baseline nasal cannula      Plan:  Patient seen on 4 L nasal cannula with pulse ox 100%, he wears baseline 2 to 5 L at home. Continue supplemental O2 to maintain pulse ox greater than 90%.   BiPAP 12/5 as needed for respiratory distress - refusing, ok to make PRN   IV Solu-Medrol 40 mg to every 12 hours for 1 more dose. Will start Prednisone taper 10/7. He will take Prednisone 40 mg x 3 days, 30 mg x 3 days, 20 mg x 3 days, then resume baseline 10 mg daily.,  S/p IV Rocephin and doxycycline. 10/1 IV Levaquin started   procalcitonin 5.43 > 1.50 (10/4). 10/4  some aspiration found on swallow study changed levaquin to IV Unasyn. For 10/7, Stop IV Unasyn start PO Augmentin for 5 more days  CT chest reviewed. Aggressive pulmonary hygiene, continue chest physiotherapy, scheduled levalbuterol, (Mucomyst,--Dc'd reaction after allergy to sulfa ) Mucinex, flutter valve-will need chest vest on discharge   Sputum culture with gram stain ordered, awaiting specimen. Strep and Legionella antigens pending. Respiratory panel negative  Continue Brovana, Pulmicort, Xopenex. Patient is on Breztri and Xopenex at home. Continue incentive spirometry  10/4 MBS no signs of aspiration, but noted transient laryngeal penetration with thin consistency barium         Electronically signed by KELECHI Horner CNP on 10/6/2022 at 2:52 PM      Attending Attestation Note:    Patient seen and examined with Hospital Staff, NP. I have extensively reviewed the chart lab work and imaging. I agree with above. Modifications and amendment of note made as necessary.     In addition, the following apply:    Current Facility-Administered Medications   Medication Dose Route Frequency Provider Last Rate Last Admin    [START ON 10/7/2022] amoxicillin-clavulanate (AUGMENTIN) 250-125 MG per tablet 1 tablet  1 tablet Oral 3 times per day KELECHI Horner CNP        methylPREDNISolone sodium (SOLU-MEDROL) injection 40 mg  40 mg IntraVENous Q12H KELECHI Landrum CNP        [START ON 10/7/2022] predniSONE (DELTASONE) tablet 40 mg  40 mg Oral Daily KELECHI Landrum CNP        Followed by    Jaclyn Banks ON 10/10/2022] predniSONE (DELTASONE) tablet 30 mg  30 mg Oral Daily Jesi KELECHI Alvarez CNP        Followed by    Sherrie Landa ON 10/13/2022] predniSONE (DELTASONE) tablet 20 mg  20 mg Oral Daily KELECHI Liu CNP        levalbuterol (XOPENEX) nebulization 0.63 mg  0.63 mg Nebulization Q4H WA Aida Guidry MD   0.63 mg at 10/06/22 1214    ampicillin-sulbactam (UNASYN) 3000 mg in 100 mL NS IVPB minibag  3,000 mg IntraVENous Q6H KELECHI Liu CNP   Stopped at 10/06/22 0954    calcium elemental (OSCAL) tablet 1,000 mg  1,000 mg Oral Daily Wise Neighbours, DO   1,000 mg at 10/06/22 0857    gabapentin (NEURONTIN) capsule 600 mg  600 mg Oral TID Wise Neighbours, DO   600 mg at 10/06/22 2881    guaiFENesin tablet 400 mg  400 mg Oral TID Wise Neighbours, DO   400 mg at 10/06/22 5228    LORazepam (ATIVAN) tablet 0.5 mg  0.5 mg Oral BID PRN Wise Neighbours, DO   0.5 mg at 10/06/22 0050    tamsulosin (FLOMAX) capsule 0.8 mg  0.8 mg Oral Nightly Wise Neighbours, DO   0.8 mg at 10/05/22 2039    levalbuterol (XOPENEX) nebulizer solution 1.25 mg  1.25 mg Nebulization Q4H PRN Wise Neighbours, DO        enoxaparin (LOVENOX) injection 40 mg  40 mg SubCUTAneous Daily Wise Neighbours, DO   40 mg at 10/06/22 1050    acetaminophen (TYLENOL) tablet 650 mg  650 mg Oral Q4H PRN Wise Neighbours, DO        amLODIPine (NORVASC) tablet 5 mg  5 mg Oral Daily Wise Neighbours, DO   5 mg at 10/06/22 2102    And    lisinopril (PRINIVIL;ZESTRIL) tablet 20 mg  20 mg Oral Daily Wise Neighbours, DO   20 mg at 10/06/22 0857    cetirizine (ZYRTEC) tablet 10 mg  10 mg Oral Daily Wise Neighbours, DO   10 mg at 10/06/22 0857    Arformoterol Tartrate (BROVANA) nebulizer solution 15 mcg  15 mcg Nebulization BID KELECHI Liu CNP   15 mcg at 10/06/22 0807    budesonide (PULMICORT) nebulizer suspension 500 mcg  0.5 mg Nebulization BID Deacon KELECHI Mccollum CNP   500 mcg at 10/06/22 7341      - Continue Brovana Pulmicort, Xopenex.   Patient is on Breztri and Xopenex at home.  - O2, IS  - IV Unasyn q 6 hours, look to transition to PO Augmentin upon D/C x total of 7 days  - defer on bronchoscopy at this time  - steroid taper   - outpatient PFT, PSG  - O2, IS  - swallow evaluation 10/4/2022 noted transient laryngeal penetration with thin consistency barium   - bronchopulmonary hygiene to continue   - await D/C planning for patient     Damon Young MD  10/6/2022  3:10 PM

## 2022-10-06 NOTE — PROGRESS NOTES
Occupational Therapy  OCCUPATIONAL THERAPY INITIAL EVALUATION    [facility] [address]  [facility], [place] [facility]:                                                  Patient Name: Monica Rodriguez    MRN: [id number]    : 1950    Room: 333/8453-O      Evaluating OT: Jailyn Arciniega, OTR/L [id number]      Referring Provider: Andree Esteban MD    Specific Provider Orders/Date: OT eval and treat 10/4/22      Diagnosis:  COPD exacerbation ([facility] [address] [J44.1]  Pneumonia due to infectious organism, unspecified laterality, unspecified part of lung [J18.9]      Pertinent Medical History: asthma, COPD, HTN, neuropathic pain      Precautions: O2    Home Living: Pt lives with wife in 1 story house with 3 IRAIS. Bathroom setup: walk in shower with grab bars, shower chair, and handheld shower head   Equipment owned: cane, wheeled walker, O2 4L    Prior Level of Function: independent with ADLs , independent with IADLs; functional mobility: cane    Pain Level: pt did not report pain this date; pt agreeable to therapy  Cognition: A&O: /; WFL command follow demonstrated. Memory:  WFL   Sequencing:  WFL   Problem solving:  WFL   Judgement/safety:  WFL     Functional Assessment:  AM-PAC Daily Activity Raw Score: 24/24   Initial Eval Status  Date: 10/6/22   Feeding Independent    Grooming Independent    UB Dressing Independent  For gown management    LB Dressing Independent  To adjust socks    Bathing Mod I/I   Toileting Independent    Bed Mobility  Supine to sit: independent     Functional Transfers Independent with cane  Sit<>stand from EOB   Functional Mobility Independent with cane  Short distance in room   Balance Sitting:     Static:  independent     Dynamic:independent   Standing: independent    Activity Tolerance Fair+ with light activity. Pt on 4L O2 and was 95-98% during session.    Visual/  Perceptual Glasses: yes            Additional long-term goal: Pt will increase functional independence to PLOF to allow pt to live in least restrictive environment. Hand Dominance R   AROM (PROM) Strength Additional Info:    RUE  WFL WFL good  and wfl FMC/dexterity noted during ADL tasks       LUE WFL WFL good  and wfl FMC/dexterity noted during ADL tasks     Hearing: WFL   Sensation:  No c/o numbness or tingling   Tone: WFL   Edema: none noted    Comments: Upon arrival patient lying in bed. At end of session, patient sitting EOB with call light and phone within reach, all lines and tubes intact. Pt able to complete own self care and walk to and from bathroom. Not acute OT needs at this time. Eval Complexity: Low    Time In: 0921  Time Out: 0932    Min Units   OT Eval Low 97165  x  1   OT Eval Medium 23373      OT Eval High 42640      OT Re-Eval [id number]       Therapeutic Ex [id number]       Therapeutic Activities 83176       ADL/Self Care 82445       Orthotic Management 81537       Manual 03740     Neuro Re-Ed 30337       Non-Billable Time          Evaluation Time additionally includes thorough review of current medical information, gathering information on past medical history/social history and prior level of function, interpretation of standardized testing/informal observation of tasks, assessment of data and development of plan of care and goals.             Constance Lefort, OTR/L, [id number]

## 2022-10-06 NOTE — PROGRESS NOTES
Physical Therapy  Facility/Department: [address] INTERMEDIATE 1  Physical Therapy Initial Assessment    Name: Anya Bagley  : 1950  MRN: [id number]  Date of Service: 10/6/2022         Patient Diagnosis(es): The primary encounter diagnosis was COPD exacerbation ([facility] A diagnosis of Pneumonia due to infectious organism, unspecified laterality, unspecified part of lung was also pertinent to this visit. Past Medical History:  has a past medical history of Asthma, COPD (chronic obstructive pulmonary disease) ([facility] Hypertension, and Neuropathic pain. Past Surgical History:  has a past surgical history that includes hernia repair and back surgery. Evaluating Therapist: Connie Lloyd PT    Room #:  2588/7328-K  Diagnosis:  COPD exacerbation ([facility] [J44.1]  Pneumonia due to infectious organism, unspecified laterality, unspecified part of lung [J18.9]  PMHx/PSHx:  COPD  Precautions:  falls, O2      Social:  Pt lives with wife in a 1 floor plan. Prior to admission independent with cane. Has ww. Uses home O2     Initial Evaluation  Date: 10/6/22   Was pt agreeable to Eval/treatment? yes   Does pt have pain? No c/o pain   Bed Mobility  Rolling: independent  Supine to sit: independent  Sit to supine: independent  Scooting: independent   Transfers Sit to stand: independent  Stand to sit: independent  Stand pivot: independent   Ambulation    40 feet with no device with independent   Stair Negotiation  Ascended and descended  NT   LE strength     4/5   balance      good   AM-PAC Raw score               24/24       Pt is alert and Oriented   LE ROM: WFL  Sensation: intact  Edema: none  Endurance: fair     ASSESSMENT:    Pt displays functional ability as noted in the objective portion of this evaluation. PHYSICAL THERAPY PLAN OF CARE:    No PT need at this time, pt up independently in room.      Referring provider/PT Order: Callum Campoverde MD    Time in  915  Time out  0930        Evaluation Time includes thorough review of current medical information, gathering information on past medical history/social history and prior level of function, completion of standardized testing/informal observation of tasks, assessment of data and education on plan of care and goals.       CPT codes:  [x] Low Complexity PT evaluation 36973  [] Moderate Complexity PT evaluation 60656  [] High Complexity PT evaluation 10293  [] PT Re-evaluation 10801  [] Gait training 02688 minutes  [] Manual therapy 98163 minutes  [] Therapeutic activities 76405 minutes  [] Therapeutic exercises 01334 minutes  [] Neuromuscular reeducation 86064 minutes     [facility] PT [id number]

## 2022-10-06 NOTE — PROGRESS NOTES
Internal Medicine  Progress Note  Christy Go MD     Subjective:     Patient is alert. Patient reports OK. Tolerating diet well no other c/o.     Scheduled Meds:   levalbuterol  0.63 mg Nebulization Q4H WA    ampicillin-sulbactam  3,000 mg IntraVENous Q6H    methylPREDNISolone  40 mg IntraVENous Q12H    calcium elemental  1,000 mg Oral Daily    gabapentin  600 mg Oral TID    guaiFENesin  400 mg Oral TID    tamsulosin  0.8 mg Oral Nightly    enoxaparin  40 mg SubCUTAneous Daily    amLODIPine  5 mg Oral Daily    And    lisinopril  20 mg Oral Daily    cetirizine  10 mg Oral Daily    Arformoterol Tartrate  15 mcg Nebulization BID    budesonide  0.5 mg Nebulization BID     Continuous Infusions:  PRN Meds:LORazepam, levalbuterol, acetaminophen    Objective:      Physical Exam:  Vitals:   BP (!) 157/76   Pulse 69   Temp 97.9 °F (36.6 °C) (Oral)   Resp 17   Ht 5' 7\" (1.702 m)   Wt 178 lb (80.7 kg)   SpO2 91%   BMI 27.88 kg/m²   Orthostatic BPs and Heart Rates:     I/O's    Intake/Output Summary (Last 24 hours) at 10/6/2022 9624  Last data filed at 10/5/2022 9381  Gross per 24 hour   Intake --   Output 550 ml   Net -550 ml     Exam:  General appearance: alert, appears stated age, and cooperative  Head: Normocephalic, without obvious abnormality, atraumatic  Eyes: negative  Throat: normal findings: lips normal without lesions  Neck: no adenopathy, no carotid bruit, no JVD, and supple, symmetrical, trachea midline  Back: negative  Lungs: clear to auscultation bilaterally with diminished BS  Chest wall: no tenderness  Heart: regular rate and rhythm  Abdomen: soft, non-tender; bowel sounds normal; no masses,  no organomegaly  Extremities: extremities normal, atraumatic, no cyanosis or edema  Pulses: 2+ and symmetric  Skin: Skin color, texture, turgor normal. No rashes or lesions  Lymph nodes: Cervical, supraclavicular, and axillary nodes normal.  Neurologic: Grossly normal      Imaging     Chest  Xray:  Results for orders placed during the hospital encounter of 20    XR CHEST STANDARD (2 VW)    Narrative  Patient MRN: [id number]  : 1950  Age:  71 years  Gender: Male  Order Date: 3/10/2020 12:00 PM  Exam: XR CHEST (2 VW)  Number of Images: 2 view  Indication:   persistent dyspnea and cough  persistent dyspnea and cough  Comparison: 2020    Findings: The heart is unremarkable. The lung fields are hyperinflated. Density is seen in the lung fields suggesting scar. The aorta is tortuous and ectatic. Impression  Findings of emphysematous changes. This study was dictated by Aminta Pryor PA-C and Michelle Parker MD  reviewed and concurred with the findings. Results for orders placed during the hospital encounter of 21    XR CHEST PORTABLE    Narrative  EXAMINATION:  ONE XRAY VIEW OF THE CHEST    2021 1:14 pm    COMPARISON:  March 10, 2020    HISTORY:  ORDERING SYSTEM PROVIDED HISTORY: r/o pna  TECHNOLOGIST PROVIDED HISTORY:  Reason for exam:->r/o pna    FINDINGS:  No airspace opacity or pleural effusion. The heart is normal size. No  pneumothorax. No free air beneath the hemidiaphragms. Hyperinflation of both  lungs notable in the upper lobes. Impression  1. No pneumonia or pleural effusion. 2.  Emphysematous changes.       Additional Imaging:  none    Lab Review   Recent Results (from the past 24 hour(s))   CBC with Auto Differential    Collection Time: 10/06/22  3:16 AM   Result Value Ref Range    WBC 11.8 (H) 4.5 - 11.5 E9/L    RBC 3.82 3.80 - 5.80 E12/L    Hemoglobin 12.6 12.5 - 16.5 g/dL    Hematocrit 37.2 37.0 - 54.0 %    MCV 97.4 80.0 - 99.9 fL    MCH 33.0 26.0 - 35.0 pg    MCHC 33.9 32.0 - 34.5 %    RDW 11.9 11.5 - 15.0 fL    Platelets 975 486 - 473 E9/L    MPV 10.0 7.0 - 12.0 fL    Neutrophils % 85.8 (H) 43.0 - 80.0 %    Immature Granulocytes % 4.1 0.0 - 5.0 %    Lymphocytes % 4.6 (L) 20.0 - 42.0 %    Monocytes % 5.3 2.0 - 12.0 %    Eosinophils % 0.0 0.0 - 6.0 %    Basophils % 0.2 0.0 - 2.0 %    Neutrophils Absolute 10.10 (H) 1.80 - 7.30 E9/L    Immature Granulocytes # 0.48 E9/L    Lymphocytes Absolute 0.54 (L) 1.50 - 4.00 E9/L    Monocytes Absolute 0.62 0.10 - 0.95 E9/L    Eosinophils Absolute 0.00 (L) 0.05 - 0.50 E9/L    Basophils Absolute 0.02 0.00 - 0.20 E9/L    RBC Morphology Normal    Basic Metabolic Panel    Collection Time: 10/06/22  3:16 AM   Result Value Ref Range    Sodium 131 (L) 132 - 146 mmol/L    Potassium 4.4 3.5 - 5.0 mmol/L    Chloride 93 (L) 98 - 107 mmol/L    CO2 32 (H) 22 - 29 mmol/L    Anion Gap 6 (L) 7 - 16 mmol/L    Glucose 224 (H) 74 - 99 mg/dL    BUN 25 (H) 6 - 23 mg/dL    Creatinine 0.6 (L) 0.7 - 1.2 mg/dL    GFR Non-African American >60 >=60 mL/min/1.73    GFR African American >60     Calcium 9.4 8.6 - 10.2 mg/dL     Assessment:     Principal Problem:    COPD exacerbation (HCC)  Active Problems:    Anemia    Hypertension    Anxiety    Hyperglycemia, drug-induced    Type 2 diabetes mellitus ([facility] [address]  Resolved Problems:    * No resolved hospital problems.  *      Plan:   As per pulm  Removed Na+ restrictio per his request  Cydney Goncalves MD  10/6/2022  6:19 AM

## 2022-10-07 LAB
ANION GAP SERPL CALCULATED.3IONS-SCNC: 5 MMOL/L (ref 7–16)
BASOPHILS ABSOLUTE: 0 E9/L (ref 0–0.2)
BASOPHILS RELATIVE PERCENT: 0 % (ref 0–2)
BUN BLDV-MCNC: 21 MG/DL (ref 6–23)
CALCIUM SERPL-MCNC: 8.9 MG/DL (ref 8.6–10.2)
CHLORIDE BLD-SCNC: 91 MMOL/L (ref 98–107)
CO2: 31 MMOL/L (ref 22–29)
CREAT SERPL-MCNC: 0.5 MG/DL (ref 0.7–1.2)
EOSINOPHILS ABSOLUTE: 0 E9/L (ref 0.05–0.5)
EOSINOPHILS RELATIVE PERCENT: 0 % (ref 0–6)
GFR AFRICAN AMERICAN: >60
GFR NON-AFRICAN AMERICAN: >60 ML/MIN/1.73
GLUCOSE BLD-MCNC: 212 MG/DL (ref 74–99)
HCT VFR BLD CALC: 34.1 % (ref 37–54)
HEMOGLOBIN: 11.6 G/DL (ref 12.5–16.5)
LYMPHOCYTES ABSOLUTE: 1.2 E9/L (ref 1.5–4)
LYMPHOCYTES RELATIVE PERCENT: 11 % (ref 20–42)
MCH RBC QN AUTO: 32.6 PG (ref 26–35)
MCHC RBC AUTO-ENTMCNC: 34 % (ref 32–34.5)
MCV RBC AUTO: 95.8 FL (ref 80–99.9)
METAMYELOCYTES RELATIVE PERCENT: 2 % (ref 0–1)
MONOCYTES ABSOLUTE: 0.76 E9/L (ref 0.1–0.95)
MONOCYTES RELATIVE PERCENT: 7 % (ref 2–12)
NEUTROPHILS ABSOLUTE: 8.94 E9/L (ref 1.8–7.3)
NEUTROPHILS RELATIVE PERCENT: 80 % (ref 43–80)
PDW BLD-RTO: 11.7 FL (ref 11.5–15)
PLATELET # BLD: 189 E9/L (ref 130–450)
PMV BLD AUTO: 9.8 FL (ref 7–12)
POLYCHROMASIA: ABNORMAL
POTASSIUM SERPL-SCNC: 4.7 MMOL/L (ref 3.5–5)
RBC # BLD: 3.56 E12/L (ref 3.8–5.8)
SODIUM BLD-SCNC: 127 MMOL/L (ref 132–146)
WBC # BLD: 10.9 E9/L (ref 4.5–11.5)

## 2022-10-07 PROCEDURE — 6360000002 HC RX W HCPCS: Performed by: INTERNAL MEDICINE

## 2022-10-07 PROCEDURE — 6370000000 HC RX 637 (ALT 250 FOR IP): Performed by: INTERNAL MEDICINE

## 2022-10-07 PROCEDURE — 6360000002 HC RX W HCPCS

## 2022-10-07 PROCEDURE — 85025 COMPLETE CBC W/AUTO DIFF WBC: CPT

## 2022-10-07 PROCEDURE — 80048 BASIC METABOLIC PNL TOTAL CA: CPT

## 2022-10-07 PROCEDURE — 94640 AIRWAY INHALATION TREATMENT: CPT

## 2022-10-07 PROCEDURE — 6370000000 HC RX 637 (ALT 250 FOR IP)

## 2022-10-07 PROCEDURE — 36415 COLL VENOUS BLD VENIPUNCTURE: CPT

## 2022-10-07 PROCEDURE — 94669 MECHANICAL CHEST WALL OSCILL: CPT

## 2022-10-07 PROCEDURE — 2700000000 HC OXYGEN THERAPY PER DAY

## 2022-10-07 PROCEDURE — 2060000000 HC ICU INTERMEDIATE R&B

## 2022-10-07 PROCEDURE — 94668 MNPJ CHEST WALL SBSQ: CPT

## 2022-10-07 RX ADMIN — BUDESONIDE 500 MCG: 0.5 SUSPENSION RESPIRATORY (INHALATION) at 20:14

## 2022-10-07 RX ADMIN — AMOXICILLIN AND CLAVULANATE POTASSIUM 1 TABLET: 250; 125 TABLET, FILM COATED ORAL at 20:57

## 2022-10-07 RX ADMIN — GUAIFENESIN 400 MG: 400 TABLET ORAL at 13:23

## 2022-10-07 RX ADMIN — LEVALBUTEROL 0.63 MG: 0.63 SOLUTION RESPIRATORY (INHALATION) at 08:35

## 2022-10-07 RX ADMIN — AMOXICILLIN AND CLAVULANATE POTASSIUM 1 TABLET: 250; 125 TABLET, FILM COATED ORAL at 13:23

## 2022-10-07 RX ADMIN — Medication 1000 MG: at 08:06

## 2022-10-07 RX ADMIN — GABAPENTIN 600 MG: 300 CAPSULE ORAL at 08:07

## 2022-10-07 RX ADMIN — CETIRIZINE HYDROCHLORIDE 10 MG: 10 TABLET, FILM COATED ORAL at 08:07

## 2022-10-07 RX ADMIN — TAMSULOSIN HYDROCHLORIDE 0.8 MG: 0.4 CAPSULE ORAL at 20:57

## 2022-10-07 RX ADMIN — ARFORMOTEROL TARTRATE 15 MCG: 15 SOLUTION RESPIRATORY (INHALATION) at 20:14

## 2022-10-07 RX ADMIN — BUDESONIDE 500 MCG: 0.5 SUSPENSION RESPIRATORY (INHALATION) at 08:35

## 2022-10-07 RX ADMIN — LISINOPRIL 20 MG: 20 TABLET ORAL at 08:08

## 2022-10-07 RX ADMIN — GABAPENTIN 600 MG: 300 CAPSULE ORAL at 20:57

## 2022-10-07 RX ADMIN — GABAPENTIN 600 MG: 300 CAPSULE ORAL at 13:23

## 2022-10-07 RX ADMIN — LEVALBUTEROL 0.63 MG: 0.63 SOLUTION RESPIRATORY (INHALATION) at 17:06

## 2022-10-07 RX ADMIN — LEVALBUTEROL 0.63 MG: 0.63 SOLUTION RESPIRATORY (INHALATION) at 20:14

## 2022-10-07 RX ADMIN — ENOXAPARIN SODIUM 40 MG: 100 INJECTION SUBCUTANEOUS at 11:21

## 2022-10-07 RX ADMIN — GUAIFENESIN 400 MG: 400 TABLET ORAL at 20:57

## 2022-10-07 RX ADMIN — GUAIFENESIN 400 MG: 400 TABLET ORAL at 08:07

## 2022-10-07 RX ADMIN — AMOXICILLIN AND CLAVULANATE POTASSIUM 1 TABLET: 250; 125 TABLET, FILM COATED ORAL at 06:44

## 2022-10-07 RX ADMIN — LEVALBUTEROL 0.63 MG: 0.63 SOLUTION RESPIRATORY (INHALATION) at 12:00

## 2022-10-07 RX ADMIN — ARFORMOTEROL TARTRATE 15 MCG: 15 SOLUTION RESPIRATORY (INHALATION) at 08:35

## 2022-10-07 RX ADMIN — PREDNISONE 40 MG: 20 TABLET ORAL at 08:06

## 2022-10-07 RX ADMIN — AMLODIPINE BESYLATE 5 MG: 5 TABLET ORAL at 08:08

## 2022-10-07 ASSESSMENT — PAIN SCALES - GENERAL
PAINLEVEL_OUTOF10: 0
PAINLEVEL_OUTOF10: 0

## 2022-10-07 NOTE — CARE COORDINATION
Social Work/Discharge Planning:  Received notification patient will need a chest vest.  Met with patient and confirmed he prefers to use [facility], since his nebulizer and oxygen is through [facility]. Referral made to Mary Ann Don with [facility].  Will continue to follow. Electronically signed by ALYSE Yuan on 10/7/2022 at 1:01 PM    Addendum:  Mary Ann Don with [facility] states patient co-pay would be $500. Notified patient of cost and he is agreeable to paying co-pay amount. Notified Mary Ann Don with [facility].  Electronically signed by ALYSE Yuan on 10/7/2022 at 2:17 PM    Addendum:  Received call from [address] with [facility] and provided her with update size large for vest in hospital.  [address] states they need measurements for chest and abdomen to confirm the size they will order for AFFLO-Vest.  Also, need documentation indicating what was tried and failed. [address] to fax form for Pulmonary. Provided update to charge nurse. Will continue to follow.   Electronically signed by ALYSE Yuan on 10/7/2022 at 3:39 PM

## 2022-10-07 NOTE — PROGRESS NOTES
Internal Medicine  Progress Note  Lorena Roman MD     Subjective:     Patient is alert. Patient reports OK but that last night was \"pretty rough\" - he states that he is moving around room better. Tolerating diet well no other c/o.     Scheduled Meds:   amoxicillin-clavulanate  1 tablet Oral 3 times per day    predniSONE  40 mg Oral Daily    Followed by    Yocasta Suarez ON 10/10/2022] predniSONE  30 mg Oral Daily    Followed by    Yocasta Suarez ON 10/13/2022] predniSONE  20 mg Oral Daily    levalbuterol  0.63 mg Nebulization Q4H WA    calcium elemental  1,000 mg Oral Daily    gabapentin  600 mg Oral TID    guaiFENesin  400 mg Oral TID    tamsulosin  0.8 mg Oral Nightly    enoxaparin  40 mg SubCUTAneous Daily    amLODIPine  5 mg Oral Daily    And    lisinopril  20 mg Oral Daily    cetirizine  10 mg Oral Daily    Arformoterol Tartrate  15 mcg Nebulization BID    budesonide  0.5 mg Nebulization BID     Continuous Infusions:  PRN Meds:LORazepam, levalbuterol, acetaminophen    Objective:      Physical Exam:  Vitals:   BP (!) 150/75   Pulse 96   Temp 97.3 °F (36.3 °C) (Temporal)   Resp 17   Ht 5' 7\" (1.702 m)   Wt 178 lb (80.7 kg)   SpO2 97%   BMI 27.88 kg/m²   Orthostatic BPs and Heart Rates:     I/O's  No intake or output data in the 24 hours ending 10/07/22 0618  Exam:  General appearance: alert, appears stated age, and cooperative  Head: Normocephalic, without obvious abnormality, atraumatic  Eyes: negative  Throat: normal findings: lips normal without lesions  Neck: no adenopathy, no carotid bruit, no JVD, and supple, symmetrical, trachea midline  Back: negative  Lungs: diminished breath sounds bilaterally  Chest wall: no tenderness  Heart: regular rate and rhythm  Abdomen: soft, non-tender; bowel sounds normal; no masses,  no organomegaly  Extremities: extremities normal, atraumatic, no cyanosis or edema  Pulses: 2+ and symmetric  Skin: Skin color, texture, turgor normal. No rashes or lesions  Lymph nodes: Cervical, supraclavicular, and axillary nodes normal.  Neurologic: Grossly normal      Imaging     Chest  Xray:  Results for orders placed during the hospital encounter of 20    XR CHEST STANDARD (2 VW)    Narrative  Patient MRN: [id number]  : 1950  Age:  71 years  Gender: Male  Order Date: 3/10/2020 12:00 PM  Exam: XR CHEST (2 VW)  Number of Images: 2 view  Indication:   persistent dyspnea and cough  persistent dyspnea and cough  Comparison: 2020    Findings: The heart is unremarkable. The lung fields are hyperinflated. Density is seen in the lung fields suggesting scar. The aorta is tortuous and ectatic. Impression  Findings of emphysematous changes. This study was dictated by Mejia Hopper PA-C and Azalia Livingston MD  reviewed and concurred with the findings. Results for orders placed during the hospital encounter of 21    XR CHEST PORTABLE    Narrative  EXAMINATION:  ONE XRAY VIEW OF THE CHEST    2021 1:14 pm    COMPARISON:  March 10, 2020    HISTORY:  ORDERING SYSTEM PROVIDED HISTORY: r/o pna  TECHNOLOGIST PROVIDED HISTORY:  Reason for exam:->r/o pna    FINDINGS:  No airspace opacity or pleural effusion. The heart is normal size. No  pneumothorax. No free air beneath the hemidiaphragms. Hyperinflation of both  lungs notable in the upper lobes. Impression  1. No pneumonia or pleural effusion. 2.  Emphysematous changes.       Additional Imaging:  deferred    Lab Review   Recent Results (from the past 24 hour(s))   CBC with Auto Differential    Collection Time: 10/07/22  2:45 AM   Result Value Ref Range    WBC 10.9 4.5 - 11.5 E9/L    RBC 3.56 (L) 3.80 - 5.80 E12/L    Hemoglobin 11.6 (L) 12.5 - 16.5 g/dL    Hematocrit 34.1 (L) 37.0 - 54.0 %    MCV 95.8 80.0 - 99.9 fL    MCH 32.6 26.0 - 35.0 pg    MCHC 34.0 32.0 - 34.5 %    RDW 11.7 11.5 - 15.0 fL    Platelets 346 671 - 203 E9/L    MPV 9.8 7.0 - 12.0 fL    Neutrophils % 80.0 43.0 - 80.0 %    Lymphocytes % 11.0 (L) 20.0 - 42.0 %    Monocytes % 7.0 2.0 - 12.0 %    Eosinophils % 0.0 0.0 - 6.0 %    Basophils % 0.0 0.0 - 2.0 %    Neutrophils Absolute 8.94 (H) 1.80 - 7.30 E9/L    Lymphocytes Absolute 1.20 (L) 1.50 - 4.00 E9/L    Monocytes Absolute 0.76 0.10 - 0.95 E9/L    Eosinophils Absolute 0.00 (L) 0.05 - 0.50 E9/L    Basophils Absolute 0.00 0.00 - 0.20 E9/L    Metamyelocytes Relative 2.0 (H) 0.0 - 1.0 %    Polychromasia 1+    Basic Metabolic Panel    Collection Time: 10/07/22  2:45 AM   Result Value Ref Range    Sodium 127 (L) 132 - 146 mmol/L    Potassium 4.7 3.5 - 5.0 mmol/L    Chloride 91 (L) 98 - 107 mmol/L    CO2 31 (H) 22 - 29 mmol/L    Anion Gap 5 (L) 7 - 16 mmol/L    Glucose 212 (H) 74 - 99 mg/dL    BUN 21 6 - 23 mg/dL    Creatinine 0.5 (L) 0.7 - 1.2 mg/dL    GFR Non-African American >60 >=60 mL/min/1.73    GFR African American >60     Calcium 8.9 8.6 - 10.2 mg/dL     Assessment:     Principal Problem:    COPD exacerbation (HCC)  Active Problems:    Anemia    Hypertension    Anxiety    Hyperglycemia, drug-induced    Type 2 diabetes mellitus ([address]  Resolved Problems:    * No resolved hospital problems.  *      Plan:   Pretty precipitous drop in Na+ will need to watch that - may be SIADH    Zuri Malave MD  10/7/2022  6:18 AM

## 2022-10-07 NOTE — PROGRESS NOTES
AffloVest paper received via fax. Chest circumference measures 44 inches and Torso length measures 17 inches. Paper placed in soft chart for the ordering physician to complete.

## 2022-10-07 NOTE — PROGRESS NOTES
Physician Progress Note      Cy Can  [facility] #:                  [id number]  :                       1950  ADMIT DATE:       10/1/2022 5:48 AM  DISCH DATE:  RESPONDING  PROVIDER #:        Naheed Bhakta MD          QUERY TEXT:    Dear Attending Physician,    Pt admitted with COPD exacerbation. Pt noted to have elevated  WBC 19.6-->   10/3 20.9, Lactic acid 2.8, Procal 5.43, Vitals 97.3 110 22 104/62 . If   possible, please document in the progress notes and discharge summary if you   are evaluating and /or treating any of the following:]    The medical record reflects the following:  Risk Factors: COPD exacerbation ,chronic respiratory failure with hypoxia,   baseline oxygen 2 to 5 L, emphysema, asthma, hypertension  Clinical Indicators: Per PCP notes,\". .. COPD exacerbation . Joel Ogren Joel Ogren \"  Per Pulmo   10/1,\". .. ? COPD with acute exacerbation Community-acquired pneumonia Chronic   respiratory failure with hypoxia on 2 to 5 L baseline nasal cannula . Joel Ogren Joel Ogren \"  Per   Pulmo 10/3,\". ..chest x-ray concerning for right basilar interstitial opacities   . Joel Ogren Joel Ogren \"     WBC 19.6--> 10/3 20.9, Lactic acid 2.8, Procal 5.43, Vitals 97.3 110   22 104/62  Treatment: IV Levaquin, IV steroids    Thank you,  Pernell Gallardo RN CCDS  Clinical Documentation Improvement Specialist  Options provided:  -- Sepsis, present on admission, Please document the cause of Sepsis. -- Sepsis, present on admission, now resolved, Please document the cause of   Sepsis. -- Sepsis was ruled out  -- Other - I will add my own diagnosis  -- Disagree - Not applicable / Not valid  -- Disagree - Clinically unable to determine / Unknown  -- Refer to Clinical Documentation Reviewer    PROVIDER RESPONSE TEXT:    After further study, sepsis was ruled out for this patient. Query created by: Kalina Roper on 10/4/2022 1:33 PM      QUERY TEXT:    Dear Attending Physician,    Pt admitted with COPD exacerbation.   Noted documentation of \"Pneumonia  \"  on 10/1-10/3 , by ordered Pulmonology consultant. If possible, please document   in progress notes and discharge summary:    The medical record reflects the following:  Risk Factors: COPD exacerbation ,chronic respiratory failure with hypoxia,   baseline oxygen 2 to 5 L, emphysema, asthma, hypertension  Clinical Indicators: Per PCP notes,\". .. COPD exacerbation. Savannah Daniels \"  Per Pulmo   10/1,\". .. ? COPD with acute exacerbation Community-acquired pneumonia Chronic   respiratory failure with hypoxia on 2 to 5 L baseline nasal cannula . Savnanah Daniels \"  Per   Pulmo 10/3,\". ..chest x-ray concerning for right basilar interstitial opacities   . Savannah Daniels Savannah Daniels \"     WBC 19.6--> 10/3 20.9, Lactic acid 2.8, Procal 5.43, Vitals 97.3 110   22 104/62  Treatment: IV Levaquin, IV steroids    Thank you,  Lavonne Carbajal RN CCDS  Clinical Documentation Improvement Specialist  Options provided:  -- Pneumonia confirmed present on admission  -- Pneumonia ruled out  -- Other - I will add my own diagnosis  -- Disagree - Not applicable / Not valid  -- Disagree - Clinically unable to determine / Unknown  -- Refer to Clinical Documentation Reviewer    PROVIDER RESPONSE TEXT:    The diagnosis of Pneumonia was confirmed as present on admission.     Query created by: Zohaib Bruno on 10/4/2022 1:34 PM      Electronically signed by:  Catarina Moss MD 10/7/2022 12:39 PM

## 2022-10-07 NOTE — PROGRESS NOTES
Pulmonary Progress Note    Admit Date: 10/1/2022                            PCP: Emi Velez MD  Principal Problem:    COPD exacerbation [facility])  Active Problems:    Anemia    Hypertension    Anxiety    Hyperglycemia, drug-induced    Type 2 diabetes mellitus ([facility] [address]  Resolved Problems:    * No resolved hospital problems. *      Subjective:  Patient seen receiving breathing treatment with vest therapy  His breathing is better, still with dyspnea with exertion which he states is baseline   No cough or wheezing      Medications:       amoxicillin-clavulanate  1 tablet Oral 3 times per day    predniSONE  40 mg Oral Daily    Followed by    Dario Matthew ON 10/10/2022] predniSONE  30 mg Oral Daily    Followed by    Dario Matthew ON 10/13/2022] predniSONE  20 mg Oral Daily    levalbuterol  0.63 mg Nebulization Q4H WA    calcium elemental  1,000 mg Oral Daily    gabapentin  600 mg Oral TID    guaiFENesin  400 mg Oral TID    tamsulosin  0.8 mg Oral Nightly    enoxaparin  40 mg SubCUTAneous Daily    amLODIPine  5 mg Oral Daily    And    lisinopril  20 mg Oral Daily    cetirizine  10 mg Oral Daily    Arformoterol Tartrate  15 mcg Nebulization BID    budesonide  0.5 mg Nebulization BID       Vitals:  VITALS:  /70   Pulse 70   Temp 97.6 °F (36.4 °C) (Oral)   Resp 18   Ht 5' 7\" (1.702 m)   Wt 178 lb (80.7 kg)   SpO2 97%   BMI 27.88 kg/m²   24HR INTAKE/OUTPUT:  No intake or output data in the 24 hours ending 10/07/22 1209    CURRENT PULSE OXIMETRY:  SpO2: 97 %  24HR PULSE OXIMETRY RANGE:  SpO2  Av.5 %  Min: 95 %  Max: 97 %  CVP:    VENT SETTINGS:      Additional Respiratory Assessments  Heart Rate: 70  Resp: 18  SpO2: 97 %      EXAM:  General: No distress. Alert. 4L NC  Eyes:  No sclera icterus. No conjunctival injection. ENT: No discharge. Pharynx clear. Neck: Trachea midline. Normal thyroid. Resp: No accessory muscle use. No wheezing. No rhonchi. Diminished bilaterally   CV: Regular rate. Regular rhythm.  No mumur or rub. No edema. ABD: Non-tender. Non-distended. Normal bowel sounds. Skin: Warm and dry. No nodule on exposed extremities. No rash on exposed extremities. M/S: No cyanosis. No joint deformity. No clubbing. Neuro: Awake. Follows commands. A&Ox3    I/O: No intake/output data recorded. No intake/output data recorded. Results:  CBC:   Recent Labs     10/05/22  0200 10/06/22  0316 10/07/22  0245   WBC 13.1* 11.8* 10.9   HGB 11.1* 12.6 11.6*   HCT 33.7* 37.2 34.1*   MCV 99.4 97.4 95.8    188 189     BMP:   Recent Labs     10/05/22  0200 10/06/22  0316 10/07/22  0245    131* 127*   K 4.5 4.4 4.7   CL 96* 93* 91*   CO2 32* 32* 31*   BUN 31* 25* 21   CREATININE 0.6* 0.6* 0.5*     LFT:   No results for input(s): ALKPHOS, ALT, AST, PROT, BILITOT, BILIDIR, LABALBU in the last 72 hours. PT/INR: No results for input(s): PROTIME, INR in the last 72 hours.   Cultures:   Latest Reference Range & Units 10/1/22 06:28 10/1/22 06:43   Influenza A by PCR Not Detected   Not Detected   Influenza B by PCR Not Detected   Not Detected   Adenovirus by PCR Not Detected   Not Detected   Coronavirus 229E by PCR Not Detected   Not Detected   Coronavirus HKU1 by PCR Not Detected   Not Detected   Coronavirus NL63 by PCR Not Detected   Not Detected   Coronavirus OC43 by PCR Not Detected   Not Detected   Human Metapneumovirus by PCR Not Detected   Not Detected   Human Rhinovirus/Enterovirus by PCR Not Detected   Not Detected   Parainfluenza Virus 1 by PCR Not Detected   Not Detected   Parainfluenza Virus 2 by PCR Not Detected   Not Detected   Parainfluenza Virus 3 by PCR Not Detected   Not Detected   Parainfluenza Virus 4 by PCR Not Detected   Not Detected   Respiratory Syncytial Virus by PCR Not Detected   Not Detected   Bordetella parapertussis by PCR Not Detected   Not Detected   Chlamydophilia pneumoniae by PCR Not Detected   Not Detected   Mycoplasma pneumoniae by PCR Not Detected   Not Detected   SARS-CoV-2, PCR Not Detected   Not Detected   SARS-CoV-2, NAAT Not Detected  Not Detected        ABG:   No results for input(s): PH, PO2, PCO2, HCO3, BE, O2SAT in the last 72 hours. Films:  XR CHEST 1 VIEW 10/1/2022  EXAMINATION: ONE XRAY VIEW OF THE CHEST 10/1/2022 3:49 am COMPARISON: 06/06/2021 HISTORY: ORDERING SYSTEM PROVIDED HISTORY: shortness of breath TECHNOLOGIST PROVIDED HISTORY: Reason for exam:->shortness of breath FINDINGS: The cardiomediastinal silhouette is normal.  There are new right basilar interstitial opacities. Overall background emphysematous changes noted. No pneumothorax or pleural effusion. Right basilar interstitial opacities, which could represent fibrotic change. Pneumonia is not entirely excluded. 6/6/2021                                                              10/1/2022    CT Chest 10/2: Stent identified in the right middle lobe bronchus. There is consolidative mucus or debris seen within the stent with consolidative infiltrate and collapse identified of the right middle lobe. Dense consolidative infiltrate with air bronchograms coursing throughout the majority of the right lower lobe. Findings all suggestive of pneumonia. Severe emphysematous and chronic changes seen within the aerated lung fields. Bronchiectasis changes identified centrally.   Bullous changes seen in the upper lung fields      Assessment:  70 y.o. male known to Dr. Juan Carlos Granados, fully vaccinated for COVID with 2 boosters, with past medical history significant for chronic respiratory failure with hypoxia, baseline oxygen 2 to 5 L, emphysema, asthma, hypertension   10/1: 4L  10/2: 3L NC  10/3: 4L  10/6: 4L 100%  10/7: 4L 97%, serum Na 127    COPD with acute exacerbation - resolved  Community-acquired pneumonia vs aspiration   Bronchiectasis -noted on CT chest 10/2  Leukocytosis r/t above  Chronic respiratory failure with hypoxia on 2 to 5 L baseline nasal cannula  Hyponatremia       Plan:  Patient seen on 4 L nasal cannula with pulse ox 97%, he wears baseline 2 to 5 L at home. Continue supplemental O2 to maintain pulse ox greater than 90%. BiPAP 12/5 PRN - refusing   Prednisone taper started today, 40 mg x 3 days, 30 mg x 3 days, 20 mg x 3 days, then resume baseline 10 mg daily. S/p IV solumedrol   S/p IV Rocephin and doxycycline. 10/1 IV Levaquin started   procalcitonin 5.43 > 1.50 (10/4). 10/4  some aspiration found on swallow study changed levaquin to IV Unasyn. IV Unasyn stopped and PO Augmentin ordered for 5 more days, last dose 10/11  CT chest reviewed. Aggressive pulmonary hygiene, continue chest physiotherapy, scheduled levalbuterol, (Mucomyst,--Dc'd reaction after allergy to sulfa ) Mucinex, flutter valve. Will need chest vest on discharge, orders placed and spoke with . Patient will do chest vest twice daily with Levalbuterol nebs for bronchiectasis   Sputum culture with gram stain ordered, awaiting specimen. Strep and Legionella antigens pending. Respiratory panel negative  Continue Brovana, Pulmicort, Xopenex. Patient is on Breztri and Xopenex at home. Continue incentive spirometry  10/4 MBS no signs of aspiration, but noted transient laryngeal penetration with thin consistency barium   Serum Na 131>127 (10/7), continue to monitor       Patient is stable from a pulmonary standpoint     Electronically signed by KELECHI Trujillo CNP on 10/7/2022 at 12:09 PM    Seen and examined, agree with above. Breathing is baseline and okay to dc on amox/ clav and prednisone taper then fu with me 4 weeks with cxr. Hyponatremia being addressed. Will see as needed.

## 2022-10-07 NOTE — PLAN OF CARE
Problem: Discharge Planning  Goal: Discharge to home or other facility with appropriate resources  10/7/2022 1023 by Kristen Barrientos RN  Outcome: Progressing     Problem: Safety - Adult  Goal: Free from fall injury  10/7/2022 1023 by Kristen Barrientos RN  Outcome: Progressing     Problem: Pain  Goal: Verbalizes/displays adequate comfort level or baseline comfort level  10/7/2022 1023 by Kristen Barrientos RN  Outcome: Progressing     Problem: ABCDS Injury Assessment  Goal: Absence of physical injury  10/7/2022 1023 by Kristen Barrientos RN  Outcome: Progressing

## 2022-10-08 VITALS
HEIGHT: 67 IN | BODY MASS INDEX: 27.94 KG/M2 | WEIGHT: 178 LBS | DIASTOLIC BLOOD PRESSURE: 67 MMHG | SYSTOLIC BLOOD PRESSURE: 126 MMHG | OXYGEN SATURATION: 96 % | HEART RATE: 77 BPM | RESPIRATION RATE: 18 BRPM | TEMPERATURE: 98.2 F

## 2022-10-08 LAB
ANION GAP SERPL CALCULATED.3IONS-SCNC: 5 MMOL/L (ref 7–16)
BASOPHILS ABSOLUTE: 0 E9/L (ref 0–0.2)
BASOPHILS RELATIVE PERCENT: 0 % (ref 0–2)
BUN BLDV-MCNC: 19 MG/DL (ref 6–23)
CALCIUM SERPL-MCNC: 8.8 MG/DL (ref 8.6–10.2)
CHLORIDE BLD-SCNC: 93 MMOL/L (ref 98–107)
CO2: 33 MMOL/L (ref 22–29)
CREAT SERPL-MCNC: 0.5 MG/DL (ref 0.7–1.2)
EOSINOPHILS ABSOLUTE: 0 E9/L (ref 0.05–0.5)
EOSINOPHILS RELATIVE PERCENT: 0 % (ref 0–6)
GFR AFRICAN AMERICAN: >60
GFR NON-AFRICAN AMERICAN: >60 ML/MIN/1.73
GLUCOSE BLD-MCNC: 200 MG/DL (ref 74–99)
HCT VFR BLD CALC: 33.5 % (ref 37–54)
HEMOGLOBIN: 11.5 G/DL (ref 12.5–16.5)
LYMPHOCYTES ABSOLUTE: 1.08 E9/L (ref 1.5–4)
LYMPHOCYTES RELATIVE PERCENT: 10.4 % (ref 20–42)
MCH RBC QN AUTO: 32.7 PG (ref 26–35)
MCHC RBC AUTO-ENTMCNC: 34.3 % (ref 32–34.5)
MCV RBC AUTO: 95.2 FL (ref 80–99.9)
MONOCYTES ABSOLUTE: 0.97 E9/L (ref 0.1–0.95)
MONOCYTES RELATIVE PERCENT: 8.7 % (ref 2–12)
NEUTROPHILS ABSOLUTE: 8.75 E9/L (ref 1.8–7.3)
NEUTROPHILS RELATIVE PERCENT: 80.9 % (ref 43–80)
NUCLEATED RED BLOOD CELLS: 0 /100 WBC
PDW BLD-RTO: 11.8 FL (ref 11.5–15)
PLATELET # BLD: 199 E9/L (ref 130–450)
PMV BLD AUTO: 9.5 FL (ref 7–12)
POLYCHROMASIA: ABNORMAL
POTASSIUM SERPL-SCNC: 4.1 MMOL/L (ref 3.5–5)
RBC # BLD: 3.52 E12/L (ref 3.8–5.8)
SODIUM BLD-SCNC: 131 MMOL/L (ref 132–146)
WBC # BLD: 10.8 E9/L (ref 4.5–11.5)

## 2022-10-08 PROCEDURE — 6370000000 HC RX 637 (ALT 250 FOR IP): Performed by: INTERNAL MEDICINE

## 2022-10-08 PROCEDURE — 94640 AIRWAY INHALATION TREATMENT: CPT

## 2022-10-08 PROCEDURE — 94668 MNPJ CHEST WALL SBSQ: CPT

## 2022-10-08 PROCEDURE — 6360000002 HC RX W HCPCS: Performed by: INTERNAL MEDICINE

## 2022-10-08 PROCEDURE — 36415 COLL VENOUS BLD VENIPUNCTURE: CPT

## 2022-10-08 PROCEDURE — 6360000002 HC RX W HCPCS

## 2022-10-08 PROCEDURE — 85025 COMPLETE CBC W/AUTO DIFF WBC: CPT

## 2022-10-08 PROCEDURE — 6370000000 HC RX 637 (ALT 250 FOR IP)

## 2022-10-08 PROCEDURE — 80048 BASIC METABOLIC PNL TOTAL CA: CPT

## 2022-10-08 PROCEDURE — 2700000000 HC OXYGEN THERAPY PER DAY

## 2022-10-08 RX ORDER — AMOXICILLIN AND CLAVULANATE POTASSIUM 250; 125 MG/1; MG/1
1 TABLET, FILM COATED ORAL EVERY 8 HOURS SCHEDULED
Qty: 11 TABLET | Refills: 0 | Status: SHIPPED | OUTPATIENT
Start: 2022-10-08 | End: 2022-10-12

## 2022-10-08 RX ORDER — PREDNISONE 10 MG/1
30 TABLET ORAL DAILY
Qty: 9 TABLET | Refills: 0 | Status: SHIPPED | OUTPATIENT
Start: 2022-10-10 | End: 2022-10-13

## 2022-10-08 RX ORDER — BUDESONIDE 0.5 MG/2ML
0.5 INHALANT ORAL 2 TIMES DAILY
Qty: 60 EACH | Refills: 3 | Status: SHIPPED | OUTPATIENT
Start: 2022-10-08

## 2022-10-08 RX ORDER — BUDESONIDE 0.5 MG/2ML
0.25 INHALANT ORAL 2 TIMES DAILY
Qty: 60 EACH | Refills: 3 | Status: SHIPPED | OUTPATIENT
Start: 2022-10-08 | End: 2022-10-08 | Stop reason: HOSPADM

## 2022-10-08 RX ORDER — PREDNISONE 20 MG/1
20 TABLET ORAL DAILY
Qty: 3 TABLET | Refills: 0 | Status: SHIPPED | OUTPATIENT
Start: 2022-10-13 | End: 2022-10-16

## 2022-10-08 RX ORDER — PREDNISONE 20 MG/1
40 TABLET ORAL DAILY
Qty: 4 TABLET | Refills: 0 | Status: SHIPPED | OUTPATIENT
Start: 2022-10-08 | End: 2022-10-10

## 2022-10-08 RX ADMIN — GUAIFENESIN 400 MG: 400 TABLET ORAL at 08:48

## 2022-10-08 RX ADMIN — AMOXICILLIN AND CLAVULANATE POTASSIUM 1 TABLET: 250; 125 TABLET, FILM COATED ORAL at 14:02

## 2022-10-08 RX ADMIN — ENOXAPARIN SODIUM 40 MG: 100 INJECTION SUBCUTANEOUS at 10:56

## 2022-10-08 RX ADMIN — GABAPENTIN 600 MG: 300 CAPSULE ORAL at 14:02

## 2022-10-08 RX ADMIN — LISINOPRIL 20 MG: 20 TABLET ORAL at 08:48

## 2022-10-08 RX ADMIN — ARFORMOTEROL TARTRATE 15 MCG: 15 SOLUTION RESPIRATORY (INHALATION) at 09:24

## 2022-10-08 RX ADMIN — Medication 1000 MG: at 08:48

## 2022-10-08 RX ADMIN — PREDNISONE 40 MG: 20 TABLET ORAL at 08:47

## 2022-10-08 RX ADMIN — BUDESONIDE 500 MCG: 0.5 SUSPENSION RESPIRATORY (INHALATION) at 09:24

## 2022-10-08 RX ADMIN — AMLODIPINE BESYLATE 5 MG: 5 TABLET ORAL at 08:47

## 2022-10-08 RX ADMIN — GUAIFENESIN 400 MG: 400 TABLET ORAL at 14:02

## 2022-10-08 RX ADMIN — LEVALBUTEROL 0.63 MG: 0.63 SOLUTION RESPIRATORY (INHALATION) at 09:25

## 2022-10-08 RX ADMIN — CETIRIZINE HYDROCHLORIDE 10 MG: 10 TABLET, FILM COATED ORAL at 08:48

## 2022-10-08 RX ADMIN — AMOXICILLIN AND CLAVULANATE POTASSIUM 1 TABLET: 250; 125 TABLET, FILM COATED ORAL at 06:49

## 2022-10-08 RX ADMIN — LEVALBUTEROL 0.63 MG: 0.63 SOLUTION RESPIRATORY (INHALATION) at 12:26

## 2022-10-08 RX ADMIN — GABAPENTIN 600 MG: 300 CAPSULE ORAL at 08:48

## 2022-10-08 ASSESSMENT — PAIN SCALES - GENERAL: PAINLEVEL_OUTOF10: 0

## 2022-10-08 NOTE — PROGRESS NOTES
Spoke with Dr. Naheed Ang. Okay for patient to discharge from pulmonary standpoint. Patient  to call office on Monday to get everything set up for chest vest. Flutter valve to be sent home with patient.  Will fax papers to [facility] that were required for chest vest.

## 2022-10-08 NOTE — PROGRESS NOTES
Subjective: The patient is awake and alert. No problems overnight. Denies chest pain, angina, and dyspnea. Denies abdominal pain. Tolerating diet. No nausea or vomiting. Objective:  Na+ increased 131    BP (!) 148/65   Pulse 63   Temp 98.1 °F (36.7 °C) (Oral)   Resp 17   Ht 5' 7\" (1.702 m)   Wt 178 lb (80.7 kg)   SpO2 99%   BMI 27.88 kg/m²     Heart:  RRR, no murmurs, gallops, or rubs. Lungs:  CTA bilaterally, bibasialr dry rales, no wheeze, rales or rhonchi  Abd: bowel sounds present, nontender, nondistended, no masses  Extrem:  No clubbing, cyanosis, or edema    CBC with Differential:    Lab Results   Component Value Date/Time    WBC 10.8 10/08/2022 03:20 AM    RBC 3.52 10/08/2022 03:20 AM    HGB 11.5 10/08/2022 03:20 AM    HCT 33.5 10/08/2022 03:20 AM     10/08/2022 03:20 AM    MCV 95.2 10/08/2022 03:20 AM    MCH 32.7 10/08/2022 03:20 AM    MCHC 34.3 10/08/2022 03:20 AM    RDW 11.8 10/08/2022 03:20 AM    NRBC 0.0 10/08/2022 03:20 AM    METASPCT 2.0 10/07/2022 02:45 AM    LYMPHOPCT 10.4 10/08/2022 03:20 AM    MONOPCT 8.7 10/08/2022 03:20 AM    MYELOPCT 2.0 06/16/2021 10:03 AM    BASOPCT 0.0 10/08/2022 03:20 AM    MONOSABS 0.97 10/08/2022 03:20 AM    LYMPHSABS 1.08 10/08/2022 03:20 AM    EOSABS 0.00 10/08/2022 03:20 AM    BASOSABS 0.00 10/08/2022 03:20 AM     BMP:    Lab Results   Component Value Date/Time     10/08/2022 03:20 AM    K 4.1 10/08/2022 03:20 AM    K 4.1 10/01/2022 06:02 AM    CL 93 10/08/2022 03:20 AM    CO2 33 10/08/2022 03:20 AM    BUN 19 10/08/2022 03:20 AM    LABALBU 4.0 10/01/2022 06:02 AM    CREATININE 0.5 10/08/2022 03:20 AM    CALCIUM 8.8 10/08/2022 03:20 AM    GFRAA >60 10/08/2022 03:20 AM    LABGLOM >60 10/08/2022 03:20 AM    GLUCOSE 200 10/08/2022 03:20 AM        Assessment:  Resolving COPD exacerbation. Resolving hyponatremia.     Patient Active Problem List   Diagnosis    COPD with acute exacerbation (HCC)    Hypertension    Anxiety    Lactic acidosis Hyperglycemia, drug-induced    Acute respiratory failure with hypoxia (HCC)    Type 2 diabetes mellitus (HCC)    COPD exacerbation (HCC)    Anemia       Plan:  Present tx. Up. Awaiting vest.  Measurements taken.           Karyna Leiva MD  6:52 AM  10/8/2022

## 2022-11-01 NOTE — PROGRESS NOTES
Comprehensive Nutrition Assessment    Type and Reason for Visit:  Initial, RD Nutrition Re-Screen/LOS    Nutrition Recommendations/Plan:   Recommend carb choice diet & will continue inpatient monitoring     Nutrition Assessment:    Pt w/ CAP vs aspiration, functional swallow per SLP s/p MBS 10/4. Note acute COPD exacerbation resolved. Hx DM. Pt tolerating diet, generally consuming >75% of meals. Recommend carb choice diet & will continue to monitor. Nutrition Related Findings:    A&O, 4 L NC, BUE trace edema, abd soft/rounded, +BS, Na 127, bili 1.3, Glu 212 Wound Type: None       Current Nutrition Intake & Therapies:    Average Meal Intake: %  Average Supplements Intake: None Ordered  ADULT DIET; Regular    Anthropometric Measures:  Height: 5' 7\" (170.2 cm)  Ideal Body Weight (IBW): 148 lbs (67 kg)    Admission Body Weight: 178 lb (80.7 kg) (10/2 bed)  Current Body Weight: 178 lb (80.7 kg), 120.3 % IBW. Weight Source: Bed Scale (10/5)  Current BMI (kg/m2): 27.9  Usual Body Weight: 174 lb (78.9 kg) (6/2021 actual per EMR)  % Weight Change (Calculated): 2.3  Weight Adjustment For: No Adjustment                 BMI Categories: Overweight (BMI 25.0-29. 9)    Nutrition Diagnosis:   No nutrition diagnosis at this time     Nutrition Interventions:   Food and/or Nutrient Delivery:  (Recommend carb choice diet)  Nutrition Education/Counseling: No recommendation at this time  Coordination of Nutrition Care: Continue to monitor while inpatient       Goals:     Goals: PO intake 75% or greater       Nutrition Monitoring and Evaluation:      Food/Nutrient Intake Outcomes: Food and Nutrient Intake  Physical Signs/Symptoms Outcomes: Biochemical Data, GI Status, Fluid Status or Edema, Nutrition Focused Physical Findings, Skin, Weight    Discharge Planning:     Too soon to determine     Isis Aguila RD, LD  Contact: [phone] Detail Level: Detailed Depth Of Biopsy: dermis Was A Bandage Applied: Yes Size Of Lesion In Cm: 0.5 X Size Of Lesion In Cm: 0 Biopsy Type: H and E Biopsy Method: Dermablade Anesthesia Type: 0.5% lidocaine without epinephrine Hemostasis: Silver Nitrate Wound Care: Petrolatum Dressing: bandage Destruction After The Procedure: No Type Of Destruction Used: Curettage Curettage Text: The wound bed was treated with curettage after the biopsy was performed. Cryotherapy Text: The wound bed was treated with cryotherapy after the biopsy was performed. Electrodesiccation Text: The wound bed was treated with electrodesiccation after the biopsy was performed. Electrodesiccation And Curettage Text: The wound bed was treated with electrodesiccation and curettage after the biopsy was performed. Silver Nitrate Text: The wound bed was treated with silver nitrate after the biopsy was performed. Lab: [id number] Lab Facility:  Consent: Written consent was obtained and risks were reviewed including but not limited to scarring, infection, bleeding, scabbing, incomplete removal, nerve damage and allergy to anesthesia. Post-Care Instructions: I reviewed with the patient in detail post-care instructions. Patient is to keep the biopsy site dry overnight, and then apply bacitracin twice daily until healed. Patient may apply hydrogen peroxide soaks to remove any crusting. Notification Instructions: Patient will be notified of biopsy results. However, patient instructed to call the office if not contacted within 2 weeks. Billing Type: Third-Party Bill Information: Selecting Yes will display possible errors in your note based on the variables you have selected. This validation is only offered as a suggestion for you. PLEASE NOTE THAT THE VALIDATION TEXT WILL BE REMOVED WHEN YOU FINALIZE YOUR NOTE. IF YOU WANT TO FAX A PRELIMINARY NOTE YOU WILL NEED TO TOGGLE THIS TO 'NO' IF YOU DO NOT WANT IT IN YOUR FAXED NOTE.

## 2022-11-10 NOTE — DISCHARGE SUMMARY
Physician Discharge Summary     Patient ID:  Alea Ernst  [id number]  89 y.o.  1950    Admit date: 10/1/2022    Discharge date and time: 10/8/2022  5:37 PM     Admission Diagnoses: COPD exacerbation ([facility] [J44.1]  Pneumonia due to infectious organism, unspecified laterality, unspecified part of lung [J18.9]    Discharge Diagnoses: COPD with acute exacerbation ([facility]    Hypertension    Anxiety    Lactic acidosis    Hyperglycemia, drug-induced    Acute respiratory failure with hypoxia ([facility]    Type 2 diabetes mellitus ([facility]    COPD exacerbation (HCC)    Anemia    Consults: pulmonary/intensive care    Procedures: None    Hospital Course: The patient is a 70 y.o. male patient of Dr Owen Justice who presents with as per ER - Alea Ernst is a 70 y.o. male who presents to the ED alone and drove self to ED for shortness of breath that started this AM and states he has history of copd and uses 4L nc continuously and is afraid to walk because he feels \"shaky\" . He denies any chest pain, abdominal pain, leg swelling. He does complain of fever 99.9 F and chills. He denies any urinary symptoms. He is using with xopenox no relief. The patient did present with complaints of increasing shortness of breath. He was evaluated in the emergency department. He was started on treatment for COPD exacerbation. Pulmonary is consulted today for COPD exacerbation. He does usually use 4 to 5 L of oxygen at home. He is currently saturating well on 4 L nasal cannula. He does continue to note shortness of breath with exertion. Sylvia Tan He states his last exacerbation requiring hospitalization was in March 2021. He was hospitalized for many days. He does normally follow with Dr. Aliya Fierro for COPD. He does normally use Breztri and Xopenex. He states he does have reaction to albuterol with complaints of increasing chest pain. At this time he is currently receiving Levaquin as well. He is currently awake and alert.  He does have significant end expiratory wheezing noted. No rhonchi are appreciated at this time. He is in no respiratory distress at rest. At this time I would continue with his current treatment regimen. Discharged to home after resolution having been treated with ATB/steroids and resp tx/chest percussion    Discharge Exam:  See progress note from today    Disposition: home    Condition at discharge:  Stable    Patient Instructions:   Discharge Medication List as of 10/8/2022  4:54 PM        START taking these medications    Details   ! ! predniSONE (DELTASONE) 20 MG tablet Take 2 tablets by mouth daily for 2 doses, Disp-4 tablet, R-0Normal      !! predniSONE (DELTASONE) 10 MG tablet Take 3 tablets by mouth daily for 3 doses, Disp-9 tablet, R-0Normal      !! predniSONE (DELTASONE) 20 MG tablet Take 1 tablet by mouth daily for 3 doses, Disp-3 tablet, R-0Normal      amoxicillin-clavulanate (AUGMENTIN) 250-125 MG per tablet Take 1 tablet by mouth every 8 hours for 11 doses, Disp-11 tablet, R-0Normal       !! - Potential duplicate medications found. Please discuss with provider.         CONTINUE these medications which have CHANGED    Details   budesonide (PULMICORT) 0.5 MG/2ML nebulizer suspension Take 2 mLs by nebulization in the morning and 2 mLs in the evening., Disp-60 each, R-3Normal           CONTINUE these medications which have NOT CHANGED    Details   calcium carbonate (OSCAL) 500 MG TABS tablet Take 1,000 mg by mouth dailyHistorical Med      Ascorbic Acid (VITAMIN C) 250 MG tablet Take 250 mg by mouth dailyHistorical Med      vitamin E 1000 units capsule Take 1,000 Units by mouth dailyHistorical Med      Arformoterol Tartrate (BROVANA) 15 MCG/2ML NEBU Take 2 mLs by nebulization 2 times daily, Disp-120 mL, R-3Normal      levalbuterol (XOPENEX) 1.25 MG/0.5ML nebulizer solution Take 0.5 mLs by nebulization every 4 hours as needed for Wheezing, Disp-60 each, R-3Normal      guaiFENesin 400 MG tablet Take 1 tablet by mouth 4 times daily, Disp-56 tablet, R-0Normal      zinc gluconate 50 MG tablet Take 50 mg by mouth dailyHistorical Med      B-COMPLEX-C PO Take by mouthHistorical Med      loratadine (CLARITIN) 10 MG capsule Take 10 mg by mouth dailyHistorical Med      tamsulosin (FLOMAX) 0.4 MG capsule Take 0.8 mg by mouth dailyHistorical Med      Coenzyme Q10 (CO Q 10 PO) Take by mouth 2 times daily Historical Med      Omega-3 Fatty Acids (FISH OIL) 1000 MG CAPS Take 1,000 mg by mouth 2 times daily Historical Med      Flaxseed, Linseed, 1000 MG CAPS Take  by mouth 2 times daily. amLODIPine-benazepril (LOTREL) 5-20 MG per capsule Take 1 capsule by mouth daily. Historical Med      lorazepam (ATIVAN) 0.5 MG tablet Take 0.5 mg by mouth 2 times daily as needed. Historical Med      gabapentin (NEURONTIN) 600 MG tablet Take 600 mg by mouth 3 times daily. STOP taking these medications       ondansetron (ZOFRAN-ODT) 4 MG disintegrating tablet Comments:   Reason for Stopping:             Activity: activity as tolerated  Diet: regular diet    Follow-up with Dr. Josselin Jackman in 5 days.     Note that over 30 minutes was spent in preparing discharge papers, discussing discharge with patient, medication review, etc.    Signed:  Wesley Glass MD  11/9/2022  7:55 PM

## 2022-11-23 LAB — PROSTATE SPECIFIC ANTIGEN: 0.78 NG/ML (ref 0–4)

## 2022-12-24 ENCOUNTER — APPOINTMENT (OUTPATIENT)
Dept: CT IMAGING | Age: 72
DRG: 190 | End: 2022-12-24
Payer: COMMERCIAL

## 2022-12-24 ENCOUNTER — HOSPITAL ENCOUNTER (INPATIENT)
Age: 72
LOS: 5 days | Discharge: HOME | DRG: 190 | End: 2022-12-29
Attending: EMERGENCY MEDICINE | Admitting: INTERNAL MEDICINE
Payer: COMMERCIAL

## 2022-12-24 ENCOUNTER — APPOINTMENT (OUTPATIENT)
Dept: GENERAL RADIOLOGY | Age: 72
DRG: 190 | End: 2022-12-24
Payer: COMMERCIAL

## 2022-12-24 DIAGNOSIS — J44.1 COPD WITH ACUTE EXACERBATION (HCC): Primary | ICD-10-CM

## 2022-12-24 DIAGNOSIS — J96.01 ACUTE RESPIRATORY FAILURE WITH HYPOXIA (HCC): ICD-10-CM

## 2022-12-24 PROBLEM — J96.90 RESPIRATORY FAILURE (HCC): Status: ACTIVE | Noted: 2022-12-24

## 2022-12-24 LAB
ADENOVIRUS BY PCR: NOT DETECTED
ANION GAP SERPL CALCULATED.3IONS-SCNC: 7 MMOL/L (ref 7–16)
BASOPHILS ABSOLUTE: 0.03 E9/L (ref 0–0.2)
BASOPHILS RELATIVE PERCENT: 0.2 % (ref 0–2)
BORDETELLA PARAPERTUSSIS BY PCR: NOT DETECTED
BORDETELLA PERTUSSIS BY PCR: NOT DETECTED
BUN BLDV-MCNC: 12 MG/DL (ref 6–23)
CALCIUM SERPL-MCNC: 9.3 MG/DL (ref 8.6–10.2)
CHLAMYDOPHILIA PNEUMONIAE BY PCR: NOT DETECTED
CHLORIDE BLD-SCNC: 92 MMOL/L (ref 98–107)
CO2: 33 MMOL/L (ref 22–29)
CORONAVIRUS 229E BY PCR: NOT DETECTED
CORONAVIRUS HKU1 BY PCR: NOT DETECTED
CORONAVIRUS NL63 BY PCR: NOT DETECTED
CORONAVIRUS OC43 BY PCR: NOT DETECTED
CREAT SERPL-MCNC: 0.6 MG/DL (ref 0.7–1.2)
EKG ATRIAL RATE: 107 BPM
EKG P AXIS: 48 DEGREES
EKG P-R INTERVAL: 124 MS
EKG Q-T INTERVAL: 302 MS
EKG QRS DURATION: 88 MS
EKG QTC CALCULATION (BAZETT): 403 MS
EKG R AXIS: 86 DEGREES
EKG T AXIS: 64 DEGREES
EKG VENTRICULAR RATE: 107 BPM
EOSINOPHILS ABSOLUTE: 0.01 E9/L (ref 0.05–0.5)
EOSINOPHILS RELATIVE PERCENT: 0.1 % (ref 0–6)
GFR SERPL CREATININE-BSD FRML MDRD: >60 ML/MIN/1.73
GLUCOSE BLD-MCNC: 176 MG/DL (ref 74–99)
HCT VFR BLD CALC: 40.7 % (ref 37–54)
HEMOGLOBIN: 13.8 G/DL (ref 12.5–16.5)
HUMAN METAPNEUMOVIRUS BY PCR: NOT DETECTED
HUMAN RHINOVIRUS/ENTEROVIRUS BY PCR: NOT DETECTED
IMMATURE GRANULOCYTES #: 0.07 E9/L
IMMATURE GRANULOCYTES %: 0.5 % (ref 0–5)
INFLUENZA A BY PCR: NOT DETECTED
INFLUENZA A BY PCR: NOT DETECTED
INFLUENZA B BY PCR: NOT DETECTED
INFLUENZA B BY PCR: NOT DETECTED
LYMPHOCYTES ABSOLUTE: 1.11 E9/L (ref 1.5–4)
LYMPHOCYTES RELATIVE PERCENT: 7.5 % (ref 20–42)
MCH RBC QN AUTO: 32.6 PG (ref 26–35)
MCHC RBC AUTO-ENTMCNC: 33.9 % (ref 32–34.5)
MCV RBC AUTO: 96.2 FL (ref 80–99.9)
MONOCYTES ABSOLUTE: 1.45 E9/L (ref 0.1–0.95)
MONOCYTES RELATIVE PERCENT: 9.8 % (ref 2–12)
MYCOPLASMA PNEUMONIAE BY PCR: NOT DETECTED
NEUTROPHILS ABSOLUTE: 12.12 E9/L (ref 1.8–7.3)
NEUTROPHILS RELATIVE PERCENT: 81.9 % (ref 43–80)
PARAINFLUENZA VIRUS 1 BY PCR: NOT DETECTED
PARAINFLUENZA VIRUS 2 BY PCR: NOT DETECTED
PARAINFLUENZA VIRUS 3 BY PCR: NOT DETECTED
PARAINFLUENZA VIRUS 4 BY PCR: NOT DETECTED
PDW BLD-RTO: 12.9 FL (ref 11.5–15)
PLATELET # BLD: 207 E9/L (ref 130–450)
PMV BLD AUTO: 9.2 FL (ref 7–12)
POTASSIUM SERPL-SCNC: 3.8 MMOL/L (ref 3.5–5)
PRO-BNP: 152 PG/ML (ref 0–125)
PROCALCITONIN: 0.05 NG/ML (ref 0–0.08)
RBC # BLD: 4.23 E12/L (ref 3.8–5.8)
RESPIRATORY SYNCYTIAL VIRUS BY PCR: NOT DETECTED
SARS-COV-2, NAAT: NOT DETECTED
SARS-COV-2, PCR: NOT DETECTED
SODIUM BLD-SCNC: 132 MMOL/L (ref 132–146)
TROPONIN, HIGH SENSITIVITY: 36 NG/L (ref 0–11)
TROPONIN, HIGH SENSITIVITY: 37 NG/L (ref 0–11)
WBC # BLD: 14.8 E9/L (ref 4.5–11.5)

## 2022-12-24 PROCEDURE — 96374 THER/PROPH/DIAG INJ IV PUSH: CPT

## 2022-12-24 PROCEDURE — 83880 ASSAY OF NATRIURETIC PEPTIDE: CPT

## 2022-12-24 PROCEDURE — 6360000002 HC RX W HCPCS

## 2022-12-24 PROCEDURE — 84484 ASSAY OF TROPONIN QUANT: CPT

## 2022-12-24 PROCEDURE — 71045 X-RAY EXAM CHEST 1 VIEW: CPT

## 2022-12-24 PROCEDURE — 94640 AIRWAY INHALATION TREATMENT: CPT

## 2022-12-24 PROCEDURE — 6370000000 HC RX 637 (ALT 250 FOR IP): Performed by: INTERNAL MEDICINE

## 2022-12-24 PROCEDURE — 87449 NOS EACH ORGANISM AG IA: CPT

## 2022-12-24 PROCEDURE — 2580000003 HC RX 258

## 2022-12-24 PROCEDURE — 6370000000 HC RX 637 (ALT 250 FOR IP)

## 2022-12-24 PROCEDURE — 85025 COMPLETE CBC W/AUTO DIFF WBC: CPT

## 2022-12-24 PROCEDURE — 6360000002 HC RX W HCPCS: Performed by: EMERGENCY MEDICINE

## 2022-12-24 PROCEDURE — 93010 ELECTROCARDIOGRAM REPORT: CPT | Performed by: INTERNAL MEDICINE

## 2022-12-24 PROCEDURE — 93005 ELECTROCARDIOGRAM TRACING: CPT

## 2022-12-24 PROCEDURE — 87502 INFLUENZA DNA AMP PROBE: CPT

## 2022-12-24 PROCEDURE — 6360000002 HC RX W HCPCS: Performed by: INTERNAL MEDICINE

## 2022-12-24 PROCEDURE — 84145 PROCALCITONIN (PCT): CPT

## 2022-12-24 PROCEDURE — 71250 CT THORAX DX C-: CPT

## 2022-12-24 PROCEDURE — 80048 BASIC METABOLIC PNL TOTAL CA: CPT

## 2022-12-24 PROCEDURE — 2500000003 HC RX 250 WO HCPCS: Performed by: INTERNAL MEDICINE

## 2022-12-24 PROCEDURE — 2700000000 HC OXYGEN THERAPY PER DAY

## 2022-12-24 PROCEDURE — 99285 EMERGENCY DEPT VISIT HI MDM: CPT

## 2022-12-24 PROCEDURE — 0202U NFCT DS 22 TRGT SARS-COV-2: CPT

## 2022-12-24 PROCEDURE — 2500000003 HC RX 250 WO HCPCS

## 2022-12-24 PROCEDURE — 2060000000 HC ICU INTERMEDIATE R&B

## 2022-12-24 PROCEDURE — 2580000003 HC RX 258: Performed by: INTERNAL MEDICINE

## 2022-12-24 PROCEDURE — 87635 SARS-COV-2 COVID-19 AMP PRB: CPT

## 2022-12-24 RX ORDER — LORAZEPAM 0.5 MG/1
0.5 TABLET ORAL 2 TIMES DAILY PRN
Status: DISCONTINUED | OUTPATIENT
Start: 2022-12-24 | End: 2022-12-28

## 2022-12-24 RX ORDER — GABAPENTIN 300 MG/1
600 CAPSULE ORAL 3 TIMES DAILY
Status: DISCONTINUED | OUTPATIENT
Start: 2022-12-24 | End: 2022-12-29 | Stop reason: HOSPADM

## 2022-12-24 RX ORDER — CALCIUM CARBONATE 500(1250)
1000 TABLET ORAL DAILY
Status: DISCONTINUED | OUTPATIENT
Start: 2022-12-24 | End: 2022-12-29 | Stop reason: HOSPADM

## 2022-12-24 RX ORDER — METHYLPREDNISOLONE SODIUM SUCCINATE 125 MG/2ML
125 INJECTION, POWDER, LYOPHILIZED, FOR SOLUTION INTRAMUSCULAR; INTRAVENOUS ONCE
Status: COMPLETED | OUTPATIENT
Start: 2022-12-24 | End: 2022-12-24

## 2022-12-24 RX ORDER — CETIRIZINE HYDROCHLORIDE 10 MG/1
10 TABLET ORAL DAILY
Status: DISCONTINUED | OUTPATIENT
Start: 2022-12-24 | End: 2022-12-29 | Stop reason: HOSPADM

## 2022-12-24 RX ORDER — AMLODIPINE BESYLATE AND BENAZEPRIL HYDROCHLORIDE 5; 20 MG/1; MG/1
1 CAPSULE ORAL DAILY
Status: DISCONTINUED | OUTPATIENT
Start: 2022-12-24 | End: 2022-12-24

## 2022-12-24 RX ORDER — HYDROCHLOROTHIAZIDE 12.5 MG/1
12.5 TABLET ORAL DAILY
COMMUNITY

## 2022-12-24 RX ORDER — BUDESONIDE 0.5 MG/2ML
0.5 INHALANT ORAL 2 TIMES DAILY
Status: DISCONTINUED | OUTPATIENT
Start: 2022-12-24 | End: 2022-12-29 | Stop reason: HOSPADM

## 2022-12-24 RX ORDER — LEVALBUTEROL 1.25 MG/.5ML
3.75 SOLUTION, CONCENTRATE RESPIRATORY (INHALATION) ONCE
Status: DISCONTINUED | OUTPATIENT
Start: 2022-12-24 | End: 2022-12-24

## 2022-12-24 RX ORDER — ARFORMOTEROL TARTRATE 15 UG/2ML
15 SOLUTION RESPIRATORY (INHALATION) 2 TIMES DAILY
Status: DISCONTINUED | OUTPATIENT
Start: 2022-12-24 | End: 2022-12-29 | Stop reason: HOSPADM

## 2022-12-24 RX ORDER — LISINOPRIL 20 MG/1
20 TABLET ORAL DAILY
Status: DISCONTINUED | OUTPATIENT
Start: 2022-12-24 | End: 2022-12-29 | Stop reason: HOSPADM

## 2022-12-24 RX ORDER — SODIUM CHLORIDE FOR INHALATION 0.9 %
3 VIAL, NEBULIZER (ML) INHALATION ONCE
Status: COMPLETED | OUTPATIENT
Start: 2022-12-24 | End: 2022-12-24

## 2022-12-24 RX ORDER — HYDROCHLOROTHIAZIDE 12.5 MG/1
12.5 TABLET ORAL DAILY
Status: DISCONTINUED | OUTPATIENT
Start: 2022-12-24 | End: 2022-12-29 | Stop reason: HOSPADM

## 2022-12-24 RX ORDER — GUAIFENESIN 400 MG/1
1200 TABLET ORAL 2 TIMES DAILY
Status: DISCONTINUED | OUTPATIENT
Start: 2022-12-24 | End: 2022-12-24

## 2022-12-24 RX ORDER — PREDNISONE 10 MG/1
10 TABLET ORAL DAILY
Status: ON HOLD | COMMUNITY
End: 2022-12-29 | Stop reason: HOSPADM

## 2022-12-24 RX ORDER — AMLODIPINE BESYLATE 5 MG/1
5 TABLET ORAL DAILY
Status: DISCONTINUED | OUTPATIENT
Start: 2022-12-24 | End: 2022-12-29 | Stop reason: HOSPADM

## 2022-12-24 RX ORDER — METHYLPREDNISOLONE SODIUM SUCCINATE 40 MG/ML
40 INJECTION, POWDER, LYOPHILIZED, FOR SOLUTION INTRAMUSCULAR; INTRAVENOUS EVERY 8 HOURS
Status: DISCONTINUED | OUTPATIENT
Start: 2022-12-24 | End: 2022-12-28

## 2022-12-24 RX ORDER — ENOXAPARIN SODIUM 100 MG/ML
40 INJECTION SUBCUTANEOUS DAILY
Status: DISCONTINUED | OUTPATIENT
Start: 2022-12-24 | End: 2022-12-29 | Stop reason: HOSPADM

## 2022-12-24 RX ORDER — GUAIFENESIN 400 MG/1
400 TABLET ORAL EVERY 4 HOURS
Status: DISCONTINUED | OUTPATIENT
Start: 2022-12-24 | End: 2022-12-29 | Stop reason: HOSPADM

## 2022-12-24 RX ORDER — LEVALBUTEROL 1.25 MG/.5ML
3.75 SOLUTION, CONCENTRATE RESPIRATORY (INHALATION)
Status: DISCONTINUED | OUTPATIENT
Start: 2022-12-24 | End: 2022-12-24

## 2022-12-24 RX ORDER — LEVALBUTEROL 1.25 MG/.5ML
3.75 SOLUTION, CONCENTRATE RESPIRATORY (INHALATION) ONCE
Status: COMPLETED | OUTPATIENT
Start: 2022-12-24 | End: 2022-12-24

## 2022-12-24 RX ORDER — LEVALBUTEROL 1.25 MG/.5ML
1 SOLUTION, CONCENTRATE RESPIRATORY (INHALATION) 4 TIMES DAILY
Status: DISCONTINUED | OUTPATIENT
Start: 2022-12-24 | End: 2022-12-29 | Stop reason: HOSPADM

## 2022-12-24 RX ORDER — TAMSULOSIN HYDROCHLORIDE 0.4 MG/1
0.8 CAPSULE ORAL DAILY
Status: DISCONTINUED | OUTPATIENT
Start: 2022-12-24 | End: 2022-12-29 | Stop reason: HOSPADM

## 2022-12-24 RX ORDER — ASCORBIC ACID 500 MG
250 TABLET ORAL DAILY
Status: DISCONTINUED | OUTPATIENT
Start: 2022-12-24 | End: 2022-12-29 | Stop reason: HOSPADM

## 2022-12-24 RX ORDER — ACETAMINOPHEN 325 MG/1
650 TABLET ORAL ONCE
Status: COMPLETED | OUTPATIENT
Start: 2022-12-24 | End: 2022-12-24

## 2022-12-24 RX ADMIN — GUAIFENESIN 400 MG: 400 TABLET ORAL at 22:26

## 2022-12-24 RX ADMIN — LORAZEPAM 0.5 MG: 0.5 TABLET ORAL at 23:49

## 2022-12-24 RX ADMIN — ISODIUM CHLORIDE 3 ML: 0.03 SOLUTION RESPIRATORY (INHALATION) at 09:39

## 2022-12-24 RX ADMIN — LEVALBUTEROL 3.75 MG: 1.25 SOLUTION, CONCENTRATE RESPIRATORY (INHALATION) at 09:39

## 2022-12-24 RX ADMIN — METHYLPREDNISOLONE SODIUM SUCCINATE 125 MG: 125 INJECTION, POWDER, FOR SOLUTION INTRAMUSCULAR; INTRAVENOUS at 08:59

## 2022-12-24 RX ADMIN — BUDESONIDE 500 MCG: 0.5 SUSPENSION RESPIRATORY (INHALATION) at 21:02

## 2022-12-24 RX ADMIN — GABAPENTIN 600 MG: 300 CAPSULE ORAL at 22:26

## 2022-12-24 RX ADMIN — METHYLPREDNISOLONE SODIUM SUCCINATE 40 MG: 40 INJECTION, POWDER, LYOPHILIZED, FOR SOLUTION INTRAMUSCULAR; INTRAVENOUS at 17:25

## 2022-12-24 RX ADMIN — ARFORMOTEROL TARTRATE 15 MCG: 15 SOLUTION RESPIRATORY (INHALATION) at 21:02

## 2022-12-24 RX ADMIN — LEVALBUTEROL HYDROCHLORIDE 1.25 MG: 1.25 SOLUTION, CONCENTRATE RESPIRATORY (INHALATION) at 16:15

## 2022-12-24 RX ADMIN — GUAIFENESIN 400 MG: 400 TABLET ORAL at 13:20

## 2022-12-24 RX ADMIN — LEVALBUTEROL HYDROCHLORIDE 3.75 MG: 1.25 SOLUTION, CONCENTRATE RESPIRATORY (INHALATION) at 08:36

## 2022-12-24 RX ADMIN — LISINOPRIL 20 MG: 20 TABLET ORAL at 13:20

## 2022-12-24 RX ADMIN — TAMSULOSIN HYDROCHLORIDE 0.8 MG: 0.4 CAPSULE ORAL at 13:20

## 2022-12-24 RX ADMIN — AMLODIPINE BESYLATE 5 MG: 5 TABLET ORAL at 13:20

## 2022-12-24 RX ADMIN — ENOXAPARIN SODIUM 40 MG: 100 INJECTION SUBCUTANEOUS at 13:19

## 2022-12-24 RX ADMIN — HYDROCHLOROTHIAZIDE 12.5 MG: 12.5 TABLET ORAL at 13:20

## 2022-12-24 RX ADMIN — DOXYCYCLINE 100 MG: 100 INJECTION, POWDER, LYOPHILIZED, FOR SOLUTION INTRAVENOUS at 22:30

## 2022-12-24 RX ADMIN — OXYCODONE HYDROCHLORIDE AND ACETAMINOPHEN 250 MG: 500 TABLET ORAL at 13:20

## 2022-12-24 RX ADMIN — LEVALBUTEROL HYDROCHLORIDE 1.25 MG: 1.25 SOLUTION, CONCENTRATE RESPIRATORY (INHALATION) at 21:02

## 2022-12-24 RX ADMIN — DOXYCYCLINE 100 MG: 100 INJECTION, POWDER, LYOPHILIZED, FOR SOLUTION INTRAVENOUS at 10:17

## 2022-12-24 RX ADMIN — GUAIFENESIN 400 MG: 400 TABLET ORAL at 17:25

## 2022-12-24 RX ADMIN — WATER 2000 MG: 1 INJECTION INTRAMUSCULAR; INTRAVENOUS; SUBCUTANEOUS at 10:09

## 2022-12-24 RX ADMIN — GABAPENTIN 600 MG: 300 CAPSULE ORAL at 13:20

## 2022-12-24 RX ADMIN — ACETAMINOPHEN 650 MG: 325 TABLET ORAL at 08:58

## 2022-12-24 ASSESSMENT — ENCOUNTER SYMPTOMS
DIARRHEA: 0
SORE THROAT: 0
ABDOMINAL PAIN: 0
VOMITING: 0
NAUSEA: 0
TROUBLE SWALLOWING: 0
EYES NEGATIVE: 1
SHORTNESS OF BREATH: 1
EYE REDNESS: 0

## 2022-12-24 ASSESSMENT — PAIN - FUNCTIONAL ASSESSMENT: PAIN_FUNCTIONAL_ASSESSMENT: NONE - DENIES PAIN

## 2022-12-24 ASSESSMENT — PAIN SCALES - GENERAL: PAINLEVEL_OUTOF10: 0

## 2022-12-24 NOTE — PROGRESS NOTES
Dr. Chase Backer on call for Dr. Charles Loera, answering service notified of need for admission orders.

## 2022-12-24 NOTE — ED PROVIDER NOTES
Shirley Canales is a 79-year-old male presents emergency department for respiratory distress that started last night. The complaint is constant, moderate severity, nothing make it better or worse. Patient has a history of COPD and wears 4 to 5 L chronically at home and takes 10 mg of prednisone daily at baseline. He was instructed to increase the prednisone when he has worsened shortness of breath, he has not increased any of his doses recently. Patient was in respiratory distress 91% on 5 L on arrival, improved after sitting and being on 6 L nonrebreather. He denies fever, chills, chest pain, abdominal pain, nausea, vomiting, diarrhea. He states that he is allergic to albuterol and ipratropium that gives him severe heart palpitations. Tried taking his levalbuterol inhalers at home with no improvement. The history is provided by the patient. Review of Systems   Constitutional:  Negative for chills and fever. HENT: Negative. Negative for congestion, sore throat and trouble swallowing. Eyes: Negative. Negative for redness. Respiratory:  Positive for shortness of breath. Cardiovascular:  Negative for chest pain. Gastrointestinal:  Negative for abdominal pain, diarrhea, nausea and vomiting. Genitourinary: Negative. Negative for dysuria and hematuria. Musculoskeletal:  Negative for neck pain and neck stiffness. Skin: Negative. Neurological:  Negative for dizziness, light-headedness and headaches. Psychiatric/Behavioral: Negative. Negative for agitation and behavioral problems. Physical Exam  Vitals and nursing note reviewed. HENT:      Head: Normocephalic and atraumatic. Eyes:      Pupils: Pupils are equal, round, and reactive to light. Cardiovascular:      Rate and Rhythm: Normal rate and regular rhythm. Pulmonary:      Effort: Respiratory distress present. Breath sounds: Wheezing (scattered) present. No rhonchi or rales.    Abdominal:      Palpations: Abdomen is soft. Tenderness: There is no abdominal tenderness. There is no guarding or rebound. Musculoskeletal:      Cervical back: Normal range of motion. No rigidity. Skin:     General: Skin is warm and dry. Capillary Refill: Capillary refill takes less than 2 seconds. Neurological:      General: No focal deficit present. Mental Status: He is alert and oriented to person, place, and time. Psychiatric:         Mood and Affect: Mood normal.         Behavior: Behavior normal.        Procedures     MDM  Number of Diagnoses or Management Options  Acute respiratory failure with hypoxia ([facility]  COPD with acute exacerbation ([facility]  Diagnosis management comments: Patient presented the emergency department in respiratory distress with a history of COPD, on his usual 4 L NC O2 at home he was 82% and came to the emergency department. Oxygen was increased to 6 L and was administered 3 live albuterol treatments with some improvement. He was also febrile on arrival 100.5 and administered Tylenol. On reevaluation, his work of breathing is somewhat improved but is still wheezy throughout so ordered additional round of breathing treatments. Patient has a mild leukocytosis and chest x-ray shows evidence of possible developing pneumonia. Discussed case Dr. Qiana Mcclellan, will admit the patient for Dr. Josselin Jackman.        --------------------------------------------- PAST HISTORY ---------------------------------------------  Past Medical History:  has a past medical history of Asthma, COPD (chronic obstructive pulmonary disease) ([facility] Hypertension, and Neuropathic pain. Past Surgical History:  has a past surgical history that includes hernia repair and back surgery. Social History:  reports that he quit smoking about 11 years ago. His smoking use included cigarettes. He has never used smokeless tobacco. He reports that he does not currently use alcohol after a past usage of about 1.0 standard drink per week.  He reports that he does not use drugs. Family History: family history is not on file. The patients home medications have been reviewed.     Allergies: Onion, Sulfa antibiotics, Albuterol, and Ipratropium bromide [ipratropium]    -------------------------------------------------- RESULTS -------------------------------------------------    Lab  Results for orders placed or performed during the hospital encounter of 12/24/22   COVID-19, Rapid    Specimen: Nasopharyngeal Swab   Result Value Ref Range    SARS-CoV-2, NAAT Not Detected Not Detected   Rapid influenza A/B antigens    Specimen: Nasopharyngeal   Result Value Ref Range    Influenza A by PCR Not Detected Not Detected    Influenza B by PCR Not Detected Not Detected   BMP   Result Value Ref Range    Sodium 132 132 - 146 mmol/L    Potassium 3.8 3.5 - 5.0 mmol/L    Chloride 92 (L) 98 - 107 mmol/L    CO2 33 (H) 22 - 29 mmol/L    Anion Gap 7 7 - 16 mmol/L    Glucose 176 (H) 74 - 99 mg/dL    BUN 12 6 - 23 mg/dL    Creatinine 0.6 (L) 0.7 - 1.2 mg/dL    Est, Glom Filt Rate >60 >=60 mL/min/1.73    Calcium 9.3 8.6 - 10.2 mg/dL   CBC with Auto Differential   Result Value Ref Range    WBC 14.8 (H) 4.5 - 11.5 E9/L    RBC 4.23 3.80 - 5.80 E12/L    Hemoglobin 13.8 12.5 - 16.5 g/dL    Hematocrit 40.7 37.0 - 54.0 %    MCV 96.2 80.0 - 99.9 fL    MCH 32.6 26.0 - 35.0 pg    MCHC 33.9 32.0 - 34.5 %    RDW 12.9 11.5 - 15.0 fL    Platelets 258 864 - 326 E9/L    MPV 9.2 7.0 - 12.0 fL    Neutrophils % 81.9 (H) 43.0 - 80.0 %    Immature Granulocytes % 0.5 0.0 - 5.0 %    Lymphocytes % 7.5 (L) 20.0 - 42.0 %    Monocytes % 9.8 2.0 - 12.0 %    Eosinophils % 0.1 0.0 - 6.0 %    Basophils % 0.2 0.0 - 2.0 %    Neutrophils Absolute 12.12 (H) 1.80 - 7.30 E9/L    Immature Granulocytes # 0.07 E9/L    Lymphocytes Absolute 1.11 (L) 1.50 - 4.00 E9/L    Monocytes Absolute 1.45 (H) 0.10 - 0.95 E9/L    Eosinophils Absolute 0.01 (L) 0.05 - 0.50 E9/L    Basophils Absolute 0.03 0.00 - 0.20 E9/L   Brain Natriuretic Peptide   Result Value Ref Range    Pro- (H) 0 - 125 pg/mL   Troponin   Result Value Ref Range    Troponin, High Sensitivity 36 (H) 0 - 11 ng/L   Troponin   Result Value Ref Range    Troponin, High Sensitivity 37 (H) 0 - 11 ng/L   EKG 12 Lead   Result Value Ref Range    Ventricular Rate 107 BPM    Atrial Rate 107 BPM    P-R Interval 124 ms    QRS Duration 88 ms    Q-T Interval 302 ms    QTc Calculation (Bazett) 403 ms    P Axis 48 degrees    R Axis 86 degrees    T Axis 64 degrees       Radiology  XR CHEST PORTABLE    Result Date: 12/24/2022  EXAMINATION: ONE XRAY VIEW OF THE CHEST 12/24/2022 9:13 am COMPARISON: 10/01/2022 HISTORY: ORDERING SYSTEM PROVIDED HISTORY: SOB TECHNOLOGIST PROVIDED HISTORY: Reason for exam:->SOB FINDINGS: Portable chest reveal cardiac silhouette to be enlarged. Chronic and emphysematous changes seen within the lung fields bilaterally. Vascular calcifications seen within the thoracic aorta. Degenerative changes seen within the spine. Ill-defined opacification seen within the right middle lobe concerning for possible infiltrate or atelectatic change. Elevation seen of the right hemidiaphragm. Chronic changes seen throughout the lung fields bilaterally with enlargement of heart. Ill-defined opacification within the right middle lobe suggesting either atelectatic change or possible infiltrate. Elevation seen of the right hemidiaphragm. ------------------------- NURSING NOTES AND VITALS REVIEWED ---------------------------  Date / Time Roomed:  12/24/2022  8:26 AM  ED Bed Assignment:  04/04    The nursing notes within the ED encounter and vital signs as below have been reviewed.    Patient Vitals for the past 24 hrs:   BP Temp Temp src Pulse Resp SpO2 Height Weight   12/24/22 0939 122/82 -- -- (!) 106 22 94 % -- --   12/24/22 0900 (!) 159/77 -- -- (!) 111 24 93 % -- --   12/24/22 0855 -- (!) 100.5 °F (38.1 °C) Oral -- -- -- -- --   12/24/22 0841 (!) 154/81 100.2 °F (37.9 °C) Axillary (!) 107 24 94 % 5' 7.5\" (1.715 m) 181 lb (82.1 kg)   12/24/22 0836 -- -- -- (!) 105 24 -- -- --       Oxygen Saturation Interpretation: Abnormal      ------------------------------------------ PROGRESS NOTES ------------------------------------------      I have spoken with the patient and discussed todays results, in addition to providing specific details for the plan of care and counseling regarding the diagnosis and prognosis. Their questions are answered at this time and they are agreeable with the plan.      --------------------------------- ADDITIONAL PROVIDER NOTES ---------------------------------  Consultations:  Spoke with Dr. Jimmy Jean,  They will admit this patient. This patient's ED course included: a personal history and physicial examination, multiple bedside re-evaluations, IV medications, cardiac monitoring, and continuous pulse oximetry    This patient has remained unchanged during their ED course. Clinical Impression  1. COPD with acute exacerbation ([facility]    2. Acute respiratory failure with hypoxia (HCC)          Disposition  Patient's disposition: Admit to telemetry  Patient's condition is serious. ED Course as of 12/24/22 1001   Sat Dec 24, 2022   0831 ATTENDING PROVIDER ATTESTATION:     I have personally performed and/or participated in the history, exam, medical decision making, and procedures and agree with all pertinent clinical information unless otherwise noted. I have also reviewed and agree with the past medical, family and social history unless otherwise noted. I have discussed this patient in detail with the resident and provided the instruction and education regarding the evidence-based evaluation and treatment of shortness of breath. Any EKG that may have been performed has been personally reviewed by me and I agree with the documentation as noted by the resident. History: Patient with history of COPD.   Utilizes 4 L oxygen continuously at home. Pulse oximetry 83% at home with increasing shortness of breath over last couple days. No other associated symptoms. My findings: Na Mcdonald is a 67 y.o. male whom is in no distress. Physical exam reveals he is alert and oriented. Heart is regular, lungs are severely decreased. He does have mild conversational dyspnea. He has use of accessory muscles for respiration. Abdomen soft nontender. Extremities are intact without edema. Skin is warm and dry. My plan: Symptomatic and supportive care. Labs. X-ray. Viral testing,    Electronically signed by Jose Hoyt DO on 12/24/22 at 8:31 AM EST       [TG]   3054 EKG: This EKG is signed and interpreted by me. Rate: 107  Rhythm: Sinus  Interpretation: no acute changes and non-specific EKG  Comparison: changes compared to previous EKG   [TG]   3759 Reevaluation-patient's work of breathing is improved after breathing treatments and he is 94% on 6 L NC O2. On physical exam he still has wheezes bilaterally. ,  Ordered another round of breathing treatments.  [DT]      ED Course User Index  [DT] Damaris Bah DO  [TG] DO Damaris Sloan DO  Resident  12/24/22 1010

## 2022-12-24 NOTE — PROGRESS NOTES
Pulmonary Consultation    Admit Date: 12/24/2022  Requesting Physician: Claritza Washington MD    CC: Respiratory failure, pneumonia    HPI:  Tana Stanton 67 y.o. male known to Dr. Rise Duverney, fully vaccinated for COVID with boosters, with past medical history for emphysema/asthma on Breztri and Xopenex, chronic respiratory failure with hypoxia on 2 to 5 L baseline, bronchiectasis, hypertension who presented to the ED on 12/24 with complaints of increased shortness of breath and hypoxia beginning 1 day ago. He reports to being 82% on 4 L nasal cannula with increasing shortness of breath, unrelieved by rescue inhaler. ED course:   He was mildly tachycardic in ED, T-max 100.5, pulse ox 93% on 6 L  Rapid flu and COVID-negative WBC 14.8, proBNP 152, high-sensitivity troponin 36> 37  Chest x-ray: Atelectasis versus possible infiltrate to the right middle lobe  IV doxycycline and Rocephin x1 in ED    Patient seen on 6 L nasal cannula pulse ox 94%. He notes to still be short of breath with minimal exertion along with wheezing, denies cough or mucus production. He denies any known sick contacts, fever, chills. PMH:    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     PSH:    Past Surgical History:   Procedure Laterality Date    BACK SURGERY      spinal fusion    HERNIA REPAIR            Respiratory ROS: positive for - shortness of breath, tachypnea, and wheezing Otherwise, a complete review of systems is undertaken and is negative.     Social History:  Social History     Socioeconomic History    Marital status:      Spouse name: Not on file    Number of children: Not on file    Years of education: Not on file    Highest education level: Not on file   Occupational History    Not on file   Tobacco Use    Smoking status: Former     Types: Cigarettes     Quit date: 10/12/2011     Years since quittin.2    Smokeless tobacco: Never   Substance and Sexual Activity    Alcohol use: Not Currently     Alcohol/week: 1.0 standard drink     Types: 1 Glasses of wine per week     Comment: daily    Drug use: No    Sexual activity: Not on file   Other Topics Concern    Not on file   Social History Narrative    Not on file     Social Determinants of Health     Financial Resource Strain: Not on file   Food Insecurity: Not on file   Transportation Needs: Not on file   Physical Activity: Not on file   Stress: Not on file   Social Connections: Not on file   Intimate Partner Violence: Not on file   Housing Stability: Not on file       Family History:  History reviewed. No pertinent family history. Medications:       levalbuterol  3.75 mg Nebulization Once    Arformoterol Tartrate  15 mcg Nebulization BID    vitamin C  250 mg Oral Daily    budesonide  0.5 mg Nebulization BID    calcium elemental  1,000 mg Oral Daily    gabapentin  600 mg Oral TID    hydroCHLOROthiazide  12.5 mg Oral Daily    cetirizine  10 mg Oral Daily    tamsulosin  0.8 mg Oral Daily    zinc sulfate  50 mg Oral Daily    enoxaparin  40 mg SubCUTAneous Daily    methylPREDNISolone  40 mg IntraVENous Q8H    levalbuterol  1 ampule Nebulization 4x daily    ipratropium  0.5 mg Nebulization 4x daily    amLODIPine  5 mg Oral Daily    And    lisinopril  20 mg Oral Daily    guaiFENesin  400 mg Oral Q4H         Vitals:    VITALS:  BP (!) 157/79   Pulse 92   Temp 97.3 °F (36.3 °C)   Resp 22   Ht 5' 7.5\" (1.715 m)   Wt 181 lb (82.1 kg)   SpO2 94%   BMI 27.93 kg/m²   24HR INTAKE/OUTPUT:  No intake or output data in the 24 hours ending 22 1234  CURRENT PULSE OXIMETRY:  SpO2: 94 %  24HR PULSE OXIMETRY RANGE:  SpO2  Av.8 %  Min: 93 %  Max: 94 %      EXAM:  General: No distress. 6 L  Eyes: No sclera icterus. No conjunctival injection. ENT: No discharge. Pharynx clear. Neck: Trachea midline. Normal thyroid. Resp: No accessory muscle use.  No crackles. Mild expiratory wheezing bilaterally. No rhonchi. CV: Regular rate. Regular rhythm. No mumur or rub. ABD: Non-tender. Non-distended. Normal bowel sounds. Skin: Warm and dry. No nodule on exposed extremities. No rash on exposed extremities. Ext: No joint deformity. No clubbing. No cyanosis. No edema  Neuro: Awake. Follows commands A&Ox3     Lab Results:  CBC:   Recent Labs     12/24/22  0846   WBC 14.8*   HGB 13.8   HCT 40.7   MCV 96.2        BMP:   Recent Labs     12/24/22  0846      K 3.8   CL 92*   CO2 33*   BUN 12   CREATININE 0.6*     LFT: No results for input(s): ALKPHOS, ALT, AST, PROT, BILITOT, BILIDIR, LABALBU in the last 72 hours. PT/INR: No results for input(s): PROTIME, INR in the last 72 hours. Cultures:  No results for input(s): CULTRESP in the last 72 hours. ABG:   No results for input(s): PH, PO2, PCO2, HCO3, BE, O2SAT in the last 72 hours. Films:  XR CHEST PORTABLE    Result Date: 12/24/2022  EXAMINATION: ONE XRAY VIEW OF THE CHEST 12/24/2022 9:13 am COMPARISON: 10/01/2022 HISTORY: ORDERING SYSTEM PROVIDED HISTORY: SOB TECHNOLOGIST PROVIDED HISTORY: Reason for exam:->SOB FINDINGS: Portable chest reveal cardiac silhouette to be enlarged. Chronic and emphysematous changes seen within the lung fields bilaterally. Vascular calcifications seen within the thoracic aorta. Degenerative changes seen within the spine. Ill-defined opacification seen within the right middle lobe concerning for possible infiltrate or atelectatic change. Elevation seen of the right hemidiaphragm. Chronic changes seen throughout the lung fields bilaterally with enlargement of heart. Ill-defined opacification within the right middle lobe suggesting either atelectatic change or possible infiltrate. Elevation seen of the right hemidiaphragm.          Assessment/Plan:  COPD with acute exacerbation  Continue IV Solu-Medrol 40 mg every 8 hours for now  Continue ipratropium, Brovana, Pulmicort and Xopenex. He will resume Breztri and Xopenex upon discharge  Chronic respiratory failure with hypoxia on 2 to 5 L baseline  Possible community-acquired pneumonia  S/p IV doxycycline and Rocephin in ED  WBC 14.8  T-max 100.5  Check procalcitonin, strep and Legionella antigens, sputum culture with gram stain  Check full respiratory panel  Incentive spirometry  Leukocytosis  History of bronchiectasis  Ordered chest vest and flutter to be completed with Xopenex        Thank you for allowing us to see this patient in consultation. Please contact us with any questions. Electronically signed by KELECHI Fofana CNP on 12/24/2022 at 12:34 PM     Seen and evaluated, and agree with above assessment and plan  Chest x-ray noticed, with worsening of right middle lobe atelectasis  Status post R bronchial valves x4, concern with occlusion and postobstructive pneumonia of the bronchial valve. CT scan of the chest to follow without contrast and compared with October 2022.   Continue antibiotics until  CT scan seen

## 2022-12-24 NOTE — ED NOTES
RN faxed SBAR to floor. Confirmation received and spoke with Jenna Schwartz. Pt ready for transport to room.        Joe aMriscal RN  12/24/22 3141

## 2022-12-25 LAB
ALBUMIN SERPL-MCNC: 3.5 G/DL (ref 3.5–5.2)
ALP BLD-CCNC: 66 U/L (ref 40–129)
ALT SERPL-CCNC: 14 U/L (ref 0–40)
ANION GAP SERPL CALCULATED.3IONS-SCNC: 7 MMOL/L (ref 7–16)
AST SERPL-CCNC: 13 U/L (ref 0–39)
BILIRUB SERPL-MCNC: 0.7 MG/DL (ref 0–1.2)
BUN BLDV-MCNC: 20 MG/DL (ref 6–23)
CALCIUM SERPL-MCNC: 9.3 MG/DL (ref 8.6–10.2)
CHLORIDE BLD-SCNC: 94 MMOL/L (ref 98–107)
CO2: 32 MMOL/L (ref 22–29)
CREAT SERPL-MCNC: 0.6 MG/DL (ref 0.7–1.2)
GFR SERPL CREATININE-BSD FRML MDRD: >60 ML/MIN/1.73
GLUCOSE BLD-MCNC: 189 MG/DL (ref 74–99)
HCT VFR BLD CALC: 36.6 % (ref 37–54)
HEMOGLOBIN: 12.3 G/DL (ref 12.5–16.5)
L. PNEUMOPHILA SEROGP 1 UR AG: NORMAL
MCH RBC QN AUTO: 32.6 PG (ref 26–35)
MCHC RBC AUTO-ENTMCNC: 33.6 % (ref 32–34.5)
MCV RBC AUTO: 97.1 FL (ref 80–99.9)
PDW BLD-RTO: 12.6 FL (ref 11.5–15)
PLATELET # BLD: 209 E9/L (ref 130–450)
PMV BLD AUTO: 9.8 FL (ref 7–12)
POTASSIUM SERPL-SCNC: 3.8 MMOL/L (ref 3.5–5)
RBC # BLD: 3.77 E12/L (ref 3.8–5.8)
SODIUM BLD-SCNC: 133 MMOL/L (ref 132–146)
STREP PNEUMONIAE ANTIGEN, URINE: NORMAL
TOTAL PROTEIN: 5.8 G/DL (ref 6.4–8.3)
WBC # BLD: 15.8 E9/L (ref 4.5–11.5)

## 2022-12-25 PROCEDURE — 2060000000 HC ICU INTERMEDIATE R&B

## 2022-12-25 PROCEDURE — 36415 COLL VENOUS BLD VENIPUNCTURE: CPT

## 2022-12-25 PROCEDURE — 94640 AIRWAY INHALATION TREATMENT: CPT

## 2022-12-25 PROCEDURE — 2580000003 HC RX 258: Performed by: INTERNAL MEDICINE

## 2022-12-25 PROCEDURE — 6370000000 HC RX 637 (ALT 250 FOR IP): Performed by: INTERNAL MEDICINE

## 2022-12-25 PROCEDURE — 6360000002 HC RX W HCPCS: Performed by: INTERNAL MEDICINE

## 2022-12-25 PROCEDURE — 2500000003 HC RX 250 WO HCPCS: Performed by: INTERNAL MEDICINE

## 2022-12-25 PROCEDURE — 85027 COMPLETE CBC AUTOMATED: CPT

## 2022-12-25 PROCEDURE — 2700000000 HC OXYGEN THERAPY PER DAY

## 2022-12-25 PROCEDURE — 80053 COMPREHEN METABOLIC PANEL: CPT

## 2022-12-25 RX ADMIN — AMLODIPINE BESYLATE 5 MG: 5 TABLET ORAL at 08:52

## 2022-12-25 RX ADMIN — GUAIFENESIN 400 MG: 400 TABLET ORAL at 12:46

## 2022-12-25 RX ADMIN — LEVALBUTEROL HYDROCHLORIDE 1.25 MG: 1.25 SOLUTION, CONCENTRATE RESPIRATORY (INHALATION) at 20:59

## 2022-12-25 RX ADMIN — GABAPENTIN 600 MG: 300 CAPSULE ORAL at 14:44

## 2022-12-25 RX ADMIN — LEVALBUTEROL HYDROCHLORIDE 1.25 MG: 1.25 SOLUTION, CONCENTRATE RESPIRATORY (INHALATION) at 13:53

## 2022-12-25 RX ADMIN — GUAIFENESIN 400 MG: 400 TABLET ORAL at 21:19

## 2022-12-25 RX ADMIN — GUAIFENESIN 400 MG: 400 TABLET ORAL at 23:58

## 2022-12-25 RX ADMIN — GABAPENTIN 600 MG: 300 CAPSULE ORAL at 08:53

## 2022-12-25 RX ADMIN — ZINC SULFATE 220 MG (50 MG) CAPSULE 50 MG: CAPSULE at 08:52

## 2022-12-25 RX ADMIN — GUAIFENESIN 400 MG: 400 TABLET ORAL at 06:00

## 2022-12-25 RX ADMIN — GABAPENTIN 600 MG: 300 CAPSULE ORAL at 21:19

## 2022-12-25 RX ADMIN — METHYLPREDNISOLONE SODIUM SUCCINATE 40 MG: 40 INJECTION, POWDER, LYOPHILIZED, FOR SOLUTION INTRAMUSCULAR; INTRAVENOUS at 08:53

## 2022-12-25 RX ADMIN — BUDESONIDE 500 MCG: 0.5 SUSPENSION RESPIRATORY (INHALATION) at 10:36

## 2022-12-25 RX ADMIN — LISINOPRIL 20 MG: 20 TABLET ORAL at 08:52

## 2022-12-25 RX ADMIN — WATER 2000 MG: 1 INJECTION INTRAMUSCULAR; INTRAVENOUS; SUBCUTANEOUS at 10:50

## 2022-12-25 RX ADMIN — LEVALBUTEROL HYDROCHLORIDE 1.25 MG: 1.25 SOLUTION, CONCENTRATE RESPIRATORY (INHALATION) at 16:59

## 2022-12-25 RX ADMIN — HYDROCHLOROTHIAZIDE 12.5 MG: 12.5 TABLET ORAL at 08:52

## 2022-12-25 RX ADMIN — ARFORMOTEROL TARTRATE 15 MCG: 15 SOLUTION RESPIRATORY (INHALATION) at 10:36

## 2022-12-25 RX ADMIN — OXYCODONE HYDROCHLORIDE AND ACETAMINOPHEN 250 MG: 500 TABLET ORAL at 08:52

## 2022-12-25 RX ADMIN — GUAIFENESIN 400 MG: 400 TABLET ORAL at 01:58

## 2022-12-25 RX ADMIN — GUAIFENESIN 400 MG: 400 TABLET ORAL at 16:54

## 2022-12-25 RX ADMIN — LEVALBUTEROL HYDROCHLORIDE 1.25 MG: 1.25 SOLUTION, CONCENTRATE RESPIRATORY (INHALATION) at 10:35

## 2022-12-25 RX ADMIN — ENOXAPARIN SODIUM 40 MG: 100 INJECTION SUBCUTANEOUS at 14:44

## 2022-12-25 RX ADMIN — GUAIFENESIN 400 MG: 400 TABLET ORAL at 08:52

## 2022-12-25 RX ADMIN — TAMSULOSIN HYDROCHLORIDE 0.8 MG: 0.4 CAPSULE ORAL at 08:52

## 2022-12-25 RX ADMIN — DOXYCYCLINE 100 MG: 100 INJECTION, POWDER, LYOPHILIZED, FOR SOLUTION INTRAVENOUS at 11:46

## 2022-12-25 RX ADMIN — METHYLPREDNISOLONE SODIUM SUCCINATE 40 MG: 40 INJECTION, POWDER, LYOPHILIZED, FOR SOLUTION INTRAMUSCULAR; INTRAVENOUS at 16:54

## 2022-12-25 RX ADMIN — METHYLPREDNISOLONE SODIUM SUCCINATE 40 MG: 40 INJECTION, POWDER, LYOPHILIZED, FOR SOLUTION INTRAMUSCULAR; INTRAVENOUS at 01:58

## 2022-12-25 RX ADMIN — ARFORMOTEROL TARTRATE 15 MCG: 15 SOLUTION RESPIRATORY (INHALATION) at 20:59

## 2022-12-25 RX ADMIN — LORAZEPAM 0.5 MG: 0.5 TABLET ORAL at 23:58

## 2022-12-25 RX ADMIN — Medication 1000 MG: at 08:52

## 2022-12-25 RX ADMIN — BUDESONIDE 500 MCG: 0.5 SUSPENSION RESPIRATORY (INHALATION) at 20:59

## 2022-12-25 NOTE — H&P
History and Physical  Internal Medicine  Joseph Johns MD    CHIEF COMPLAINT:  SOB      HISTORY OF PRESENT ILLNESS:      The patient is a 67 y.o. male patient of Dr Kvng Stallworth who presents with as per ER -   Timothy Banks is a 77-year-old male presents emergency department for respiratory distress that started last night. The complaint is constant, moderate severity, nothing make it better or worse. Patient has a history of COPD and wears 4 to 5 L chronically at home and takes 10 mg of prednisone daily at baseline. He was instructed to increase the prednisone when he has worsened shortness of breath, he has not increased any of his doses recently. Patient was in respiratory distress 91% on 5 L on arrival, improved after sitting and being on 6 L nonrebreather. He denies fever, chills, chest pain, abdominal pain, nausea, vomiting, diarrhea. He states that he is allergic to albuterol and ipratropium that gives him severe heart palpitations. Tried taking his levalbuterol inhalers at home with no improvement. 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           Past Surgical History:   Procedure Laterality Date    BACK SURGERY      spinal fusion    HERNIA REPAIR         Medications Prior to Admission:    Medications Prior to Admission: predniSONE (DELTASONE) 10 MG tablet, Take 10 mg by mouth daily  hydroCHLOROthiazide (HYDRODIURIL) 12.5 MG tablet, Take 12.5 mg by mouth daily  budesonide (PULMICORT) 0.5 MG/2ML nebulizer suspension, Take 2 mLs by nebulization in the morning and 2 mLs in the evening.   calcium carbonate (OSCAL) 500 MG TABS tablet, Take 1,000 mg by mouth daily  Ascorbic Acid (VITAMIN C) 250 MG tablet, Take 250 mg by mouth daily  vitamin E 1000 units capsule, Take 1,000 Units by mouth daily  Arformoterol Tartrate (BROVANA) 15 MCG/2ML NEBU, Take 2 mLs by nebulization 2 times daily  levalbuterol (XOPENEX) 1.25 MG/0.5ML nebulizer solution, Take 0.5 mLs by nebulization every 4 hours as needed for Wheezing  guaiFENesin 400 MG tablet, Take 1 tablet by mouth 4 times daily (Patient taking differently: Take 1,200 mg by mouth in the morning and at bedtime)  zinc gluconate 50 MG tablet, Take 50 mg by mouth daily  B-COMPLEX-C PO, Take by mouth  loratadine (CLARITIN) 10 MG capsule, Take 10 mg by mouth daily  tamsulosin (FLOMAX) 0.4 MG capsule, Take 0.8 mg by mouth daily  Coenzyme Q10 (CO Q 10 PO), Take by mouth 2 times daily   Omega-3 Fatty Acids (FISH OIL) 1000 MG CAPS, Take 1,000 mg by mouth 2 times daily   Flaxseed, Linseed, 1000 MG CAPS, Take  by mouth 2 times daily. amLODIPine-benazepril (LOTREL) 5-20 MG per capsule, Take 1 capsule by mouth daily. lorazepam (ATIVAN) 0.5 MG tablet, Take 0.5 mg by mouth 2 times daily as needed. gabapentin (NEURONTIN) 600 MG tablet, Take 600 mg by mouth 3 times daily. Allergies:    Onion, Sulfa antibiotics, Albuterol, and Ipratropium bromide [ipratropium]    Social History:    reports that he quit smoking about 11 years ago. His smoking use included cigarettes. He has never used smokeless tobacco. He reports that he does not currently use alcohol after a past usage of about 1.0 standard drink per week. He reports that he does not use drugs. Family History:   family history is not on file.     REVIEW OF SYSTEMS:  As above in the HPI, otherwise negative    Vital Signs:  BP (!) 121/56   Pulse 93   Temp 97.3 °F (36.3 °C) (Oral)   Resp 22   Ht 5' 7.5\" (1.715 m)   Wt 173 lb 3.2 oz (78.6 kg)   SpO2 97%   BMI 26.73 kg/m²     Physical Exam:    General appearance: alert, appears stated age, and cooperative  Head: Normocephalic, without obvious abnormality, atraumatic  Eyes: negative  Throat: normal findings: lips normal without lesions  Neck: no adenopathy, no carotid bruit, no JVD, and supple, symmetrical, trachea midline  Back: negative  Lungs: diminished breath sounds bilaterally and wheezes bilaterally  Chest wall: no tenderness  Heart: regular rate and rhythm, S1, S2 normal, no murmur, click, rub or gallop  Abdomen: soft, non-tender; bowel sounds normal; no masses,  no organomegaly  Extremities: extremities normal, atraumatic, no cyanosis or edema  Pulses: 2+ and symmetric  Skin: Skin color, texture, turgor normal. No rashes or lesions  Lymph nodes: Cervical, supraclavicular, and axillary nodes normal.  Neurologic: Grossly normal  Rectal: deferred    LABS:    Recent Results (from the past 24 hour(s))   Strep Pneumoniae Antigen    Collection Time: 12/24/22  3:42 PM    Specimen: Urine, clean catch   Result Value Ref Range    STREP PNEUMONIAE ANTIGEN, URINE       Presumptive negative- suggests no current or recent  pneumococcal infection. Infection due to Strep pneumoniae cannot be  ruled out since the antigen present in the sample  may be below the detection limit of the test.  Normal Range:Presumptive Negative     LEGIONELLA ANTIGEN, URINE    Collection Time: 12/24/22  3:42 PM    Specimen: Urine   Result Value Ref Range    L. pneumophila Serogp 1 Ur Ag       Presumptive Negative -suggesting no recent or current infections  with Legionella pneumophila serogroup 1. Infection to Legionella cannot be ruled out since other serogroups  and species may cause infection, antigen may not be present in  early infection, or level of antigen may be below the  detection limit.   Normal Range: Presumptive Negative     CBC    Collection Time: 12/25/22  4:45 AM   Result Value Ref Range    WBC 15.8 (H) 4.5 - 11.5 E9/L    RBC 3.77 (L) 3.80 - 5.80 E12/L    Hemoglobin 12.3 (L) 12.5 - 16.5 g/dL    Hematocrit 36.6 (L) 37.0 - 54.0 %    MCV 97.1 80.0 - 99.9 fL    MCH 32.6 26.0 - 35.0 pg    MCHC 33.6 32.0 - 34.5 %    RDW 12.6 11.5 - 15.0 fL    Platelets 185 441 - 460 E9/L    MPV 9.8 7.0 - 12.0 fL   Comprehensive Metabolic Panel    Collection Time: 12/25/22  4:45 AM   Result Value Ref Range    Sodium 133 132 - 146 mmol/L    Potassium 3.8 3.5 - 5.0 mmol/L    Chloride 94 (L) 98 - 107 mmol/L    CO2 32 (H) 22 - 29 mmol/L    Anion Gap 7 7 - 16 mmol/L    Glucose 189 (H) 74 - 99 mg/dL    BUN 20 6 - 23 mg/dL    Creatinine 0.6 (L) 0.7 - 1.2 mg/dL    Est, Glom Filt Rate >60 >=60 mL/min/1.73    Calcium 9.3 8.6 - 10.2 mg/dL    Total Protein 5.8 (L) 6.4 - 8.3 g/dL    Albumin 3.5 3.5 - 5.2 g/dL    Total Bilirubin 0.7 0.0 - 1.2 mg/dL    Alkaline Phosphatase 66 40 - 129 U/L    ALT 14 0 - 40 U/L    AST 13 0 - 39 U/L       Radiology:     Chest  Xray:  Results for orders placed during the hospital encounter of 20    XR CHEST STANDARD (2 VW)    Narrative  Patient MRN: [id number]  : 1950  Age:  71 years  Gender: Male  Order Date: 3/10/2020 12:00 PM  Exam: XR CHEST (2 VW)  Number of Images: 2 view  Indication:   persistent dyspnea and cough  persistent dyspnea and cough  Comparison: 2020    Findings: The heart is unremarkable. The lung fields are hyperinflated. Density is seen in the lung fields suggesting scar. The aorta is tortuous and ectatic. Impression  Findings of emphysematous changes. This study was dictated by Mackenzie Bahena PA-C and Sharlene Marie MD  reviewed and concurred with the findings. Results for orders placed during the hospital encounter of 22    XR CHEST PORTABLE    Narrative  EXAMINATION:  ONE XRAY VIEW OF THE CHEST    2022 9:13 am    COMPARISON:  10/01/2022    HISTORY:  ORDERING SYSTEM PROVIDED HISTORY: SOB  TECHNOLOGIST PROVIDED HISTORY:  Reason for exam:->SOB    FINDINGS:  Portable chest reveal cardiac silhouette to be enlarged. Chronic and  emphysematous changes seen within the lung fields bilaterally. Vascular  calcifications seen within the thoracic aorta. Degenerative changes seen  within the spine. Ill-defined opacification seen within the right middle  lobe concerning for possible infiltrate or atelectatic change. Elevation  seen of the right hemidiaphragm. Impression  Chronic changes seen throughout the lung fields bilaterally with enlargement  of heart. Ill-defined opacification within the right middle lobe suggesting  either atelectatic change or possible infiltrate. Elevation seen of the  right hemidiaphragm. Other:  none      ASSESSMENT:      Principal Problem:    Respiratory failure ([address]  Active Problems:    COPD exacerbation (HCC)    Hypertension    Anxiety    Type 2 diabetes mellitus ([address]  Resolved Problems:    * No resolved hospital problems.  *      PLAN:    Aerosols  Steroids  Repeat CT  Pulm input appreciated    Penelope Tapia MD  12:12 PM  12/25/2022

## 2022-12-25 NOTE — PROGRESS NOTES
Dyspneic even moving bedside table. Still tight and wheezy with minimally productive cough.      Additional Respiratory Assessments  Heart Rate: (!) 107  Resp: 22  SpO2: 97 %      Current Facility-Administered Medications:     Arformoterol Tartrate (BROVANA) nebulizer solution 15 mcg, 15 mcg, Nebulization, BID, Duane Darius, DO, 15 mcg at 12/25/22 1036    ascorbic acid (VITAMIN C) tablet 250 mg, 250 mg, Oral, Daily, Duane Darius, DO, 250 mg at 12/25/22 0852    budesonide (PULMICORT) nebulizer suspension 500 mcg, 0.5 mg, Nebulization, BID, Duane Darius, DO, 500 mcg at 12/25/22 1036    calcium elemental (OSCAL) tablet 1,000 mg, 1,000 mg, Oral, Daily, Duane Darius, DO, 1,000 mg at 12/25/22 0559    gabapentin (NEURONTIN) capsule 600 mg, 600 mg, Oral, TID, Duane Darius, DO, 600 mg at 12/25/22 1444    hydroCHLOROthiazide (HYDRODIURIL) tablet 12.5 mg, 12.5 mg, Oral, Daily, Duane Darius, DO, 12.5 mg at 12/25/22 2576    cetirizine (ZYRTEC) tablet 10 mg, 10 mg, Oral, Daily, Duane Darius, DO    LORazepam (ATIVAN) tablet 0.5 mg, 0.5 mg, Oral, BID PRN, Duane Darius, DO, 0.5 mg at 12/24/22 2349    tamsulosin (FLOMAX) capsule 0.8 mg, 0.8 mg, Oral, Daily, Duane Darius, DO, 0.8 mg at 12/25/22 0471    zinc sulfate (ZINCATE) capsule 50 mg, 50 mg, Oral, Daily, Nubia Paez, DO, 50 mg at 12/25/22 0852    enoxaparin (LOVENOX) injection 40 mg, 40 mg, SubCUTAneous, Daily, Silvia Rodríguez MD, 40 mg at 12/25/22 1444    methylPREDNISolone sodium (SOLU-MEDROL) injection 40 mg, 40 mg, IntraVENous, Q8H, Silvia Rodríguez MD, 40 mg at 12/25/22 0853    levalbuterol (XOPENEX) nebulizer solution 1.25 mg, 1 ampule, Nebulization, 4x daily, Duane Lloyd, DO, 1.25 mg at 12/25/22 1353    amLODIPine (NORVASC) tablet 5 mg, 5 mg, Oral, Daily, 5 mg at 12/25/22 8304 **AND** lisinopril (PRINIVIL;ZESTRIL) tablet 20 mg, 20 mg, Oral, Daily, Nubia Paez DO, 20 mg at 12/25/22 9312    guaiFENesin tablet 400 mg, 400 mg, Oral, Q4H, Alan Paez DO, 400 mg at 12/25/22 1246  BP (!) 117/58   Pulse (!) 107   Temp 97.5 °F (36.4 °C) (Oral)   Resp 22   Ht 5' 7.5\" (1.715 m)   Wt 173 lb 3.2 oz (78.6 kg)   SpO2 97%   BMI 26.73 kg/m²     General: No distress  Skin: No rash  Chest: Symmetric, no accessory use  Heart: Tachy  Lungs: Diminished bs  Abdomen: Soft, nt  Extremities: No edema    Intake/Output Summary (Last 24 hours) at 12/25/2022 1653  Last data filed at 12/25/2022 0830  Gross per 24 hour   Intake 240 ml   Output --   Net 240 ml     CBC with Differential:    Lab Results   Component Value Date/Time    WBC 15.8 12/25/2022 04:45 AM    RBC 3.77 12/25/2022 04:45 AM    HGB 12.3 12/25/2022 04:45 AM    HCT 36.6 12/25/2022 04:45 AM     12/25/2022 04:45 AM    MCV 97.1 12/25/2022 04:45 AM    MCH 32.6 12/25/2022 04:45 AM    MCHC 33.6 12/25/2022 04:45 AM    RDW 12.6 12/25/2022 04:45 AM    NRBC 0.0 10/08/2022 03:20 AM    METASPCT 2.0 10/07/2022 02:45 AM    LYMPHOPCT 7.5 12/24/2022 08:46 AM    MONOPCT 9.8 12/24/2022 08:46 AM    MYELOPCT 2.0 06/16/2021 10:03 AM    BASOPCT 0.2 12/24/2022 08:46 AM    MONOSABS 1.45 12/24/2022 08:46 AM    LYMPHSABS 1.11 12/24/2022 08:46 AM    EOSABS 0.01 12/24/2022 08:46 AM    BASOSABS 0.03 12/24/2022 08:46 AM     CMP:    Lab Results   Component Value Date/Time     12/25/2022 04:45 AM    K 3.8 12/25/2022 04:45 AM    K 4.1 10/01/2022 06:02 AM    CL 94 12/25/2022 04:45 AM    CO2 32 12/25/2022 04:45 AM    BUN 20 12/25/2022 04:45 AM    CREATININE 0.6 12/25/2022 04:45 AM    GFRAA >60 10/08/2022 03:20 AM    LABGLOM >60 12/25/2022 04:45 AM    GLUCOSE 189 12/25/2022 04:45 AM    PROT 5.8 12/25/2022 04:45 AM    LABALBU 3.5 12/25/2022 04:45 AM    CALCIUM 9.3 12/25/2022 04:45 AM    BILITOT 0.7 12/25/2022 04:45 AM    ALKPHOS 66 12/25/2022 04:45 AM    AST 13 12/25/2022 04:45 AM    ALT 14 12/25/2022 04:45 AM       IMPRESSION:   Patient Active Problem List   Diagnosis    COPD with acute exacerbation (HCC)    Hypertension    Anxiety    Lactic acidosis Hyperglycemia, drug-induced    Acute respiratory failure with hypoxia (HCC)    Type 2 diabetes mellitus (HCC)    COPD exacerbation (HCC)    Anemia    Respiratory failure (HCC)   Acute exac of copd no better, continue present therapy. CT viewed and shows stable right middle lobe collapse without evidence of pneumonia so will stop doxy and ceftriaxone.     Kareem Loco MD  12/25/2022  4:53 PM

## 2022-12-26 LAB
ALBUMIN SERPL-MCNC: 3.8 G/DL (ref 3.5–5.2)
ALP BLD-CCNC: 89 U/L (ref 40–129)
ALT SERPL-CCNC: 18 U/L (ref 0–40)
ANION GAP SERPL CALCULATED.3IONS-SCNC: 6 MMOL/L (ref 7–16)
AST SERPL-CCNC: 13 U/L (ref 0–39)
BILIRUB SERPL-MCNC: 0.3 MG/DL (ref 0–1.2)
BUN BLDV-MCNC: 30 MG/DL (ref 6–23)
CALCIUM SERPL-MCNC: 9.9 MG/DL (ref 8.6–10.2)
CHLORIDE BLD-SCNC: 96 MMOL/L (ref 98–107)
CO2: 33 MMOL/L (ref 22–29)
CREAT SERPL-MCNC: 0.7 MG/DL (ref 0.7–1.2)
GFR SERPL CREATININE-BSD FRML MDRD: >60 ML/MIN/1.73
GLUCOSE BLD-MCNC: 215 MG/DL (ref 74–99)
HCT VFR BLD CALC: 37.9 % (ref 37–54)
HEMOGLOBIN: 12.3 G/DL (ref 12.5–16.5)
MCH RBC QN AUTO: 32 PG (ref 26–35)
MCHC RBC AUTO-ENTMCNC: 32.5 % (ref 32–34.5)
MCV RBC AUTO: 98.7 FL (ref 80–99.9)
PDW BLD-RTO: 12.8 FL (ref 11.5–15)
PLATELET # BLD: 228 E9/L (ref 130–450)
PMV BLD AUTO: 9.9 FL (ref 7–12)
POTASSIUM SERPL-SCNC: 4.3 MMOL/L (ref 3.5–5)
RBC # BLD: 3.84 E12/L (ref 3.8–5.8)
SODIUM BLD-SCNC: 135 MMOL/L (ref 132–146)
TOTAL PROTEIN: 6.3 G/DL (ref 6.4–8.3)
WBC # BLD: 19.3 E9/L (ref 4.5–11.5)

## 2022-12-26 PROCEDURE — 2060000000 HC ICU INTERMEDIATE R&B

## 2022-12-26 PROCEDURE — 94667 MNPJ CHEST WALL 1ST: CPT

## 2022-12-26 PROCEDURE — 6370000000 HC RX 637 (ALT 250 FOR IP): Performed by: INTERNAL MEDICINE

## 2022-12-26 PROCEDURE — 94640 AIRWAY INHALATION TREATMENT: CPT

## 2022-12-26 PROCEDURE — 36415 COLL VENOUS BLD VENIPUNCTURE: CPT

## 2022-12-26 PROCEDURE — 94669 MECHANICAL CHEST WALL OSCILL: CPT

## 2022-12-26 PROCEDURE — 85027 COMPLETE CBC AUTOMATED: CPT

## 2022-12-26 PROCEDURE — 6360000002 HC RX W HCPCS: Performed by: INTERNAL MEDICINE

## 2022-12-26 PROCEDURE — 80053 COMPREHEN METABOLIC PANEL: CPT

## 2022-12-26 PROCEDURE — 2700000000 HC OXYGEN THERAPY PER DAY

## 2022-12-26 RX ADMIN — TAMSULOSIN HYDROCHLORIDE 0.8 MG: 0.4 CAPSULE ORAL at 08:25

## 2022-12-26 RX ADMIN — METHYLPREDNISOLONE SODIUM SUCCINATE 40 MG: 40 INJECTION, POWDER, LYOPHILIZED, FOR SOLUTION INTRAMUSCULAR; INTRAVENOUS at 16:32

## 2022-12-26 RX ADMIN — GABAPENTIN 600 MG: 300 CAPSULE ORAL at 22:18

## 2022-12-26 RX ADMIN — HYDROCHLOROTHIAZIDE 12.5 MG: 12.5 TABLET ORAL at 08:24

## 2022-12-26 RX ADMIN — GABAPENTIN 600 MG: 300 CAPSULE ORAL at 13:00

## 2022-12-26 RX ADMIN — LEVALBUTEROL HYDROCHLORIDE 1.25 MG: 1.25 SOLUTION, CONCENTRATE RESPIRATORY (INHALATION) at 12:46

## 2022-12-26 RX ADMIN — GUAIFENESIN 400 MG: 400 TABLET ORAL at 13:00

## 2022-12-26 RX ADMIN — BUDESONIDE 500 MCG: 0.5 SUSPENSION RESPIRATORY (INHALATION) at 18:36

## 2022-12-26 RX ADMIN — CETIRIZINE HYDROCHLORIDE 10 MG: 10 TABLET, FILM COATED ORAL at 08:25

## 2022-12-26 RX ADMIN — LEVALBUTEROL HYDROCHLORIDE 1.25 MG: 1.25 SOLUTION, CONCENTRATE RESPIRATORY (INHALATION) at 18:36

## 2022-12-26 RX ADMIN — ENOXAPARIN SODIUM 40 MG: 100 INJECTION SUBCUTANEOUS at 13:00

## 2022-12-26 RX ADMIN — AMLODIPINE BESYLATE 5 MG: 5 TABLET ORAL at 08:25

## 2022-12-26 RX ADMIN — GABAPENTIN 600 MG: 300 CAPSULE ORAL at 08:25

## 2022-12-26 RX ADMIN — GUAIFENESIN 400 MG: 400 TABLET ORAL at 16:32

## 2022-12-26 RX ADMIN — ZINC SULFATE 220 MG (50 MG) CAPSULE 50 MG: CAPSULE at 08:25

## 2022-12-26 RX ADMIN — METHYLPREDNISOLONE SODIUM SUCCINATE 40 MG: 40 INJECTION, POWDER, LYOPHILIZED, FOR SOLUTION INTRAMUSCULAR; INTRAVENOUS at 08:24

## 2022-12-26 RX ADMIN — ARFORMOTEROL TARTRATE 15 MCG: 15 SOLUTION RESPIRATORY (INHALATION) at 08:34

## 2022-12-26 RX ADMIN — GUAIFENESIN 400 MG: 400 TABLET ORAL at 22:18

## 2022-12-26 RX ADMIN — METHYLPREDNISOLONE SODIUM SUCCINATE 40 MG: 40 INJECTION, POWDER, LYOPHILIZED, FOR SOLUTION INTRAMUSCULAR; INTRAVENOUS at 00:04

## 2022-12-26 RX ADMIN — Medication 1000 MG: at 08:24

## 2022-12-26 RX ADMIN — OXYCODONE HYDROCHLORIDE AND ACETAMINOPHEN 250 MG: 500 TABLET ORAL at 08:25

## 2022-12-26 RX ADMIN — LEVALBUTEROL HYDROCHLORIDE 1.25 MG: 1.25 SOLUTION, CONCENTRATE RESPIRATORY (INHALATION) at 08:34

## 2022-12-26 RX ADMIN — GUAIFENESIN 400 MG: 400 TABLET ORAL at 08:24

## 2022-12-26 RX ADMIN — GUAIFENESIN 400 MG: 400 TABLET ORAL at 06:22

## 2022-12-26 RX ADMIN — BUDESONIDE 500 MCG: 0.5 SUSPENSION RESPIRATORY (INHALATION) at 08:34

## 2022-12-26 RX ADMIN — LISINOPRIL 20 MG: 20 TABLET ORAL at 08:25

## 2022-12-26 RX ADMIN — ARFORMOTEROL TARTRATE 15 MCG: 15 SOLUTION RESPIRATORY (INHALATION) at 18:36

## 2022-12-26 ASSESSMENT — PAIN SCALES - GENERAL
PAINLEVEL_OUTOF10: 0
PAINLEVEL_OUTOF10: 0

## 2022-12-26 NOTE — PROGRESS NOTES
[address] PROGRESS NOTE    Patient: Marika Renteria  MRN: [id number]  : 1950    Encounter Date: 2022  Encounter Time: 1:12 PM     Date of Admission: .2022  8:26 AM    Consulting Physician:  Primary Care Physician:     Juan Carlos Farfan MD     [phone]    Reason for Consultation:    Problem List:  Patient Active Problem List   Diagnosis    COPD with acute exacerbation ([facility] [address]    Hypertension    Anxiety    Lactic acidosis    Hyperglycemia, drug-induced    Acute respiratory failure with hypoxia ([facility] [address]    Type 2 diabetes mellitus ([facility] [address]    COPD exacerbation ([facility] [address]    Anemia    Respiratory failure ([facility] [address]     SUBJECTIVE: Patient seen and examined. Overnight the patient had no shortness of breath, cough, increased work of breathing. Patient continues on 5 O2 NC at this time. HOME MEDICATIONS:  Prior to Admission medications    Medication Sig Start Date End Date Taking? Authorizing Provider   predniSONE (DELTASONE) 10 MG tablet Take 10 mg by mouth daily   Yes Historical Provider, MD   hydroCHLOROthiazide (HYDRODIURIL) 12.5 MG tablet Take 12.5 mg by mouth daily    Historical Provider, MD   budesonide (PULMICORT) 0.5 MG/2ML nebulizer suspension Take 2 mLs by nebulization in the morning and 2 mLs in the evening.  10/8/22   Lisa Lopez MD   calcium carbonate (OSCAL) 500 MG TABS tablet Take 1,000 mg by mouth daily    Historical Provider, MD   Ascorbic Acid (VITAMIN C) 250 MG tablet Take 250 mg by mouth daily    Historical Provider, MD   vitamin E 1000 units capsule Take 1,000 Units by mouth daily    Historical Provider, MD   Arformoterol Tartrate (BROVANA) 15 MCG/2ML NEBU Take 2 mLs by nebulization 2 times daily 21   Lisa Lopez MD   levalbuterol Ruth Minder) 1.25 MG/0.5ML nebulizer solution Take 0.5 mLs by nebulization every 4 hours as needed for Wheezing 21   Lisa Lopez MD   guaiFENesin 400 MG tablet Take 1 tablet by mouth 4 times daily  Patient taking differently: Take 1,200 mg by mouth in the morning and at bedtime 6/17/21   Dorina Almanzar MD   zinc gluconate 50 MG tablet Take 50 mg by mouth daily    Historical Provider, MD   B-COMPLEX-C PO Take by mouth    Historical Provider, MD   loratadine (CLARITIN) 10 MG capsule Take 10 mg by mouth daily    Historical Provider, MD   tamsulosin (FLOMAX) 0.4 MG capsule Take 0.8 mg by mouth daily    Historical Provider, MD   Coenzyme Q10 (CO Q 10 PO) Take by mouth 2 times daily     Historical Provider, MD   Omega-3 Fatty Acids (FISH OIL) 1000 MG CAPS Take 1,000 mg by mouth 2 times daily     Historical Provider, MD   Flaxseed, Linseed, 1000 MG CAPS Take  by mouth 2 times daily. Historical Provider, MD   amLODIPine-benazepril (LOTREL) 5-20 MG per capsule Take 1 capsule by mouth daily. Historical Provider, MD   lorazepam (ATIVAN) 0.5 MG tablet Take 0.5 mg by mouth 2 times daily as needed. Historical Provider, MD   gabapentin (NEURONTIN) 600 MG tablet Take 600 mg by mouth 3 times daily.       Historical Provider, MD       CURRENT MEDICATIONS:  Current Facility-Administered Medications: Arformoterol Tartrate (BROVANA) nebulizer solution 15 mcg, 15 mcg, Nebulization, BID  ascorbic acid (VITAMIN C) tablet 250 mg, 250 mg, Oral, Daily  budesonide (PULMICORT) nebulizer suspension 500 mcg, 0.5 mg, Nebulization, BID  calcium elemental (OSCAL) tablet 1,000 mg, 1,000 mg, Oral, Daily  gabapentin (NEURONTIN) capsule 600 mg, 600 mg, Oral, TID  hydroCHLOROthiazide (HYDRODIURIL) tablet 12.5 mg, 12.5 mg, Oral, Daily  cetirizine (ZYRTEC) tablet 10 mg, 10 mg, Oral, Daily  LORazepam (ATIVAN) tablet 0.5 mg, 0.5 mg, Oral, BID PRN  tamsulosin (FLOMAX) capsule 0.8 mg, 0.8 mg, Oral, Daily  zinc sulfate (ZINCATE) capsule 50 mg, 50 mg, Oral, Daily  enoxaparin (LOVENOX) injection 40 mg, 40 mg, SubCUTAneous, Daily  methylPREDNISolone sodium (SOLU-MEDROL) injection 40 mg, 40 mg, IntraVENous, Q8H  levalbuterol (XOPENEX) nebulizer solution 1.25 mg, 1 ampule, Nebulization, 4x daily  amLODIPine (NORVASC) tablet 5 mg, 5 mg, Oral, Daily **AND** lisinopril (PRINIVIL;ZESTRIL) tablet 20 mg, 20 mg, Oral, Daily  guaiFENesin tablet 400 mg, 400 mg, Oral, Q4H    ALLERGIES:  Allergies   Allergen Reactions    Onion Nausea Only    Sulfa Antibiotics     Albuterol Palpitations    Ipratropium Bromide [Ipratropium] Palpitations     REVIEW OF SYSTEMS:  General ROS: Negative For: chills, fatigue, fever, malaise, night sweats or sleep disturbance   ENT ROS: Negative For: epistaxis, headaches, sinus pain, sneezing or sore throat   Respiratory ROS: Negative For: hemoptysis, pleuritic type chest pains  Cardiovascular ROS: Negative For: chest pain, dyspnea on exertion, irregular heartbeat, loss of consciousness, murmur, orthopnea or palpitations   Gastrointestinal ROS: Negative For: abdominal pain, appetite loss, blood in stools, change in bowel habits, change in stools, constipation, diarrhea, hematemesis, melena, nausea/vomiting or stool incontinence     OBJECTIVE:  PHYSICAL EXAMINATION:     VITAL SIGNS:  BP (!) 147/57   Pulse 92   Temp 97.8 °F (36.6 °C) (Oral)   Resp 20   Ht 5' 7.5\" (1.715 m)   Wt 178 lb 6.4 oz (80.9 kg)   SpO2 98%   BMI 27.53 kg/m²   Wt Readings from Last 3 Encounters:   22 178 lb 6.4 oz (80.9 kg)   10/05/22 178 lb (80.7 kg)   21 178 lb (80.7 kg)     Temp Readings from Last 3 Encounters:   22 97.8 °F (36.6 °C) (Oral)   10/08/22 98.2 °F (36.8 °C) (Oral)   21 97.9 °F (36.6 °C)     TMAX:  BP Readings from Last 3 Encounters:   22 (!) 147/57   10/08/22 126/67   21 (!) 162/74     Pulse Readings from Last 3 Encounters:   22 92   10/08/22 77   21 92       CURRENT PULSE OXIMETRY: SpO2: 98 %  24HR PULSE OXIMETRY RANGE: SpO2  Av.9 %  Min: 97 %  Max: 99 %  CVP:      ________________________________________________________________________    VENTILATOR SETTINGS (if applicable):             Additional Respiratory Assessments  Heart Rate: 92  Resp: 20  SpO2: 98 %  ETCO2:  Peak Inspiratory Pressure:  End-Inspiratory Plateau Pressure:    ABG:  No results for input(s): PH, PO2, PCO2, HCO3, BE, O2SAT, METHB, O2HB, COHB, O2CON, HHB, THB in the last 72 hours. ________________________________________________________________________    IV ACCESS:    NUTRITION: ADULT DIET; Regular    INTAKE/OUTPUTS:  I/O last 3 completed shifts:   In: 240 [P.O.:240]  Out: -   No intake or output data in the 24 hours ending 12/26/22 1312    General Appearance: alert and oriented to person, place and time, well-developed and   well-nourished, in no acute distress   Eyes: pupils equal, round, and reactive to light, extraocular eye movements intact, conjunctivae normal and sclera anicteric   Neck: neck supple and non tender without mass, no thyromegaly, no thyroid nodules and no cervical adenopathy   Pulmonary/Chest: decreased breath sounds at bases, no accessory muscles of inspiration noted  Cardiovascular: normal rate, regular rhythm, normal S1 and S2, no murmurs, rubs, clicks or gallops, distal pulses intact, no carotid bruits, no murmurs, no gallops, no carotid bruits and no JVD   Abdomen: soft, non-tender, non-distended, normal bowel sounds, no masses or organomegaly   Extremities: no cyanosis, no clubbing, no edema  Musculoskeletal: normal range of motion, no joint swelling, deformity or tenderness   Neurologic: reflexes normal and symmetric, no cranial nerve deficit noted    LABS/IMAGING:    CBC:  Lab Results   Component Value Date    WBC 19.3 (H) 12/26/2022    HGB 12.3 (L) 12/26/2022    HCT 37.9 12/26/2022    MCV 98.7 12/26/2022     12/26/2022    LYMPHOPCT 7.5 (L) 12/24/2022    RBC 3.84 12/26/2022    MCH 32.0 12/26/2022    MCHC 32.5 12/26/2022    RDW 12.8 12/26/2022    NEUTOPHILPCT 81.9 (H) 12/24/2022    MONOPCT 9.8 12/24/2022    BASOPCT 0.2 12/24/2022    NEUTROABS 12.12 (H) 12/24/2022    LYMPHSABS 1.11 (L) 12/24/2022 mass is seen. 3.  No infiltrates or atelectasis at the left lung. XR CHEST PORTABLE   Final Result   Chronic changes seen throughout the lung fields bilaterally with enlargement   of heart. Ill-defined opacification within the right middle lobe suggesting   either atelectatic change or possible infiltrate. Elevation seen of the   right hemidiaphragm. ASSESSMENT:  Acute Exacerbation of COPD  Acute on Chronic Hypoxic Respiratory Failure   Suspected CAP (Community Acquired Pneumonia)  Bronchiectasis     PLAN:  O2, IS  Chest vest/physiotherapy  Xopenex, Pulmicort, Brovana, Solu-medrol   DVT Prophylaxis     Thank you Vivien Duarte MD very much for allowing me to see this patient in consultation and follow up. Care reviewed with nursing staff, medical and surgical specialty care, primary care and the patient's family as available. Restraints are ordered when the patient can do harm to him/herself by pulling out devices.     Ernie Street MD, M.D.

## 2022-12-26 NOTE — PROGRESS NOTES
Internal Medicine  Progress Note  Ellis Quick MD     Subjective:     Patient is alert. Patient reports OK - a ittle better. Tolerating diet well no other c/o. Scheduled Meds:   Arformoterol Tartrate  15 mcg Nebulization BID    vitamin C  250 mg Oral Daily    budesonide  0.5 mg Nebulization BID    calcium elemental  1,000 mg Oral Daily    gabapentin  600 mg Oral TID    hydroCHLOROthiazide  12.5 mg Oral Daily    cetirizine  10 mg Oral Daily    tamsulosin  0.8 mg Oral Daily    zinc sulfate  50 mg Oral Daily    enoxaparin  40 mg SubCUTAneous Daily    methylPREDNISolone  40 mg IntraVENous Q8H    levalbuterol  1 ampule Nebulization 4x daily    amLODIPine  5 mg Oral Daily    And    lisinopril  20 mg Oral Daily    guaiFENesin  400 mg Oral Q4H     Continuous Infusions:  PRN Meds:LORazepam    Objective:      Physical Exam:  Vitals:   /72   Pulse 87   Temp 97.7 °F (36.5 °C) (Oral)   Resp 20   Ht 5' 7.5\" (1.715 m)   Wt 178 lb 6.4 oz (80.9 kg)   SpO2 98%   BMI 27.53 kg/m²   Orthostatic BPs and Heart Rates:     I/O's    Intake/Output Summary (Last 24 hours) at 12/26/2022 0800  Last data filed at 12/25/2022 0830  Gross per 24 hour   Intake 240 ml   Output --   Net 240 ml     Exam:  General appearance: alert, appears stated age, and cooperative  Head: Normocephalic, without obvious abnormality, atraumatic  Eyes: conjunctivae/corneas clear. PERRL, EOM's intact. Fundi benign.   Throat: normal findings: lips normal without lesions  Neck: no adenopathy, no carotid bruit, no JVD, and supple, symmetrical, trachea midline  Back: negative  Lungs: diminished breath sounds bilaterally and wheezes bilaterally  Chest wall: no tenderness  Heart: regular rate and rhythm  Abdomen: soft, non-tender; bowel sounds normal; no masses,  no organomegaly  Extremities: extremities normal, atraumatic, no cyanosis or edema  Pulses: 2+ and symmetric  Skin: Skin color, texture, turgor normal. No rashes or lesions  Lymph nodes: Cervical, supraclavicular, and axillary nodes normal.  Neurologic: Grossly normal      Imaging     Chest  Xray:  Results for orders placed during the hospital encounter of 20    XR CHEST STANDARD (2 VW)    Narrative  Patient MRN: [id number]  : 1950  Age:  71 years  Gender: Male  Order Date: 3/10/2020 12:00 PM  Exam: XR CHEST (2 VW)  Number of Images: 2 view  Indication:   persistent dyspnea and cough  persistent dyspnea and cough  Comparison: 2020    Findings: The heart is unremarkable. The lung fields are hyperinflated. Density is seen in the lung fields suggesting scar. The aorta is tortuous and ectatic. Impression  Findings of emphysematous changes. This study was dictated by Aminta Pryor PA-C and Michelle Parker MD  reviewed and concurred with the findings. Results for orders placed during the hospital encounter of 22    XR CHEST PORTABLE    Narrative  EXAMINATION:  ONE XRAY VIEW OF THE CHEST    2022 9:13 am    COMPARISON:  10/01/2022    HISTORY:  ORDERING SYSTEM PROVIDED HISTORY: SOB  TECHNOLOGIST PROVIDED HISTORY:  Reason for exam:->SOB    FINDINGS:  Portable chest reveal cardiac silhouette to be enlarged. Chronic and  emphysematous changes seen within the lung fields bilaterally. Vascular  calcifications seen within the thoracic aorta. Degenerative changes seen  within the spine. Ill-defined opacification seen within the right middle  lobe concerning for possible infiltrate or atelectatic change. Elevation  seen of the right hemidiaphragm. Impression  Chronic changes seen throughout the lung fields bilaterally with enlargement  of heart. Ill-defined opacification within the right middle lobe suggesting  either atelectatic change or possible infiltrate. Elevation seen of the  right hemidiaphragm.       Additional Imaging:  none    Lab Review   Recent Results (from the past 24 hour(s))   CBC    Collection Time: 22  5:35 AM   Result Value Ref Range    WBC 19.3 (H) 4.5 - 11.5 E9/L    RBC 3.84 3.80 - 5.80 E12/L    Hemoglobin 12.3 (L) 12.5 - 16.5 g/dL    Hematocrit 37.9 37.0 - 54.0 %    MCV 98.7 80.0 - 99.9 fL    MCH 32.0 26.0 - 35.0 pg    MCHC 32.5 32.0 - 34.5 %    RDW 12.8 11.5 - 15.0 fL    Platelets 673 664 - 369 E9/L    MPV 9.9 7.0 - 12.0 fL   Comprehensive Metabolic Panel    Collection Time: 12/26/22  5:35 AM   Result Value Ref Range    Sodium 135 132 - 146 mmol/L    Potassium 4.3 3.5 - 5.0 mmol/L    Chloride 96 (L) 98 - 107 mmol/L    CO2 33 (H) 22 - 29 mmol/L    Anion Gap 6 (L) 7 - 16 mmol/L    Glucose 215 (H) 74 - 99 mg/dL    BUN 30 (H) 6 - 23 mg/dL    Creatinine 0.7 0.7 - 1.2 mg/dL    Est, Glom Filt Rate >60 >=60 mL/min/1.73    Calcium 9.9 8.6 - 10.2 mg/dL    Total Protein 6.3 (L) 6.4 - 8.3 g/dL    Albumin 3.8 3.5 - 5.2 g/dL    Total Bilirubin 0.3 0.0 - 1.2 mg/dL    Alkaline Phosphatase 89 40 - 129 U/L    ALT 18 0 - 40 U/L    AST 13 0 - 39 U/L     Assessment:     Principal Problem:    Respiratory failure (HCC)  Active Problems:    COPD exacerbation (HCC)    Hypertension    Anxiety    Type 2 diabetes mellitus ([facility] [address]  Resolved Problems:    * No resolved hospital problems.  *      Plan:   Improving  Remigio King MD  12/26/2022  8:00 AM

## 2022-12-27 LAB
ALBUMIN SERPL-MCNC: 3.3 G/DL (ref 3.5–5.2)
ALP BLD-CCNC: 63 U/L (ref 40–129)
ALT SERPL-CCNC: 18 U/L (ref 0–40)
ANION GAP SERPL CALCULATED.3IONS-SCNC: 7 MMOL/L (ref 7–16)
AST SERPL-CCNC: 13 U/L (ref 0–39)
BILIRUB SERPL-MCNC: 0.3 MG/DL (ref 0–1.2)
BUN BLDV-MCNC: 33 MG/DL (ref 6–23)
CALCIUM SERPL-MCNC: 9.5 MG/DL (ref 8.6–10.2)
CHLORIDE BLD-SCNC: 95 MMOL/L (ref 98–107)
CO2: 33 MMOL/L (ref 22–29)
CREAT SERPL-MCNC: 0.6 MG/DL (ref 0.7–1.2)
GFR SERPL CREATININE-BSD FRML MDRD: >60 ML/MIN/1.73
GLUCOSE BLD-MCNC: 227 MG/DL (ref 74–99)
HCT VFR BLD CALC: 33.7 % (ref 37–54)
HEMOGLOBIN: 11.2 G/DL (ref 12.5–16.5)
MCH RBC QN AUTO: 32.7 PG (ref 26–35)
MCHC RBC AUTO-ENTMCNC: 33.2 % (ref 32–34.5)
MCV RBC AUTO: 98.5 FL (ref 80–99.9)
PDW BLD-RTO: 12.5 FL (ref 11.5–15)
PLATELET # BLD: 206 E9/L (ref 130–450)
PMV BLD AUTO: 9.8 FL (ref 7–12)
POTASSIUM SERPL-SCNC: 4.1 MMOL/L (ref 3.5–5)
RBC # BLD: 3.42 E12/L (ref 3.8–5.8)
SODIUM BLD-SCNC: 135 MMOL/L (ref 132–146)
TOTAL PROTEIN: 5.5 G/DL (ref 6.4–8.3)
WBC # BLD: 14.3 E9/L (ref 4.5–11.5)

## 2022-12-27 PROCEDURE — 94668 MNPJ CHEST WALL SBSQ: CPT

## 2022-12-27 PROCEDURE — 94640 AIRWAY INHALATION TREATMENT: CPT

## 2022-12-27 PROCEDURE — 6360000002 HC RX W HCPCS: Performed by: INTERNAL MEDICINE

## 2022-12-27 PROCEDURE — 85027 COMPLETE CBC AUTOMATED: CPT

## 2022-12-27 PROCEDURE — 6370000000 HC RX 637 (ALT 250 FOR IP): Performed by: INTERNAL MEDICINE

## 2022-12-27 PROCEDURE — 2060000000 HC ICU INTERMEDIATE R&B

## 2022-12-27 PROCEDURE — 2700000000 HC OXYGEN THERAPY PER DAY

## 2022-12-27 PROCEDURE — 36415 COLL VENOUS BLD VENIPUNCTURE: CPT

## 2022-12-27 PROCEDURE — 80053 COMPREHEN METABOLIC PANEL: CPT

## 2022-12-27 PROCEDURE — 94669 MECHANICAL CHEST WALL OSCILL: CPT

## 2022-12-27 RX ORDER — LEVALBUTEROL 1.25 MG/.5ML
1.25 SOLUTION, CONCENTRATE RESPIRATORY (INHALATION) EVERY 6 HOURS PRN
Status: DISCONTINUED | OUTPATIENT
Start: 2022-12-27 | End: 2022-12-29 | Stop reason: HOSPADM

## 2022-12-27 RX ADMIN — BUDESONIDE 500 MCG: 0.5 SUSPENSION RESPIRATORY (INHALATION) at 20:05

## 2022-12-27 RX ADMIN — METHYLPREDNISOLONE SODIUM SUCCINATE 40 MG: 40 INJECTION, POWDER, LYOPHILIZED, FOR SOLUTION INTRAMUSCULAR; INTRAVENOUS at 01:31

## 2022-12-27 RX ADMIN — METHYLPREDNISOLONE SODIUM SUCCINATE 40 MG: 40 INJECTION, POWDER, LYOPHILIZED, FOR SOLUTION INTRAMUSCULAR; INTRAVENOUS at 08:52

## 2022-12-27 RX ADMIN — GUAIFENESIN 400 MG: 400 TABLET ORAL at 12:49

## 2022-12-27 RX ADMIN — ENOXAPARIN SODIUM 40 MG: 100 INJECTION SUBCUTANEOUS at 12:49

## 2022-12-27 RX ADMIN — LISINOPRIL 20 MG: 20 TABLET ORAL at 08:55

## 2022-12-27 RX ADMIN — LORAZEPAM 0.5 MG: 0.5 TABLET ORAL at 12:16

## 2022-12-27 RX ADMIN — LEVALBUTEROL HYDROCHLORIDE 1.25 MG: 1.25 SOLUTION, CONCENTRATE RESPIRATORY (INHALATION) at 20:05

## 2022-12-27 RX ADMIN — Medication 1000 MG: at 08:54

## 2022-12-27 RX ADMIN — CETIRIZINE HYDROCHLORIDE 10 MG: 10 TABLET, FILM COATED ORAL at 08:53

## 2022-12-27 RX ADMIN — LEVALBUTEROL HYDROCHLORIDE 1.25 MG: 1.25 SOLUTION, CONCENTRATE RESPIRATORY (INHALATION) at 16:29

## 2022-12-27 RX ADMIN — GABAPENTIN 600 MG: 300 CAPSULE ORAL at 08:55

## 2022-12-27 RX ADMIN — BUDESONIDE 500 MCG: 0.5 SUSPENSION RESPIRATORY (INHALATION) at 08:44

## 2022-12-27 RX ADMIN — GUAIFENESIN 400 MG: 400 TABLET ORAL at 08:53

## 2022-12-27 RX ADMIN — ARFORMOTEROL TARTRATE 15 MCG: 15 SOLUTION RESPIRATORY (INHALATION) at 08:44

## 2022-12-27 RX ADMIN — METHYLPREDNISOLONE SODIUM SUCCINATE 40 MG: 40 INJECTION, POWDER, LYOPHILIZED, FOR SOLUTION INTRAMUSCULAR; INTRAVENOUS at 16:27

## 2022-12-27 RX ADMIN — LEVALBUTEROL HYDROCHLORIDE 1.25 MG: 1.25 SOLUTION, CONCENTRATE RESPIRATORY (INHALATION) at 08:44

## 2022-12-27 RX ADMIN — GUAIFENESIN 400 MG: 400 TABLET ORAL at 01:31

## 2022-12-27 RX ADMIN — HYDROCHLOROTHIAZIDE 12.5 MG: 12.5 TABLET ORAL at 08:54

## 2022-12-27 RX ADMIN — GUAIFENESIN 400 MG: 400 TABLET ORAL at 21:15

## 2022-12-27 RX ADMIN — LORAZEPAM 0.5 MG: 0.5 TABLET ORAL at 01:35

## 2022-12-27 RX ADMIN — LEVALBUTEROL HYDROCHLORIDE 1.25 MG: 1.25 SOLUTION, CONCENTRATE RESPIRATORY (INHALATION) at 12:59

## 2022-12-27 RX ADMIN — OXYCODONE HYDROCHLORIDE AND ACETAMINOPHEN 250 MG: 500 TABLET ORAL at 08:54

## 2022-12-27 RX ADMIN — TAMSULOSIN HYDROCHLORIDE 0.8 MG: 0.4 CAPSULE ORAL at 08:54

## 2022-12-27 RX ADMIN — GUAIFENESIN 400 MG: 400 TABLET ORAL at 16:27

## 2022-12-27 RX ADMIN — AMLODIPINE BESYLATE 5 MG: 5 TABLET ORAL at 08:54

## 2022-12-27 RX ADMIN — ZINC SULFATE 220 MG (50 MG) CAPSULE 50 MG: CAPSULE at 08:53

## 2022-12-27 RX ADMIN — ARFORMOTEROL TARTRATE 15 MCG: 15 SOLUTION RESPIRATORY (INHALATION) at 20:05

## 2022-12-27 RX ADMIN — GABAPENTIN 600 MG: 300 CAPSULE ORAL at 12:49

## 2022-12-27 RX ADMIN — GABAPENTIN 600 MG: 300 CAPSULE ORAL at 21:15

## 2022-12-27 ASSESSMENT — PAIN SCALES - GENERAL
PAINLEVEL_OUTOF10: 0
PAINLEVEL_OUTOF10: 0

## 2022-12-27 NOTE — PROGRESS NOTES
Pulmonary Progress Note    Admit Date: 2022                            PCP: Rosina Jones MD  Principal Problem:    Respiratory failure [facility])  Active Problems:    COPD exacerbation ([facility] [address]    Hypertension    Anxiety    Type 2 diabetes mellitus ([facility]  Resolved Problems:    * No resolved hospital problems. *      Subjective:    Patient seen sitting up on side of bed on 5L nc, sats 99%, 4L is baseline. He denies shortness of breath, coughing or wheezing. He is requesting for prn xopenex treatments and is upset he is not due for his ativan yet. Medications:       Arformoterol Tartrate  15 mcg Nebulization BID    vitamin C  250 mg Oral Daily    budesonide  0.5 mg Nebulization BID    calcium elemental  1,000 mg Oral Daily    gabapentin  600 mg Oral TID    hydroCHLOROthiazide  12.5 mg Oral Daily    cetirizine  10 mg Oral Daily    tamsulosin  0.8 mg Oral Daily    zinc sulfate  50 mg Oral Daily    enoxaparin  40 mg SubCUTAneous Daily    methylPREDNISolone  40 mg IntraVENous Q8H    levalbuterol  1 ampule Nebulization 4x daily    amLODIPine  5 mg Oral Daily    And    lisinopril  20 mg Oral Daily    guaiFENesin  400 mg Oral Q4H       Vitals:  VITALS:  BP (!) 155/76   Pulse 74   Temp 98 °F (36.7 °C) (Oral)   Resp 18   Ht 5' 7.5\" (1.715 m)   Wt 178 lb 6.4 oz (80.9 kg)   SpO2 99%   BMI 27.53 kg/m²   24HR INTAKE/OUTPUT:  No intake or output data in the 24 hours ending 22 1113  CURRENT PULSE OXIMETRY:  SpO2: 99 %  24HR PULSE OXIMETRY RANGE:  SpO2  Av.2 %  Min: 95 %  Max: 99 %  CVP:    VENT SETTINGS:      Additional Respiratory Assessments  Heart Rate: 74  Resp: 18  SpO2: 99 %      EXAM:  General: Alert, in NAD, on 5L nc  ENT: No discharge. Pharynx clear. membranes moist   Neck: Supple, Trachea midline. Resp: No accessory muscle use. Non-labored. Lungs clear with No crackles. No wheezing. No rhonchi. Minimal scattered wheezes   CV: Regular rate. Regular rhythm. No murmur . No edema.    ABD: Non-tender. Non-distended. Soft, round . Normal bowel sounds. Skin: Warm and dry. M/S: No cyanosis. No joint deformity. No clubbing. Neuro: Awake. Follows commands. O x 3, [place]  Psych:  calm and interactive     I/O: No intake/output data recorded. No intake/output data recorded. Results:  CBC:   Recent Labs     12/25/22 0445 12/26/22 0535 12/27/22 0423   WBC 15.8* 19.3* 14.3*   HGB 12.3* 12.3* 11.2*   HCT 36.6* 37.9 33.7*   MCV 97.1 98.7 98.5    228 206     BMP:   Recent Labs     12/25/22 0445 12/26/22 0535 12/27/22 0423    135 135   K 3.8 4.3 4.1   CL 94* 96* 95*   CO2 32* 33* 33*   BUN 20 30* 33*   CREATININE 0.6* 0.7 0.6*     LFT:   Recent Labs     12/25/22 0445 12/26/22 0535 12/27/22 0423   ALKPHOS 66 89 63   ALT 14 18 18   AST 13 13 13   PROT 5.8* 6.3* 5.5*   BILITOT 0.7 0.3 0.3   LABALBU 3.5 3.8 3.3*     PT/INR: No results for input(s): PROTIME, INR in the last 72 hours. Procalcitonin: No results for input(s): PROCAL in the last 72 hours. Cultures:  No results for input(s): CULTRESP in the last 72 hours. ABG:   No results for input(s): PH, PO2, PCO2, HCO3, BE, O2SAT in the last 72 hours. Films:  CT CHEST WO CONTRAST    Result Date: 12/24/2022  EXAMINATION: CT OF THE CHEST WITHOUT CONTRAST 12/24/2022 3:37 pm TECHNIQUE: CT of the chest was performed without the administration of intravenous contrast. Multiplanar reformatted images are provided for review. Automated exposure control, iterative reconstruction, and/or weight based adjustment of the mA/kV was utilized to reduce the radiation dose to as low as reasonably achievable. COMPARISON: Portable chest 12/24/2022 and CT chest 10/02/2022 HISTORY: ORDERING SYSTEM PROVIDED HISTORY: worsening RML atelectasis TECHNOLOGIST PROVIDED HISTORY: Reason for exam:->worsening RML atelectasis FINDINGS: Lungs and pleura: Chronic lung disease with moderate emphysema and upper lobe predominance.   Right upper lobe segmental endobronchial within the right middle lobe concerning for possible infiltrate or atelectatic change. Elevation seen of the right hemidiaphragm. Chronic changes seen throughout the lung fields bilaterally with enlargement of heart. Ill-defined opacification within the right middle lobe suggesting either atelectatic change or possible infiltrate. Elevation seen of the right hemidiaphragm. ASSESSMENT:  Acute Exacerbation of COPD  Acute on Chronic Hypoxic Respiratory Failure- baseline 4L nc  Suspected CAP (Community Acquired Pneumonia)  Bronchiectasis   H/o right bronchial valves x4, follows a [facility] with Dr. Sumeet Muhammad:  Currently on 5L nc, baseline 2-5L, wean as able for pulse ox >90%. Continue IV Solu-Medrol 40 mg every 8 hours for now, hope to wean tomorrow   Continue Brovana, Pulmicort and Xopenex. He will resume Breztri and Xopenex upon discharge  S/p doxycyline and Rocephin in ED-- off abx, pct negative   IS  Continue chest vest/physiotherapy with Xopenex, he does not like Flutter valve   DVT prophylaxis on lovenox         Electronically signed by KELECHI Hilton NP on 12/27/2022 at 11:13 AM       Attending Attestation Note:    Patient seen and examined with Hospital Staff, NP. I have extensively reviewed the chart lab work and imaging. I agree with above. Modifications and amendment of note made as necessary.     In addition, the following apply:    Current Facility-Administered Medications   Medication Dose Route Frequency Provider Last Rate Last Admin    levalbuterol (XOPENEX) nebulizer solution 1.25 mg  1.25 mg Nebulization Q6H PRN KELECHI Hdz NP        Arformoterol Tartrate (BROVANA) nebulizer solution 15 mcg  15 mcg Nebulization BID Jakob Lacks, DO   15 mcg at 12/27/22 9845    ascorbic acid (VITAMIN C) tablet 250 mg  250 mg Oral Daily Vickie Lemme Philippe, DO   250 mg at 12/27/22 0854    budesonide (PULMICORT) nebulizer suspension 500 mcg  0.5 mg Nebulization BID Jakob Lacks, DO   500 mcg at 12/27/22 0844    calcium elemental (OSCAL) tablet 1,000 mg  1,000 mg Oral Daily Jasson Pintos, DO   1,000 mg at 12/27/22 9524    gabapentin (NEURONTIN) capsule 600 mg  600 mg Oral TID Jasson Pintos, DO   600 mg at 12/27/22 1249    hydroCHLOROthiazide (HYDRODIURIL) tablet 12.5 mg  12.5 mg Oral Daily Jasson Pintos, DO   12.5 mg at 12/27/22 0854    cetirizine (ZYRTEC) tablet 10 mg  10 mg Oral Daily Jasson Pintos, DO   10 mg at 12/27/22 9768    LORazepam (ATIVAN) tablet 0.5 mg  0.5 mg Oral BID PRN Jasson Pintos, DO   0.5 mg at 12/27/22 1216    tamsulosin (FLOMAX) capsule 0.8 mg  0.8 mg Oral Daily Jasson Pintos, DO   0.8 mg at 12/27/22 2845    zinc sulfate (ZINCATE) capsule 50 mg  50 mg Oral Daily Jasson Pintos, DO   50 mg at 12/27/22 0853    enoxaparin (LOVENOX) injection 40 mg  40 mg SubCUTAneous Daily Dean Souza MD   40 mg at 12/27/22 1249    methylPREDNISolone sodium (SOLU-MEDROL) injection 40 mg  40 mg IntraVENous Q8H Dean Souza MD   40 mg at 12/27/22 1627    levalbuterol (XOPENEX) nebulizer solution 1.25 mg  1 ampule Nebulization 4x daily Jasson Pintos, DO   1.25 mg at 12/27/22 1629    amLODIPine (NORVASC) tablet 5 mg  5 mg Oral Daily Jasson Pintos, DO   5 mg at 12/27/22 0864    And    lisinopril (PRINIVIL;ZESTRIL) tablet 20 mg  20 mg Oral Daily Jasson Pintos, DO   20 mg at 12/27/22 2972    guaiFENesin tablet 400 mg  400 mg Oral Q4H Jasson Pintos, DO   400 mg at 12/27/22 1627      - RUL endobronchial stent in place, no role for bronchoscopy at this time  - O2, IS  - nebs   - continue chest physiotherapy  - bronchopulmonary hygiene  - await D/C planning     Brandie Mcdonough MD  12/27/2022  4:42 PM

## 2022-12-27 NOTE — CARE COORDINATION
12/27/2022. Social Work Discharge Planning: This worker met with Pt to discuss  role and transition of care/discharge planning. Pt is independent from home with his spouse and uses a cane. Pt also has a ww available. Pt resides in a one story home and also uses 4-5l o2 via [id number] [employer] and is currently on  5l here.   Pharmacy is [facility] in [place] and PCP is Dr. Miller January. Electronically signed by ALYSE Vera on 12/27/2022 at 1:42 PM

## 2022-12-27 NOTE — PROGRESS NOTES
Internal Medicine  Progress Note  Ezequiel Zavala MD     Subjective:     Patient is alert. Patient reports OK. Tolerating diet well no other c/o.     Scheduled Meds:   Arformoterol Tartrate  15 mcg Nebulization BID    vitamin C  250 mg Oral Daily    budesonide  0.5 mg Nebulization BID    calcium elemental  1,000 mg Oral Daily    gabapentin  600 mg Oral TID    hydroCHLOROthiazide  12.5 mg Oral Daily    cetirizine  10 mg Oral Daily    tamsulosin  0.8 mg Oral Daily    zinc sulfate  50 mg Oral Daily    enoxaparin  40 mg SubCUTAneous Daily    methylPREDNISolone  40 mg IntraVENous Q8H    levalbuterol  1 ampule Nebulization 4x daily    amLODIPine  5 mg Oral Daily    And    lisinopril  20 mg Oral Daily    guaiFENesin  400 mg Oral Q4H     Continuous Infusions:  PRN Meds:LORazepam    Objective:      Physical Exam:  Vitals:   BP (!) 141/78   Pulse 67   Temp 97.7 °F (36.5 °C) (Oral)   Resp 18   Ht 5' 7.5\" (1.715 m)   Wt 178 lb 6.4 oz (80.9 kg)   SpO2 98%   BMI 27.53 kg/m²   Orthostatic BPs and Heart Rates:     I/O's  No intake or output data in the 24 hours ending 12/27/22 3189  Exam:  General appearance: alert, appears stated age, and cooperative  Head: Normocephalic, without obvious abnormality, atraumatic  Eyes: negative  Throat: lips, mucosa, and tongue normal; teeth and gums normal  Neck: no adenopathy, no carotid bruit, no JVD, and supple, symmetrical, trachea midline  Back: negative  Lungs: end exp wheezing  Chest wall: no tenderness  Heart: regular rate and rhythm  Abdomen: soft, non-tender; bowel sounds normal; no masses,  no organomegaly  Extremities: extremities normal, atraumatic, no cyanosis or edema  Pulses: 2+ and symmetric  Skin: Skin color, texture, turgor normal. No rashes or lesions  Lymph nodes: Cervical, supraclavicular, and axillary nodes normal.  Neurologic: Grossly normal      Imaging     Chest  Xray:  Results for orders placed during the hospital encounter of 03/02/20    XR CHEST STANDARD (2 VW)    Narrative  Patient MRN: [id number]  : 1950  Age:  71 years  Gender: Male  Order Date: 3/10/2020 12:00 PM  Exam: XR CHEST (2 VW)  Number of Images: 2 view  Indication:   persistent dyspnea and cough  persistent dyspnea and cough  Comparison: 2020    Findings: The heart is unremarkable. The lung fields are hyperinflated. Density is seen in the lung fields suggesting scar. The aorta is tortuous and ectatic. Impression  Findings of emphysematous changes. This study was dictated by Michelle Cronin PA-C and Alejandrina Bazzi MD  reviewed and concurred with the findings. Results for orders placed during the hospital encounter of 22    XR CHEST PORTABLE    Narrative  EXAMINATION:  ONE XRAY VIEW OF THE CHEST    2022 9:13 am    COMPARISON:  10/01/2022    HISTORY:  ORDERING SYSTEM PROVIDED HISTORY: SOB  TECHNOLOGIST PROVIDED HISTORY:  Reason for exam:->SOB    FINDINGS:  Portable chest reveal cardiac silhouette to be enlarged. Chronic and  emphysematous changes seen within the lung fields bilaterally. Vascular  calcifications seen within the thoracic aorta. Degenerative changes seen  within the spine. Ill-defined opacification seen within the right middle  lobe concerning for possible infiltrate or atelectatic change. Elevation  seen of the right hemidiaphragm. Impression  Chronic changes seen throughout the lung fields bilaterally with enlargement  of heart. Ill-defined opacification within the right middle lobe suggesting  either atelectatic change or possible infiltrate. Elevation seen of the  right hemidiaphragm.       Additional Imaging:  none    Lab Review   Recent Results (from the past 24 hour(s))   CBC    Collection Time: 22  4:23 AM   Result Value Ref Range    WBC 14.3 (H) 4.5 - 11.5 E9/L    RBC 3.42 (L) 3.80 - 5.80 E12/L    Hemoglobin 11.2 (L) 12.5 - 16.5 g/dL    Hematocrit 33.7 (L) 37.0 - 54.0 %    MCV 98.5 80.0 - 99.9 fL    MCH 32.7 26.0 - 35.0 pg    MCHC 33.2 32.0 - 34.5 %    RDW 12.5 11.5 - 15.0 fL    Platelets 020 224 - 583 E9/L    MPV 9.8 7.0 - 12.0 fL   Comprehensive Metabolic Panel    Collection Time: 12/27/22  4:23 AM   Result Value Ref Range    Sodium 135 132 - 146 mmol/L    Potassium 4.1 3.5 - 5.0 mmol/L    Chloride 95 (L) 98 - 107 mmol/L    CO2 33 (H) 22 - 29 mmol/L    Anion Gap 7 7 - 16 mmol/L    Glucose 227 (H) 74 - 99 mg/dL    BUN 33 (H) 6 - 23 mg/dL    Creatinine 0.6 (L) 0.7 - 1.2 mg/dL    Est, Glom Filt Rate >60 >=60 mL/min/1.73    Calcium 9.5 8.6 - 10.2 mg/dL    Total Protein 5.5 (L) 6.4 - 8.3 g/dL    Albumin 3.3 (L) 3.5 - 5.2 g/dL    Total Bilirubin 0.3 0.0 - 1.2 mg/dL    Alkaline Phosphatase 63 40 - 129 U/L    ALT 18 0 - 40 U/L    AST 13 0 - 39 U/L     Assessment:     Principal Problem:    Respiratory failure (HCC)  Active Problems:    COPD exacerbation (HCC)    Hypertension    Anxiety    [facility]  Resolved Problems:    * No resolved hospital problems.  *      Plan:   Improving  Jerome Barber MD  12/27/2022  6:27 AM

## 2022-12-28 PROCEDURE — 2700000000 HC OXYGEN THERAPY PER DAY

## 2022-12-28 PROCEDURE — 6360000002 HC RX W HCPCS: Performed by: INTERNAL MEDICINE

## 2022-12-28 PROCEDURE — 6370000000 HC RX 637 (ALT 250 FOR IP): Performed by: INTERNAL MEDICINE

## 2022-12-28 PROCEDURE — 94668 MNPJ CHEST WALL SBSQ: CPT

## 2022-12-28 PROCEDURE — 2060000000 HC ICU INTERMEDIATE R&B

## 2022-12-28 PROCEDURE — 94669 MECHANICAL CHEST WALL OSCILL: CPT

## 2022-12-28 PROCEDURE — 94640 AIRWAY INHALATION TREATMENT: CPT

## 2022-12-28 RX ORDER — PREDNISONE 1 MG/1
10 TABLET ORAL DAILY
Status: DISCONTINUED | OUTPATIENT
Start: 2023-01-04 | End: 2022-12-28

## 2022-12-28 RX ORDER — PREDNISONE 1 MG/1
10 TABLET ORAL DAILY
Status: DISCONTINUED | OUTPATIENT
Start: 2023-01-03 | End: 2022-12-29 | Stop reason: HOSPADM

## 2022-12-28 RX ORDER — PREDNISONE 20 MG/1
20 TABLET ORAL DAILY
Status: DISCONTINUED | OUTPATIENT
Start: 2022-12-31 | End: 2022-12-29 | Stop reason: HOSPADM

## 2022-12-28 RX ORDER — PREDNISONE 20 MG/1
20 TABLET ORAL DAILY
Status: DISCONTINUED | OUTPATIENT
Start: 2023-01-01 | End: 2022-12-28

## 2022-12-28 RX ORDER — LORAZEPAM 0.5 MG/1
0.5 TABLET ORAL 3 TIMES DAILY PRN
Status: DISCONTINUED | OUTPATIENT
Start: 2022-12-28 | End: 2022-12-29 | Stop reason: HOSPADM

## 2022-12-28 RX ADMIN — GABAPENTIN 600 MG: 300 CAPSULE ORAL at 20:43

## 2022-12-28 RX ADMIN — BUDESONIDE 500 MCG: 0.5 SUSPENSION RESPIRATORY (INHALATION) at 09:58

## 2022-12-28 RX ADMIN — LISINOPRIL 20 MG: 20 TABLET ORAL at 10:37

## 2022-12-28 RX ADMIN — PREDNISONE 30 MG: 5 TABLET ORAL at 17:07

## 2022-12-28 RX ADMIN — GUAIFENESIN 400 MG: 400 TABLET ORAL at 05:48

## 2022-12-28 RX ADMIN — LEVALBUTEROL HYDROCHLORIDE 1.25 MG: 1.25 SOLUTION, CONCENTRATE RESPIRATORY (INHALATION) at 21:40

## 2022-12-28 RX ADMIN — GUAIFENESIN 400 MG: 400 TABLET ORAL at 01:19

## 2022-12-28 RX ADMIN — LEVALBUTEROL HYDROCHLORIDE 1.25 MG: 1.25 SOLUTION, CONCENTRATE RESPIRATORY (INHALATION) at 09:58

## 2022-12-28 RX ADMIN — LEVALBUTEROL HYDROCHLORIDE 1.25 MG: 1.25 SOLUTION, CONCENTRATE RESPIRATORY (INHALATION) at 16:48

## 2022-12-28 RX ADMIN — AMLODIPINE BESYLATE 5 MG: 5 TABLET ORAL at 10:37

## 2022-12-28 RX ADMIN — METHYLPREDNISOLONE SODIUM SUCCINATE 40 MG: 40 INJECTION, POWDER, LYOPHILIZED, FOR SOLUTION INTRAMUSCULAR; INTRAVENOUS at 01:20

## 2022-12-28 RX ADMIN — LORAZEPAM 0.5 MG: 0.5 TABLET ORAL at 10:36

## 2022-12-28 RX ADMIN — OXYCODONE HYDROCHLORIDE AND ACETAMINOPHEN 250 MG: 500 TABLET ORAL at 10:38

## 2022-12-28 RX ADMIN — ARFORMOTEROL TARTRATE 15 MCG: 15 SOLUTION RESPIRATORY (INHALATION) at 09:58

## 2022-12-28 RX ADMIN — GABAPENTIN 600 MG: 300 CAPSULE ORAL at 10:36

## 2022-12-28 RX ADMIN — LORAZEPAM 0.5 MG: 0.5 TABLET ORAL at 21:31

## 2022-12-28 RX ADMIN — GUAIFENESIN 400 MG: 400 TABLET ORAL at 20:45

## 2022-12-28 RX ADMIN — BUDESONIDE 500 MCG: 0.5 SUSPENSION RESPIRATORY (INHALATION) at 21:40

## 2022-12-28 RX ADMIN — LEVALBUTEROL HYDROCHLORIDE 1.25 MG: 1.25 SOLUTION, CONCENTRATE RESPIRATORY (INHALATION) at 12:52

## 2022-12-28 RX ADMIN — GABAPENTIN 600 MG: 300 CAPSULE ORAL at 15:29

## 2022-12-28 RX ADMIN — TAMSULOSIN HYDROCHLORIDE 0.8 MG: 0.4 CAPSULE ORAL at 10:36

## 2022-12-28 RX ADMIN — GUAIFENESIN 400 MG: 400 TABLET ORAL at 10:36

## 2022-12-28 RX ADMIN — ENOXAPARIN SODIUM 40 MG: 100 INJECTION SUBCUTANEOUS at 15:29

## 2022-12-28 RX ADMIN — GUAIFENESIN 400 MG: 400 TABLET ORAL at 14:00

## 2022-12-28 RX ADMIN — ARFORMOTEROL TARTRATE 15 MCG: 15 SOLUTION RESPIRATORY (INHALATION) at 21:40

## 2022-12-28 RX ADMIN — HYDROCHLOROTHIAZIDE 12.5 MG: 12.5 TABLET ORAL at 10:36

## 2022-12-28 ASSESSMENT — PAIN SCALES - GENERAL
PAINLEVEL_OUTOF10: 0
PAINLEVEL_OUTOF10: 0

## 2022-12-28 NOTE — PROGRESS NOTES
Internal Medicine  Progress Note  Jann Hernandez MD     Subjective:     Patient is alert. Patient reports a borderline panic attck yesterday - states that the lorazepam he likesto dose prior to resp rx asks if he can increase to tid when in hospital. Tolerating diet well no other c/o.     Scheduled Meds:   Arformoterol Tartrate  15 mcg Nebulization BID    vitamin C  250 mg Oral Daily    budesonide  0.5 mg Nebulization BID    calcium elemental  1,000 mg Oral Daily    gabapentin  600 mg Oral TID    hydroCHLOROthiazide  12.5 mg Oral Daily    cetirizine  10 mg Oral Daily    tamsulosin  0.8 mg Oral Daily    zinc sulfate  50 mg Oral Daily    enoxaparin  40 mg SubCUTAneous Daily    methylPREDNISolone  40 mg IntraVENous Q8H    levalbuterol  1 ampule Nebulization 4x daily    amLODIPine  5 mg Oral Daily    And    lisinopril  20 mg Oral Daily    guaiFENesin  400 mg Oral Q4H     Continuous Infusions:  PRN Meds:levalbuterol, LORazepam    Objective:      Physical Exam:  Vitals:   BP (!) 160/74   Pulse 70   Temp 97.5 °F (36.4 °C) (Oral)   Resp 18   Ht 5' 7.5\" (1.715 m)   Wt 178 lb 6.4 oz (80.9 kg)   SpO2 99%   BMI 27.53 kg/m²   Orthostatic BPs and Heart Rates:     I/O's  No intake or output data in the 24 hours ending 12/28/22 0618  Exam:  General appearance: alert, appears stated age, and cooperative  Head: Normocephalic, without obvious abnormality, atraumatic  Eyes: negative  Throat: normal findings: lips normal without lesions  Neck: no adenopathy, no carotid bruit, no JVD, and supple, symmetrical, trachea midline  Back: negative  Lungs: diminished breath sounds bilaterally and wheezes bilaterally  Chest wall: no tenderness  Heart: regular rate and rhythm  Abdomen: soft, non-tender; bowel sounds normal; no masses,  no organomegaly  Extremities: extremities normal, atraumatic, no cyanosis or edema  Pulses: 2+ and symmetric  Skin: Skin color, texture, turgor normal. No rashes or lesions  Lymph nodes: Cervical, supraclavicular, and axillary nodes normal.  Neurologic: Grossly normal      Imaging     Chest  Xray:  Results for orders placed during the hospital encounter of 20    XR CHEST STANDARD (2 VW)    Narrative  Patient MRN: [id number]  : 1950  Age:  71 years  Gender: Male  Order Date: 3/10/2020 12:00 PM  Exam: XR CHEST (2 VW)  Number of Images: 2 view  Indication:   persistent dyspnea and cough  persistent dyspnea and cough  Comparison: 2020    Findings: The heart is unremarkable. The lung fields are hyperinflated. Density is seen in the lung fields suggesting scar. The aorta is tortuous and ectatic. Impression  Findings of emphysematous changes. This study was dictated by Diana Kern PA-C and Alesha Lloyd MD  reviewed and concurred with the findings. Results for orders placed during the hospital encounter of 22    XR CHEST PORTABLE    Narrative  EXAMINATION:  ONE XRAY VIEW OF THE CHEST    2022 9:13 am    COMPARISON:  10/01/2022    HISTORY:  ORDERING SYSTEM PROVIDED HISTORY: SOB  TECHNOLOGIST PROVIDED HISTORY:  Reason for exam:->SOB    FINDINGS:  Portable chest reveal cardiac silhouette to be enlarged. Chronic and  emphysematous changes seen within the lung fields bilaterally. Vascular  calcifications seen within the thoracic aorta. Degenerative changes seen  within the spine. Ill-defined opacification seen within the right middle  lobe concerning for possible infiltrate or atelectatic change. Elevation  seen of the right hemidiaphragm. Impression  Chronic changes seen throughout the lung fields bilaterally with enlargement  of heart. Ill-defined opacification within the right middle lobe suggesting  either atelectatic change or possible infiltrate. Elevation seen of the  right hemidiaphragm. Additional Imaging:  none    Lab Review   No results found for this or any previous visit (from the past 24 hour(s)).   Assessment:     Principal Problem: Respiratory failure ([facility]  Active Problems:    COPD exacerbation ([facility]    Hypertension    Anxiety    [facility]  Resolved Problems:    * No resolved hospital problems.  *      Plan:   Increase lorazepam  Wean steroids per pulangeli Young MD  12/28/2022  6:18 AM

## 2022-12-28 NOTE — PROGRESS NOTES
Pulmonary Progress Note    Admit Date: 2022                            PCP: Brittany Patel MD  Principal Problem:    Respiratory failure [facility])  Active Problems:    COPD exacerbation ([facility]    Hypertension    Anxiety    Type 2 diabetes mellitus ([facility]  Resolved Problems:    * No resolved hospital problems. *      Subjective:  Patient seen on 5 L nasal cannula pulse ox 99%  Patient denies any breathing issues, his only complaint is that the Ativan was not scheduled with his chest vest  He feels that he is ready for discharge  Medications:       Arformoterol Tartrate  15 mcg Nebulization BID    vitamin C  250 mg Oral Daily    budesonide  0.5 mg Nebulization BID    calcium elemental  1,000 mg Oral Daily    gabapentin  600 mg Oral TID    hydroCHLOROthiazide  12.5 mg Oral Daily    cetirizine  10 mg Oral Daily    tamsulosin  0.8 mg Oral Daily    zinc sulfate  50 mg Oral Daily    enoxaparin  40 mg SubCUTAneous Daily    methylPREDNISolone  40 mg IntraVENous Q8H    levalbuterol  1 ampule Nebulization 4x daily    amLODIPine  5 mg Oral Daily    And    lisinopril  20 mg Oral Daily    guaiFENesin  400 mg Oral Q4H       Vitals:  VITALS:  BP (!) 160/74   Pulse 70   Temp 97.5 °F (36.4 °C) (Oral)   Resp 18   Ht 5' 7.5\" (1.715 m)   Wt 178 lb 6.4 oz (80.9 kg)   SpO2 99%   BMI 27.53 kg/m²   24HR INTAKE/OUTPUT:  No intake or output data in the 24 hours ending 22 0940  CURRENT PULSE OXIMETRY:  SpO2: 99 %  24HR PULSE OXIMETRY RANGE:  SpO2  Av.3 %  Min: 96 %  Max: 100 %  CVP:    VENT SETTINGS:      Additional Respiratory Assessments  Heart Rate: 70  Resp: 18  SpO2: 99 %      EXAM:  General: Alert, in NAD, on 5L nc  ENT: No discharge. Pharynx clear. membranes moist   Neck: Supple, Trachea midline. Resp: No accessory muscle use. Non-labored. Lungs clear with No crackles. No wheezing. No rhonchi. CV: Regular rate. Regular rhythm. No murmur . No edema. ABD: Non-tender. Non-distended. Soft, round .  Normal bowel sounds. Skin: Warm and dry. M/S: No cyanosis. No joint deformity. No clubbing. Neuro: Awake. Follows commands. O x 3, [place]  Psych:  calm and interactive     I/O: No intake/output data recorded. No intake/output data recorded. Results:  CBC:   Recent Labs     12/26/22 0535 12/27/22 0423   WBC 19.3* 14.3*   HGB 12.3* 11.2*   HCT 37.9 33.7*   MCV 98.7 98.5    206     BMP:   Recent Labs     12/26/22 0535 12/27/22 0423    135   K 4.3 4.1   CL 96* 95*   CO2 33* 33*   BUN 30* 33*   CREATININE 0.7 0.6*     LFT:   Recent Labs     12/26/22 0535 12/27/22 0423   ALKPHOS 89 63   ALT 18 18   AST 13 13   PROT 6.3* 5.5*   BILITOT 0.3 0.3   LABALBU 3.8 3.3*     PT/INR: No results for input(s): PROTIME, INR in the last 72 hours. Procalcitonin: No results for input(s): PROCAL in the last 72 hours. Cultures:  No results for input(s): CULTRESP in the last 72 hours. ABG:   No results for input(s): PH, PO2, PCO2, HCO3, BE, O2SAT in the last 72 hours. Films:  CT CHEST WO CONTRAST    Result Date: 12/24/2022  EXAMINATION: CT OF THE CHEST WITHOUT CONTRAST 12/24/2022 3:37 pm TECHNIQUE: CT of the chest was performed without the administration of intravenous contrast. Multiplanar reformatted images are provided for review. Automated exposure control, iterative reconstruction, and/or weight based adjustment of the mA/kV was utilized to reduce the radiation dose to as low as reasonably achievable. COMPARISON: Portable chest 12/24/2022 and CT chest 10/02/2022 HISTORY: ORDERING SYSTEM PROVIDED HISTORY: worsening RML atelectasis TECHNOLOGIST PROVIDED HISTORY: Reason for exam:->worsening RML atelectasis FINDINGS: Lungs and pleura: Chronic lung disease with moderate emphysema and upper lobe predominance. Right upper lobe segmental endobronchial stents with patency of the apical segment and areas of mucous plugging within the anterior and posterior segments.   More distal atelectasis, however, is not present at these segments. A right middle lobe endobronchial stent is present and with complete collapse of the medial and lateral segments distal to the stent. No central obstructing mass is seen. The right lower lobe shows emphysema and mild pleuroparenchymal scarring. Mild elevation of the right hemidiaphragm related to ipsilateral volume loss. The left lung is emphysematous and shows no focal lesion, infiltrate, or atelectasis. No suspicious pulmonary lesion seen. Heart and mediastinum: Normal heart size. No pericardial effusion. Coronary artery calcifications. No lymph node enlargement. Great vessels: The thoracic aorta and central pulmonary arteries are normal in caliber. Upper abdomen: No acute disease. Atherosclerotic calcifications including bilateral central renal vascular calcifications. Bones: No acute osseous abnormality. Remote, healed fracture of the anterolateral right 4th rib. 1.  Chronic lung disease with moderate emphysema. Right upper lobe segmental endobronchial stents with mucous plugging but no distal atelectasis or infiltrate at this lobe. 2.  Right middle lobe central endobronchial stent and with more distal complete lobar collapse. No central obstructing mass is seen. 3.  No infiltrates or atelectasis at the left lung. XR CHEST PORTABLE    Result Date: 12/24/2022  EXAMINATION: ONE XRAY VIEW OF THE CHEST 12/24/2022 9:13 am COMPARISON: 10/01/2022 HISTORY: ORDERING SYSTEM PROVIDED HISTORY: SOB TECHNOLOGIST PROVIDED HISTORY: Reason for exam:->SOB FINDINGS: Portable chest reveal cardiac silhouette to be enlarged. Chronic and emphysematous changes seen within the lung fields bilaterally. Vascular calcifications seen within the thoracic aorta. Degenerative changes seen within the spine. Ill-defined opacification seen within the right middle lobe concerning for possible infiltrate or atelectatic change. Elevation seen of the right hemidiaphragm.      Chronic changes seen throughout the lung fields bilaterally with enlargement of heart. Ill-defined opacification within the right middle lobe suggesting either atelectatic change or possible infiltrate. Elevation seen of the right hemidiaphragm. ASSESSMENT/Plan:  Acute Exacerbation of COPD nearing baseline  Stop IV Solu-Medrol. Start p.o. prednisone taper 12/29, he will take 30 mg x 3 days, 20 mg x 3 days, 10 mg daily for 3 days, then stop  Continue Brovana, Pulmicort and Xopenex while inpatient. Resume Breztri and Xopenex on discharge  Chronic Hypoxic Respiratory Failure-at baseline   Currently on 5 L nasal cannula pulse ox 99%  Baseline is 2 to 5 L at home  Suspected CAP (Community Acquired Pneumonia)  S/p doxycyline and Rocephin in ED-- off abx, PCT negative   Bronchiectasis   Continue chest vest/physiotherapy with Xopenex, he does not like Flutter valve  H/o right upper lobe Endobronchial valves x4, follows a [facility] with Dr. Rojas Palacios  No role for bronchoscopy at this time  DVT/PE prophylaxis on Lovenox     Patient is stable from a pulmonary standpoint. He is to follow-up in 2 to 4 weeks with Dr. Rey Jose. Electronically signed by KELECHI William CNP on 12/28/2022 at 9:40 AM    Attending Attestation Note:    Patient seen and examined with Hospital Staff, NP. I have extensively reviewed the chart lab work and imaging. I agree with above. Modifications and amendment of note made as necessary.     In addition, the following apply:    Current Facility-Administered Medications   Medication Dose Route Frequency Provider Last Rate Last Admin    LORazepam (ATIVAN) tablet 0.5 mg  0.5 mg Oral TID PRN See Barron MD   0.5 mg at 12/28/22 1036    [START ON 12/29/2022] predniSONE (DELTASONE) tablet 30 mg  30 mg Oral Daily KELECHI William CNP        Followed by    Jahaira Shields ON 1/1/2023] predniSONE (DELTASONE) tablet 20 mg  20 mg Oral Daily KELECHI William CNP        Followed by    Jahaira Shields ON 1/4/2023] predniSONE (DELTASONE) tablet 10 mg  10 mg Oral Daily Carmella Willams, APRN - CNP        levalbuterol (XOPENEX) nebulizer solution 1.25 mg  1.25 mg Nebulization Q6H PRN Leilani Guajardo, APRN - NP        Arformoterol Tartrate (BROVANA) nebulizer solution 15 mcg  15 mcg Nebulization BID [facility], DO   15 mcg at 12/28/22 3631    ascorbic acid (VITAMIN C) tablet 250 mg  250 mg Oral Daily [facility] [place], DO   250 mg at 12/28/22 1038    budesonide (PULMICORT) nebulizer suspension 500 mcg  0.5 mg Nebulization BID [facility], [facility]   500 mcg at 12/28/22 0410    calcium elemental (OSCAL) tablet 1,000 mg  1,000 mg Oral Daily [facility], DO   1,000 mg at 12/27/22 8404    gabapentin (NEURONTIN) capsule 600 mg  600 mg Oral TID [facility], DO   600 mg at 12/28/22 1529    hydroCHLOROthiazide (HYDRODIURIL) tablet 12.5 mg  12.5 mg Oral Daily [facility], DO   12.5 mg at 12/28/22 1036    cetirizine (ZYRTEC) tablet 10 mg  10 mg Oral Daily [facility] [place], DO   10 mg at 12/27/22 0853    tamsulosin (FLOMAX) capsule 0.8 mg  0.8 mg Oral Daily [facility], DO   0.8 mg at 12/28/22 1036    zinc sulfate (ZINCATE) capsule 50 mg  50 mg Oral Daily [facility], DO   50 mg at 12/27/22 0853    enoxaparin (LOVENOX) injection 40 mg  40 mg SubCUTAneous Daily Noy Harris MD   40 mg at 12/28/22 1529    levalbuterol (XOPENEX) nebulizer solution 1.25 mg  1 ampule Nebulization 4x daily [facility], [facility]   1.25 mg at 12/28/22 1252    amLODIPine (NORVASC) tablet 5 mg  5 mg Oral Daily [facility], DO   5 mg at 12/28/22 1037    And    lisinopril (PRINIVIL;ZESTRIL) tablet 20 mg  20 mg Oral Daily [facility], DO   20 mg at 12/28/22 1037    guaiFENesin tablet 400 mg  400 mg Oral Q4H Sameer Bishop, DO   400 mg at 12/28/22 1400      - RUL endobronchial stent in place, no role for bronchoscopy at this time  - O2, IS  - nebs   - continue chest physiotherapy  - bronchopulmonary hygiene  - await D/C planning, patient on PO steroids but not up to leaving as of yet, we will give another day     Angélica Robbins MD  12/28/2022  4:04 PM

## 2022-12-28 NOTE — CARE COORDINATION
12/28/2022  Social Work Discharge Planning:Plan is home with  spouse. Pt is independent and uses 5l o2 at home via [facility] and is on 5l here. No current needs.  Electronically signed by ALYSE Alonzo on 12/28/2022 at 11:45 AM

## 2022-12-29 VITALS
RESPIRATION RATE: 20 BRPM | BODY MASS INDEX: 27.04 KG/M2 | SYSTOLIC BLOOD PRESSURE: 161 MMHG | HEIGHT: 68 IN | WEIGHT: 178.4 LBS | HEART RATE: 72 BPM | DIASTOLIC BLOOD PRESSURE: 90 MMHG | OXYGEN SATURATION: 95 % | TEMPERATURE: 97.7 F

## 2022-12-29 PROCEDURE — 6370000000 HC RX 637 (ALT 250 FOR IP): Performed by: INTERNAL MEDICINE

## 2022-12-29 PROCEDURE — 2700000000 HC OXYGEN THERAPY PER DAY

## 2022-12-29 RX ORDER — PREDNISONE 10 MG/1
30 TABLET ORAL DAILY
Qty: 3 TABLET | Refills: 0 | Status: SHIPPED | OUTPATIENT
Start: 2022-12-30 | End: 2022-12-31

## 2022-12-29 RX ORDER — PREDNISONE 10 MG/1
10 TABLET ORAL DAILY
Qty: 3 TABLET | Refills: 0 | Status: SHIPPED | OUTPATIENT
Start: 2023-01-03 | End: 2023-01-06

## 2022-12-29 RX ORDER — PREDNISONE 20 MG/1
20 TABLET ORAL DAILY
Qty: 3 TABLET | Refills: 0 | Status: SHIPPED | OUTPATIENT
Start: 2022-12-31 | End: 2023-01-03

## 2022-12-29 RX ADMIN — PREDNISONE 30 MG: 5 TABLET ORAL at 08:30

## 2022-12-29 RX ADMIN — Medication 1000 MG: at 08:30

## 2022-12-29 RX ADMIN — GUAIFENESIN 400 MG: 400 TABLET ORAL at 08:30

## 2022-12-29 RX ADMIN — LORAZEPAM 0.5 MG: 0.5 TABLET ORAL at 08:30

## 2022-12-29 RX ADMIN — CETIRIZINE HYDROCHLORIDE 10 MG: 10 TABLET, FILM COATED ORAL at 08:31

## 2022-12-29 RX ADMIN — GUAIFENESIN 400 MG: 400 TABLET ORAL at 07:06

## 2022-12-29 RX ADMIN — HYDROCHLOROTHIAZIDE 12.5 MG: 12.5 TABLET ORAL at 08:31

## 2022-12-29 RX ADMIN — AMLODIPINE BESYLATE 5 MG: 5 TABLET ORAL at 08:30

## 2022-12-29 RX ADMIN — ZINC SULFATE 220 MG (50 MG) CAPSULE 50 MG: CAPSULE at 08:31

## 2022-12-29 RX ADMIN — TAMSULOSIN HYDROCHLORIDE 0.8 MG: 0.4 CAPSULE ORAL at 08:30

## 2022-12-29 RX ADMIN — GABAPENTIN 600 MG: 300 CAPSULE ORAL at 08:31

## 2022-12-29 RX ADMIN — LISINOPRIL 20 MG: 20 TABLET ORAL at 08:30

## 2022-12-29 RX ADMIN — OXYCODONE HYDROCHLORIDE AND ACETAMINOPHEN 250 MG: 500 TABLET ORAL at 08:31

## 2022-12-29 RX ADMIN — GUAIFENESIN 400 MG: 400 TABLET ORAL at 01:25

## 2022-12-29 NOTE — PROGRESS NOTES
Internal Medicine  Progress Note  Bladimir Gill MD     Subjective:     Patient is alert. Patient reports much better. Tolerating diet well no other c/o.     Scheduled Meds:   predniSONE  30 mg Oral Daily    Followed by    Sherrie Landa ON 12/31/2022] predniSONE  20 mg Oral Daily    Followed by    Sherrie Landa ON 1/3/2023] predniSONE  10 mg Oral Daily    Arformoterol Tartrate  15 mcg Nebulization BID    vitamin C  250 mg Oral Daily    budesonide  0.5 mg Nebulization BID    calcium elemental  1,000 mg Oral Daily    gabapentin  600 mg Oral TID    hydroCHLOROthiazide  12.5 mg Oral Daily    cetirizine  10 mg Oral Daily    tamsulosin  0.8 mg Oral Daily    zinc sulfate  50 mg Oral Daily    enoxaparin  40 mg SubCUTAneous Daily    levalbuterol  1 ampule Nebulization 4x daily    amLODIPine  5 mg Oral Daily    And    lisinopril  20 mg Oral Daily    guaiFENesin  400 mg Oral Q4H     Continuous Infusions:  PRN Meds:LORazepam, levalbuterol    Objective:      Physical Exam:  Vitals:   BP (!) 154/79   Pulse 73   Temp 98.1 °F (36.7 °C) (Oral)   Resp 20   Ht 5' 7.5\" (1.715 m)   Wt 178 lb 6.4 oz (80.9 kg)   SpO2 100%   BMI 27.53 kg/m²   Orthostatic BPs and Heart Rates:     I/O's    Intake/Output Summary (Last 24 hours) at 12/29/2022 5535  Last data filed at 12/28/2022 1015  Gross per 24 hour   Intake 240 ml   Output --   Net 240 ml     Exam:  General appearance: alert, appears stated age, and cooperative  Head: Normocephalic, without obvious abnormality, atraumatic  Eyes: negative  Throat: normal findings: lips normal without lesions  Neck: no adenopathy, no carotid bruit, no JVD, and supple, symmetrical, trachea midline  Back: negative  Lungs: clear to auscultation bilaterally  Chest wall: no tenderness  Heart: regular rate and rhythm  Abdomen: soft, non-tender; bowel sounds normal; no masses,  no organomegaly  Extremities: extremities normal, atraumatic, no cyanosis or edema  Pulses: 2+ and symmetric  Skin: Skin color, texture, turgor normal. No rashes or lesions  Lymph nodes: Cervical, supraclavicular, and axillary nodes normal.  Neurologic: Grossly normal      Imaging     Chest  Xray:  Results for orders placed during the hospital encounter of 20    XR CHEST STANDARD (2 VW)    Narrative  Patient MRN: [id number]  : 1950  Age:  71 years  Gender: Male  Order Date: 3/10/2020 12:00 PM  Exam: XR CHEST (2 VW)  Number of Images: 2 view  Indication:   persistent dyspnea and cough  persistent dyspnea and cough  Comparison: 2020    Findings: The heart is unremarkable. The lung fields are hyperinflated. Density is seen in the lung fields suggesting scar. The aorta is tortuous and ectatic. Impression  Findings of emphysematous changes. This study was dictated by Guanako Payne PA-C and Saad Marquez MD  reviewed and concurred with the findings. Results for orders placed during the hospital encounter of 22    XR CHEST PORTABLE    Narrative  EXAMINATION:  ONE XRAY VIEW OF THE CHEST    2022 9:13 am    COMPARISON:  10/01/2022    HISTORY:  ORDERING SYSTEM PROVIDED HISTORY: SOB  TECHNOLOGIST PROVIDED HISTORY:  Reason for exam:->SOB    FINDINGS:  Portable chest reveal cardiac silhouette to be enlarged. Chronic and  emphysematous changes seen within the lung fields bilaterally. Vascular  calcifications seen within the thoracic aorta. Degenerative changes seen  within the spine. Ill-defined opacification seen within the right middle  lobe concerning for possible infiltrate or atelectatic change. Elevation  seen of the right hemidiaphragm. Impression  Chronic changes seen throughout the lung fields bilaterally with enlargement  of heart. Ill-defined opacification within the right middle lobe suggesting  either atelectatic change or possible infiltrate. Elevation seen of the  right hemidiaphragm.       Additional Imaging:  none    Lab Review   No results found for this or any previous visit (from the past 24 hour(s)). Assessment:     Principal Problem:    Respiratory failure ([facility]  Active Problems:    COPD exacerbation (HCC)    Hypertension    Anxiety    Type 2 diabetes mellitus ([facility]  Resolved Problems:    * No resolved hospital problems.  *      Plan:   Discharge  Follow up with Dr Radha Fierro next week  Marisol Guerrero MD  12/29/2022  6:22 AM

## 2022-12-29 NOTE — PROGRESS NOTES
Pulmonary Progress Note    Admit Date: 2022                            PCP: Luisana Brantley MD  Principal Problem:    Respiratory failure [facility])  Active Problems:    COPD exacerbation ([facility]    Hypertension    Anxiety    Type 2 diabetes mellitus ([facility]  Resolved Problems:    * No resolved hospital problems. *      Subjective:  Patient seen on 4L NC pulse ox 100%  Patient feels to be at baseline and denies any breathing complaints  Patient is ready for discharge today     Medications:       predniSONE  30 mg Oral Daily    Followed by    Rosie Galvan ON 2022] predniSONE  20 mg Oral Daily    Followed by    Rosie Milliganon ON 1/3/2023] predniSONE  10 mg Oral Daily    Arformoterol Tartrate  15 mcg Nebulization BID    vitamin C  250 mg Oral Daily    budesonide  0.5 mg Nebulization BID    calcium elemental  1,000 mg Oral Daily    gabapentin  600 mg Oral TID    hydroCHLOROthiazide  12.5 mg Oral Daily    cetirizine  10 mg Oral Daily    tamsulosin  0.8 mg Oral Daily    zinc sulfate  50 mg Oral Daily    enoxaparin  40 mg SubCUTAneous Daily    levalbuterol  1 ampule Nebulization 4x daily    amLODIPine  5 mg Oral Daily    And    lisinopril  20 mg Oral Daily    guaiFENesin  400 mg Oral Q4H       Vitals:  VITALS:  BP (!) 161/90   Pulse 72   Temp 97.7 °F (36.5 °C) (Oral)   Resp 20   Ht 5' 7.5\" (1.715 m)   Wt 178 lb 6.4 oz (80.9 kg)   SpO2 95%   BMI 27.53 kg/m²   24HR INTAKE/OUTPUT:    Intake/Output Summary (Last 24 hours) at 2022 0926  Last data filed at 2022 1015  Gross per 24 hour   Intake 240 ml   Output --   Net 240 ml     CURRENT PULSE OXIMETRY:  SpO2: 95 %  24HR PULSE OXIMETRY RANGE:  SpO2  Av.4 %  Min: 95 %  Max: 100 %  CVP:    VENT SETTINGS:      Additional Respiratory Assessments  Heart Rate: 72  Resp: 20  SpO2: 95 %      EXAM:  General: Alert, in NAD, on 4L  ENT: No discharge. Pharynx clear. membranes moist   Neck: Supple, Trachea midline. Resp: No accessory muscle use. Non-labored.   Lungs diminished bilaterally. No crackles. No wheezing. No rhonchi. CV: Regular rate. Regular rhythm. No murmur . No edema. ABD: Non-tender. Non-distended. Soft, round . Normal bowel sounds. Skin: Warm and dry. M/S: No cyanosis. No joint deformity. No clubbing. Neuro: Awake. Follows commands. O x 3, [place]  Psych:  calm and interactive     I/O: I/O last 3 completed shifts: In: 240 [P.O.:240]  Out: -   No intake/output data recorded. Results:  CBC:   Recent Labs     12/27/22 0423   WBC 14.3*   HGB 11.2*   HCT 33.7*   MCV 98.5        BMP:   Recent Labs     12/27/22 0423      K 4.1   CL 95*   CO2 33*   BUN 33*   CREATININE 0.6*     LFT:   Recent Labs     12/27/22 0423   ALKPHOS 63   ALT 18   AST 13   PROT 5.5*   BILITOT 0.3   LABALBU 3.3*     PT/INR: No results for input(s): PROTIME, INR in the last 72 hours. Procalcitonin: No results for input(s): PROCAL in the last 72 hours. Cultures:  No results for input(s): CULTRESP in the last 72 hours. ABG:   No results for input(s): PH, PO2, PCO2, HCO3, BE, O2SAT in the last 72 hours. Films:  CT CHEST WO CONTRAST    Result Date: 12/24/2022  EXAMINATION: CT OF THE CHEST WITHOUT CONTRAST 12/24/2022 3:37 pm TECHNIQUE: CT of the chest was performed without the administration of intravenous contrast. Multiplanar reformatted images are provided for review. Automated exposure control, iterative reconstruction, and/or weight based adjustment of the mA/kV was utilized to reduce the radiation dose to as low as reasonably achievable. COMPARISON: Portable chest 12/24/2022 and CT chest 10/02/2022 HISTORY: ORDERING SYSTEM PROVIDED HISTORY: worsening RML atelectasis TECHNOLOGIST PROVIDED HISTORY: Reason for exam:->worsening RML atelectasis FINDINGS: Lungs and pleura: Chronic lung disease with moderate emphysema and upper lobe predominance.   Right upper lobe segmental endobronchial stents with patency of the apical segment and areas of mucous plugging within the anterior and posterior segments. More distal atelectasis, however, is not present at these segments. A right middle lobe endobronchial stent is present and with complete collapse of the medial and lateral segments distal to the stent. No central obstructing mass is seen. The right lower lobe shows emphysema and mild pleuroparenchymal scarring. Mild elevation of the right hemidiaphragm related to ipsilateral volume loss. The left lung is emphysematous and shows no focal lesion, infiltrate, or atelectasis. No suspicious pulmonary lesion seen. Heart and mediastinum: Normal heart size. No pericardial effusion. Coronary artery calcifications. No lymph node enlargement. Great vessels: The thoracic aorta and central pulmonary arteries are normal in caliber. Upper abdomen: No acute disease. Atherosclerotic calcifications including bilateral central renal vascular calcifications. Bones: No acute osseous abnormality. Remote, healed fracture of the anterolateral right 4th rib. 1.  Chronic lung disease with moderate emphysema. Right upper lobe segmental endobronchial stents with mucous plugging but no distal atelectasis or infiltrate at this lobe. 2.  Right middle lobe central endobronchial stent and with more distal complete lobar collapse. No central obstructing mass is seen. 3.  No infiltrates or atelectasis at the left lung. XR CHEST PORTABLE    Result Date: 12/24/2022  EXAMINATION: ONE XRAY VIEW OF THE CHEST 12/24/2022 9:13 am COMPARISON: 10/01/2022 HISTORY: ORDERING SYSTEM PROVIDED HISTORY: SOB TECHNOLOGIST PROVIDED HISTORY: Reason for exam:->SOB FINDINGS: Portable chest reveal cardiac silhouette to be enlarged. Chronic and emphysematous changes seen within the lung fields bilaterally. Vascular calcifications seen within the thoracic aorta. Degenerative changes seen within the spine. Ill-defined opacification seen within the right middle lobe concerning for possible infiltrate or atelectatic change. Elevation seen of the right hemidiaphragm. Chronic changes seen throughout the lung fields bilaterally with enlargement of heart. Ill-defined opacification within the right middle lobe suggesting either atelectatic change or possible infiltrate. Elevation seen of the right hemidiaphragm. ASSESSMENT/Plan:  Acute Exacerbation of COPD nearing baseline  S/p IV solu-medrol 12/28. He started p.o. prednisone taper 12/29, he will take 30 mg x 3 days, 20 mg x 3 days, 10 mg daily for 3 days, then stop  Continue Brovana, Pulmicort and Xopenex while inpatient. Resume Breztri and Xopenex on discharge  Chronic Hypoxic Respiratory Failure-at baseline   Currently on 4L nasal cannula pulse ox 100%  Baseline is 2 to 5 L at home  Suspected CAP (Community Acquired Pneumonia)  S/p doxycyline and Rocephin in ED-- off abx, PCT negative   Bronchiectasis   Continue chest vest/physiotherapy with Xopenex, he does not like Flutter valve  H/o right upper lobe Endobronchial valves x4, follows a [facility] with Dr. Bobo Child  No role for bronchoscopy at this time  DVT/PE prophylaxis on Lovenox     Patient is stable from a pulmonary standpoint.   He already has appointment scheduled 1/5/2023 in our office for follow up       Electronically signed by KELECHI Hicks CNP on 12/29/2022 at 9:26 AM

## 2023-01-06 ENCOUNTER — APPOINTMENT (OUTPATIENT)
Dept: CT IMAGING | Age: 73
DRG: 190 | End: 2023-01-06
Payer: COMMERCIAL

## 2023-01-06 ENCOUNTER — APPOINTMENT (OUTPATIENT)
Dept: GENERAL RADIOLOGY | Age: 73
DRG: 190 | End: 2023-01-06
Payer: COMMERCIAL

## 2023-01-06 ENCOUNTER — TELEPHONE (OUTPATIENT)
Dept: OTHER | Facility: CLINIC | Age: 73
End: 2023-01-06

## 2023-01-06 ENCOUNTER — HOSPITAL ENCOUNTER (INPATIENT)
Age: 73
LOS: 5 days | Discharge: HOME | DRG: 190 | End: 2023-01-11
Attending: EMERGENCY MEDICINE | Admitting: INTERNAL MEDICINE
Payer: COMMERCIAL

## 2023-01-06 DIAGNOSIS — J44.1 COPD EXACERBATION (HCC): ICD-10-CM

## 2023-01-06 DIAGNOSIS — J96.01 ACUTE RESPIRATORY FAILURE WITH HYPOXIA (HCC): Primary | ICD-10-CM

## 2023-01-06 LAB
ANION GAP SERPL CALCULATED.3IONS-SCNC: 10 MMOL/L (ref 7–16)
B.E.: 8.4 MMOL/L (ref -3–3)
BASOPHILS ABSOLUTE: 0 E9/L (ref 0–0.2)
BASOPHILS RELATIVE PERCENT: 0 % (ref 0–2)
BUN BLDV-MCNC: 17 MG/DL (ref 6–23)
CALCIUM SERPL-MCNC: 9.5 MG/DL (ref 8.6–10.2)
CHLORIDE BLD-SCNC: 90 MMOL/L (ref 98–107)
CO2: 33 MMOL/L (ref 22–29)
COHB: 1.3 % (ref 0–1.5)
CREAT SERPL-MCNC: 0.7 MG/DL (ref 0.7–1.2)
CRITICAL: ABNORMAL
DATE ANALYZED: ABNORMAL
DATE OF COLLECTION: ABNORMAL
EKG ATRIAL RATE: 89 BPM
EKG P AXIS: 44 DEGREES
EKG P-R INTERVAL: 112 MS
EKG Q-T INTERVAL: 338 MS
EKG QRS DURATION: 88 MS
EKG QTC CALCULATION (BAZETT): 411 MS
EKG R AXIS: 82 DEGREES
EKG T AXIS: 77 DEGREES
EKG VENTRICULAR RATE: 89 BPM
EOSINOPHILS ABSOLUTE: 0 E9/L (ref 0.05–0.5)
EOSINOPHILS RELATIVE PERCENT: 0 % (ref 0–6)
GFR SERPL CREATININE-BSD FRML MDRD: >60 ML/MIN/1.73
GLUCOSE BLD-MCNC: 196 MG/DL (ref 74–99)
HCO3: 33.3 MMOL/L (ref 22–26)
HCT VFR BLD CALC: 38.9 % (ref 37–54)
HEMOGLOBIN: 13.1 G/DL (ref 12.5–16.5)
HHB: 1.7 % (ref 0–5)
INFLUENZA A BY PCR: NOT DETECTED
INFLUENZA B BY PCR: NOT DETECTED
INR BLD: 1
LAB: ABNORMAL
LYMPHOCYTES ABSOLUTE: 0.31 E9/L (ref 1.5–4)
LYMPHOCYTES RELATIVE PERCENT: 1.7 % (ref 20–42)
Lab: ABNORMAL
MCH RBC QN AUTO: 32.5 PG (ref 26–35)
MCHC RBC AUTO-ENTMCNC: 33.7 % (ref 32–34.5)
MCV RBC AUTO: 96.5 FL (ref 80–99.9)
METHB: 0.3 % (ref 0–1.5)
MODE: ABNORMAL
MONOCYTES ABSOLUTE: 0.31 E9/L (ref 0.1–0.95)
MONOCYTES RELATIVE PERCENT: 1.8 % (ref 2–12)
NEUTROPHILS ABSOLUTE: 15.13 E9/L (ref 1.8–7.3)
NEUTROPHILS RELATIVE PERCENT: 96.5 % (ref 43–80)
NUCLEATED RED BLOOD CELLS: 0 /100 WBC
O2 CONTENT: 18.3 ML/DL
O2 SATURATION: 98.3 % (ref 92–98.5)
O2HB: 96.7 % (ref 94–97)
OPERATOR ID: ABNORMAL
PATIENT TEMP: 37 C
PCO2: 46.8 MMHG (ref 35–45)
PDW BLD-RTO: 12.6 FL (ref 11.5–15)
PH BLOOD GAS: 7.47 (ref 7.35–7.45)
PLATELET # BLD: 218 E9/L (ref 130–450)
PMV BLD AUTO: 9.5 FL (ref 7–12)
PO2: 107.7 MMHG (ref 75–100)
POTASSIUM SERPL-SCNC: 4.2 MMOL/L (ref 3.5–5)
PROTHROMBIN TIME: 10.6 SEC (ref 9.3–12.4)
RBC # BLD: 4.03 E12/L (ref 3.8–5.8)
RBC # BLD: NORMAL 10*6/UL
SARS-COV-2, NAAT: NOT DETECTED
SODIUM BLD-SCNC: 133 MMOL/L (ref 132–146)
SOURCE, BLOOD GAS: ABNORMAL
THB: 13.4 G/DL (ref 11.5–16.5)
TIME ANALYZED: 1228
TROPONIN, HIGH SENSITIVITY: 24 NG/L (ref 0–11)
WBC # BLD: 15.6 E9/L (ref 4.5–11.5)

## 2023-01-06 PROCEDURE — 87635 SARS-COV-2 COVID-19 AMP PRB: CPT

## 2023-01-06 PROCEDURE — 93005 ELECTROCARDIOGRAM TRACING: CPT | Performed by: EMERGENCY MEDICINE

## 2023-01-06 PROCEDURE — 87502 INFLUENZA DNA AMP PROBE: CPT

## 2023-01-06 PROCEDURE — 0202U NFCT DS 22 TRGT SARS-COV-2: CPT

## 2023-01-06 PROCEDURE — 99285 EMERGENCY DEPT VISIT HI MDM: CPT

## 2023-01-06 PROCEDURE — 85025 COMPLETE CBC W/AUTO DIFF WBC: CPT

## 2023-01-06 PROCEDURE — 6360000002 HC RX W HCPCS: Performed by: EMERGENCY MEDICINE

## 2023-01-06 PROCEDURE — 6360000002 HC RX W HCPCS: Performed by: STUDENT IN AN ORGANIZED HEALTH CARE EDUCATION/TRAINING PROGRAM

## 2023-01-06 PROCEDURE — 94664 DEMO&/EVAL PT USE INHALER: CPT

## 2023-01-06 PROCEDURE — 93010 ELECTROCARDIOGRAM REPORT: CPT | Performed by: INTERNAL MEDICINE

## 2023-01-06 PROCEDURE — 82805 BLOOD GASES W/O2 SATURATION: CPT

## 2023-01-06 PROCEDURE — 6360000004 HC RX CONTRAST MEDICATION: Performed by: RADIOLOGY

## 2023-01-06 PROCEDURE — 71045 X-RAY EXAM CHEST 1 VIEW: CPT

## 2023-01-06 PROCEDURE — 80048 BASIC METABOLIC PNL TOTAL CA: CPT

## 2023-01-06 PROCEDURE — 96365 THER/PROPH/DIAG IV INF INIT: CPT

## 2023-01-06 PROCEDURE — 94640 AIRWAY INHALATION TREATMENT: CPT

## 2023-01-06 PROCEDURE — 2580000003 HC RX 258: Performed by: RADIOLOGY

## 2023-01-06 PROCEDURE — 71275 CT ANGIOGRAPHY CHEST: CPT

## 2023-01-06 PROCEDURE — 6370000000 HC RX 637 (ALT 250 FOR IP): Performed by: EMERGENCY MEDICINE

## 2023-01-06 PROCEDURE — 2060000000 HC ICU INTERMEDIATE R&B

## 2023-01-06 PROCEDURE — 84484 ASSAY OF TROPONIN QUANT: CPT

## 2023-01-06 PROCEDURE — 85610 PROTHROMBIN TIME: CPT

## 2023-01-06 PROCEDURE — 96375 TX/PRO/DX INJ NEW DRUG ADDON: CPT

## 2023-01-06 RX ORDER — GUAIFENESIN 1200 MG/1
1200 TABLET, EXTENDED RELEASE ORAL 2 TIMES DAILY
COMMUNITY

## 2023-01-06 RX ORDER — LORAZEPAM 0.5 MG/1
0.5 TABLET ORAL 2 TIMES DAILY PRN
Status: DISCONTINUED | OUTPATIENT
Start: 2023-01-06 | End: 2023-01-08

## 2023-01-06 RX ORDER — SODIUM CHLORIDE FOR INHALATION 0.9 %
3 VIAL, NEBULIZER (ML) INHALATION ONCE
Status: COMPLETED | OUTPATIENT
Start: 2023-01-06 | End: 2023-01-06

## 2023-01-06 RX ORDER — METHYLPREDNISOLONE SODIUM SUCCINATE 125 MG/2ML
125 INJECTION, POWDER, LYOPHILIZED, FOR SOLUTION INTRAMUSCULAR; INTRAVENOUS ONCE
Status: COMPLETED | OUTPATIENT
Start: 2023-01-06 | End: 2023-01-06

## 2023-01-06 RX ORDER — LEVALBUTEROL 1.25 MG/.5ML
1 SOLUTION, CONCENTRATE RESPIRATORY (INHALATION)
Status: DISCONTINUED | OUTPATIENT
Start: 2023-01-07 | End: 2023-01-11 | Stop reason: HOSPADM

## 2023-01-06 RX ORDER — LEVALBUTEROL 1.25 MG/.5ML
3.75 SOLUTION, CONCENTRATE RESPIRATORY (INHALATION)
Status: ACTIVE | OUTPATIENT
Start: 2023-01-06 | End: 2023-01-06

## 2023-01-06 RX ORDER — CETIRIZINE HYDROCHLORIDE 10 MG/1
5 TABLET ORAL DAILY
Status: DISCONTINUED | OUTPATIENT
Start: 2023-01-07 | End: 2023-01-11 | Stop reason: HOSPADM

## 2023-01-06 RX ORDER — AMLODIPINE BESYLATE 5 MG/1
5 TABLET ORAL DAILY
Status: DISCONTINUED | OUTPATIENT
Start: 2023-01-07 | End: 2023-01-11 | Stop reason: HOSPADM

## 2023-01-06 RX ORDER — GABAPENTIN 300 MG/1
600 CAPSULE ORAL 2 TIMES DAILY
Status: DISCONTINUED | OUTPATIENT
Start: 2023-01-07 | End: 2023-01-07

## 2023-01-06 RX ORDER — HYDROCHLOROTHIAZIDE 12.5 MG/1
12.5 TABLET ORAL EVERY MORNING
COMMUNITY

## 2023-01-06 RX ORDER — LISINOPRIL 20 MG/1
20 TABLET ORAL DAILY
Status: DISCONTINUED | OUTPATIENT
Start: 2023-01-07 | End: 2023-01-11 | Stop reason: HOSPADM

## 2023-01-06 RX ORDER — ENOXAPARIN SODIUM 100 MG/ML
40 INJECTION SUBCUTANEOUS DAILY
Status: DISCONTINUED | OUTPATIENT
Start: 2023-01-07 | End: 2023-01-11 | Stop reason: HOSPADM

## 2023-01-06 RX ORDER — MAGNESIUM SULFATE IN WATER 40 MG/ML
2000 INJECTION, SOLUTION INTRAVENOUS ONCE
Status: COMPLETED | OUTPATIENT
Start: 2023-01-06 | End: 2023-01-06

## 2023-01-06 RX ORDER — BENZOCAINE 20 %
1000 GEL (GRAM) MUCOUS MEMBRANE EVERY EVENING
Status: DISCONTINUED | OUTPATIENT
Start: 2023-01-07 | End: 2023-01-07 | Stop reason: CLARIF

## 2023-01-06 RX ORDER — BUDESONIDE, GLYCOPYRROLATE, AND FORMOTEROL FUMARATE 160; 9; 4.8 UG/1; UG/1; UG/1
2 AEROSOL, METERED RESPIRATORY (INHALATION) 2 TIMES DAILY
COMMUNITY

## 2023-01-06 RX ORDER — TAMSULOSIN HYDROCHLORIDE 0.4 MG/1
0.4 CAPSULE ORAL NIGHTLY
Status: DISCONTINUED | OUTPATIENT
Start: 2023-01-07 | End: 2023-01-11 | Stop reason: HOSPADM

## 2023-01-06 RX ORDER — PREDNISONE 10 MG/1
20 TABLET ORAL DAILY
Status: ON HOLD | COMMUNITY
Start: 2023-01-05 | End: 2023-01-11 | Stop reason: HOSPADM

## 2023-01-06 RX ORDER — AMLODIPINE BESYLATE 5 MG/1
5 TABLET ORAL EVERY MORNING
Status: DISCONTINUED | OUTPATIENT
Start: 2023-01-07 | End: 2023-01-06 | Stop reason: CLARIF

## 2023-01-06 RX ORDER — MULTIVITAMIN WITH IRON
1 TABLET ORAL EVERY EVENING
Status: DISCONTINUED | OUTPATIENT
Start: 2023-01-07 | End: 2023-01-07 | Stop reason: CLARIF

## 2023-01-06 RX ORDER — SODIUM CHLORIDE 0.9 % (FLUSH) 0.9 %
10 SYRINGE (ML) INJECTION PRN
Status: COMPLETED | OUTPATIENT
Start: 2023-01-06 | End: 2023-01-06

## 2023-01-06 RX ORDER — LEVALBUTEROL INHALATION SOLUTION 0.31 MG/3ML
0.31 SOLUTION RESPIRATORY (INHALATION) ONCE
Status: DISCONTINUED | OUTPATIENT
Start: 2023-01-06 | End: 2023-01-06

## 2023-01-06 RX ORDER — METHYLPREDNISOLONE SODIUM SUCCINATE 125 MG/2ML
60 INJECTION, POWDER, LYOPHILIZED, FOR SOLUTION INTRAMUSCULAR; INTRAVENOUS EVERY 12 HOURS
Status: DISCONTINUED | OUTPATIENT
Start: 2023-01-07 | End: 2023-01-07

## 2023-01-06 RX ORDER — GUAIFENESIN 400 MG/1
400 TABLET ORAL
Status: DISCONTINUED | OUTPATIENT
Start: 2023-01-07 | End: 2023-01-11 | Stop reason: HOSPADM

## 2023-01-06 RX ORDER — HYDROCHLOROTHIAZIDE 12.5 MG/1
12.5 TABLET ORAL EVERY MORNING
Status: DISCONTINUED | OUTPATIENT
Start: 2023-01-07 | End: 2023-01-11 | Stop reason: HOSPADM

## 2023-01-06 RX ADMIN — IOPAMIDOL 75 ML: 755 INJECTION, SOLUTION INTRAVENOUS at 15:11

## 2023-01-06 RX ADMIN — ISODIUM CHLORIDE 3 ML: 0.03 SOLUTION RESPIRATORY (INHALATION) at 12:26

## 2023-01-06 RX ADMIN — MAGNESIUM SULFATE HEPTAHYDRATE 2000 MG: 40 INJECTION, SOLUTION INTRAVENOUS at 12:14

## 2023-01-06 RX ADMIN — SODIUM CHLORIDE, PRESERVATIVE FREE 10 ML: 5 INJECTION INTRAVENOUS at 15:07

## 2023-01-06 RX ADMIN — METHYLPREDNISOLONE SODIUM SUCCINATE 125 MG: 125 INJECTION, POWDER, FOR SOLUTION INTRAMUSCULAR; INTRAVENOUS at 12:13

## 2023-01-06 RX ADMIN — LEVALBUTEROL 3.75 MG: 1.25 SOLUTION, CONCENTRATE RESPIRATORY (INHALATION) at 22:18

## 2023-01-06 RX ADMIN — LEVALBUTEROL 3.75 MG: 1.25 SOLUTION, CONCENTRATE RESPIRATORY (INHALATION) at 12:25

## 2023-01-06 ASSESSMENT — LIFESTYLE VARIABLES
HOW OFTEN DO YOU HAVE A DRINK CONTAINING ALCOHOL: NEVER
HOW MANY STANDARD DRINKS CONTAINING ALCOHOL DO YOU HAVE ON A TYPICAL DAY: PATIENT DOES NOT DRINK

## 2023-01-06 NOTE — ED PROVIDER NOTES
HPI:  1/6/23,   Time: 11:52 AM RASHEEDA Bowser is a 67 y.o. male presenting to the ED for sob, beginning 2 days ago. The complaint has been persistent, moderate in severity, and worsened by nothing. Brought in by EMS. History of COPD. Chronically on 4 L. History of recurrent admissions recently apparent past 2 months. Patient with bronchial stent placed at [facility]. Question some malfunction. Has appointment with pulmonologist next week. No fevers chills or sweats. No nausea vomiting diarrhea. Review of Systems:   Pertinent positives and negatives are stated within HPI, all other systems reviewed and are negative.          --------------------------------------------- PAST HISTORY ---------------------------------------------  Past Medical History:  has a past medical history of Asthma, COPD (chronic obstructive pulmonary disease) ([facility] Hypertension, and Neuropathic pain. Past Surgical History:  has a past surgical history that includes hernia repair and back surgery. Social History:  reports that he quit smoking about 11 years ago. His smoking use included cigarettes. He has never used smokeless tobacco. He reports that he does not currently use alcohol after a past usage of about 1.0 standard drink per week. He reports that he does not use drugs. Family History: family history is not on file. The patients home medications have been reviewed.     Allergies: Onion, Sulfa antibiotics, Albuterol, and Ipratropium bromide [ipratropium]        ---------------------------------------------------PHYSICAL EXAM--------------------------------------    Constitutional/General: Alert and oriented x3, mod distress  Head: Normocephalic and atraumatic  Eyes: PERRL, EOMI, conjunctive normal, sclera non icteric  Mouth: Oropharynx clear, handling secretions, no trismus, no asymmetry of the posterior oropharynx or uvular edema  Neck: Supple, full ROM, s  Respiratory: wheezing and tachypnic  Cardiovascular:  Regular rate. Regular rhythm. . 2+ distal pulses  Chest: No chest wall tenderness  GI:  Abdomen Soft, Non tender, Non distended. +.   Musculoskeletal: Moves all extremities x 4. Warm and well perfused,  Integument: skin warm and dry. No rashes. Lymphatic: no lymphadenopathy noted  Neurologic: GCS 15, no focal deficits,  Psychiatric: Normal Affect      Medical Decision Making:    Patient COPD concern for stent malfunction. Brought in by EMS. Moderate distress on arrival.  Some improvement with nebs and Solu-Medrol. Still with some respiratory distress on reevaluation. ABG stable on chronic 4 L.  CBC CMP COVID flu all ordered and reviewed as below. CT ordered results as noted. Will require inpatient treatment. Question as to whether can be treated here and he is transferred to [facility] with history of stent in lungs. Call placed to [facility] for pulmonology to determine need of transfer or not.      -------------------------------------------------- RESULTS -------------------------------------------------  I have personally reviewed all laboratory and imaging results for this patient. Results are listed below. LABS:  No results found for this visit on 01/06/23. RADIOLOGY:  Interpreted by Radiologist.  No orders to display       EKG: This EKG is signed and interpreted by the EP. Time: 1206  Rate: 90  Rhythm: Sinus  Interpretation: non-specific EKG  Comparison: None      ------------------------- NURSING NOTES AND VITALS REVIEWED ---------------------------   The nursing notes within the ED encounter and vital signs as below have been reviewed by myself. There were no vitals taken for this visit. Oxygen Saturation Interpretation: Normal    The patients available past medical records and past encounters were reviewed.         ------------------------------ ED COURSE/MEDICAL DECISION MAKING----------------------  Medications   methylPREDNISolone sodium (SOLU-MEDROL) injection 125 mg (has no administration in time range)   magnesium sulfate 2000 mg in 50 mL IVPB premix (has no administration in time range)   levalbuterol (XOPENEX) nebulizer solution 3.75 mg (has no administration in time range)   sodium chloride nebulizer 0.9 % solution 3 mL (has no administration in time range)         ED COURSE:             This patient's ED course included: a personal history and physicial examination    This patient has remained hemodynamically stable during their ED course. Re-Evaluations:             Re-evaluation. Patients symptoms are improving    Re-examination  1/6/23   11:52 AM EST               Counseling: The emergency provider has spoken with the patient and discussed todays results, in addition to providing specific details for the plan of care and counseling regarding the diagnosis and prognosis. Questions are answered at this time and they are agreeable with the plan.       --------------------------------- IMPRESSION AND DISPOSITION ---------------------------------    IMPRESSION  Copd exacerbation    DISPOSITION  Disposition: sign out pending call back from   Patient condition is stable    NOTE: This report was transcribed using voice recognition software.  Every effort was made to ensure accuracy; however, inadvertent computerized transcription errors may be present        Gilles Rodriguez MD  01/10/23 0632

## 2023-01-06 NOTE — TELEPHONE ENCOUNTER
Writer contacted Dr. Tricia Caceres to inform of 30 day readmission risk. Writer's attempt to contact Dr. Tricia Caceres was unsuccessful.      Call Back: If you need to call back to inform of disposition you can contact me at [phone]

## 2023-01-06 NOTE — PROGRESS NOTES
@[id number] access center notified to consult Dr Sami Hunt at [facility]  COPD flare  [place] stent occlusion

## 2023-01-07 LAB
ADENOVIRUS BY PCR: NOT DETECTED
ANION GAP SERPL CALCULATED.3IONS-SCNC: 7 MMOL/L (ref 7–16)
BORDETELLA PARAPERTUSSIS BY PCR: NOT DETECTED
BORDETELLA PERTUSSIS BY PCR: NOT DETECTED
BUN BLDV-MCNC: 21 MG/DL (ref 6–23)
CALCIUM SERPL-MCNC: 9 MG/DL (ref 8.6–10.2)
CHLAMYDOPHILIA PNEUMONIAE BY PCR: NOT DETECTED
CHLORIDE BLD-SCNC: 94 MMOL/L (ref 98–107)
CO2: 33 MMOL/L (ref 22–29)
CORONAVIRUS 229E BY PCR: NOT DETECTED
CORONAVIRUS HKU1 BY PCR: NOT DETECTED
CORONAVIRUS NL63 BY PCR: NOT DETECTED
CORONAVIRUS OC43 BY PCR: NOT DETECTED
CREAT SERPL-MCNC: 0.6 MG/DL (ref 0.7–1.2)
GFR SERPL CREATININE-BSD FRML MDRD: >60 ML/MIN/1.73
GLUCOSE BLD-MCNC: 267 MG/DL (ref 74–99)
HCT VFR BLD CALC: 32.8 % (ref 37–54)
HEMOGLOBIN: 11 G/DL (ref 12.5–16.5)
HUMAN METAPNEUMOVIRUS BY PCR: NOT DETECTED
HUMAN RHINOVIRUS/ENTEROVIRUS BY PCR: NOT DETECTED
INFLUENZA A BY PCR: NOT DETECTED
INFLUENZA B BY PCR: NOT DETECTED
MCH RBC QN AUTO: 32.3 PG (ref 26–35)
MCHC RBC AUTO-ENTMCNC: 33.5 % (ref 32–34.5)
MCV RBC AUTO: 96.2 FL (ref 80–99.9)
MYCOPLASMA PNEUMONIAE BY PCR: NOT DETECTED
PARAINFLUENZA VIRUS 1 BY PCR: NOT DETECTED
PARAINFLUENZA VIRUS 2 BY PCR: NOT DETECTED
PARAINFLUENZA VIRUS 3 BY PCR: NOT DETECTED
PARAINFLUENZA VIRUS 4 BY PCR: NOT DETECTED
PDW BLD-RTO: 12.7 FL (ref 11.5–15)
PLATELET # BLD: 196 E9/L (ref 130–450)
PMV BLD AUTO: 9.4 FL (ref 7–12)
POTASSIUM SERPL-SCNC: 4.4 MMOL/L (ref 3.5–5)
RBC # BLD: 3.41 E12/L (ref 3.8–5.8)
RESPIRATORY SYNCYTIAL VIRUS BY PCR: DETECTED
SARS-COV-2, PCR: NOT DETECTED
SODIUM BLD-SCNC: 134 MMOL/L (ref 132–146)
WBC # BLD: 12.4 E9/L (ref 4.5–11.5)

## 2023-01-07 PROCEDURE — 36415 COLL VENOUS BLD VENIPUNCTURE: CPT

## 2023-01-07 PROCEDURE — 85027 COMPLETE CBC AUTOMATED: CPT

## 2023-01-07 PROCEDURE — 6360000002 HC RX W HCPCS: Performed by: CLINICAL NURSE SPECIALIST

## 2023-01-07 PROCEDURE — 6360000002 HC RX W HCPCS: Performed by: INTERNAL MEDICINE

## 2023-01-07 PROCEDURE — 94667 MNPJ CHEST WALL 1ST: CPT

## 2023-01-07 PROCEDURE — 80048 BASIC METABOLIC PNL TOTAL CA: CPT

## 2023-01-07 PROCEDURE — 94640 AIRWAY INHALATION TREATMENT: CPT

## 2023-01-07 PROCEDURE — 6370000000 HC RX 637 (ALT 250 FOR IP): Performed by: INTERNAL MEDICINE

## 2023-01-07 PROCEDURE — 2060000000 HC ICU INTERMEDIATE R&B

## 2023-01-07 RX ORDER — ARFORMOTEROL TARTRATE 15 UG/2ML
15 SOLUTION RESPIRATORY (INHALATION) 2 TIMES DAILY
Status: DISCONTINUED | OUTPATIENT
Start: 2023-01-07 | End: 2023-01-11 | Stop reason: HOSPADM

## 2023-01-07 RX ORDER — METHYLPREDNISOLONE SODIUM SUCCINATE 125 MG/2ML
60 INJECTION, POWDER, LYOPHILIZED, FOR SOLUTION INTRAMUSCULAR; INTRAVENOUS EVERY 6 HOURS
Status: DISCONTINUED | OUTPATIENT
Start: 2023-01-07 | End: 2023-01-08

## 2023-01-07 RX ORDER — BUDESONIDE 0.5 MG/2ML
0.5 INHALANT ORAL 2 TIMES DAILY
Status: DISCONTINUED | OUTPATIENT
Start: 2023-01-07 | End: 2023-01-11 | Stop reason: HOSPADM

## 2023-01-07 RX ORDER — CHOLECALCIFEROL (VITAMIN D3) 10 MCG
1 TABLET ORAL DAILY
Status: DISCONTINUED | OUTPATIENT
Start: 2023-01-07 | End: 2023-01-11 | Stop reason: HOSPADM

## 2023-01-07 RX ORDER — GABAPENTIN 300 MG/1
600 CAPSULE ORAL 2 TIMES DAILY
Status: DISCONTINUED | OUTPATIENT
Start: 2023-01-07 | End: 2023-01-11 | Stop reason: HOSPADM

## 2023-01-07 RX ADMIN — ZINC SULFATE 220 MG (50 MG) CAPSULE 50 MG: CAPSULE at 16:32

## 2023-01-07 RX ADMIN — GUAIFENESIN 400 MG: 400 TABLET ORAL at 16:32

## 2023-01-07 RX ADMIN — TAMSULOSIN HYDROCHLORIDE 0.4 MG: 0.4 CAPSULE ORAL at 20:12

## 2023-01-07 RX ADMIN — LEVALBUTEROL HYDROCHLORIDE 1.25 MG: 1.25 SOLUTION, CONCENTRATE RESPIRATORY (INHALATION) at 09:46

## 2023-01-07 RX ADMIN — BUDESONIDE 500 MCG: 0.5 SUSPENSION RESPIRATORY (INHALATION) at 20:19

## 2023-01-07 RX ADMIN — ENOXAPARIN SODIUM 40 MG: 100 INJECTION SUBCUTANEOUS at 08:57

## 2023-01-07 RX ADMIN — BUDESONIDE 500 MCG: 0.5 SUSPENSION RESPIRATORY (INHALATION) at 12:45

## 2023-01-07 RX ADMIN — LEVALBUTEROL HYDROCHLORIDE 1.25 MG: 1.25 SOLUTION, CONCENTRATE RESPIRATORY (INHALATION) at 12:46

## 2023-01-07 RX ADMIN — ARFORMOTEROL TARTRATE 15 MCG: 15 SOLUTION RESPIRATORY (INHALATION) at 12:45

## 2023-01-07 RX ADMIN — GUAIFENESIN 400 MG: 400 TABLET ORAL at 08:58

## 2023-01-07 RX ADMIN — METHYLPREDNISOLONE SODIUM SUCCINATE 60 MG: 125 INJECTION, POWDER, LYOPHILIZED, FOR SOLUTION INTRAMUSCULAR; INTRAVENOUS at 00:28

## 2023-01-07 RX ADMIN — GABAPENTIN 600 MG: 300 CAPSULE ORAL at 20:12

## 2023-01-07 RX ADMIN — CETIRIZINE HYDROCHLORIDE 5 MG: 10 TABLET, FILM COATED ORAL at 08:58

## 2023-01-07 RX ADMIN — LORAZEPAM 0.5 MG: 0.5 TABLET ORAL at 08:58

## 2023-01-07 RX ADMIN — HYDROCHLOROTHIAZIDE 12.5 MG: 12.5 TABLET ORAL at 08:58

## 2023-01-07 RX ADMIN — METHYLPREDNISOLONE SODIUM SUCCINATE 60 MG: 125 INJECTION, POWDER, LYOPHILIZED, FOR SOLUTION INTRAMUSCULAR; INTRAVENOUS at 20:12

## 2023-01-07 RX ADMIN — GUAIFENESIN 400 MG: 400 TABLET ORAL at 20:12

## 2023-01-07 RX ADMIN — ARFORMOTEROL TARTRATE 15 MCG: 15 SOLUTION RESPIRATORY (INHALATION) at 20:19

## 2023-01-07 RX ADMIN — LISINOPRIL 20 MG: 20 TABLET ORAL at 08:58

## 2023-01-07 RX ADMIN — LORAZEPAM 0.5 MG: 0.5 TABLET ORAL at 00:32

## 2023-01-07 RX ADMIN — NEPHROCAP 1 MG: 1 CAP ORAL at 08:57

## 2023-01-07 RX ADMIN — GUAIFENESIN 400 MG: 400 TABLET ORAL at 05:07

## 2023-01-07 RX ADMIN — AMLODIPINE BESYLATE 5 MG: 5 TABLET ORAL at 08:58

## 2023-01-07 RX ADMIN — METHYLPREDNISOLONE SODIUM SUCCINATE 60 MG: 125 INJECTION, POWDER, LYOPHILIZED, FOR SOLUTION INTRAMUSCULAR; INTRAVENOUS at 13:39

## 2023-01-07 RX ADMIN — LEVALBUTEROL HYDROCHLORIDE 1.25 MG: 1.25 SOLUTION, CONCENTRATE RESPIRATORY (INHALATION) at 20:19

## 2023-01-07 RX ADMIN — LEVALBUTEROL HYDROCHLORIDE 1.25 MG: 1.25 SOLUTION, CONCENTRATE RESPIRATORY (INHALATION) at 15:31

## 2023-01-07 RX ADMIN — GABAPENTIN 600 MG: 300 CAPSULE ORAL at 08:58

## 2023-01-07 RX ADMIN — LORAZEPAM 0.5 MG: 0.5 TABLET ORAL at 23:23

## 2023-01-07 RX ADMIN — GABAPENTIN 600 MG: 300 CAPSULE ORAL at 01:01

## 2023-01-07 RX ADMIN — GUAIFENESIN 400 MG: 400 TABLET ORAL at 13:38

## 2023-01-07 RX ADMIN — GUAIFENESIN 400 MG: 400 TABLET ORAL at 01:01

## 2023-01-07 ASSESSMENT — PAIN SCALES - GENERAL
PAINLEVEL_OUTOF10: 0
PAINLEVEL_OUTOF10: 0

## 2023-01-07 NOTE — PROGRESS NOTES
Pharmacy Note    Nina Shearer was ordered [facility]. As per the  [facility], herbals and certain dietary supplements will be discontinued. The herbal or dietary supplement may be continued after discharge from the hospital.    [facility], Pharm. D

## 2023-01-07 NOTE — PLAN OF CARE
Problem: Safety - Adult  Goal: Free from fall injury  1/7/2023 0916 by Aggie Bazan RN  Outcome: Progressing     Problem: Pain  Goal: Verbalizes/displays adequate comfort level or baseline comfort level  1/7/2023 0916 by Aggie Bazan RN  Outcome: Progressing

## 2023-01-07 NOTE — ED PROVIDER NOTES
a 9:39 PM EST  I received this patient at sign out from Dr. Jessica Junior. I have discussed the patient's initial exam, treatment and plan of care with the out going physician. I have introduced my self to the patient / family and have answered their questions to this point. I have examined the patient myself and reviewed ordered tests / medications and  reviewed any available results to this point. If a resident is involved in the Emergency Department care, I have discussed my findings and plan with them as well. I spoke with Dr. Joselo Gee, pulmonology, from [facility] who works with Dr. Marlin Kelly, the patient's pulmonologist.  She states that these are infected valves and not stents in the patient's lungs and sometimes they can be obstructed from mucous plugging. I discussed the CT report with her in depth and she states that this is something that the patient can follow-up with as an outpatient. She states that this is nothing the patient is transferred to [facility] for. Reevaluated patient discussed the plan. He will be admitted here at this time. Consult placed to the patient's family doctor, Dr. Sebas Saunders, who accepted for admission. He recommended consult with our pulmonologist, Dr. Rima Edmonds. Consult placed to Dr. Rima Edmonds. Spoke with Dr. Anderson Cronin who agreed on consult on the patient.      Myesha Bacon, DO  01/06/23 2146       Myesha Bacon, DO  01/06/23 2149

## 2023-01-07 NOTE — CONSULTS
Pulmonary Consultation    Admit Date: 1/6/2023  Requesting Physician: Chandrika Puckett MD    CC:  shortness of breath     HPI:   Antonieta Hurtado 67 y.o. male known to Dr. Griselda Walsh, fully vaccinated for COVID with boosters, with past medical history for emphysema/asthma on Breztri and Xopenex, chronic respiratory failure with hypoxia on 2 to 5 L baseline, bronchiectasis on vest at home ,s/p zypher endobronchial valves ,  hypertension who presented to the ED back on 12/24 with complaints of increased shortness of breath and hypoxia beginning 1 day ago. He reports to being 82% on 4 L nasal cannula with increasing shortness of breath, unrelieved by rescue inhaler. He was mildly tachycardic in ED, T-max 100.5, pulse ox 93% on 6 L  Rapid flu and COVID-negative WBC 14.8, proBNP 152, high-sensitivity troponin 36> 37  Chest x-ray: Atelectasis versus possible infiltrate to the right middle lobe  IV doxycycline and Rocephin x1 in ED   d/c'd home on 12/29, felt ok for a day then had progressive dyspnea. Seen Dr. Griselda Walsh on 1/4 at [facility] office with no change to plan  Came in yesterday reports that his POX has been low he is tired and cant do any task without stopping because he cant get enough air. Dr. Mariano Spurling, pulmonology, from [facility] who works with Dr. Aranza Bella, the patient's pulmonologist.  She states that these are zephyr valves and not stents in the patient's lungs and sometimes they can be obstructed from mucous plugging. the  CT report was discussed from ER  in depth and she states that this is something that the patient can follow-up with as an outpatient. She states that this is nothing the patient is transferred to [facility] for. He has a previous appt for January 12. He needs to get to this appt. He is now positive for RSV. With acute exacerbation  COPD. Resting in bed on 6L.  Audible wheezing, mildly labored breathing         PMH:    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     PSH:    Past Surgical History:   Procedure Laterality Date    BACK SURGERY      spinal fusion    HERNIA REPAIR            Respiratory ROS: + dyspnea, + wz, + cough, + fatigue Otherwise, a complete review of systems is undertaken and is negative.     Social History:  Social History     Socioeconomic History    Marital status:      Spouse name: Not on file    Number of children: Not on file    Years of education: Not on file    Highest education level: Not on file   Occupational History    Not on file   Tobacco Use    Smoking status: Former     Types: Cigarettes     Quit date: 10/12/2011     Years since quittin.2    Smokeless tobacco: Never   Substance and Sexual Activity    Alcohol use: Not Currently     Alcohol/week: 1.0 standard drink     Types: 1 Glasses of wine per week     Comment: daily    Drug use: No    Sexual activity: Not on file   Other Topics Concern    Not on file   Social History Narrative    Not on file     Social Determinants of Health     Financial Resource Strain: Not on file   Food Insecurity: Not on file   Transportation Needs: Not on file   Physical Activity: Not on file   Stress: Not on file   Social Connections: Not on file   Intimate Partner Violence: Not on file   Housing Stability: Not on file       Family History:  UNK     Medications:       b complex-C-folic acid  1 capsule Oral Daily    gabapentin  600 mg Oral BID    Budeson-Glycopyrrol-Formoterol  2 puff Inhalation BID    guaiFENesin  400 mg Oral 6x Daily    hydroCHLOROthiazide  12.5 mg Oral QAM    cetirizine  5 mg Oral Daily    tamsulosin  0.4 mg Oral Nightly    zinc sulfate  50 mg Oral QPM    methylPREDNISolone  60 mg IntraVENous Q12H    enoxaparin  40 mg SubCUTAneous Daily    levalbuterol  1 ampule Nebulization Q4H WA    amLODIPine  5 mg Oral Daily    And    lisinopril  20 mg Oral Daily         Vitals:    VITALS:  BP (!) 153/75   Pulse 80   Temp 97.7 °F (36.5 °C) (Oral)   Resp 21   Ht 5' 7.5\" (1.715 m)   Wt 152 lb 4.8 oz (69.1 kg)   SpO2 98%   BMI 23.50 kg/m²   24HR INTAKE/OUTPUT:  No intake or output data in the 24 hours ending 23 1145  CURRENT PULSE OXIMETRY:  SpO2: 98 %  24HR PULSE OXIMETRY RANGE:  SpO2  Av.3 %  Min: 96 %  Max: 99 %      EXAM:  General: No distress. Mildly labored   Eyes: PERRL. No sclera icterus. No conjunctival injection. ENT: No discharge. Pharynx clear. Neck: Trachea midline. Normal thyroid. Resp: No accessory muscle use. Tight exp wz throughout   CV: Regular rate. Regular rhythm. No mumur or rub. ABD: Non-tender. Non-distended. No masses. No organmegaly. Normal bowel sounds. Skin: Warm and dry. No nodule on exposed extremities. No rash on exposed extremities. Lymph: No cervical LAD. No supraclavicular LAD. Ext: No joint deformity. No clubbing. No cyanosis. No edema  Neuro: Awake. Follows commands ox3      Lab Results:  CBC:   Recent Labs     23  1208 23  0509   WBC 15.6* 12.4*   HGB 13.1 11.0*   HCT 38.9 32.8*   MCV 96.5 96.2    196     BMP:   Recent Labs     23  1208 23  0509    134   K 4.2 4.4   CL 90* 94*   CO2 33* 33*   BUN 17 21   CREATININE 0.7 0.6*     LFT: No results for input(s): ALKPHOS, ALT, AST, PROT, BILITOT, BILIDIR, LABALBU in the last 72 hours.   PT/INR:   Recent Labs     23  1208   PROTIME 10.6   INR 1.0       Cultures:   Latest Reference Range & Units 23 00:55 23 12:08   Influenza A by PCR Not Detected  Not Detected Not Detected   Influenza B by PCR Not Detected  Not Detected Not Detected   Adenovirus by PCR Not Detected  Not Detected    Coronavirus 229E by PCR Not Detected  Not Detected    Coronavirus HKU1 by PCR Not Detected  Not Detected    Coronavirus NL63 by PCR Not Detected  Not Detected    Coronavirus OC43 by PCR Not Detected  Not Detected    Human Metapneumovirus by PCR Not Detected  Not Detected    Human Rhinovirus/Enterovirus by PCR Not Detected  Not Detected    Parainfluenza Virus 1 by PCR Not Detected  Not Detected    Parainfluenza Virus 2 by PCR Not Detected  Not Detected    Parainfluenza Virus 3 by PCR Not Detected  Not Detected    Parainfluenza Virus 4 by PCR Not Detected  Not Detected    Respiratory Syncytial Virus by PCR Not Detected  DETECTED ! Bordetella parapertussis by PCR Not Detected  Not Detected    Chlamydophilia pneumoniae by PCR Not Detected  Not Detected    Mycoplasma pneumoniae by PCR Not Detected  Not Detected    SARS-CoV-2, PCR Not Detected  Not Detected    SARS-CoV-2, NAAT Not Detected   Not Detected   Bordetella pertussis by PCR Not Detected  Not Detected    !: Data is abnormal    ABG:   Recent Labs     01/06/23  1228   PH 7.470*   PO2 107.7*   PCO2 46.8*   HCO3 33.3*   BE 8.4*   O2SAT 98.3       Films:  XR CHEST PORTABLE    Result Date: 1/6/2023  EXAMINATION: ONE XRAY VIEW OF THE CHEST 1/6/2023 10:59 am COMPARISON: 24 December 2022 HISTORY: ORDERING SYSTEM PROVIDED HISTORY: sob TECHNOLOGIST PROVIDED HISTORY: Reason for exam:->sob FINDINGS: Bronchial wall stents on the right are faintly visible. Partial right middle lobe collapse is again noted. Normal heart and pulmonary vascularity. There is obstructive airways disease. Persistent partial right middle lobe collapse with indwelling bronchial wall stents. Obstructive airways disease. CTA CHEST W CONTRAST    Result Date: 1/6/2023  EXAMINATION: CTA OF THE CHEST 1/6/2023 3:07 pm TECHNIQUE: CTA of the chest was performed after the administration of intravenous contrast.  Multiplanar reformatted images are provided for review. MIP images are provided for review. Automated exposure control, iterative reconstruction, and/or weight based adjustment of the mA/kV was utilized to reduce the radiation dose to as low as reasonably achievable. COMPARISON: None.  HISTORY: ORDERING SYSTEM PROVIDED HISTORY: ro pe TECHNOLOGIST PROVIDED HISTORY: Reason for exam:->ro pe Decision Support Exception - unselect if not a suspected or confirmed emergency medical condition->Emergency Medical Condition (MA) FINDINGS: In view previous examination of a June 9, 2020 2nd and December 24, 2020. There is a persistent pattern of collapse of the right middle lobe with endobronchial obstruction at the right middle lobe bronchus stent. 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 There additional bronchial stents for the right upper anterior posterior, and apical segments with obstruction of the lumen of the posterior segment stent. There is no atelectasis in the right upper lobe associated with these findings. Collateral drift circulation most likely present for the posterior segment of the right upper lobe. Discrete residual infiltrate in the distal peribronchial area/bronchiolar is seen in the right lower. There where previous infiltrates in that area. Advanced emphysematous changes are present in the lung parenchyma. There is a residual confluent scar in the apicoposterior aspect of the left upper lobe which had developed since the study of June 2021 but has not changed significantly since October 2, 2020. There are no superimposed acute infiltrates or consolidation to the lung parenchyma of both lungs. There is no pleural effusions. There is no indication for acute pulmonary embolus in the main PA, right left main PAs, in the lobar, segmental and subsegmental branches to the 3/4 order approximately. There is no aneurysm formation or dissection of the thoracic aorta. The heart is normal size. LV inner diameter: 3.8 cm. The RV inner diameter: 4.5 cm. RV/LV ratio: 1.18, being mildly enlarged. There is no pericardial effusion. Calcifications are seen in the coronary arteries. There is no aneurysm formation or dissection of the thoracic aorta.  Calcified atheromatous changes are seen throughout the arch of the aorta, descending thoracic aorta and visualized upper abdominal aorta. There is a aneurysm of the infrarenal segment of the upper abdominal aorta not fully covered on this study. The proximal segment of the aneurysm visualized on the present examination measures 2.3 x 2.7 cm. There is no mediastinal masses or adenopathy. Upper abdominal structures not fully covered on this study. The calcifications of the renal arteries are seen bilaterally in the hilum of the kidneys. Adrenals not enlarged. Gallbladder is normally distended. The biliary tree and pancreatic duct systems are not dilated. There normal size and the early arterial enhancement for the liver, the spleen and pancreas. Visualized bone structures demonstrate previous vertebroplasty in L1 and L2 with loss of height of T10 and T11 vertebral bodies and minimal of T12 vertebral body. 1.  No acute pulmonary emboli. 2.  No aneurysm formation or dissection of the thoracic aorta. 3.  There is a aneurysm of the abdominal aorta not fully covered on this study, proximal segment of the aneurysm measures 2.3 x 2.7 cm. 4.  Presence of endobronchial stent for the right middle lobe with obstruction of the stent, causing now a pattern of chronic right middle lobe syndrome pattern. 5.  Endobronchial obstructions for the anterior, apical, and posterior segments of the right upper lobe. There is obstruction of the posterior segment stent. No atelectasis are seen in the right upper lobe. Air drifting collateral circulation considered. 6.  Discrete residual distal peribronchial/bronchiolar infiltrate in the posteromedial aspect of the right upper lobe. Acute Exacerbation of COPD    IV Solu-Medrol 60mg IV to q6H diffuse wz . Breztri at [facility], start  Brovana, Pulmicort and Xopenex while inpatient.   Mucinex and chest vest q8H  + RSV   Chronic Hypoxic Respiratory Failure-at baseline   Currently on 5-6 L nasal cannula pulse ox 99%  Baseline is 2 to 5 L at home  Recent  CAP (Community Acquired Pneumonia)  S/p doxycyline and Rocephin in ED--   Bronchiectasis   Continue chest vest/physiotherapy with Xopenex, he does not like Flutter valve  H/o right upper lobe Endobronchial valves x4, follows a [facility] with Dr. Opal Manzo scheduled appt 1/12/23, needs to be seen, if no better possible transfer to [facility]  DVT/PE prophylaxis on Lovenox      Thank you for allowing us to see this patient in consultation. Please contact us with any questions.       Electronically signed by KELECHI Bansal on 1/7/2023 at 11:45 AM

## 2023-01-07 NOTE — ED NOTES
Confirmed receipt of SBAR with 4th floor     Jolanta Zaragoza, [address] [facility]  01/06/23 [id number]

## 2023-01-07 NOTE — H&P
CHIEF COMPLAINT:  Worsening shortness of breath. HISTORY OF PRESENT ILLNESS:      The patient is a 67 y.o. male patient of Dr. Maximiliano Lowery who presents with above. He presents to the ED for sob, beginning 2 days ago. The complaint has been persistent, moderate in severity, and worsened by nothing. Brought in by EMS. History of COPD. Chronically on 4 L. History of recurrent admissions recently apparent past 2 months. Patient with bronchial stent placed at [facility]. Question some malfunction. Has appointment with pulmonologist next week. No fevers chills or sweats. No nausea vomiting diarrhea. Has had endobronchial stenting per [facility]. Has f/u planned next week.     Past Medical History:    Past Medical History:   Diagnosis Date    Asthma     COPD (chronic obstructive pulmonary disease) ([facility] [address]     Hypertension     Neuropathic pain     from back fusion       Past Surgical History:    Past Surgical History:   Procedure Laterality Date    BACK SURGERY      spinal fusion    HERNIA REPAIR         Medications Prior to Admission:    Medications Prior to Admission: Budeson-Glycopyrrol-Formoterol (BREZTRI AEROSPHERE) 160-9-4.8 MCG/ACT AERO, Inhale 2 puffs into the lungs 2 times daily  guaiFENesin 1200 MG TB12, Take 1,200 mg by mouth 2 times daily  hydroCHLOROthiazide (HYDRODIURIL) 12.5 MG tablet, Take 12.5 mg by mouth every morning  predniSONE (DELTASONE) 10 MG tablet, Take 20 mg by mouth daily  CALCIUM-VITAMINS C & D PO, Take 1 tablet by mouth every evening  OXYGEN, Inhale 4 L/min into the lungs continuous  calcium carbonate (OSCAL) 500 MG TABS tablet, Take 1,000 mg by mouth daily  levalbuterol (XOPENEX) 1.25 MG/0.5ML nebulizer solution, Take 0.5 mLs by nebulization every 4 hours as needed for Wheezing  zinc gluconate 50 MG tablet, Take 50 mg by mouth every evening  B-Complex-C TABS, Take 1 tablet by mouth every evening  loratadine (CLARITIN) 10 MG capsule, Take 10 mg by mouth daily  tamsulosin (FLOMAX) 0.4 MG capsule, Take 0.4 mg by mouth nightly  Coenzyme Q10 (CO Q 10 PO), Take 1 capsule by mouth every evening  Omega-3 Fatty Acids (FISH OIL) 1000 MG CAPS, Take 1,000 mg by mouth every evening  Flaxseed, Linseed, 1000 MG CAPS, Take 1,000 mg by mouth every evening  amLODIPine-benazepril (LOTREL) 5-20 MG per capsule, Take 1 capsule by mouth every morning  lorazepam (ATIVAN) 0.5 MG tablet, Take 0.5 mg by mouth 2 times daily as needed. gabapentin (NEURONTIN) 600 MG tablet, Take 600 mg by mouth 2 times daily. Allergies:    Albuterol, Ipratropium bromide [ipratropium], Onion, Sulfa antibiotics, and Prednisone    Social History:    reports that he quit smoking about 11 years ago. His smoking use included cigarettes. He has never used smokeless tobacco. He reports that he does not currently use alcohol after a past usage of about 1.0 standard drink per week. He reports that he does not use drugs. Family History:   family history is not on file. REVIEW OF SYSTEMS:  As above in the HPI, otherwise negative    PHYSICAL EXAM:    Vitals:  BP (!) 153/75   Pulse 80   Temp 97.7 °F (36.5 °C) (Oral)   Resp 21   Ht 5' 7.5\" (1.715 m)   Wt 152 lb 4.8 oz (69.1 kg)   SpO2 96%   BMI 23.50 kg/m²     General:  Awake, alert, oriented X 3. Well developed, well nourished, well groomed. No apparent distress. HEENT:  Normocephalic, atraumatic. Pupils equal, round, reactive to light. No scleral icterus. No conjunctival injection. Normal lips, teeth, and gums. No nasal discharge. Neck:  Supple  Heart:  RRR, no murmurs, gallops, rubs  Lungs:  CTA bilaterally, bilat symmetrical expansion, no wheeze, rales, or rhonchi  Abdomen:   Bowel sounds present, soft, nontender, no masses, no organomegaly, no peritoneal signs  Extremities:  No clubbing, cyanosis, or edema  Skin:  Warm and dry, no open lesions or rash  Neuro:  Cranial nerves 2-12 intact, no focal deficits  Breast: deferred  Rectal: deferred  Genitalia: deferred    LABS:  CBC:   Lab Results   Component Value Date/Time    WBC 12.4 01/07/2023 05:09 AM    RBC 3.41 01/07/2023 05:09 AM    HGB 11.0 01/07/2023 05:09 AM    HCT 32.8 01/07/2023 05:09 AM    MCV 96.2 01/07/2023 05:09 AM    MCH 32.3 01/07/2023 05:09 AM    MCHC 33.5 01/07/2023 05:09 AM    RDW 12.7 01/07/2023 05:09 AM     01/07/2023 05:09 AM    MPV 9.4 01/07/2023 05:09 AM     BMP:    Lab Results   Component Value Date/Time     01/07/2023 05:09 AM    K 4.4 01/07/2023 05:09 AM    K 4.1 10/01/2022 06:02 AM    CL 94 01/07/2023 05:09 AM    CO2 33 01/07/2023 05:09 AM    BUN 21 01/07/2023 05:09 AM    LABALBU 3.3 12/27/2022 04:23 AM    CREATININE 0.6 01/07/2023 05:09 AM    CALCIUM 9.0 01/07/2023 05:09 AM    GFRAA >60 10/08/2022 03:20 AM    LABGLOM >60 01/07/2023 05:09 AM    GLUCOSE 267 01/07/2023 05:09 AM         ASSESSMENT:  Currently  inproving. Principal Problem:    Acute respiratory failure with hypoxia (HCC)  Plan: Resp tx. Pulmo t see. DM control.       PLAN:    See orders    Wesley Glass MD  9:01 AM  1/7/2023

## 2023-01-08 PROBLEM — B33.8 RSV (RESPIRATORY SYNCYTIAL VIRUS INFECTION): Status: ACTIVE | Noted: 2023-01-08

## 2023-01-08 LAB
ANION GAP SERPL CALCULATED.3IONS-SCNC: 7 MMOL/L (ref 7–16)
BUN BLDV-MCNC: 22 MG/DL (ref 6–23)
CALCIUM SERPL-MCNC: 9 MG/DL (ref 8.6–10.2)
CHLORIDE BLD-SCNC: 93 MMOL/L (ref 98–107)
CO2: 33 MMOL/L (ref 22–29)
CREAT SERPL-MCNC: 0.6 MG/DL (ref 0.7–1.2)
GFR SERPL CREATININE-BSD FRML MDRD: >60 ML/MIN/1.73
GLUCOSE BLD-MCNC: 229 MG/DL (ref 74–99)
HCT VFR BLD CALC: 33.5 % (ref 37–54)
HEMOGLOBIN: 10.9 G/DL (ref 12.5–16.5)
MCH RBC QN AUTO: 31.5 PG (ref 26–35)
MCHC RBC AUTO-ENTMCNC: 32.5 % (ref 32–34.5)
MCV RBC AUTO: 96.8 FL (ref 80–99.9)
PDW BLD-RTO: 12.6 FL (ref 11.5–15)
PLATELET # BLD: 197 E9/L (ref 130–450)
PMV BLD AUTO: 9.4 FL (ref 7–12)
POTASSIUM SERPL-SCNC: 4.3 MMOL/L (ref 3.5–5)
RBC # BLD: 3.46 E12/L (ref 3.8–5.8)
SODIUM BLD-SCNC: 133 MMOL/L (ref 132–146)
WBC # BLD: 14 E9/L (ref 4.5–11.5)

## 2023-01-08 PROCEDURE — 6370000000 HC RX 637 (ALT 250 FOR IP): Performed by: INTERNAL MEDICINE

## 2023-01-08 PROCEDURE — 6360000002 HC RX W HCPCS: Performed by: INTERNAL MEDICINE

## 2023-01-08 PROCEDURE — 80048 BASIC METABOLIC PNL TOTAL CA: CPT

## 2023-01-08 PROCEDURE — 6360000002 HC RX W HCPCS: Performed by: CLINICAL NURSE SPECIALIST

## 2023-01-08 PROCEDURE — 2700000000 HC OXYGEN THERAPY PER DAY

## 2023-01-08 PROCEDURE — 36415 COLL VENOUS BLD VENIPUNCTURE: CPT

## 2023-01-08 PROCEDURE — 94669 MECHANICAL CHEST WALL OSCILL: CPT

## 2023-01-08 PROCEDURE — 85027 COMPLETE CBC AUTOMATED: CPT

## 2023-01-08 PROCEDURE — 94640 AIRWAY INHALATION TREATMENT: CPT

## 2023-01-08 PROCEDURE — 2060000000 HC ICU INTERMEDIATE R&B

## 2023-01-08 RX ORDER — METHYLPREDNISOLONE SODIUM SUCCINATE 125 MG/2ML
60 INJECTION, POWDER, LYOPHILIZED, FOR SOLUTION INTRAMUSCULAR; INTRAVENOUS EVERY 8 HOURS
Status: DISCONTINUED | OUTPATIENT
Start: 2023-01-08 | End: 2023-01-09

## 2023-01-08 RX ORDER — LORAZEPAM 0.5 MG/1
0.5 TABLET ORAL EVERY 4 HOURS PRN
Status: DISCONTINUED | OUTPATIENT
Start: 2023-01-08 | End: 2023-01-11 | Stop reason: HOSPADM

## 2023-01-08 RX ADMIN — ENOXAPARIN SODIUM 40 MG: 100 INJECTION SUBCUTANEOUS at 08:12

## 2023-01-08 RX ADMIN — GUAIFENESIN 400 MG: 400 TABLET ORAL at 03:22

## 2023-01-08 RX ADMIN — GUAIFENESIN 400 MG: 400 TABLET ORAL at 19:43

## 2023-01-08 RX ADMIN — LORAZEPAM 0.5 MG: 0.5 TABLET ORAL at 23:44

## 2023-01-08 RX ADMIN — ARFORMOTEROL TARTRATE 15 MCG: 15 SOLUTION RESPIRATORY (INHALATION) at 21:33

## 2023-01-08 RX ADMIN — GUAIFENESIN 400 MG: 400 TABLET ORAL at 11:13

## 2023-01-08 RX ADMIN — LEVALBUTEROL HYDROCHLORIDE 1.25 MG: 1.25 SOLUTION, CONCENTRATE RESPIRATORY (INHALATION) at 07:42

## 2023-01-08 RX ADMIN — LORAZEPAM 0.5 MG: 0.5 TABLET ORAL at 19:43

## 2023-01-08 RX ADMIN — GABAPENTIN 600 MG: 300 CAPSULE ORAL at 08:12

## 2023-01-08 RX ADMIN — GABAPENTIN 600 MG: 300 CAPSULE ORAL at 19:43

## 2023-01-08 RX ADMIN — AMLODIPINE BESYLATE 5 MG: 5 TABLET ORAL at 08:12

## 2023-01-08 RX ADMIN — GUAIFENESIN 400 MG: 400 TABLET ORAL at 07:18

## 2023-01-08 RX ADMIN — METHYLPREDNISOLONE SODIUM SUCCINATE 60 MG: 125 INJECTION, POWDER, LYOPHILIZED, FOR SOLUTION INTRAMUSCULAR; INTRAVENOUS at 03:20

## 2023-01-08 RX ADMIN — METHYLPREDNISOLONE SODIUM SUCCINATE 60 MG: 125 INJECTION, POWDER, LYOPHILIZED, FOR SOLUTION INTRAMUSCULAR; INTRAVENOUS at 16:23

## 2023-01-08 RX ADMIN — BUDESONIDE 500 MCG: 0.5 SUSPENSION RESPIRATORY (INHALATION) at 07:42

## 2023-01-08 RX ADMIN — TAMSULOSIN HYDROCHLORIDE 0.4 MG: 0.4 CAPSULE ORAL at 19:43

## 2023-01-08 RX ADMIN — LISINOPRIL 20 MG: 20 TABLET ORAL at 08:12

## 2023-01-08 RX ADMIN — ARFORMOTEROL TARTRATE 15 MCG: 15 SOLUTION RESPIRATORY (INHALATION) at 07:42

## 2023-01-08 RX ADMIN — LEVALBUTEROL HYDROCHLORIDE 1.25 MG: 1.25 SOLUTION, CONCENTRATE RESPIRATORY (INHALATION) at 21:33

## 2023-01-08 RX ADMIN — LEVALBUTEROL HYDROCHLORIDE 1.25 MG: 1.25 SOLUTION, CONCENTRATE RESPIRATORY (INHALATION) at 11:53

## 2023-01-08 RX ADMIN — METHYLPREDNISOLONE SODIUM SUCCINATE 60 MG: 125 INJECTION, POWDER, LYOPHILIZED, FOR SOLUTION INTRAMUSCULAR; INTRAVENOUS at 08:13

## 2023-01-08 RX ADMIN — GUAIFENESIN 400 MG: 400 TABLET ORAL at 23:44

## 2023-01-08 RX ADMIN — HYDROCHLOROTHIAZIDE 12.5 MG: 12.5 TABLET ORAL at 08:12

## 2023-01-08 RX ADMIN — ZINC SULFATE 220 MG (50 MG) CAPSULE 50 MG: CAPSULE at 17:38

## 2023-01-08 RX ADMIN — NEPHROCAP 1 MG: 1 CAP ORAL at 08:11

## 2023-01-08 RX ADMIN — CETIRIZINE HYDROCHLORIDE 5 MG: 10 TABLET, FILM COATED ORAL at 08:12

## 2023-01-08 RX ADMIN — BUDESONIDE 500 MCG: 0.5 SUSPENSION RESPIRATORY (INHALATION) at 21:33

## 2023-01-08 RX ADMIN — METHYLPREDNISOLONE SODIUM SUCCINATE 60 MG: 125 INJECTION, POWDER, LYOPHILIZED, FOR SOLUTION INTRAMUSCULAR; INTRAVENOUS at 23:44

## 2023-01-08 RX ADMIN — LORAZEPAM 0.5 MG: 0.5 TABLET ORAL at 08:12

## 2023-01-08 RX ADMIN — GUAIFENESIN 400 MG: 400 TABLET ORAL at 16:22

## 2023-01-08 ASSESSMENT — PAIN SCALES - GENERAL
PAINLEVEL_OUTOF10: 0
PAINLEVEL_OUTOF10: 0

## 2023-01-08 NOTE — PROGRESS NOTES
Pulmonary Progress Note    Admit Date: 2023                            PCP: Hawa Tapia MD  Principal Problem:    Acute respiratory failure with hypoxia ([facility] [address]  Active Problems:    Anemia    RSV (respiratory syncytial virus infection)    COPD with acute exacerbation ([facility] [address]    Primary hypertension  Resolved Problems:    * No resolved hospital problems. *      Subjective:  Resting in bed on 4Lnc. Feeling a little better today . Now he's getting his ativan and the nebs he feels better     Medications:       b complex-C-folic acid  1 capsule Oral Daily    gabapentin  600 mg Oral BID    methylPREDNISolone  60 mg IntraVENous Q6H    arformoterol tartrate  15 mcg Nebulization BID    budesonide  0.5 mg Nebulization BID    Budeson-Glycopyrrol-Formoterol  2 puff Inhalation BID    guaiFENesin  400 mg Oral 6x Daily    hydroCHLOROthiazide  12.5 mg Oral QAM    cetirizine  5 mg Oral Daily    tamsulosin  0.4 mg Oral Nightly    zinc sulfate  50 mg Oral QPM    enoxaparin  40 mg SubCUTAneous Daily    levalbuterol  1 ampule Nebulization Q4H [place]    amLODIPine  5 mg Oral Daily    And    lisinopril  20 mg Oral Daily       Vitals:  VITALS:  BP (!) 147/67   Pulse 79   Temp 97.7 °F (36.5 °C) (Oral)   Resp 20   Ht 5' 7.5\" (1.715 m)   Wt 165 lb 4.8 oz (75 kg)   SpO2 97%   BMI 25.51 kg/m²   24HR INTAKE/OUTPUT:  No intake or output data in the 24 hours ending 23 1049  CURRENT PULSE OXIMETRY:  SpO2: 97 %  24HR PULSE OXIMETRY RANGE:  SpO2  Av.5 %  Min: 97 %  Max: 98 %  CVP:    VENT SETTINGS:      Additional Respiratory Assessments  Heart Rate: 79  Resp: 20  SpO2: 97 %      EXAM:  General: Alert, in NAD  ENT: No discharge. Pharynx clear. membranes moist   Neck: Supple, Trachea midline. Resp: No accessory muscle use. Non-labored. Lungs decreased minimal wz   CV: Regular rate. Regular rhythm. No murmur . No edema. ABD: Non-tender. Non-distended. Soft, round . Normal bowel sounds. Skin: Warm and dry. M/S: No cyanosis. No joint deformity. No clubbing. Neuro: Awake. Follows commands. O x 3, [place]  Psych:  calm and interactive     I/O: No intake/output data recorded. No intake/output data recorded. Results:  CBC:   Recent Labs     01/06/23  1208 01/07/23  0509 01/08/23  0327   WBC 15.6* 12.4* 14.0*   HGB 13.1 11.0* 10.9*   HCT 38.9 32.8* 33.5*   MCV 96.5 96.2 96.8    196 197     BMP:   Recent Labs     01/06/23  1208 01/07/23  0509 01/08/23 0327    134 133   K 4.2 4.4 4.3   CL 90* 94* 93*   CO2 33* 33* 33*   BUN 17 21 22   CREATININE 0.7 0.6* 0.6*     LFT: No results for input(s): ALKPHOS, ALT, AST, PROT, BILITOT, BILIDIR, LABALBU in the last 72 hours. PT/INR:   Recent Labs     01/06/23 1208   PROTIME 10.6   INR 1.0     Procalcitonin: No results for input(s): PROCAL in the last 72 hours. Cultures:    Latest Reference Range & Units 1/6/23 00:55 1/6/23 12:08   Influenza A by PCR Not Detected  Not Detected Not Detected   Influenza B by PCR Not Detected  Not Detected Not Detected   Adenovirus by PCR Not Detected  Not Detected     Coronavirus 229E by PCR Not Detected  Not Detected     Coronavirus HKU1 by PCR Not Detected  Not Detected     Coronavirus NL63 by PCR Not Detected  Not Detected     Coronavirus OC43 by PCR Not Detected  Not Detected     Human Metapneumovirus by PCR Not Detected  Not Detected     Human Rhinovirus/Enterovirus by PCR Not Detected  Not Detected     Parainfluenza Virus 1 by PCR Not Detected  Not Detected     Parainfluenza Virus 2 by PCR Not Detected  Not Detected     Parainfluenza Virus 3 by PCR Not Detected  Not Detected     Parainfluenza Virus 4 by PCR Not Detected  Not Detected     Respiratory Syncytial Virus by PCR Not Detected  DETECTED !      Bordetella parapertussis by PCR Not Detected  Not Detected     Chlamydophilia pneumoniae by PCR Not Detected  Not Detected     Mycoplasma pneumoniae by PCR Not Detected  Not Detected     SARS-CoV-2, PCR Not Detected  Not Detected     SARS-CoV-2, NAAT Not Detected    Not Detected   Bordetella pertussis by PCR Not Detected  Not Detected     !: Data is abnormal           ABG:   Recent Labs     01/06/23  1228   PH 7.470*   PO2 107.7*   PCO2 46.8*   HCO3 33.3*   BE 8.4*   O2SAT 98.3       Films:  XR CHEST PORTABLE    Result Date: 1/6/2023  EXAMINATION: ONE XRAY VIEW OF THE CHEST 1/6/2023 10:59 am COMPARISON: 24 December 2022 HISTORY: ORDERING SYSTEM PROVIDED HISTORY: sob TECHNOLOGIST PROVIDED HISTORY: Reason for exam:->sob FINDINGS: Bronchial wall stents on the right are faintly visible. Partial right middle lobe collapse is again noted. Normal heart and pulmonary vascularity. There is obstructive airways disease. Persistent partial right middle lobe collapse with indwelling bronchial wall stents. Obstructive airways disease. CTA CHEST W CONTRAST    Result Date: 1/6/2023  EXAMINATION: CTA OF THE CHEST 1/6/2023 3:07 pm TECHNIQUE: CTA of the chest was performed after the administration of intravenous contrast.  Multiplanar reformatted images are provided for review. MIP images are provided for review. Automated exposure control, iterative reconstruction, and/or weight based adjustment of the mA/kV was utilized to reduce the radiation dose to as low as reasonably achievable. COMPARISON: None. HISTORY: ORDERING SYSTEM PROVIDED HISTORY: ro pe TECHNOLOGIST PROVIDED HISTORY: Reason for exam:->ro pe Decision Support Exception - unselect if not a suspected or confirmed emergency medical condition->Emergency Medical Condition (MA) FINDINGS: In view previous examination of a June 9, 2020 2nd and December 24, 2020. There is a persistent pattern of collapse of the right middle lobe with endobronchial obstruction at the right middle lobe bronchus stent. 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 There additional bronchial stents for the right upper anterior posterior, and apical segments with obstruction of the lumen of the posterior segment stent.  There is no atelectasis in the right upper lobe associated with these findings. Collateral drift circulation most likely present for the posterior segment of the right upper lobe. Discrete residual infiltrate in the distal peribronchial area/bronchiolar is seen in the right lower. There where previous infiltrates in that area. Advanced emphysematous changes are present in the lung parenchyma. There is a residual confluent scar in the apicoposterior aspect of the left upper lobe which had developed since the study of June 2021 but has not changed significantly since October 2, 2020. There are no superimposed acute infiltrates or consolidation to the lung parenchyma of both lungs. There is no pleural effusions. There is no indication for acute pulmonary embolus in the main PA, right left main PAs, in the lobar, segmental and subsegmental branches to the 3/4 order approximately. There is no aneurysm formation or dissection of the thoracic aorta. The heart is normal size. LV inner diameter: 3.8 cm. The RV inner diameter: 4.5 cm. RV/LV ratio: 1.18, being mildly enlarged. There is no pericardial effusion. Calcifications are seen in the coronary arteries. There is no aneurysm formation or dissection of the thoracic aorta. Calcified atheromatous changes are seen throughout the arch of the aorta, descending thoracic aorta and visualized upper abdominal aorta. There is a aneurysm of the infrarenal segment of the upper abdominal aorta not fully covered on this study. The proximal segment of the aneurysm visualized on the present examination measures 2.3 x 2.7 cm. There is no mediastinal masses or adenopathy. Upper abdominal structures not fully covered on this study. The calcifications of the renal arteries are seen bilaterally in the hilum of the kidneys. Adrenals not enlarged. Gallbladder is normally distended. The biliary tree and pancreatic duct systems are not dilated.   There normal size and the early arterial enhancement for the liver, the spleen and pancreas. Visualized bone structures demonstrate previous vertebroplasty in L1 and L2 with loss of height of T10 and T11 vertebral bodies and minimal of T12 vertebral body. 1.  No acute pulmonary emboli. 2.  No aneurysm formation or dissection of the thoracic aorta. 3.  There is a aneurysm of the abdominal aorta not fully covered on this study, proximal segment of the aneurysm measures 2.3 x 2.7 cm. 4.  Presence of endobronchial stent for the right middle lobe with obstruction of the stent, causing now a pattern of chronic right middle lobe syndrome pattern. 5.  Endobronchial obstructions for the anterior, apical, and posterior segments of the right upper lobe. There is obstruction of the posterior segment stent. No atelectasis are seen in the right upper lobe. Air drifting collateral circulation considered. 6.  Discrete residual distal peribronchial/bronchiolar infiltrate in the posteromedial aspect of the right upper lobe. Acute Exacerbation of COPD    IV Solu-Medrol 60mg IV to q6H to q8H  Bremanoj at [place], continue   Brovana, Pulmicort and Xopenex while inpatient.   Mucinex and chest vest q8H  + RSV   Chronic Hypoxic Respiratory Failure-at baseline   Currently on 5-6 L nasal cannula pulse ox 99%  Baseline is 2 to 5 L at home  Recent  CAP (Community Acquired Pneumonia)  S/p doxycyline and Rocephin in ED--   Obtain procal   Bronchiectasis   Continue chest vest/physiotherapy with Xopenex, he does not like Flutter valve  H/o right upper lobe Endobronchial valves x4, follows a  with Dr. Lady Nelson scheduled appt 1/12/23, needs to be seen, if no better possible transfer to [facility]  DVT/PE prophylaxis on Lovenox       Electronically signed by KELECHI Espino on 1/8/2023 at 10:49 AM

## 2023-01-08 NOTE — PROGRESS NOTES
Subjective:  Still coughing and easily dyspneic. The patient is awake and alert. No problems overnight. Denies chest pain, angina. Denies abdominal pain. Tolerating diet. No nausea or vomiting. Objective:  Much anxiety timed to end of resp tx treatments. BP (!) 147/67   Pulse 79   Temp 97.7 °F (36.5 °C) (Oral)   Resp 20   Ht 5' 7.5\" (1.715 m)   Wt 165 lb 4.8 oz (75 kg)   SpO2 97%   BMI 25.51 kg/m²     Heart:  RRR, no murmurs, gallops, or rubs. Lungs:  Scattered rhonchi  Abd: bowel sounds present, nontender, nondistended, no masses  Extrem:  No clubbing, cyanosis, or edema    CBC:   Lab Results   Component Value Date/Time    WBC 14.0 01/08/2023 03:27 AM    RBC 3.46 01/08/2023 03:27 AM    HGB 10.9 01/08/2023 03:27 AM    HCT 33.5 01/08/2023 03:27 AM    MCV 96.8 01/08/2023 03:27 AM    MCH 31.5 01/08/2023 03:27 AM    MCHC 32.5 01/08/2023 03:27 AM    RDW 12.6 01/08/2023 03:27 AM     01/08/2023 03:27 AM    MPV 9.4 01/08/2023 03:27 AM     BMP:    Lab Results   Component Value Date/Time     01/08/2023 03:27 AM    K 4.3 01/08/2023 03:27 AM    K 4.1 10/01/2022 06:02 AM    CL 93 01/08/2023 03:27 AM    CO2 33 01/08/2023 03:27 AM    BUN 22 01/08/2023 03:27 AM    LABALBU 3.3 12/27/2022 04:23 AM    CREATININE 0.6 01/08/2023 03:27 AM    CALCIUM 9.0 01/08/2023 03:27 AM    GFRAA >60 10/08/2022 03:20 AM    LABGLOM >60 01/08/2023 03:27 AM    GLUCOSE 229 01/08/2023 03:27 AM        Assessment:    Patient Active Problem List   Diagnosis    COPD with acute exacerbation ([address]    Primary hypertension    Anxiety    Lactic acidosis    Hyperglycemia, drug-induced    Acute respiratory failure with hypoxia (HCC)    Type 2 diabetes mellitus (HCC)    COPD exacerbation (HCC)    Anemia    Respiratory failure (HCC)    RSV (respiratory syncytial virus infection)       Plan:  Present tx. BZDP re anxiety. BP control.           Alejandro Kumar MD  10:43 AM  1/8/2023

## 2023-01-09 LAB
ANION GAP SERPL CALCULATED.3IONS-SCNC: 6 MMOL/L (ref 7–16)
BUN BLDV-MCNC: 24 MG/DL (ref 6–23)
CALCIUM SERPL-MCNC: 9 MG/DL (ref 8.6–10.2)
CHLORIDE BLD-SCNC: 91 MMOL/L (ref 98–107)
CO2: 35 MMOL/L (ref 22–29)
CREAT SERPL-MCNC: 0.5 MG/DL (ref 0.7–1.2)
GFR SERPL CREATININE-BSD FRML MDRD: >60 ML/MIN/1.73
GLUCOSE BLD-MCNC: 246 MG/DL (ref 74–99)
HCT VFR BLD CALC: 34.9 % (ref 37–54)
HEMOGLOBIN: 11.6 G/DL (ref 12.5–16.5)
MCH RBC QN AUTO: 32 PG (ref 26–35)
MCHC RBC AUTO-ENTMCNC: 33.2 % (ref 32–34.5)
MCV RBC AUTO: 96.4 FL (ref 80–99.9)
METER GLUCOSE: 198 MG/DL (ref 74–99)
METER GLUCOSE: 210 MG/DL (ref 74–99)
METER GLUCOSE: 243 MG/DL (ref 74–99)
METER GLUCOSE: 277 MG/DL (ref 74–99)
PDW BLD-RTO: 12.3 FL (ref 11.5–15)
PLATELET # BLD: 195 E9/L (ref 130–450)
PMV BLD AUTO: 9.2 FL (ref 7–12)
POTASSIUM SERPL-SCNC: 4.1 MMOL/L (ref 3.5–5)
RBC # BLD: 3.62 E12/L (ref 3.8–5.8)
SODIUM BLD-SCNC: 132 MMOL/L (ref 132–146)
WBC # BLD: 13.3 E9/L (ref 4.5–11.5)

## 2023-01-09 PROCEDURE — 6360000002 HC RX W HCPCS: Performed by: INTERNAL MEDICINE

## 2023-01-09 PROCEDURE — 85027 COMPLETE CBC AUTOMATED: CPT

## 2023-01-09 PROCEDURE — 82962 GLUCOSE BLOOD TEST: CPT

## 2023-01-09 PROCEDURE — 36415 COLL VENOUS BLD VENIPUNCTURE: CPT

## 2023-01-09 PROCEDURE — 80048 BASIC METABOLIC PNL TOTAL CA: CPT

## 2023-01-09 PROCEDURE — 6360000002 HC RX W HCPCS: Performed by: CLINICAL NURSE SPECIALIST

## 2023-01-09 PROCEDURE — 94669 MECHANICAL CHEST WALL OSCILL: CPT

## 2023-01-09 PROCEDURE — 2700000000 HC OXYGEN THERAPY PER DAY

## 2023-01-09 PROCEDURE — 6370000000 HC RX 637 (ALT 250 FOR IP): Performed by: INTERNAL MEDICINE

## 2023-01-09 PROCEDURE — 2060000000 HC ICU INTERMEDIATE R&B

## 2023-01-09 PROCEDURE — 94640 AIRWAY INHALATION TREATMENT: CPT

## 2023-01-09 RX ORDER — DEXTROSE MONOHYDRATE 100 MG/ML
INJECTION, SOLUTION INTRAVENOUS CONTINUOUS PRN
Status: DISCONTINUED | OUTPATIENT
Start: 2023-01-09 | End: 2023-01-11 | Stop reason: HOSPADM

## 2023-01-09 RX ORDER — LOPERAMIDE HYDROCHLORIDE 2 MG/1
2 CAPSULE ORAL 4 TIMES DAILY PRN
Status: DISCONTINUED | OUTPATIENT
Start: 2023-01-09 | End: 2023-01-11 | Stop reason: HOSPADM

## 2023-01-09 RX ORDER — METHYLPREDNISOLONE SODIUM SUCCINATE 40 MG/ML
40 INJECTION, POWDER, LYOPHILIZED, FOR SOLUTION INTRAMUSCULAR; INTRAVENOUS EVERY 8 HOURS
Status: DISCONTINUED | OUTPATIENT
Start: 2023-01-09 | End: 2023-01-10

## 2023-01-09 RX ORDER — INSULIN LISPRO 100 [IU]/ML
0-8 INJECTION, SOLUTION INTRAVENOUS; SUBCUTANEOUS
Status: DISCONTINUED | OUTPATIENT
Start: 2023-01-09 | End: 2023-01-11 | Stop reason: HOSPADM

## 2023-01-09 RX ORDER — INSULIN LISPRO 100 [IU]/ML
0-4 INJECTION, SOLUTION INTRAVENOUS; SUBCUTANEOUS NIGHTLY
Status: DISCONTINUED | OUTPATIENT
Start: 2023-01-09 | End: 2023-01-11 | Stop reason: HOSPADM

## 2023-01-09 RX ADMIN — LEVALBUTEROL HYDROCHLORIDE 1.25 MG: 1.25 SOLUTION, CONCENTRATE RESPIRATORY (INHALATION) at 10:17

## 2023-01-09 RX ADMIN — ENOXAPARIN SODIUM 40 MG: 100 INJECTION SUBCUTANEOUS at 08:56

## 2023-01-09 RX ADMIN — ZINC SULFATE 220 MG (50 MG) CAPSULE 50 MG: CAPSULE at 16:18

## 2023-01-09 RX ADMIN — GUAIFENESIN 400 MG: 400 TABLET ORAL at 14:25

## 2023-01-09 RX ADMIN — CETIRIZINE HYDROCHLORIDE 5 MG: 10 TABLET, FILM COATED ORAL at 08:55

## 2023-01-09 RX ADMIN — LISINOPRIL 20 MG: 20 TABLET ORAL at 08:55

## 2023-01-09 RX ADMIN — LORAZEPAM 0.5 MG: 0.5 TABLET ORAL at 20:17

## 2023-01-09 RX ADMIN — BUDESONIDE 500 MCG: 0.5 SUSPENSION RESPIRATORY (INHALATION) at 21:53

## 2023-01-09 RX ADMIN — LOPERAMIDE HYDROCHLORIDE 2 MG: 2 CAPSULE ORAL at 14:31

## 2023-01-09 RX ADMIN — LORAZEPAM 0.5 MG: 0.5 TABLET ORAL at 08:56

## 2023-01-09 RX ADMIN — TAMSULOSIN HYDROCHLORIDE 0.4 MG: 0.4 CAPSULE ORAL at 20:17

## 2023-01-09 RX ADMIN — GABAPENTIN 600 MG: 300 CAPSULE ORAL at 20:17

## 2023-01-09 RX ADMIN — INSULIN LISPRO 2 UNITS: 100 INJECTION, SOLUTION INTRAVENOUS; SUBCUTANEOUS at 16:18

## 2023-01-09 RX ADMIN — LEVALBUTEROL HYDROCHLORIDE 1.25 MG: 1.25 SOLUTION, CONCENTRATE RESPIRATORY (INHALATION) at 16:04

## 2023-01-09 RX ADMIN — NEPHROCAP 1 MG: 1 CAP ORAL at 08:57

## 2023-01-09 RX ADMIN — ARFORMOTEROL TARTRATE 15 MCG: 15 SOLUTION RESPIRATORY (INHALATION) at 21:53

## 2023-01-09 RX ADMIN — LOPERAMIDE HYDROCHLORIDE 2 MG: 2 CAPSULE ORAL at 18:47

## 2023-01-09 RX ADMIN — METHYLPREDNISOLONE SODIUM SUCCINATE 60 MG: 125 INJECTION, POWDER, LYOPHILIZED, FOR SOLUTION INTRAMUSCULAR; INTRAVENOUS at 08:56

## 2023-01-09 RX ADMIN — LEVALBUTEROL HYDROCHLORIDE 1.25 MG: 1.25 SOLUTION, CONCENTRATE RESPIRATORY (INHALATION) at 13:45

## 2023-01-09 RX ADMIN — AMLODIPINE BESYLATE 5 MG: 5 TABLET ORAL at 08:56

## 2023-01-09 RX ADMIN — ARFORMOTEROL TARTRATE 15 MCG: 15 SOLUTION RESPIRATORY (INHALATION) at 10:16

## 2023-01-09 RX ADMIN — GUAIFENESIN 400 MG: 400 TABLET ORAL at 10:52

## 2023-01-09 RX ADMIN — HYDROCHLOROTHIAZIDE 12.5 MG: 12.5 TABLET ORAL at 08:56

## 2023-01-09 RX ADMIN — METHYLPREDNISOLONE SODIUM SUCCINATE 40 MG: 40 INJECTION, POWDER, FOR SOLUTION INTRAMUSCULAR; INTRAVENOUS at 16:18

## 2023-01-09 RX ADMIN — GUAIFENESIN 400 MG: 400 TABLET ORAL at 18:43

## 2023-01-09 RX ADMIN — GABAPENTIN 600 MG: 300 CAPSULE ORAL at 08:55

## 2023-01-09 RX ADMIN — BUDESONIDE 500 MCG: 0.5 SUSPENSION RESPIRATORY (INHALATION) at 10:16

## 2023-01-09 RX ADMIN — GUAIFENESIN 400 MG: 400 TABLET ORAL at 06:48

## 2023-01-09 RX ADMIN — LEVALBUTEROL HYDROCHLORIDE 1.25 MG: 1.25 SOLUTION, CONCENTRATE RESPIRATORY (INHALATION) at 21:54

## 2023-01-09 RX ADMIN — GUAIFENESIN 400 MG: 400 TABLET ORAL at 04:03

## 2023-01-09 RX ADMIN — INSULIN LISPRO 4 UNITS: 100 INJECTION, SOLUTION INTRAVENOUS; SUBCUTANEOUS at 11:06

## 2023-01-09 ASSESSMENT — PAIN SCALES - GENERAL
PAINLEVEL_OUTOF10: 0
PAINLEVEL_OUTOF10: 0

## 2023-01-09 NOTE — PROGRESS NOTES
Subjective:    The patient is awake and alert.  No new problems overnight.  Denies chest pain, angina.    Denies abdominal pain.  Tolerating diet.  No nausea or vomiting.    Objective:  Still desating with ambulation.    /69   Pulse 80   Temp 97.6 °F (36.4 °C) (Oral)   Resp 17   Ht 5' 7.5\" (1.715 m)   Wt 160 lb 9.6 oz (72.8 kg)   SpO2 98%   BMI 24.78 kg/m²     Heart:  RRR, no murmurs, gallops, or rubs.  Lungs:  CTA bilaterally(at present), no wheeze, rales or rhonchi  Abd: bowel sounds present, nontender, nondistended, no masses  Extrem:  No clubbing, cyanosis, or edema  Neuro:  Intact    CBC:   Lab Results   Component Value Date/Time    WBC 13.3 01/09/2023 04:05 AM    RBC 3.62 01/09/2023 04:05 AM    HGB 11.6 01/09/2023 04:05 AM    HCT 34.9 01/09/2023 04:05 AM    MCV 96.4 01/09/2023 04:05 AM    MCH 32.0 01/09/2023 04:05 AM    MCHC 33.2 01/09/2023 04:05 AM    RDW 12.3 01/09/2023 04:05 AM     01/09/2023 04:05 AM    MPV 9.2 01/09/2023 04:05 AM     CMP:    Lab Results   Component Value Date/Time     01/09/2023 04:05 AM    K 4.1 01/09/2023 04:05 AM    K 4.1 10/01/2022 06:02 AM    CL 91 01/09/2023 04:05 AM    CO2 35 01/09/2023 04:05 AM    BUN 24 01/09/2023 04:05 AM    CREATININE 0.5 01/09/2023 04:05 AM    GFRAA >60 10/08/2022 03:20 AM    LABGLOM >60 01/09/2023 04:05 AM    GLUCOSE 246 01/09/2023 04:05 AM    PROT 5.5 12/27/2022 04:23 AM    LABALBU 3.3 12/27/2022 04:23 AM    CALCIUM 9.0 01/09/2023 04:05 AM    BILITOT 0.3 12/27/2022 04:23 AM    ALKPHOS 63 12/27/2022 04:23 AM    AST 13 12/27/2022 04:23 AM    ALT 18 12/27/2022 04:23 AM     BMP:    Lab Results   Component Value Date/Time     01/09/2023 04:05 AM    K 4.1 01/09/2023 04:05 AM    K 4.1 10/01/2022 06:02 AM    CL 91 01/09/2023 04:05 AM    CO2 35 01/09/2023 04:05 AM    BUN 24 01/09/2023 04:05 AM    LABALBU 3.3 12/27/2022 04:23 AM    CREATININE 0.5 01/09/2023 04:05 AM    CALCIUM 9.0 01/09/2023 04:05 AM    GFRAA >60 10/08/2022 03:20 AM     LABGLOM >60 01/09/2023 04:05 AM    GLUCOSE 246 01/09/2023 04:05 AM        Assessment:  RSV. Steroid induced diabetes. Patient Active Problem List   Diagnosis    COPD with acute exacerbation ([facility] [address]    Primary hypertension    Anxiety    Lactic acidosis    Hyperglycemia, drug-induced    Acute respiratory failure with hypoxia (HCC)    Type 2 diabetes mellitus (HCC)    COPD exacerbation (HCC)    Anemia    Respiratory failure (HCC)    RSV (respiratory syncytial virus infection)       Plan:  Present tx. N Sliding scale coverage for now.           Froilan Salguero MD  6:38 AM  1/9/2023

## 2023-01-09 NOTE — CARE COORDINATION
CASE MANAGEMENT. ... Met with patient at the bedside. He is tolerating o2 4lnc which is his baseline. Also, has nebulizer, chest vest inogen, pulse ox, can and walker. Uses [facility]. [facility], but cannot recall the agency. Did go to [facility] out pulm rehab at one time. No AMEE admissions. He lives with his wife, Carolina García, who is very supportive. Follows with Dr Gracie Mortensen at [facility]. He has appointment with him this Thursday 1/12/23 at . [address] with  [facility] locally. Mr Jumana Ye is currently on iv solumedrol 40mg q8hrs and is requiring insulin for hyperglycemia-which is new. He will return home at discharge. Denies any needs including HHC. Will need to assess o2 sats prior to dc to evaluate any changes in o2 needs. States he typically increases his o2 to 5lnc with ambulation. Will follow and assist with needs accordingly.

## 2023-01-09 NOTE — PROGRESS NOTES
Pulmonary Progress Note    Admit Date: 2023                            PCP: Kings Iraheta MD  Principal Problem:    Acute respiratory failure with hypoxia ([facility] [address]  Active Problems:    Anemia    RSV (respiratory syncytial virus infection)    COPD with acute exacerbation ([facility] [address]    Primary hypertension    Anxiety  Resolved Problems:    * No resolved hospital problems. *      Subjective:  Resting in bed on 4Lnc. Feeling a little better today . Medications:   dextrose          insulin lispro  0-8 Units SubCUTAneous TID WC    insulin lispro  0-4 Units SubCUTAneous Nightly    methylPREDNISolone  60 mg IntraVENous Q8H    b complex-C-folic acid  1 capsule Oral Daily    gabapentin  600 mg Oral BID    arformoterol tartrate  15 mcg Nebulization BID    budesonide  0.5 mg Nebulization BID    Budeson-Glycopyrrol-Formoterol  2 puff Inhalation BID    guaiFENesin  400 mg Oral 6x Daily    hydroCHLOROthiazide  12.5 mg Oral QAM    cetirizine  5 mg Oral Daily    tamsulosin  0.4 mg Oral Nightly    zinc sulfate  50 mg Oral QPM    enoxaparin  40 mg SubCUTAneous Daily    levalbuterol  1 ampule Nebulization Q4H WA    amLODIPine  5 mg Oral Daily    And    lisinopril  20 mg Oral Daily       Vitals:  VITALS:  BP (!) 149/68   Pulse 75   Temp 97.8 °F (36.6 °C) (Oral)   Resp 18   Ht 5' 7.5\" (1.715 m)   Wt 160 lb 9.6 oz (72.8 kg)   SpO2 98%   BMI 24.78 kg/m²   24HR INTAKE/OUTPUT:  No intake or output data in the 24 hours ending 23 1147  CURRENT PULSE OXIMETRY:  SpO2: 98 %  24HR PULSE OXIMETRY RANGE:  SpO2  Av.8 %  Min: 97 %  Max: 98 %  CVP:    VENT SETTINGS:      Additional Respiratory Assessments  Heart Rate: 75  Resp: 18  SpO2: 98 %      EXAM:  General: Alert, in NAD  ENT: No discharge. Pharynx clear. membranes moist   Neck: Supple, Trachea midline. Resp: No accessory muscle use. Non-labored. Lungs decreased minimal wz   CV: Regular rate. Regular rhythm. No murmur . No edema. ABD: Non-tender. Non-distended.  Soft, round . Normal bowel sounds. Skin: Warm and dry. M/S: No cyanosis. No joint deformity. No clubbing. Neuro: Awake. Follows commands. O x 3, APODACA  Psych:  calm and interactive     I/O: No intake/output data recorded. No intake/output data recorded. Results:  CBC:   Recent Labs     01/07/23  0509 01/08/23  0327 01/09/23  0405   WBC 12.4* 14.0* 13.3*   HGB 11.0* 10.9* 11.6*   HCT 32.8* 33.5* 34.9*   MCV 96.2 96.8 96.4    197 195     BMP:   Recent Labs     01/07/23  0509 01/08/23  0327 01/09/23  0405    133 132   K 4.4 4.3 4.1   CL 94* 93* 91*   CO2 33* 33* 35*   BUN 21 22 24*   CREATININE 0.6* 0.6* 0.5*     LFT: No results for input(s): ALKPHOS, ALT, AST, PROT, BILITOT, BILIDIR, LABALBU in the last 72 hours. PT/INR:   Recent Labs     01/06/23  1208   PROTIME 10.6   INR 1.0     Procalcitonin: No results for input(s): PROCAL in the last 72 hours. Cultures:    Latest Reference Range & Units 1/6/23 00:55 1/6/23 12:08   Influenza A by PCR Not Detected  Not Detected Not Detected   Influenza B by PCR Not Detected  Not Detected Not Detected   Adenovirus by PCR Not Detected  Not Detected     Coronavirus 229E by PCR Not Detected  Not Detected     Coronavirus HKU1 by PCR Not Detected  Not Detected     Coronavirus NL63 by PCR Not Detected  Not Detected     Coronavirus OC43 by PCR Not Detected  Not Detected     Human Metapneumovirus by PCR Not Detected  Not Detected     Human Rhinovirus/Enterovirus by PCR Not Detected  Not Detected     Parainfluenza Virus 1 by PCR Not Detected  Not Detected     Parainfluenza Virus 2 by PCR Not Detected  Not Detected     Parainfluenza Virus 3 by PCR Not Detected  Not Detected     Parainfluenza Virus 4 by PCR Not Detected  Not Detected     Respiratory Syncytial Virus by PCR Not Detected  DETECTED !      Bordetella parapertussis by PCR Not Detected  Not Detected     Chlamydophilia pneumoniae by PCR Not Detected  Not Detected     Mycoplasma pneumoniae by PCR Not Detected  Not Detected     SARS-CoV-2, PCR Not Detected  Not Detected     SARS-CoV-2, NAAT Not Detected    Not Detected   Bordetella pertussis by PCR Not Detected  Not Detected     !: Data is abnormal           ABG:   Recent Labs     01/06/23  1228   PH 7.470*   PO2 107.7*   PCO2 46.8*   HCO3 33.3*   BE 8.4*   O2SAT 98.3       Films:  XR CHEST PORTABLE    Result Date: 1/6/2023  EXAMINATION: ONE XRAY VIEW OF THE CHEST 1/6/2023 10:59 am COMPARISON: 24 December 2022 HISTORY: ORDERING SYSTEM PROVIDED HISTORY: sob TECHNOLOGIST PROVIDED HISTORY: Reason for exam:->sob FINDINGS: Bronchial wall stents on the right are faintly visible. Partial right middle lobe collapse is again noted. Normal heart and pulmonary vascularity. There is obstructive airways disease. Persistent partial right middle lobe collapse with indwelling bronchial wall stents. Obstructive airways disease. CTA CHEST W CONTRAST    Result Date: 1/6/2023  EXAMINATION: CTA OF THE CHEST 1/6/2023 3:07 pm TECHNIQUE: CTA of the chest was performed after the administration of intravenous contrast.  Multiplanar reformatted images are provided for review. MIP images are provided for review. Automated exposure control, iterative reconstruction, and/or weight based adjustment of the mA/kV was utilized to reduce the radiation dose to as low as reasonably achievable. COMPARISON: None. HISTORY: ORDERING SYSTEM PROVIDED HISTORY: ro pe TECHNOLOGIST PROVIDED HISTORY: Reason for exam:->ro pe Decision Support Exception - unselect if not a suspected or confirmed emergency medical condition->Emergency Medical Condition (MA) FINDINGS: In view previous examination of a June 9, 2020 2nd and December 24, 2020. There is a persistent pattern of collapse of the right middle lobe with endobronchial obstruction at the right middle lobe bronchus stent. 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  There additional bronchial stents for the right upper anterior posterior, and apical segments with obstruction of the lumen of the posterior segment stent. There is no atelectasis in the right upper lobe associated with these findings. Collateral drift circulation most likely present for the posterior segment of the right upper lobe. Discrete residual infiltrate in the distal peribronchial area/bronchiolar is seen in the right lower. There where previous infiltrates in that area. Advanced emphysematous changes are present in the lung parenchyma. There is a residual confluent scar in the apicoposterior aspect of the left upper lobe which had developed since the study of June 2021 but has not changed significantly since October 2, 2020. There are no superimposed acute infiltrates or consolidation to the lung parenchyma of both lungs. There is no pleural effusions. There is no indication for acute pulmonary embolus in the main PA, right left main PAs, in the lobar, segmental and subsegmental branches to the 3/4 order approximately. There is no aneurysm formation or dissection of the thoracic aorta. The heart is normal size. LV inner diameter: 3.8 cm. The RV inner diameter: 4.5 cm. RV/LV ratio: 1.18, being mildly enlarged. There is no pericardial effusion. Calcifications are seen in the coronary arteries. There is no aneurysm formation or dissection of the thoracic aorta. Calcified atheromatous changes are seen throughout the arch of the aorta, descending thoracic aorta and visualized upper abdominal aorta. There is a aneurysm of the infrarenal segment of the upper abdominal aorta not fully covered on this study. The proximal segment of the aneurysm visualized on the present examination measures 2.3 x 2.7 cm. There is no mediastinal masses or adenopathy. Upper abdominal structures not fully covered on this study. The calcifications of the renal arteries are seen bilaterally in the hilum of the kidneys. Adrenals not enlarged. Gallbladder is normally distended.   The biliary tree and pancreatic duct systems are not dilated. There normal size and the early arterial enhancement for the liver, the spleen and pancreas. Visualized bone structures demonstrate previous vertebroplasty in L1 and L2 with loss of height of T10 and T11 vertebral bodies and minimal of T12 vertebral body. 1.  No acute pulmonary emboli. 2.  No aneurysm formation or dissection of the thoracic aorta. 3.  There is a aneurysm of the abdominal aorta not fully covered on this study, proximal segment of the aneurysm measures 2.3 x 2.7 cm. 4.  Presence of endobronchial stent for the right middle lobe with obstruction of the stent, causing now a pattern of chronic right middle lobe syndrome pattern. 5.  Endobronchial obstructions for the anterior, apical, and posterior segments of the right upper lobe. There is obstruction of the posterior segment stent. No atelectasis are seen in the right upper lobe. Air drifting collateral circulation considered. 6.  Discrete residual distal peribronchial/bronchiolar infiltrate in the posteromedial aspect of the right upper lobe. Acute Exacerbation of COPD    IV Solu-Medrol 60mg IV tq8 decrease to 40 Q8  Breztri at [place], continue   Brovana, Pulmicort and Xopenex while inpatient. Mucinex and chest vest q8H  + RSV   Chronic Hypoxic Respiratory Failure-at baseline   Currently on 5-6 L nasal cannula pulse ox 99%  Baseline is 2 to 5 L at home  Recent  CAP (Community Acquired Pneumonia)  S/p doxycyline and Rocephin in ED--   Obtain procal   Bronchiectasis   Continue chest vest/physiotherapy with Xopenex, he does not like Flutter valve  H/o right upper lobe Endobronchial valves x4, [facility] with [facility] Eyal Garcia scheduled appt 1/12/23, needs to be seen, if no better possible transfer to [facility]  DVT/PE prophylaxis on Lovenox       Electronically signed by KELECHI Johnson on 1/9/2023 at 11:47 AM    Seen and examined, agree with above. Recurrent acute exacerbation of copd improving.  He needs dc within 48 hours to get up to [address] to evaluate his [place]ler valves so continue to wean down steroids. Needs extra lorazepam to take his triple neb meds as makes him anxious.

## 2023-01-09 NOTE — PLAN OF CARE
Problem: Discharge Planning  Goal: Discharge to home or other facility with appropriate resources  Outcome: Progressing     Problem: Safety - Adult  Goal: Free from fall injury  1/9/2023 0156 by Siddhartha Persaud RN  Outcome: Progressing     Problem: Pain  Goal: Verbalizes/displays adequate comfort level or baseline comfort level  1/9/2023 0156 by Siddhartha Persaud RN  Outcome: Progressing     Problem: ABCDS Injury Assessment  Goal: Absence of physical injury  Outcome: Progressing

## 2023-01-10 LAB
ANION GAP SERPL CALCULATED.3IONS-SCNC: 8 MMOL/L (ref 7–16)
BUN BLDV-MCNC: 22 MG/DL (ref 6–23)
CALCIUM SERPL-MCNC: 8.9 MG/DL (ref 8.6–10.2)
CHLORIDE BLD-SCNC: 91 MMOL/L (ref 98–107)
CO2: 35 MMOL/L (ref 22–29)
CREAT SERPL-MCNC: 0.5 MG/DL (ref 0.7–1.2)
GFR SERPL CREATININE-BSD FRML MDRD: >60 ML/MIN/1.73
GLUCOSE BLD-MCNC: 236 MG/DL (ref 74–99)
HCT VFR BLD CALC: 34.1 % (ref 37–54)
HEMOGLOBIN: 11.5 G/DL (ref 12.5–16.5)
MCH RBC QN AUTO: 32 PG (ref 26–35)
MCHC RBC AUTO-ENTMCNC: 33.7 % (ref 32–34.5)
MCV RBC AUTO: 95 FL (ref 80–99.9)
METER GLUCOSE: 156 MG/DL (ref 74–99)
METER GLUCOSE: 190 MG/DL (ref 74–99)
METER GLUCOSE: 217 MG/DL (ref 74–99)
METER GLUCOSE: 303 MG/DL (ref 74–99)
PDW BLD-RTO: 12.3 FL (ref 11.5–15)
PLATELET # BLD: 176 E9/L (ref 130–450)
PMV BLD AUTO: 9.4 FL (ref 7–12)
POTASSIUM SERPL-SCNC: 3.7 MMOL/L (ref 3.5–5)
RBC # BLD: 3.59 E12/L (ref 3.8–5.8)
SODIUM BLD-SCNC: 134 MMOL/L (ref 132–146)
WBC # BLD: 12.1 E9/L (ref 4.5–11.5)

## 2023-01-10 PROCEDURE — 80048 BASIC METABOLIC PNL TOTAL CA: CPT

## 2023-01-10 PROCEDURE — 2700000000 HC OXYGEN THERAPY PER DAY

## 2023-01-10 PROCEDURE — 94640 AIRWAY INHALATION TREATMENT: CPT

## 2023-01-10 PROCEDURE — 82962 GLUCOSE BLOOD TEST: CPT

## 2023-01-10 PROCEDURE — 6360000002 HC RX W HCPCS: Performed by: CLINICAL NURSE SPECIALIST

## 2023-01-10 PROCEDURE — 2060000000 HC ICU INTERMEDIATE R&B

## 2023-01-10 PROCEDURE — 6360000002 HC RX W HCPCS: Performed by: INTERNAL MEDICINE

## 2023-01-10 PROCEDURE — 85027 COMPLETE CBC AUTOMATED: CPT

## 2023-01-10 PROCEDURE — 6370000000 HC RX 637 (ALT 250 FOR IP): Performed by: INTERNAL MEDICINE

## 2023-01-10 PROCEDURE — 36415 COLL VENOUS BLD VENIPUNCTURE: CPT

## 2023-01-10 PROCEDURE — 94669 MECHANICAL CHEST WALL OSCILL: CPT

## 2023-01-10 PROCEDURE — 94668 MNPJ CHEST WALL SBSQ: CPT

## 2023-01-10 RX ORDER — METHYLPREDNISOLONE SODIUM SUCCINATE 40 MG/ML
40 INJECTION, POWDER, LYOPHILIZED, FOR SOLUTION INTRAMUSCULAR; INTRAVENOUS EVERY 12 HOURS
Status: DISCONTINUED | OUTPATIENT
Start: 2023-01-10 | End: 2023-01-11

## 2023-01-10 RX ADMIN — GABAPENTIN 600 MG: 300 CAPSULE ORAL at 20:41

## 2023-01-10 RX ADMIN — GABAPENTIN 600 MG: 300 CAPSULE ORAL at 08:25

## 2023-01-10 RX ADMIN — GUAIFENESIN 400 MG: 400 TABLET ORAL at 11:02

## 2023-01-10 RX ADMIN — METHYLPREDNISOLONE SODIUM SUCCINATE 40 MG: 40 INJECTION, POWDER, FOR SOLUTION INTRAMUSCULAR; INTRAVENOUS at 08:25

## 2023-01-10 RX ADMIN — GUAIFENESIN 400 MG: 400 TABLET ORAL at 03:32

## 2023-01-10 RX ADMIN — GUAIFENESIN 400 MG: 400 TABLET ORAL at 18:41

## 2023-01-10 RX ADMIN — CETIRIZINE HYDROCHLORIDE 5 MG: 10 TABLET, FILM COATED ORAL at 08:25

## 2023-01-10 RX ADMIN — BUDESONIDE 500 MCG: 0.5 SUSPENSION RESPIRATORY (INHALATION) at 09:15

## 2023-01-10 RX ADMIN — ENOXAPARIN SODIUM 40 MG: 100 INJECTION SUBCUTANEOUS at 08:25

## 2023-01-10 RX ADMIN — METHYLPREDNISOLONE SODIUM SUCCINATE 40 MG: 40 INJECTION, POWDER, FOR SOLUTION INTRAMUSCULAR; INTRAVENOUS at 00:12

## 2023-01-10 RX ADMIN — ZINC SULFATE 220 MG (50 MG) CAPSULE 50 MG: CAPSULE at 18:41

## 2023-01-10 RX ADMIN — GUAIFENESIN 400 MG: 400 TABLET ORAL at 15:07

## 2023-01-10 RX ADMIN — AMLODIPINE BESYLATE 5 MG: 5 TABLET ORAL at 08:24

## 2023-01-10 RX ADMIN — ARFORMOTEROL TARTRATE 15 MCG: 15 SOLUTION RESPIRATORY (INHALATION) at 09:15

## 2023-01-10 RX ADMIN — LISINOPRIL 20 MG: 20 TABLET ORAL at 08:24

## 2023-01-10 RX ADMIN — ARFORMOTEROL TARTRATE 15 MCG: 15 SOLUTION RESPIRATORY (INHALATION) at 19:48

## 2023-01-10 RX ADMIN — NEPHROCAP 1 MG: 1 CAP ORAL at 08:24

## 2023-01-10 RX ADMIN — LEVALBUTEROL HYDROCHLORIDE 1.25 MG: 1.25 SOLUTION, CONCENTRATE RESPIRATORY (INHALATION) at 15:41

## 2023-01-10 RX ADMIN — GUAIFENESIN 400 MG: 400 TABLET ORAL at 06:30

## 2023-01-10 RX ADMIN — LORAZEPAM 0.5 MG: 0.5 TABLET ORAL at 08:24

## 2023-01-10 RX ADMIN — GUAIFENESIN 400 MG: 400 TABLET ORAL at 23:44

## 2023-01-10 RX ADMIN — GUAIFENESIN 400 MG: 400 TABLET ORAL at 00:12

## 2023-01-10 RX ADMIN — LEVALBUTEROL HYDROCHLORIDE 1.25 MG: 1.25 SOLUTION, CONCENTRATE RESPIRATORY (INHALATION) at 12:15

## 2023-01-10 RX ADMIN — METHYLPREDNISOLONE SODIUM SUCCINATE 40 MG: 40 INJECTION, POWDER, FOR SOLUTION INTRAMUSCULAR; INTRAVENOUS at 20:41

## 2023-01-10 RX ADMIN — BUDESONIDE 500 MCG: 0.5 SUSPENSION RESPIRATORY (INHALATION) at 19:48

## 2023-01-10 RX ADMIN — HYDROCHLOROTHIAZIDE 12.5 MG: 12.5 TABLET ORAL at 08:25

## 2023-01-10 RX ADMIN — INSULIN LISPRO 2 UNITS: 100 INJECTION, SOLUTION INTRAVENOUS; SUBCUTANEOUS at 06:29

## 2023-01-10 RX ADMIN — LORAZEPAM 0.5 MG: 0.5 TABLET ORAL at 19:22

## 2023-01-10 RX ADMIN — TAMSULOSIN HYDROCHLORIDE 0.4 MG: 0.4 CAPSULE ORAL at 20:41

## 2023-01-10 RX ADMIN — LEVALBUTEROL HYDROCHLORIDE 1.25 MG: 1.25 SOLUTION, CONCENTRATE RESPIRATORY (INHALATION) at 19:48

## 2023-01-10 RX ADMIN — INSULIN LISPRO 6 UNITS: 100 INJECTION, SOLUTION INTRAVENOUS; SUBCUTANEOUS at 15:10

## 2023-01-10 RX ADMIN — LEVALBUTEROL HYDROCHLORIDE 1.25 MG: 1.25 SOLUTION, CONCENTRATE RESPIRATORY (INHALATION) at 09:15

## 2023-01-10 NOTE — PROGRESS NOTES
Subjective:  Still easily dyspneic and hypoxic. The patient is awake and alert. No problems overnight. Denies chest pain, angina, and dyspnea. Denies abdominal pain. Tolerating diet. No nausea or vomiting. Objective:  Better DM control. BP (!) 155/66   Pulse 71   Temp 97.6 °F (36.4 °C) (Oral)   Resp 18   Ht 5' 7.5\" (1.715 m)   Wt 159 lb 3 oz (72.2 kg)   SpO2 97%   BMI 24.56 kg/m²     Heart:  RRR, no murmurs, gallops, or rubs. Lungs: Scattered rhonchi  Abd: bowel sounds present, nontender, nondistended, no masses  Extrem:  No clubbing, cyanosis, or edema    CBC:   Lab Results   Component Value Date/Time    WBC 12.1 01/10/2023 05:14 AM    RBC 3.59 01/10/2023 05:14 AM    HGB 11.5 01/10/2023 05:14 AM    HCT 34.1 01/10/2023 05:14 AM    MCV 95.0 01/10/2023 05:14 AM    MCH 32.0 01/10/2023 05:14 AM    MCHC 33.7 01/10/2023 05:14 AM    RDW 12.3 01/10/2023 05:14 AM     01/10/2023 05:14 AM    MPV 9.4 01/10/2023 05:14 AM     BMP:    Lab Results   Component Value Date/Time     01/10/2023 05:14 AM    K 3.7 01/10/2023 05:14 AM    K 4.1 10/01/2022 06:02 AM    CL 91 01/10/2023 05:14 AM    CO2 35 01/10/2023 05:14 AM    BUN 22 01/10/2023 05:14 AM    LABALBU 3.3 12/27/2022 04:23 AM    CREATININE 0.5 01/10/2023 05:14 AM    CALCIUM 8.9 01/10/2023 05:14 AM    GFRAA >60 10/08/2022 03:20 AM    LABGLOM >60 01/10/2023 05:14 AM    GLUCOSE 236 01/10/2023 05:14 AM        Assessment:    Patient Active Problem List   Diagnosis    COPD with acute exacerbation ([address]    Primary hypertension    Anxiety    Lactic acidosis    Hyperglycemia, drug-induced    Acute respiratory failure with hypoxia (HCC)    Type 2 diabetes mellitus (HCC)    COPD exacerbation (HCC)    Anemia    Respiratory failure (HCC)    RSV (respiratory syncytial virus infection)       Plan:  Anticipating [facility] [place].           Krishna Joel MD  7:12 AM  1/10/2023

## 2023-01-10 NOTE — PROGRESS NOTES
Pulmonary Progress Note    Admit Date: 2023                            PCP: Monik Abdi MD  Principal Problem:    Acute respiratory failure with hypoxia ([facility] [address]  Active Problems:    Anemia    RSV (respiratory syncytial virus infection)    COPD with acute exacerbation ([facility] [address]    Primary hypertension    Anxiety  Resolved Problems:    * No resolved hospital problems. *      Subjective:  Resting in bed on 4Lnc. Feeling a little better today . Hoping for dc tomorrow     Medications:   dextrose          insulin lispro  0-8 Units SubCUTAneous TID WC    insulin lispro  0-4 Units SubCUTAneous Nightly    methylPREDNISolone  40 mg IntraVENous Q8H    b complex-C-folic acid  1 capsule Oral Daily    gabapentin  600 mg Oral BID    arformoterol tartrate  15 mcg Nebulization BID    budesonide  0.5 mg Nebulization BID    Budeson-Glycopyrrol-Formoterol  2 puff Inhalation BID    guaiFENesin  400 mg Oral 6x Daily    hydroCHLOROthiazide  12.5 mg Oral QAM    cetirizine  5 mg Oral Daily    tamsulosin  0.4 mg Oral Nightly    zinc sulfate  50 mg Oral QPM    enoxaparin  40 mg SubCUTAneous Daily    levalbuterol  1 ampule Nebulization Q4H WA    amLODIPine  5 mg Oral Daily    And    lisinopril  20 mg Oral Daily       Vitals:  VITALS:  BP (!) 152/60   Pulse 62   Temp 97.4 °F (36.3 °C) (Oral)   Resp 18   Ht 5' 7.5\" (1.715 m)   Wt 159 lb 3 oz (72.2 kg)   SpO2 97%   BMI 24.56 kg/m²   24HR INTAKE/OUTPUT:  No intake or output data in the 24 hours ending 01/10/23 1146  CURRENT PULSE OXIMETRY:  SpO2: 97 %  24HR PULSE OXIMETRY RANGE:  SpO2  Av %  Min: 96 %  Max: 98 %  CVP:    VENT SETTINGS:      Additional Respiratory Assessments  Heart Rate: 62  Resp: 18  SpO2: 97 %      EXAM:  General: Alert, in NAD  ENT: No discharge. Pharynx clear. membranes moist   Neck: Supple, Trachea midline. Resp: No accessory muscle use. Non-labored. Lungs decreased minimal wz   CV: Regular rate. Regular rhythm. No murmur . No edema. ABD: Non-tender. Non-distended. Soft, round . Normal bowel sounds. Skin: Warm and dry. M/S: No cyanosis. No joint deformity. No clubbing. Neuro: Awake. Follows commands. O x 3, APODACA  Psych:  calm and interactive     I/O: No intake/output data recorded. No intake/output data recorded. Results:  CBC:   Recent Labs     01/08/23  0327 01/09/23  0405 01/10/23  0514   WBC 14.0* 13.3* 12.1*   HGB 10.9* 11.6* 11.5*   HCT 33.5* 34.9* 34.1*   MCV 96.8 96.4 95.0    195 176     BMP:   Recent Labs     01/08/23  0327 01/09/23  0405 01/10/23  0514    132 134   K 4.3 4.1 3.7   CL 93* 91* 91*   CO2 33* 35* 35*   BUN 22 24* 22   CREATININE 0.6* 0.5* 0.5*     LFT: No results for input(s): ALKPHOS, ALT, AST, PROT, BILITOT, BILIDIR, LABALBU in the last 72 hours. PT/INR:   No results for input(s): PROTIME, INR in the last 72 hours. Procalcitonin: No results for input(s): PROCAL in the last 72 hours. Cultures:    Latest Reference Range & Units 1/6/23 00:55 1/6/23 12:08   Influenza A by PCR Not Detected  Not Detected Not Detected   Influenza B by PCR Not Detected  Not Detected Not Detected   Adenovirus by PCR Not Detected  Not Detected     Coronavirus 229E by PCR Not Detected  Not Detected     Coronavirus HKU1 by PCR Not Detected  Not Detected     Coronavirus NL63 by PCR Not Detected  Not Detected     Coronavirus OC43 by PCR Not Detected  Not Detected     Human Metapneumovirus by PCR Not Detected  Not Detected     Human Rhinovirus/Enterovirus by PCR Not Detected  Not Detected     Parainfluenza Virus 1 by PCR Not Detected  Not Detected     Parainfluenza Virus 2 by PCR Not Detected  Not Detected     Parainfluenza Virus 3 by PCR Not Detected  Not Detected     Parainfluenza Virus 4 by PCR Not Detected  Not Detected     Respiratory Syncytial Virus by PCR Not Detected  DETECTED !      Bordetella parapertussis by PCR Not Detected  Not Detected     Chlamydophilia pneumoniae by PCR Not Detected  Not Detected     Mycoplasma pneumoniae by PCR Not Detected  Not Detected     SARS-CoV-2, PCR Not Detected  Not Detected     SARS-CoV-2, NAAT Not Detected    Not Detected   Bordetella pertussis by PCR Not Detected  Not Detected     !: Data is abnormal           ABG:   No results for input(s): PH, PO2, PCO2, HCO3, BE, O2SAT in the last 72 hours. Films:  XR CHEST PORTABLE    Result Date: 1/6/2023  EXAMINATION: ONE XRAY VIEW OF THE CHEST 1/6/2023 10:59 am COMPARISON: 24 December 2022 HISTORY: ORDERING SYSTEM PROVIDED HISTORY: sob TECHNOLOGIST PROVIDED HISTORY: Reason for exam:->sob FINDINGS: Bronchial wall stents on the right are faintly visible. Partial right middle lobe collapse is again noted. Normal heart and pulmonary vascularity. There is obstructive airways disease. Persistent partial right middle lobe collapse with indwelling bronchial wall stents. Obstructive airways disease. CTA CHEST W CONTRAST    Result Date: 1/6/2023  EXAMINATION: CTA OF THE CHEST 1/6/2023 3:07 pm TECHNIQUE: CTA of the chest was performed after the administration of intravenous contrast.  Multiplanar reformatted images are provided for review. MIP images are provided for review. Automated exposure control, iterative reconstruction, and/or weight based adjustment of the mA/kV was utilized to reduce the radiation dose to as low as reasonably achievable. COMPARISON: None. HISTORY: ORDERING SYSTEM PROVIDED HISTORY: ro pe TECHNOLOGIST PROVIDED HISTORY: Reason for exam:->ro pe Decision Support Exception - unselect if not a suspected or confirmed emergency medical condition->Emergency Medical Condition (MA) FINDINGS: In view previous examination of a June 9, 2020 2nd and December 24, 2020. There is a persistent pattern of collapse of the right middle lobe with endobronchial obstruction at the right middle lobe bronchus stent. 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  There additional bronchial stents for the right upper anterior posterior, and apical segments with obstruction of the lumen of the posterior segment stent. There is no atelectasis in the right upper lobe associated with these findings. Collateral drift circulation most likely present for the posterior segment of the right upper lobe. Discrete residual infiltrate in the distal peribronchial area/bronchiolar is seen in the right lower. There where previous infiltrates in that area. Advanced emphysematous changes are present in the lung parenchyma. There is a residual confluent scar in the apicoposterior aspect of the left upper lobe which had developed since the study of June 2021 but has not changed significantly since October 2, 2020. There are no superimposed acute infiltrates or consolidation to the lung parenchyma of both lungs. There is no pleural effusions. There is no indication for acute pulmonary embolus in the main PA, right left main PAs, in the lobar, segmental and subsegmental branches to the 3/4 order approximately. There is no aneurysm formation or dissection of the thoracic aorta. The heart is normal size. LV inner diameter: 3.8 cm. The RV inner diameter: 4.5 cm. RV/LV ratio: 1.18, being mildly enlarged. There is no pericardial effusion. Calcifications are seen in the coronary arteries. There is no aneurysm formation or dissection of the thoracic aorta. Calcified atheromatous changes are seen throughout the arch of the aorta, descending thoracic aorta and visualized upper abdominal aorta. There is a aneurysm of the infrarenal segment of the upper abdominal aorta not fully covered on this study. The proximal segment of the aneurysm visualized on the present examination measures 2.3 x 2.7 cm. There is no mediastinal masses or adenopathy. Upper abdominal structures not fully covered on this study. The calcifications of the renal arteries are seen bilaterally in the hilum of the kidneys. Adrenals not enlarged. Gallbladder is normally distended.   The biliary tree and pancreatic duct systems are not dilated. There normal size and the early arterial enhancement for the liver, the spleen and pancreas. Visualized bone structures demonstrate previous vertebroplasty in L1 and L2 with loss of height of T10 and T11 vertebral bodies and minimal of T12 vertebral body. 1.  No acute pulmonary emboli. 2.  No aneurysm formation or dissection of the thoracic aorta. 3.  There is a aneurysm of the abdominal aorta not fully covered on this study, proximal segment of the aneurysm measures 2.3 x 2.7 cm. 4.  Presence of endobronchial stent for the right middle lobe with obstruction of the stent, causing now a pattern of chronic right middle lobe syndrome pattern. 5.  Endobronchial obstructions for the anterior, apical, and posterior segments of the right upper lobe. There is obstruction of the posterior segment stent. No atelectasis are seen in the right upper lobe. Air drifting collateral circulation considered. 6.  Discrete residual distal peribronchial/bronchiolar infiltrate in the posteromedial aspect of the right upper lobe. Acute Exacerbation of COPD    IV Solu-Medrol to 40 q12H today , plan for oral taper tomorrow for DC   Breztri at [place], continue   Brovana, Pulmicort and Xopenex while inpatient. Mucinex and chest vest q8H  + RSV   Chronic Hypoxic Respiratory Failure-at baseline   Currently on 5-6 L nasal cannula pulse ox 99%  Baseline is 2 to 5 L at home  Recent  CAP (Community Acquired Pneumonia)  S/p doxycyline and Rocephin in ED--   Obtain procal   Bronchiectasis   Continue chest vest/physiotherapy with Xopenex, he does not like Flutter valve  H/o right upper lobe Endobronchial valves x4, follows a [facility] with Dr. Russell [place] scheduled appt 1/12/23, needs dc 1/11 so he can g to 1/12 appt   DVT/PE prophylaxis on Lovenox       Electronically signed by KELECHI Gutiérrez - CNS on 1/10/2023 at 11:46 AM    Seen and examined, agree with above. Improving acute exacerbation of severe copd. Okay to wean steroids, continue nebs and dc tomorrow on prednisone taper with plans to have Zephryr valves reevaluated at [facility] 1/12.

## 2023-01-10 NOTE — PROGRESS NOTES
Pulse ox was 97% on 4L at rest.  Ambulated patient on 4L. Oxygen saturation was 92% 4L while ambulating. At rest oxygen saturation was 96% on 4L.

## 2023-01-11 VITALS
HEIGHT: 68 IN | DIASTOLIC BLOOD PRESSURE: 60 MMHG | RESPIRATION RATE: 18 BRPM | SYSTOLIC BLOOD PRESSURE: 120 MMHG | OXYGEN SATURATION: 94 % | WEIGHT: 155.5 LBS | BODY MASS INDEX: 23.57 KG/M2 | TEMPERATURE: 98.1 F | HEART RATE: 86 BPM

## 2023-01-11 LAB
ANION GAP SERPL CALCULATED.3IONS-SCNC: 5 MMOL/L (ref 7–16)
BUN BLDV-MCNC: 21 MG/DL (ref 6–23)
CALCIUM SERPL-MCNC: 8.9 MG/DL (ref 8.6–10.2)
CHLORIDE BLD-SCNC: 91 MMOL/L (ref 98–107)
CO2: 36 MMOL/L (ref 22–29)
CREAT SERPL-MCNC: 0.5 MG/DL (ref 0.7–1.2)
GFR SERPL CREATININE-BSD FRML MDRD: >60 ML/MIN/1.73
GLUCOSE BLD-MCNC: 227 MG/DL (ref 74–99)
HCT VFR BLD CALC: 33.4 % (ref 37–54)
HEMOGLOBIN: 11.3 G/DL (ref 12.5–16.5)
MCH RBC QN AUTO: 31.8 PG (ref 26–35)
MCHC RBC AUTO-ENTMCNC: 33.8 % (ref 32–34.5)
MCV RBC AUTO: 94.1 FL (ref 80–99.9)
METER GLUCOSE: 183 MG/DL (ref 74–99)
METER GLUCOSE: 186 MG/DL (ref 74–99)
METER GLUCOSE: 220 MG/DL (ref 74–99)
PDW BLD-RTO: 12.2 FL (ref 11.5–15)
PLATELET # BLD: 164 E9/L (ref 130–450)
PMV BLD AUTO: 9.4 FL (ref 7–12)
POTASSIUM SERPL-SCNC: 3.9 MMOL/L (ref 3.5–5)
RBC # BLD: 3.55 E12/L (ref 3.8–5.8)
SODIUM BLD-SCNC: 132 MMOL/L (ref 132–146)
WBC # BLD: 12.8 E9/L (ref 4.5–11.5)

## 2023-01-11 PROCEDURE — 6360000002 HC RX W HCPCS: Performed by: INTERNAL MEDICINE

## 2023-01-11 PROCEDURE — 6360000002 HC RX W HCPCS: Performed by: CLINICAL NURSE SPECIALIST

## 2023-01-11 PROCEDURE — 2700000000 HC OXYGEN THERAPY PER DAY

## 2023-01-11 PROCEDURE — 85027 COMPLETE CBC AUTOMATED: CPT

## 2023-01-11 PROCEDURE — 80048 BASIC METABOLIC PNL TOTAL CA: CPT

## 2023-01-11 PROCEDURE — 36415 COLL VENOUS BLD VENIPUNCTURE: CPT

## 2023-01-11 PROCEDURE — 94640 AIRWAY INHALATION TREATMENT: CPT

## 2023-01-11 PROCEDURE — 82962 GLUCOSE BLOOD TEST: CPT

## 2023-01-11 PROCEDURE — 6370000000 HC RX 637 (ALT 250 FOR IP): Performed by: INTERNAL MEDICINE

## 2023-01-11 PROCEDURE — 94668 MNPJ CHEST WALL SBSQ: CPT

## 2023-01-11 RX ORDER — PREDNISONE 20 MG/1
20 TABLET ORAL
Qty: 3 TABLET | Refills: 0 | Status: SHIPPED | OUTPATIENT
Start: 2023-01-18 | End: 2023-01-21

## 2023-01-11 RX ORDER — PREDNISONE 20 MG/1
40 TABLET ORAL
Qty: 6 TABLET | Refills: 0 | Status: SHIPPED | OUTPATIENT
Start: 2023-01-12 | End: 2023-01-15

## 2023-01-11 RX ORDER — PREDNISONE 20 MG/1
40 TABLET ORAL
Status: DISCONTINUED | OUTPATIENT
Start: 2023-01-12 | End: 2023-01-11 | Stop reason: HOSPADM

## 2023-01-11 RX ORDER — LOPERAMIDE HYDROCHLORIDE 2 MG/1
2 CAPSULE ORAL 4 TIMES DAILY PRN
Qty: 20 CAPSULE | Refills: 0 | Status: SHIPPED | OUTPATIENT
Start: 2023-01-11 | End: 2023-01-11 | Stop reason: SDUPTHER

## 2023-01-11 RX ORDER — PREDNISONE 20 MG/1
20 TABLET ORAL
Qty: 3 TABLET | Refills: 0 | Status: SHIPPED | OUTPATIENT
Start: 2023-01-18 | End: 2023-01-11 | Stop reason: SDUPTHER

## 2023-01-11 RX ORDER — PREDNISONE 20 MG/1
40 TABLET ORAL
Qty: 6 TABLET | Refills: 0 | Status: SHIPPED | OUTPATIENT
Start: 2023-01-12 | End: 2023-01-11 | Stop reason: SDUPTHER

## 2023-01-11 RX ORDER — LOPERAMIDE HYDROCHLORIDE 2 MG/1
2 CAPSULE ORAL 4 TIMES DAILY PRN
Qty: 20 CAPSULE | Refills: 0 | Status: SHIPPED | OUTPATIENT
Start: 2023-01-11 | End: 2023-01-21

## 2023-01-11 RX ORDER — PREDNISONE 1 MG/1
10 TABLET ORAL
Status: DISCONTINUED | OUTPATIENT
Start: 2023-01-21 | End: 2023-01-11 | Stop reason: HOSPADM

## 2023-01-11 RX ORDER — PREDNISONE 20 MG/1
20 TABLET ORAL
Status: DISCONTINUED | OUTPATIENT
Start: 2023-01-18 | End: 2023-01-11 | Stop reason: HOSPADM

## 2023-01-11 RX ADMIN — GUAIFENESIN 400 MG: 400 TABLET ORAL at 03:33

## 2023-01-11 RX ADMIN — GABAPENTIN 600 MG: 300 CAPSULE ORAL at 08:21

## 2023-01-11 RX ADMIN — GUAIFENESIN 400 MG: 400 TABLET ORAL at 11:32

## 2023-01-11 RX ADMIN — AMLODIPINE BESYLATE 5 MG: 5 TABLET ORAL at 08:21

## 2023-01-11 RX ADMIN — METHYLPREDNISOLONE SODIUM SUCCINATE 40 MG: 40 INJECTION, POWDER, FOR SOLUTION INTRAMUSCULAR; INTRAVENOUS at 08:21

## 2023-01-11 RX ADMIN — LEVALBUTEROL HYDROCHLORIDE 1.25 MG: 1.25 SOLUTION, CONCENTRATE RESPIRATORY (INHALATION) at 07:48

## 2023-01-11 RX ADMIN — GUAIFENESIN 400 MG: 400 TABLET ORAL at 06:37

## 2023-01-11 RX ADMIN — HYDROCHLOROTHIAZIDE 12.5 MG: 12.5 TABLET ORAL at 08:21

## 2023-01-11 RX ADMIN — CETIRIZINE HYDROCHLORIDE 5 MG: 10 TABLET, FILM COATED ORAL at 08:21

## 2023-01-11 RX ADMIN — INSULIN LISPRO 2 UNITS: 100 INJECTION, SOLUTION INTRAVENOUS; SUBCUTANEOUS at 15:36

## 2023-01-11 RX ADMIN — LEVALBUTEROL HYDROCHLORIDE 1.25 MG: 1.25 SOLUTION, CONCENTRATE RESPIRATORY (INHALATION) at 12:46

## 2023-01-11 RX ADMIN — NEPHROCAP 1 MG: 1 CAP ORAL at 08:21

## 2023-01-11 RX ADMIN — LORAZEPAM 0.5 MG: 0.5 TABLET ORAL at 07:21

## 2023-01-11 RX ADMIN — ARFORMOTEROL TARTRATE 15 MCG: 15 SOLUTION RESPIRATORY (INHALATION) at 07:48

## 2023-01-11 RX ADMIN — BUDESONIDE 500 MCG: 0.5 SUSPENSION RESPIRATORY (INHALATION) at 07:48

## 2023-01-11 RX ADMIN — ENOXAPARIN SODIUM 40 MG: 100 INJECTION SUBCUTANEOUS at 08:21

## 2023-01-11 RX ADMIN — LISINOPRIL 20 MG: 20 TABLET ORAL at 08:20

## 2023-01-11 RX ADMIN — GUAIFENESIN 400 MG: 400 TABLET ORAL at 15:05

## 2023-01-11 ASSESSMENT — PAIN SCALES - GENERAL: PAINLEVEL_OUTOF10: 0

## 2023-01-11 NOTE — PROGRESS NOTES
Pulmonary Progress Note    Admit Date: 2023                            PCP: Mark Kay MD  Principal Problem:    Acute respiratory failure with hypoxia ([facility] [address]  Active Problems:    Anemia    RSV (respiratory syncytial virus infection)    COPD with acute exacerbation ([facility] [address]    Primary hypertension    Anxiety  Resolved Problems:    * No resolved hospital problems. *      Subjective:  Resting in bed on 4Lnc. Feeling about the same     Medications:   dextrose          methylPREDNISolone  40 mg IntraVENous Q12H    insulin lispro  0-8 Units SubCUTAneous TID WC    insulin lispro  0-4 Units SubCUTAneous Nightly    b complex-C-folic acid  1 capsule Oral Daily    gabapentin  600 mg Oral BID    arformoterol tartrate  15 mcg Nebulization BID    budesonide  0.5 mg Nebulization BID    Budeson-Glycopyrrol-Formoterol  2 puff Inhalation BID    guaiFENesin  400 mg Oral 6x Daily    hydroCHLOROthiazide  12.5 mg Oral QAM    cetirizine  5 mg Oral Daily    tamsulosin  0.4 mg Oral Nightly    zinc sulfate  50 mg Oral QPM    enoxaparin  40 mg SubCUTAneous Daily    levalbuterol  1 ampule Nebulization Q4H WA    amLODIPine  5 mg Oral Daily    And    lisinopril  20 mg Oral Daily       Vitals:  VITALS:  BP (!) 159/67   Pulse 81   Temp 97.5 °F (36.4 °C) (Temporal)   Resp 18   Ht 5' 7.5\" (1.715 m)   Wt 155 lb 8 oz (70.5 kg)   SpO2 95%   BMI 24.00 kg/m²   24HR INTAKE/OUTPUT:  No intake or output data in the 24 hours ending 23 0956  CURRENT PULSE OXIMETRY:  SpO2: 95 %  24HR PULSE OXIMETRY RANGE:  SpO2  Av.5 %  Min: 95 %  Max: 100 %  CVP:    VENT SETTINGS:      Additional Respiratory Assessments  Heart Rate: 81  Resp: 18  SpO2: 95 %      EXAM:  General: Alert, in NAD  ENT: No discharge. Pharynx clear. membranes moist   Neck: Supple, Trachea midline. Resp: No accessory muscle use. Non-labored. Lungs decreased minimal wz   CV: Regular rate. Regular rhythm. No murmur . No edema. ABD: Non-tender. Non-distended. Soft, round . Normal bowel sounds. Skin: Warm and dry. M/S: No cyanosis. No joint deformity. No clubbing. Neuro: Awake. Follows commands. O x 3, APODACA  Psych:  calm and interactive     I/O: No intake/output data recorded. No intake/output data recorded. Results:  CBC:   Recent Labs     01/09/23  0405 01/10/23  0514 01/11/23 0522   WBC 13.3* 12.1* 12.8*   HGB 11.6* 11.5* 11.3*   HCT 34.9* 34.1* 33.4*   MCV 96.4 95.0 94.1    176 164     BMP:   Recent Labs     01/09/23  0405 01/10/23  0514 01/11/23 0522    134 132   K 4.1 3.7 3.9   CL 91* 91* 91*   CO2 35* 35* 36*   BUN 24* 22 21   CREATININE 0.5* 0.5* 0.5*     LFT: No results for input(s): ALKPHOS, ALT, AST, PROT, BILITOT, BILIDIR, LABALBU in the last 72 hours. PT/INR:   No results for input(s): PROTIME, INR in the last 72 hours. Procalcitonin: No results for input(s): PROCAL in the last 72 hours. Cultures:    Latest Reference Range & Units 1/6/23 00:55 1/6/23 12:08   Influenza A by PCR Not Detected  Not Detected Not Detected   Influenza B by PCR Not Detected  Not Detected Not Detected   Adenovirus by PCR Not Detected  Not Detected     Coronavirus 229E by PCR Not Detected  Not Detected     Coronavirus HKU1 by PCR Not Detected  Not Detected     Coronavirus NL63 by PCR Not Detected  Not Detected     Coronavirus OC43 by PCR Not Detected  Not Detected     Human Metapneumovirus by PCR Not Detected  Not Detected     Human Rhinovirus/Enterovirus by PCR Not Detected  Not Detected     Parainfluenza Virus 1 by PCR Not Detected  Not Detected     Parainfluenza Virus 2 by PCR Not Detected  Not Detected     Parainfluenza Virus 3 by PCR Not Detected  Not Detected     Parainfluenza Virus 4 by PCR Not Detected  Not Detected     Respiratory Syncytial Virus by PCR Not Detected  DETECTED !      Bordetella parapertussis by PCR Not Detected  Not Detected     Chlamydophilia pneumoniae by PCR Not Detected  Not Detected     Mycoplasma pneumoniae by PCR Not Detected  Not Detected     SARS-CoV-2, PCR Not Detected  Not Detected     SARS-CoV-2, NAAT Not Detected    Not Detected   Bordetella pertussis by PCR Not Detected  Not Detected     !: Data is abnormal           ABG:   No results for input(s): PH, PO2, PCO2, HCO3, BE, O2SAT in the last 72 hours. Films:  XR CHEST PORTABLE    Result Date: 1/6/2023  EXAMINATION: ONE XRAY VIEW OF THE CHEST 1/6/2023 10:59 am COMPARISON: 24 December 2022 HISTORY: ORDERING SYSTEM PROVIDED HISTORY: sob TECHNOLOGIST PROVIDED HISTORY: Reason for exam:->sob FINDINGS: Bronchial wall stents on the right are faintly visible. Partial right middle lobe collapse is again noted. Normal heart and pulmonary vascularity. There is obstructive airways disease. Persistent partial right middle lobe collapse with indwelling bronchial wall stents. Obstructive airways disease. CTA CHEST W CONTRAST    Result Date: 1/6/2023  EXAMINATION: CTA OF THE CHEST 1/6/2023 3:07 pm TECHNIQUE: CTA of the chest was performed after the administration of intravenous contrast.  Multiplanar reformatted images are provided for review. MIP images are provided for review. Automated exposure control, iterative reconstruction, and/or weight based adjustment of the mA/kV was utilized to reduce the radiation dose to as low as reasonably achievable. COMPARISON: None. HISTORY: ORDERING SYSTEM PROVIDED HISTORY: ro pe TECHNOLOGIST PROVIDED HISTORY: Reason for exam:->ro pe Decision Support Exception - unselect if not a suspected or confirmed emergency medical condition->Emergency Medical Condition (MA) FINDINGS: In view previous examination of a June 9, 2020 2nd and December 24, 2020. There is a persistent pattern of collapse of the right middle lobe with endobronchial obstruction at the right middle lobe bronchus stent. 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  There additional bronchial stents for the right upper anterior posterior, and apical segments with obstruction of the lumen of the posterior segment stent. There is no atelectasis in the right upper lobe associated with these findings. Collateral drift circulation most likely present for the posterior segment of the right upper lobe. Discrete residual infiltrate in the distal peribronchial area/bronchiolar is seen in the right lower. There where previous infiltrates in that area. Advanced emphysematous changes are present in the lung parenchyma. There is a residual confluent scar in the apicoposterior aspect of the left upper lobe which had developed since the study of June 2021 but has not changed significantly since October 2, 2020. There are no superimposed acute infiltrates or consolidation to the lung parenchyma of both lungs. There is no pleural effusions. There is no indication for acute pulmonary embolus in the main PA, right left main PAs, in the lobar, segmental and subsegmental branches to the 3/4 order approximately. There is no aneurysm formation or dissection of the thoracic aorta. The heart is normal size. LV inner diameter: 3.8 cm. The RV inner diameter: 4.5 cm. RV/LV ratio: 1.18, being mildly enlarged. There is no pericardial effusion. Calcifications are seen in the coronary arteries. There is no aneurysm formation or dissection of the thoracic aorta. Calcified atheromatous changes are seen throughout the arch of the aorta, descending thoracic aorta and visualized upper abdominal aorta. There is a aneurysm of the infrarenal segment of the upper abdominal aorta not fully covered on this study. The proximal segment of the aneurysm visualized on the present examination measures 2.3 x 2.7 cm. There is no mediastinal masses or adenopathy. Upper abdominal structures not fully covered on this study. The calcifications of the renal arteries are seen bilaterally in the hilum of the kidneys. Adrenals not enlarged. Gallbladder is normally distended.   The biliary tree and pancreatic duct systems are not dilated. There normal size and the early arterial enhancement for the liver, the spleen and pancreas. Visualized bone structures demonstrate previous vertebroplasty in L1 and L2 with loss of height of T10 and T11 vertebral bodies and minimal of T12 vertebral body. 1.  No acute pulmonary emboli. 2.  No aneurysm formation or dissection of the thoracic aorta. 3.  There is a aneurysm of the abdominal aorta not fully covered on this study, proximal segment of the aneurysm measures 2.3 x 2.7 cm. 4.  Presence of endobronchial stent for the right middle lobe with obstruction of the stent, causing now a pattern of chronic right middle lobe syndrome pattern. 5.  Endobronchial obstructions for the anterior, apical, and posterior segments of the right upper lobe. There is obstruction of the posterior segment stent. No atelectasis are seen in the right upper lobe. Air drifting collateral circulation considered. 6.  Discrete residual distal peribronchial/bronchiolar infiltrate in the posteromedial aspect of the right upper lobe.         Acute Exacerbation of COPD    IV Solu-Medrol to 40 q12H , one dose this am, plan oral taper tomorrow to start 40x3,30x3,20x3,10x3   Breztri at home , after discussion feel patient will benefit from switching to nebulized meds as an outpatient continue   Brovana, Pulmicort and Xopenex at [place]   Mucinex and chest vest q8H, has at home   + RSV   Chronic Hypoxic Respiratory Failure-at baseline   Currently on 4L nasal cannula pulse ox 99%  Baseline is 2 to 5 L at home  Recent  CAP (Community Acquired Pneumonia)  S/p doxycyline and Rocephin in ED--   Bronchiectasis   Continue chest vest/physiotherapy with Xopenex, he does not like Flutter valve  H/o right upper lobe Endobronchial valves x4, follows a  with Dr. Mary Ellen Hardy scheduled appt 1/12/23, ok to be dc'd  1/11 so he can go to 1/12 appt at [facility] and have valves evaluated  DVT/PE prophylaxis on Lovenox  Stable from a  pulm standpoint ok to Myra Martinez at [facility] in 4-6 weeks        Electronically signed by KELECHI Alexandra on 1/11/2023 at 9:56 AM    Seen and examined, now with congestion but wants to go home so will allow to dc on prednisone taper and Breztri with fu at [facility] tomorrow.

## 2023-01-11 NOTE — PROGRESS NOTES
Subjective: Breathing improved, but, still easily dyspneic. The patient is awake and alert. No problems overnight. Denies chest pain, angina. Denies abdominal pain. Tolerating diet. No nausea or vomiting. Objective:    BP (!) 147/70   Pulse 84   Temp 98 °F (36.7 °C) (Oral)   Resp 18   Ht 5' 7.5\" (1.715 m)   Wt 155 lb 8 oz (70.5 kg)   SpO2 100%   BMI 24.00 kg/m²     Heart:  RRR, no murmurs, gallops, or rubs. Lungs:  CTA bilaterally, no wheeze, rales or rhonchi  Abd: bowel sounds present, nontender, nondistended, no masses  Extrem:  No clubbing, cyanosis, or edema    CBC:   Lab Results   Component Value Date/Time    WBC 12.8 01/11/2023 05:22 AM    RBC 3.55 01/11/2023 05:22 AM    HGB 11.3 01/11/2023 05:22 AM    HCT 33.4 01/11/2023 05:22 AM    MCV 94.1 01/11/2023 05:22 AM    MCH 31.8 01/11/2023 05:22 AM    MCHC 33.8 01/11/2023 05:22 AM    RDW 12.2 01/11/2023 05:22 AM     01/11/2023 05:22 AM    MPV 9.4 01/11/2023 05:22 AM     BMP:    Lab Results   Component Value Date/Time     01/11/2023 05:22 AM    K 3.9 01/11/2023 05:22 AM    K 4.1 10/01/2022 06:02 AM    CL 91 01/11/2023 05:22 AM    CO2 36 01/11/2023 05:22 AM    BUN 21 01/11/2023 05:22 AM    LABALBU 3.3 12/27/2022 04:23 AM    CREATININE 0.5 01/11/2023 05:22 AM    CALCIUM 8.9 01/11/2023 05:22 AM    GFRAA >60 10/08/2022 03:20 AM    LABGLOM >60 01/11/2023 05:22 AM    GLUCOSE 227 01/11/2023 05:22 AM        Assessment:    Patient Active Problem List   Diagnosis    COPD with acute exacerbation (HCC)    Primary hypertension    Anxiety    Lactic acidosis    Hyperglycemia, drug-induced    Acute respiratory failure with hypoxia (HCC)    Type 2 diabetes mellitus (HCC)    COPD exacerbation (HCC)    Anemia    Respiratory failure (HCC)    RSV (respiratory syncytial virus infection)       Plan:  Likely DC later this afternoon.           Corinne Dong MD  7:04 AM  1/11/2023

## 2023-01-11 NOTE — PROGRESS NOTES
Discharge paperwork reviewed with patient including medications and when to follow up with his providers.  All questions answered at this time

## 2023-02-15 ENCOUNTER — HOSPITAL ENCOUNTER (OUTPATIENT)
Age: 73
Discharge: HOME | End: 2023-02-17

## 2023-02-28 PROBLEM — R93.89 ABNORMAL FINDINGS ON DIAGNOSTIC IMAGING OF OTHER SPECIFIED BODY STRUCTURES: Status: ACTIVE | Noted: 2023-01-12

## 2023-02-28 PROBLEM — Z88.0 ALLERGY STATUS TO PENICILLIN: Status: ACTIVE | Noted: 2023-01-12

## 2023-02-28 PROBLEM — E66.3 OVERWEIGHT WITH BODY MASS INDEX (BMI) OF 28 TO 28.9 IN ADULT: Status: ACTIVE | Noted: 2023-02-28

## 2023-05-14 ENCOUNTER — APPOINTMENT (OUTPATIENT)
Dept: CT IMAGING | Age: 73
End: 2023-05-14
Payer: COMMERCIAL

## 2023-05-14 ENCOUNTER — HOSPITAL ENCOUNTER (EMERGENCY)
Age: 73
Discharge: HOME | End: 2023-05-14
Attending: EMERGENCY MEDICINE
Payer: COMMERCIAL

## 2023-05-14 VITALS
HEIGHT: 67 IN | SYSTOLIC BLOOD PRESSURE: 171 MMHG | BODY MASS INDEX: 26.06 KG/M2 | OXYGEN SATURATION: 97 % | DIASTOLIC BLOOD PRESSURE: 77 MMHG | HEART RATE: 96 BPM | RESPIRATION RATE: 16 BRPM | WEIGHT: 166 LBS | TEMPERATURE: 98.4 F

## 2023-05-14 DIAGNOSIS — K57.32 DIVERTICULITIS OF COLON: Primary | ICD-10-CM

## 2023-05-14 LAB
ALBUMIN SERPL-MCNC: 4.1 G/DL (ref 3.5–5.2)
ALP SERPL-CCNC: 74 U/L (ref 40–129)
ALT SERPL-CCNC: 25 U/L (ref 0–40)
ANION GAP SERPL CALCULATED.3IONS-SCNC: 12 MMOL/L (ref 7–16)
AST SERPL-CCNC: 28 U/L (ref 0–39)
BACTERIA URNS QL MICRO: ABNORMAL /HPF
BASOPHILS # BLD: 0.04 E9/L (ref 0–0.2)
BASOPHILS NFR BLD: 0.5 % (ref 0–2)
BILIRUB SERPL-MCNC: 0.7 MG/DL (ref 0–1.2)
BILIRUB UR QL STRIP: NEGATIVE
BUN SERPL-MCNC: 15 MG/DL (ref 6–23)
CALCIUM SERPL-MCNC: 9.8 MG/DL (ref 8.6–10.2)
CHLORIDE SERPL-SCNC: 92 MMOL/L (ref 98–107)
CLARITY UR: CLEAR
CO2 SERPL-SCNC: 31 MMOL/L (ref 22–29)
COLOR UR: YELLOW
CREAT SERPL-MCNC: 0.6 MG/DL (ref 0.7–1.2)
EKG ATRIAL RATE: 89 BPM
EKG P AXIS: 76 DEGREES
EKG P-R INTERVAL: 130 MS
EKG Q-T INTERVAL: 346 MS
EKG QRS DURATION: 88 MS
EKG QTC CALCULATION (BAZETT): 420 MS
EKG R AXIS: 86 DEGREES
EKG T AXIS: 58 DEGREES
EKG VENTRICULAR RATE: 89 BPM
EOSINOPHIL # BLD: 0.15 E9/L (ref 0.05–0.5)
EOSINOPHIL NFR BLD: 1.8 % (ref 0–6)
EPI CELLS #/AREA URNS HPF: ABNORMAL /HPF
ERYTHROCYTE [DISTWIDTH] IN BLOOD BY AUTOMATED COUNT: 13.2 FL (ref 11.5–15)
GLUCOSE SERPL-MCNC: 122 MG/DL (ref 74–99)
GLUCOSE UR STRIP-MCNC: NEGATIVE MG/DL
HCT VFR BLD AUTO: 42.4 % (ref 37–54)
HGB BLD-MCNC: 14 G/DL (ref 12.5–16.5)
HGB UR QL STRIP: ABNORMAL
IMM GRANULOCYTES # BLD: 0.01 E9/L
IMM GRANULOCYTES NFR BLD: 0.1 % (ref 0–5)
KETONES UR STRIP-MCNC: ABNORMAL MG/DL
LACTATE BLDV-SCNC: 1 MMOL/L (ref 0.5–2.2)
LEUKOCYTE ESTERASE UR QL STRIP: NEGATIVE
LIPASE: 34 U/L (ref 13–60)
LYMPHOCYTES # BLD: 1.61 E9/L (ref 1.5–4)
LYMPHOCYTES NFR BLD: 18.8 % (ref 20–42)
MCH RBC QN AUTO: 28.8 PG (ref 26–35)
MCHC RBC AUTO-ENTMCNC: 33 % (ref 32–34.5)
MCV RBC AUTO: 87.2 FL (ref 80–99.9)
MONOCYTES # BLD: 1.01 E9/L (ref 0.1–0.95)
MONOCYTES NFR BLD: 11.8 % (ref 2–12)
MUCOUS THREADS URNS QL MICRO: PRESENT /LPF
NEUTROPHILS # BLD: 5.75 E9/L (ref 1.8–7.3)
NEUTS SEG NFR BLD: 67 % (ref 43–80)
NITRITE UR QL STRIP: NEGATIVE
PH UR STRIP: 6 [PH] (ref 5–9)
PLATELET # BLD AUTO: 141 E9/L (ref 130–450)
PMV BLD AUTO: 10 FL (ref 7–12)
POTASSIUM SERPL-SCNC: 3.9 MMOL/L (ref 3.5–5)
PROT SERPL-MCNC: 6.5 G/DL (ref 6.4–8.3)
PROT UR STRIP-MCNC: ABNORMAL MG/DL
RBC # BLD AUTO: 4.86 E12/L (ref 3.8–5.8)
RBC #/AREA URNS HPF: ABNORMAL /HPF (ref 0–2)
SODIUM SERPL-SCNC: 135 MMOL/L (ref 132–146)
SP GR UR STRIP: 1.02 (ref 1–1.03)
TROPONIN, HIGH SENSITIVITY: 33 NG/L (ref 0–11)
UROBILINOGEN UR STRIP-ACNC: 0.2 E.U./DL
WBC # BLD: 8.6 E9/L (ref 4.5–11.5)
WBC #/AREA URNS HPF: ABNORMAL /HPF (ref 0–5)

## 2023-05-14 PROCEDURE — 80053 COMPREHEN METABOLIC PANEL: CPT

## 2023-05-14 PROCEDURE — 81001 URINALYSIS AUTO W/SCOPE: CPT

## 2023-05-14 PROCEDURE — 93010 ELECTROCARDIOGRAM REPORT: CPT | Performed by: INTERNAL MEDICINE

## 2023-05-14 PROCEDURE — 93005 ELECTROCARDIOGRAM TRACING: CPT | Performed by: STUDENT IN AN ORGANIZED HEALTH CARE EDUCATION/TRAINING PROGRAM

## 2023-05-14 PROCEDURE — 85025 COMPLETE CBC W/AUTO DIFF WBC: CPT

## 2023-05-14 PROCEDURE — 99285 EMERGENCY DEPT VISIT HI MDM: CPT

## 2023-05-14 PROCEDURE — 6370000000 HC RX 637 (ALT 250 FOR IP): Performed by: STUDENT IN AN ORGANIZED HEALTH CARE EDUCATION/TRAINING PROGRAM

## 2023-05-14 PROCEDURE — 84484 ASSAY OF TROPONIN QUANT: CPT

## 2023-05-14 PROCEDURE — 74177 CT ABD & PELVIS W/CONTRAST: CPT

## 2023-05-14 PROCEDURE — 6360000004 HC RX CONTRAST MEDICATION: Performed by: RADIOLOGY

## 2023-05-14 PROCEDURE — 83605 ASSAY OF LACTIC ACID: CPT

## 2023-05-14 PROCEDURE — 83690 ASSAY OF LIPASE: CPT

## 2023-05-14 RX ORDER — CEFDINIR 300 MG/1
300 CAPSULE ORAL 2 TIMES DAILY
Qty: 20 CAPSULE | Refills: 0 | Status: SHIPPED | OUTPATIENT
Start: 2023-05-14 | End: 2023-05-24

## 2023-05-14 RX ORDER — HYDROCODONE BITARTRATE AND ACETAMINOPHEN 5; 325 MG/1; MG/1
1 TABLET ORAL EVERY 8 HOURS PRN
Qty: 6 TABLET | Refills: 0 | Status: SHIPPED | OUTPATIENT
Start: 2023-05-14 | End: 2023-05-16

## 2023-05-14 RX ORDER — METRONIDAZOLE 500 MG/1
500 TABLET ORAL 3 TIMES DAILY
Qty: 30 TABLET | Refills: 0 | Status: SHIPPED | OUTPATIENT
Start: 2023-05-14 | End: 2023-05-24

## 2023-05-14 RX ORDER — CEFDINIR 300 MG/1
300 CAPSULE ORAL EVERY 12 HOURS SCHEDULED
Status: DISCONTINUED | OUTPATIENT
Start: 2023-05-14 | End: 2023-05-14 | Stop reason: HOSPADM

## 2023-05-14 RX ORDER — METRONIDAZOLE 500 MG/1
500 TABLET ORAL ONCE
Status: COMPLETED | OUTPATIENT
Start: 2023-05-14 | End: 2023-05-14

## 2023-05-14 RX ADMIN — CEFDINIR 300 MG: 300 CAPSULE ORAL at 11:41

## 2023-05-14 RX ADMIN — METRONIDAZOLE 500 MG: 500 TABLET ORAL at 11:41

## 2023-05-14 RX ADMIN — IOPAMIDOL 75 ML: 755 INJECTION, SOLUTION INTRAVENOUS at 10:31

## 2023-05-14 ASSESSMENT — LIFESTYLE VARIABLES
HOW MANY STANDARD DRINKS CONTAINING ALCOHOL DO YOU HAVE ON A TYPICAL DAY: PATIENT DOES NOT DRINK
HOW OFTEN DO YOU HAVE A DRINK CONTAINING ALCOHOL: NEVER

## 2023-10-20 ENCOUNTER — HOSPITAL ENCOUNTER (OUTPATIENT)
Age: 73
Discharge: HOME | End: 2023-10-20
Payer: COMMERCIAL

## 2023-10-20 DIAGNOSIS — J96.11 CHRONIC HYPOXIC RESPIRATORY FAILURE (HCC): ICD-10-CM

## 2023-10-20 DIAGNOSIS — J43.2 CENTRILOBULAR EMPHYSEMA (HCC): ICD-10-CM

## 2023-10-20 LAB
B.E.: 4.7 MMOL/L (ref -3–3)
COHB: 1.2 % (ref 0–1.5)
CRITICAL: ABNORMAL
DATE ANALYZED: ABNORMAL
DATE OF COLLECTION: ABNORMAL
HCO3: 30.2 MMOL/L (ref 22–26)
HHB: 2.8 % (ref 0–5)
LAB: ABNORMAL
Lab: 1650
METHB: 0.3 % (ref 0–1.5)
MODE: ABNORMAL
O2 CONTENT: 19.4 ML/DL
O2 SATURATION: 97.2 % (ref 92–98.5)
O2HB: 95.7 % (ref 94–97)
OPERATOR ID: 1741
PATIENT TEMP: 37 C
PCO2: 47.6 MMHG (ref 35–45)
PH BLOOD GAS: 7.42 (ref 7.35–7.45)
PO2: 87.2 MMHG (ref 75–100)
SOURCE, BLOOD GAS: ABNORMAL
THB: 14.4 G/DL (ref 11.5–16.5)
TIME ANALYZED: 1654

## 2023-10-20 PROCEDURE — 82805 BLOOD GASES W/O2 SATURATION: CPT

## 2023-11-03 ENCOUNTER — HOSPITAL ENCOUNTER (OUTPATIENT)
Dept: CARDIOLOGY | Age: 73
Discharge: HOME | End: 2023-11-03
Payer: COMMERCIAL

## 2023-11-03 DIAGNOSIS — R06.09 DOE (DYSPNEA ON EXERTION): ICD-10-CM

## 2023-11-03 DIAGNOSIS — J43.2 CENTRILOBULAR EMPHYSEMA (HCC): ICD-10-CM

## 2023-11-03 DIAGNOSIS — J96.11 CHRONIC RESPIRATORY FAILURE WITH HYPOXIA (HCC): ICD-10-CM

## 2023-11-03 PROCEDURE — 93306 TTE W/DOPPLER COMPLETE: CPT

## 2023-12-09 LAB — PSA SERPL-MCNC: 0.88 NG/ML (ref 0–4)

## 2024-03-18 NOTE — PROGRESS NOTES
OCCUPATIONAL THERAPY INITIAL EVALUATION    [facility]  JUDIT OCCUPATIONAL THERAPY  [address] [address]  Dept: [phone]  Loc: [phone]   [facility] OT Fax: [phone]    Date:  3/19/24  Initial Evaluation Date: 3/19/24   Evaluating Therapist: Tara Lyons OT    Patient Name:  Angel Luis Stanley    :  1950    Restrictions/Precautions:  COPD, high fall risk  Diagnosis:  G56.03 (ICD-10-CM) - Carpal tunnel syndrome, bilateral        Date of Surgery/Injury: CTR Left 24, CTR R 24    Insurance/Certification information:  [facility] CHOICE PLUS (30 visits per year Hard max)  Plan of care signed (Y/N): N  Visit# / total visits: -     Referring Practitioner:  Marc Luis PA-C   Specific Practitioner Orders: OT Eval & Treat    Assessment of current deficits   [x] Functional mobility  [x] ADLs  [x] Strength   [] Cognition   [x] Functional transfers   [x] IADLs  [] Safety Awareness  [x] Endurance   [x] Fine Motor Coordination  [x] Balance  [] Vision/perception  [x] Sensation    [x] Gross Motor Coordination [x] ROM  [x] Pain   [x] Edema    [x] Scar Adhesion/Skin Integrity     OT PLAN OF CARE   OT POC based on physician orders, patient diagnosis and results of clinical assessment    Frequency/DurationFrequency/Duration 1-2x / week for 12- 18 visits.   Certification period From: 3/19/24  To: 24    Specific OT Treatment to include:   [x] Instruction in HEP                   Modalities:  [x] Therapeutic Exercise        [x] Ultrasound               [] Electrical Stimulation/Attended  [x] PROM/Stretching                    [x] Fluidotherapy          [x]  Paraffin                   [x] AAROM  [x] AROM                 [] Iontophoresis:   [x] Tendon Glides                                               [] Neuromuscular Re-Ed            [x] ADL/IADL re-training    [x] Therapeutic Activity       [x] Pain Management with/without modalities PRN                 [x]

## 2024-03-19 ENCOUNTER — HOSPITAL ENCOUNTER (OUTPATIENT)
Dept: OCCUPATIONAL THERAPY | Age: 74
Setting detail: THERAPIES SERIES
Discharge: HOME | End: 2024-03-19
Payer: COMMERCIAL

## 2024-03-19 PROCEDURE — 97110 THERAPEUTIC EXERCISES: CPT

## 2024-03-19 PROCEDURE — 97165 OT EVAL LOW COMPLEX 30 MIN: CPT

## 2024-04-01 ENCOUNTER — HOSPITAL ENCOUNTER (OUTPATIENT)
Dept: OCCUPATIONAL THERAPY | Age: 74
Setting detail: THERAPIES SERIES
End: 2024-04-01
Payer: COMMERCIAL

## 2024-04-03 ENCOUNTER — HOSPITAL ENCOUNTER (OUTPATIENT)
Dept: OCCUPATIONAL THERAPY | Age: 74
Setting detail: THERAPIES SERIES
Discharge: HOME | End: 2024-04-03
Payer: COMMERCIAL

## 2024-04-03 PROCEDURE — 97110 THERAPEUTIC EXERCISES: CPT

## 2024-04-03 PROCEDURE — 97140 MANUAL THERAPY 1/> REGIONS: CPT

## 2024-04-03 NOTE — PROGRESS NOTES
Occupational Therapy    OCCUPATIONAL THERAPY DAILY NOTE    Date:  4/3/2024  Initial Evaluation Date: 3/19/24                                Evaluating Therapist: Tara Lyons OT     Patient Name:  Angel Luis Stanley                         :  1950     Restrictions/Precautions:  COPD, high fall risk  Diagnosis:  G56.03 (ICD-10-CM) - Carpal tunnel syndrome, bilateral                                                          Date of Surgery/Injury: Status post CTR Left 24, CTR R 24      Insurance/Certification information:  [facility] CHOICE PLUS (30 visits per year Hard max)  Plan of care signed (Y/N): N  Visit# / total visits: -      Referring Practitioner:  Marc Luis PA-C   Specific Practitioner Orders: OT Eval & Treat     Assessment of current deficits   [x] Functional mobility             [x] ADLs           [x] Strength                  [] Cognition   [x] Functional transfers           [x] IADLs          [] Safety Awareness  [x] Endurance   [x] Fine Motor Coordination    [x] Balance      [] Vision/perception    [x] Sensation     [x] Gross Motor Coordination [x] ROM           [x] Pain                        [x] Edema          [x] Scar Adhesion/Skin Integrity      OT PLAN OF CARE   OT POC based on physician orders, patient diagnosis and results of clinical assessment     Frequency/DurationFrequency/Duration 1-2x / week for 12- 18 visits.   Certification period From: 3/19/24  To: 24     Specific OT Treatment to include:   [x] Instruction in HEP                   Modalities:  [x] Therapeutic Exercise                 [x] Ultrasound               [] Electrical Stimulation/Attended  [x] PROM/Stretching                    [x] Fluidotherapy          [x]  Paraffin                   [x] AAROM  [x] AROM                 [] Iontophoresis:   [x] Tendon Glides                                               [] Neuromuscular Re-Ed            [x] ADL/IADL re-training    [x] Therapeutic Activity  no

## 2024-04-08 ENCOUNTER — HOSPITAL ENCOUNTER (OUTPATIENT)
Dept: OCCUPATIONAL THERAPY | Age: 74
Setting detail: THERAPIES SERIES
End: 2024-04-08
Payer: COMMERCIAL

## 2024-04-15 ENCOUNTER — HOSPITAL ENCOUNTER (OUTPATIENT)
Dept: OCCUPATIONAL THERAPY | Age: 74
Setting detail: THERAPIES SERIES
Discharge: HOME | End: 2024-04-15
Payer: COMMERCIAL

## 2024-04-15 PROCEDURE — 97110 THERAPEUTIC EXERCISES: CPT

## 2024-04-15 PROCEDURE — 97140 MANUAL THERAPY 1/> REGIONS: CPT

## 2024-04-15 NOTE — PROGRESS NOTES
demonstrate improved functional activity tolerance from Poor to Fair for ADL/IADL completion.  12) Patient will decrease QuickDASH score to 50% or less for increased participation in daily functional activities.         Pain Level: Moderate pain in bilateral hands in palmar regions that shoots up forearm      Subjective: \" I have felt bad for a couple days and everything feels worse. Both of my hands- my fingers feel tighter .\"     Objective:  Updated POC to be completed by 10th visit.    INTERVENTION: COMPLETED: SPECIFICS/COMMENTS:   Modality:     MHP application X Bilateral hands & wrists during session to alleviate discomfort & stiffness        AROM:     Flexor tendon gliding ex's X Composite bilateral hands - all 5 positions- slow to engage in mobility. Flexor stiffness noted bill hands        AAROM:     Tabletop medium size ball bilateral hands- individually X Using medium playground ball- pt places hand palm side down on top of ball to open fingers gently into extension and rolls forward & back to stretch fingers followed by right/left and clockwise & counterclockwise circles to add wrist into the stretch. Tolerates well.        PROM/Stretching:     Composite flexor stretches BUEs X Bilateral UEs - individually- prolonged end range extension holds   Prayer stretches- wrist & hands/digits combined  X Therapist instructs pt with prayer stretches at tabletop to do at home. Tolerated fair in clinic. Pt to perform as tolerated at home.   Scar Mass/Edema Control:     Edema flushing massage X Bilateral hands /wrists- edema around scars decreases pre to post tx   Desensitization X Bilateral hands /wrists   Myofascial stretch X Forearm flexors   Scar mgmt massage X  X Bilateral hands /wrists  Theraputty utilization for rolling & kneading over scar areas    Strengthening:               Other:     HEP X Scar mgmt ex's ,edema control techs, tendon gliding ex's, stretches          Assessment/Comments:  Pt arrives to

## 2024-04-17 PROBLEM — G56.00 CARPAL TUNNEL SYNDROME: Status: ACTIVE | Noted: 2024-04-17

## 2024-04-17 PROBLEM — M54.12 CERVICAL RADICULOPATHY: Status: ACTIVE | Noted: 2024-04-17

## 2024-04-22 ENCOUNTER — HOSPITAL ENCOUNTER (OUTPATIENT)
Dept: OCCUPATIONAL THERAPY | Age: 74
Setting detail: THERAPIES SERIES
End: 2024-04-22
Payer: COMMERCIAL

## 2024-04-24 ENCOUNTER — HOSPITAL ENCOUNTER (OUTPATIENT)
Dept: OCCUPATIONAL THERAPY | Age: 74
Setting detail: THERAPIES SERIES
Discharge: HOME | End: 2024-04-24
Payer: COMMERCIAL

## 2024-04-24 PROCEDURE — 97110 THERAPEUTIC EXERCISES: CPT

## 2024-04-24 PROCEDURE — 97140 MANUAL THERAPY 1/> REGIONS: CPT

## 2024-04-24 NOTE — PROGRESS NOTES
Occupational Therapy    OCCUPATIONAL THERAPY DAILY NOTE    Date:  2024  Initial Evaluation Date: 3/19/24                                Evaluating Therapist: Tara Lyons OT     Patient Name:  Angel Luis Stnaley                         :  1950     Restrictions/Precautions:  COPD, high fall risk  Diagnosis:  G56.03 (ICD-10-CM) - Carpal tunnel syndrome, bilateral                                                          Date of Surgery/Injury: Status post CTR Left 24, CTR R 24      Insurance/Certification information:  [facility] CHOICE PLUS (30 visits per year Hard max)  Plan of care signed (Y/N): N  Visit# / total visits: -      Referring Practitioner:  Marc Luis PA-C   Specific Practitioner Orders: OT Eval & Treat     Assessment of current deficits   [x] Functional mobility             [x] ADLs           [x] Strength                  [] Cognition   [x] Functional transfers           [x] IADLs          [] Safety Awareness  [x] Endurance   [x] Fine Motor Coordination    [x] Balance      [] Vision/perception    [x] Sensation     [x] Gross Motor Coordination [x] ROM           [x] Pain                        [x] Edema          [x] Scar Adhesion/Skin Integrity      OT PLAN OF CARE   OT POC based on physician orders, patient diagnosis and results of clinical assessment     Frequency/DurationFrequency/Duration 1-2x / week for 12- 18 visits.   Certification period From: 3/19/24  To: 24     Specific OT Treatment to include:   [x] Instruction in HEP                   Modalities:  [x] Therapeutic Exercise                 [x] Ultrasound               [] Electrical Stimulation/Attended  [x] PROM/Stretching                    [x] Fluidotherapy          [x]  Paraffin                   [x] AAROM  [x] AROM                 [] Iontophoresis:   [x] Tendon Glides                                               [] Neuromuscular Re-Ed            [x] ADL/IADL re-training    [x] Therapeutic Activity

## 2024-04-29 ENCOUNTER — HOSPITAL ENCOUNTER (OUTPATIENT)
Dept: OCCUPATIONAL THERAPY | Age: 74
Setting detail: THERAPIES SERIES
Discharge: HOME | End: 2024-04-29
Payer: COMMERCIAL

## 2024-04-29 PROCEDURE — 97140 MANUAL THERAPY 1/> REGIONS: CPT

## 2024-04-29 PROCEDURE — 97110 THERAPEUTIC EXERCISES: CPT

## 2024-04-29 NOTE — PROGRESS NOTES
[x] Pain Management with/without modalities PRN                 [x] Manual Therapy                      [x] Splinting                                   [] Scar Management                   []Joint Protection/Training  []Ergonomics                             [x] Joint Mobilization                      [] Adaptive Equipment Assessment/Training                             [x] Manual Edema Mobilization   [x] Myofascial Release                 [] Energy Conservation/Work Simplification  [x] GM/FM Coordination                [x] Safety retraining/education per  individual diagnosis/goals  [] Desensitization        Patient Specific Goal: Returned strength and use hands and decrease pain                              GOALS (Long term same as Short term):  1) Patient will demonstrate good understanding of home program (exercises/activities/diagnosis/prognosis/goals) with good accuracy.   2) Patient will demonstrate increased active/passive range of motion of their B wrists by atleast 10* for ADL/IADL completion.  3) Patient will demonstrate increased /pinch strength of at least 10 / 3-5 pinch pounds of their B hand.   4) Patient to report decreased pain in their affected B upper extremity from 9/10 to 5/10 or less with resistive functional use.   5) Increase in fine motor function as evidenced by decreased time to complete 9-hole peg test and/or MRMT test by at least 5-10 seconds with  BUE in order to complete ADL/IADLs.   6) Patient to report 100% compliance with their splint wear, care, and precautions if needed.   7) Patient to report a decrease in hypersensitivity from 50% to 20% or less in their BUEs.   8) Patient will be knowledgeable of edema control techniques as evident with decreases from moderate/mild to mild/none.   9) Patient will demonstrate a non-tender/non-adherent scar.   10) Patient will report ADL/IADL functions as PLOF using BUEs and no difficulty.   11) Patient will demonstrate

## 2024-05-06 ENCOUNTER — HOSPITAL ENCOUNTER (OUTPATIENT)
Dept: OCCUPATIONAL THERAPY | Age: 74
Setting detail: THERAPIES SERIES
Discharge: HOME | End: 2024-05-06

## 2024-05-06 NOTE — PROGRESS NOTES
Occupational Therapy      Phone: [phone] Fax: [phone]     Occupational Therapy   Cancellation Note     Patient Name:  Angel Luis Stanley  : 1950  Date:  2024  MRN: [id number]    For today's appointment patient:   [x]  Cancelled   []  Rescheduled appointment   []  No-show     Reason given by patient:   []  Patient ill   []  Conflicting appointment   []  No transportation   []  Conflict with work   []  No reason given   [x]  Other:    Comments: Pts oxygen travel tank is not working correctly and he had to cx. Pt reports he will contact dept when it is fixed    Electronically signed by: Pia CHICAS, [id number]

## 2024-07-30 ENCOUNTER — HOSPITAL ENCOUNTER (EMERGENCY)
Age: 74
Discharge: HOME | End: 2024-07-30
Attending: STUDENT IN AN ORGANIZED HEALTH CARE EDUCATION/TRAINING PROGRAM
Payer: COMMERCIAL

## 2024-07-30 ENCOUNTER — APPOINTMENT (OUTPATIENT)
Dept: CT IMAGING | Age: 74
End: 2024-07-30
Payer: COMMERCIAL

## 2024-07-30 VITALS
HEART RATE: 65 BPM | SYSTOLIC BLOOD PRESSURE: 111 MMHG | TEMPERATURE: 97 F | OXYGEN SATURATION: 99 % | RESPIRATION RATE: 18 BRPM | HEIGHT: 67 IN | DIASTOLIC BLOOD PRESSURE: 69 MMHG | WEIGHT: 144 LBS | BODY MASS INDEX: 22.6 KG/M2

## 2024-07-30 DIAGNOSIS — J40 BRONCHITIS: Primary | ICD-10-CM

## 2024-07-30 DIAGNOSIS — R04.2 HEMOPTYSIS: ICD-10-CM

## 2024-07-30 LAB
ALBUMIN SERPL-MCNC: 4.3 G/DL (ref 3.5–5.2)
ALP SERPL-CCNC: 114 U/L (ref 40–129)
ALT SERPL-CCNC: 14 U/L (ref 0–40)
ANION GAP SERPL CALCULATED.3IONS-SCNC: 5 MMOL/L (ref 7–16)
AST SERPL-CCNC: 18 U/L (ref 0–39)
BASOPHILS # BLD: 0.04 K/UL (ref 0–0.2)
BASOPHILS NFR BLD: 1 % (ref 0–2)
BILIRUB DIRECT SERPL-MCNC: <0.2 MG/DL (ref 0–0.3)
BILIRUB INDIRECT SERPL-MCNC: NORMAL MG/DL (ref 0–1)
BILIRUB SERPL-MCNC: 0.8 MG/DL (ref 0–1.2)
BNP SERPL-MCNC: 97 PG/ML (ref 0–125)
BUN SERPL-MCNC: 26 MG/DL (ref 6–23)
CALCIUM SERPL-MCNC: 9.1 MG/DL (ref 8.6–10.2)
CHLORIDE SERPL-SCNC: 94 MMOL/L (ref 98–107)
CO2 SERPL-SCNC: 35 MMOL/L (ref 22–29)
CREAT SERPL-MCNC: 0.7 MG/DL (ref 0.7–1.2)
EKG ATRIAL RATE: 72 BPM
EKG P AXIS: 67 DEGREES
EKG P-R INTERVAL: 144 MS
EKG Q-T INTERVAL: 372 MS
EKG QRS DURATION: 82 MS
EKG QTC CALCULATION (BAZETT): 407 MS
EKG R AXIS: 87 DEGREES
EKG T AXIS: 58 DEGREES
EKG VENTRICULAR RATE: 72 BPM
EOSINOPHIL # BLD: 0.11 K/UL (ref 0.05–0.5)
EOSINOPHILS RELATIVE PERCENT: 1 % (ref 0–6)
ERYTHROCYTE [DISTWIDTH] IN BLOOD BY AUTOMATED COUNT: 12.5 % (ref 11.5–15)
GFR, ESTIMATED: >90 ML/MIN/1.73M2
GLUCOSE SERPL-MCNC: 111 MG/DL (ref 74–99)
HCT VFR BLD AUTO: 36.4 % (ref 37–54)
HGB BLD-MCNC: 12.1 G/DL (ref 12.5–16.5)
IMM GRANULOCYTES # BLD AUTO: 0.03 K/UL (ref 0–0.58)
IMM GRANULOCYTES NFR BLD: 0 % (ref 0–5)
LYMPHOCYTES NFR BLD: 1.71 K/UL (ref 1.5–4)
LYMPHOCYTES RELATIVE PERCENT: 20 % (ref 20–42)
MCH RBC QN AUTO: 32.4 PG (ref 26–35)
MCHC RBC AUTO-ENTMCNC: 33.2 G/DL (ref 32–34.5)
MCV RBC AUTO: 97.3 FL (ref 80–99.9)
MONOCYTES NFR BLD: 0.85 K/UL (ref 0.1–0.95)
MONOCYTES NFR BLD: 10 % (ref 2–12)
NEUTROPHILS NFR BLD: 67 % (ref 43–80)
NEUTS SEG NFR BLD: 5.67 K/UL (ref 1.8–7.3)
PLATELET # BLD AUTO: 195 K/UL (ref 130–450)
PMV BLD AUTO: 9.5 FL (ref 7–12)
POTASSIUM SERPL-SCNC: 4.3 MMOL/L (ref 3.5–5)
PROT SERPL-MCNC: 7.1 G/DL (ref 6.4–8.3)
RBC # BLD AUTO: 3.74 M/UL (ref 3.8–5.8)
SODIUM SERPL-SCNC: 134 MMOL/L (ref 132–146)
TROPONIN I SERPL HS-MCNC: 33 NG/L (ref 0–11)
TROPONIN I SERPL HS-MCNC: 33 NG/L (ref 0–11)
WBC OTHER # BLD: 8.4 K/UL (ref 4.5–11.5)

## 2024-07-30 PROCEDURE — 99285 EMERGENCY DEPT VISIT HI MDM: CPT

## 2024-07-30 PROCEDURE — 93010 ELECTROCARDIOGRAM REPORT: CPT | Performed by: INTERNAL MEDICINE

## 2024-07-30 PROCEDURE — 82248 BILIRUBIN DIRECT: CPT

## 2024-07-30 PROCEDURE — 83880 ASSAY OF NATRIURETIC PEPTIDE: CPT

## 2024-07-30 PROCEDURE — 84484 ASSAY OF TROPONIN QUANT: CPT

## 2024-07-30 PROCEDURE — 80053 COMPREHEN METABOLIC PANEL: CPT

## 2024-07-30 PROCEDURE — 6360000004 HC RX CONTRAST MEDICATION: Performed by: RADIOLOGY

## 2024-07-30 PROCEDURE — 93005 ELECTROCARDIOGRAM TRACING: CPT | Performed by: STUDENT IN AN ORGANIZED HEALTH CARE EDUCATION/TRAINING PROGRAM

## 2024-07-30 PROCEDURE — 71275 CT ANGIOGRAPHY CHEST: CPT

## 2024-07-30 PROCEDURE — 85025 COMPLETE CBC W/AUTO DIFF WBC: CPT

## 2024-07-30 RX ORDER — AZITHROMYCIN 250 MG/1
TABLET, FILM COATED ORAL
Qty: 6 TABLET | Refills: 0 | Status: SHIPPED | OUTPATIENT
Start: 2024-07-30 | End: 2024-08-09

## 2024-07-30 RX ORDER — CEFDINIR 300 MG/1
300 CAPSULE ORAL 2 TIMES DAILY
Qty: 14 CAPSULE | Refills: 0 | Status: SHIPPED | OUTPATIENT
Start: 2024-07-30 | End: 2024-08-06

## 2024-07-30 RX ADMIN — IOPAMIDOL 75 ML: 755 INJECTION, SOLUTION INTRAVENOUS at 17:55

## 2024-07-30 ASSESSMENT — PAIN - FUNCTIONAL ASSESSMENT: PAIN_FUNCTIONAL_ASSESSMENT: NONE - DENIES PAIN

## 2024-07-30 NOTE — ED PROVIDER NOTES
by nebulization 2 times daily    BUDESONIDE (PULMICORT) 0.5 MG/2ML NEBULIZER SUSPENSION    INHALE 1 VIAL VIA NEBULIZER TWICE DAILY    CALCIUM-VITAMINS C & D PO    Take 1 tablet by mouth every evening    COENZYME Q10 (CO Q 10 PO)    Take 1 capsule by mouth every evening    FLAXSEED, LINSEED, 1000 MG CAPS    Take 1,000 mg by mouth every evening    GABAPENTIN (NEURONTIN) 600 MG TABLET    Take 1 tablet by mouth 2 times daily.    HYDROCHLOROTHIAZIDE (HYDRODIURIL) 12.5 MG TABLET    TAKE ONE TABLET (12.5MG) BY MOUTH EVERY  MORNING    LEVALBUTEROL (XOPENEX HFA) 45 MCG/ACT INHALER    INHALE TWO PUFFS BY MOUTH FOUR TIMES A DAY AS NEEDED FOR 30 DAYS    LEVALBUTEROL (XOPENEX) 0.63 MG/3ML NEBULIZATION    Take 3 mLs by nebulization every 6 hours as needed for Wheezing    LORATADINE (CLARITIN) 10 MG CAPSULE    Take 1 capsule by mouth daily    LORAZEPAM (ATIVAN) 0.5 MG TABLET    Take 1 tablet by mouth 2 times daily as needed.    OMEGA-3 FATTY ACIDS (FISH OIL) 1000 MG CAPS    Take 1 capsule by mouth every evening    OXYGEN    Inhale 6 L/min into the lungs continuous    TAMSULOSIN (FLOMAX) 0.4 MG CAPSULE    Take 1 capsule by mouth nightly    ZINC 100 MG TABS    zinc Active Daily 2023 1:00am    ZINC GLUCONATE 50 MG TABLET    Take 1 tablet by mouth every evening       ALLERGIES     Albuterol, Ipratropium, Sulfa antibiotics, Sulfadiazine, Ciprofloxacin, Dexamethasone, Doxycycline, Onion, and Prednisone    FAMILYHISTORY     No family history on file.     SOCIAL HISTORY       Social History     Tobacco Use    Smoking status: Former     Current packs/day: 0.00     Average packs/day: 1 pack/day for 30.8 years (30.8 ttl pk-yrs)     Types: Cigarettes     Start date:      Quit date: 10/12/2011     Years since quittin.8    Smokeless tobacco: Never    Tobacco comments:     Quit smoking 12 yrs.ago.   Vaping Use    Vaping Use: Never used    Passive vaping exposure: Yes   Substance Use Topics    Alcohol use: Not Currently

## 2024-07-30 NOTE — DISCHARGE INSTRUCTIONS
Hold your prophylaxis dose while you are taking your Z-Alverto and then you can resume it.  Please return to the emergency department if you having worsening chest pain, shortness of breath, fevers or chills.  Your CT scan today showed some mucus in your airways but did not show any evidence of blood clot.  I would recommend following up with your pulmonologist

## 2024-09-19 ENCOUNTER — HOSPITAL ENCOUNTER (INPATIENT)
Age: 74
LOS: 2 days | Discharge: HOME | DRG: 191 | End: 2024-09-22
Attending: STUDENT IN AN ORGANIZED HEALTH CARE EDUCATION/TRAINING PROGRAM | Admitting: INTERNAL MEDICINE
Payer: MEDICARE

## 2024-09-19 ENCOUNTER — APPOINTMENT (OUTPATIENT)
Dept: GENERAL RADIOLOGY | Age: 74
DRG: 191 | End: 2024-09-19
Payer: MEDICARE

## 2024-09-19 DIAGNOSIS — R79.89 ELEVATED TROPONIN: ICD-10-CM

## 2024-09-19 DIAGNOSIS — J44.1 COPD EXACERBATION (HCC): ICD-10-CM

## 2024-09-19 DIAGNOSIS — I21.4 NSTEMI (NON-ST ELEVATED MYOCARDIAL INFARCTION) (HCC): Primary | ICD-10-CM

## 2024-09-19 LAB
ALBUMIN SERPL-MCNC: 4.7 G/DL (ref 3.5–5.2)
ALP SERPL-CCNC: 121 U/L (ref 40–129)
ALT SERPL-CCNC: 16 U/L (ref 0–40)
ANION GAP SERPL CALCULATED.3IONS-SCNC: 9 MMOL/L (ref 7–16)
AST SERPL-CCNC: 25 U/L (ref 0–39)
BASOPHILS # BLD: 0.05 K/UL (ref 0–0.2)
BASOPHILS NFR BLD: 1 % (ref 0–2)
BILIRUB DIRECT SERPL-MCNC: <0.2 MG/DL (ref 0–0.3)
BILIRUB INDIRECT SERPL-MCNC: NORMAL MG/DL (ref 0–1)
BILIRUB SERPL-MCNC: 0.9 MG/DL (ref 0–1.2)
BNP SERPL-MCNC: 117 PG/ML (ref 0–125)
BUN SERPL-MCNC: 13 MG/DL (ref 6–23)
CALCIUM SERPL-MCNC: 9.1 MG/DL (ref 8.6–10.2)
CHLORIDE SERPL-SCNC: 94 MMOL/L (ref 98–107)
CO2 SERPL-SCNC: 32 MMOL/L (ref 22–29)
CREAT SERPL-MCNC: 0.6 MG/DL (ref 0.7–1.2)
D-DIMER QUANTITATIVE: <200 NG/ML DDU (ref 0–230)
EOSINOPHIL # BLD: 0.16 K/UL (ref 0.05–0.5)
EOSINOPHILS RELATIVE PERCENT: 2 % (ref 0–6)
ERYTHROCYTE [DISTWIDTH] IN BLOOD BY AUTOMATED COUNT: 12.9 % (ref 11.5–15)
GFR, ESTIMATED: >90 ML/MIN/1.73M2
GLUCOSE SERPL-MCNC: 140 MG/DL (ref 74–99)
HCT VFR BLD AUTO: 39.4 % (ref 37–54)
HGB BLD-MCNC: 13.3 G/DL (ref 12.5–16.5)
IMM GRANULOCYTES # BLD AUTO: <0.03 K/UL (ref 0–0.58)
IMM GRANULOCYTES NFR BLD: 0 % (ref 0–5)
LYMPHOCYTES NFR BLD: 0.81 K/UL (ref 1.5–4)
LYMPHOCYTES RELATIVE PERCENT: 9 % (ref 20–42)
MCH RBC QN AUTO: 33.4 PG (ref 26–35)
MCHC RBC AUTO-ENTMCNC: 33.8 G/DL (ref 32–34.5)
MCV RBC AUTO: 99 FL (ref 80–99.9)
MONOCYTES NFR BLD: 0.66 K/UL (ref 0.1–0.95)
MONOCYTES NFR BLD: 7 % (ref 2–12)
NEUTROPHILS NFR BLD: 82 % (ref 43–80)
NEUTS SEG NFR BLD: 7.63 K/UL (ref 1.8–7.3)
PLATELET # BLD AUTO: 193 K/UL (ref 130–450)
PMV BLD AUTO: 9.4 FL (ref 7–12)
POTASSIUM SERPL-SCNC: 4.2 MMOL/L (ref 3.5–5)
PROT SERPL-MCNC: 7.1 G/DL (ref 6.4–8.3)
RBC # BLD AUTO: 3.98 M/UL (ref 3.8–5.8)
SARS-COV-2 RDRP RESP QL NAA+PROBE: NOT DETECTED
SODIUM SERPL-SCNC: 135 MMOL/L (ref 132–146)
SPECIMEN DESCRIPTION: NORMAL
TROPONIN I SERPL HS-MCNC: 31 NG/L (ref 0–11)
TROPONIN I SERPL HS-MCNC: 43 NG/L (ref 0–11)
WBC OTHER # BLD: 9.3 K/UL (ref 4.5–11.5)

## 2024-09-19 PROCEDURE — 96375 TX/PRO/DX INJ NEW DRUG ADDON: CPT

## 2024-09-19 PROCEDURE — 94664 DEMO&/EVAL PT USE INHALER: CPT

## 2024-09-19 PROCEDURE — 84484 ASSAY OF TROPONIN QUANT: CPT

## 2024-09-19 PROCEDURE — 2700000000 HC OXYGEN THERAPY PER DAY

## 2024-09-19 PROCEDURE — 85379 FIBRIN DEGRADATION QUANT: CPT

## 2024-09-19 PROCEDURE — 6360000002 HC RX W HCPCS: Performed by: STUDENT IN AN ORGANIZED HEALTH CARE EDUCATION/TRAINING PROGRAM

## 2024-09-19 PROCEDURE — 99285 EMERGENCY DEPT VISIT HI MDM: CPT

## 2024-09-19 PROCEDURE — 94640 AIRWAY INHALATION TREATMENT: CPT

## 2024-09-19 PROCEDURE — 85025 COMPLETE CBC W/AUTO DIFF WBC: CPT

## 2024-09-19 PROCEDURE — 80053 COMPREHEN METABOLIC PANEL: CPT

## 2024-09-19 PROCEDURE — 83880 ASSAY OF NATRIURETIC PEPTIDE: CPT

## 2024-09-19 PROCEDURE — 93005 ELECTROCARDIOGRAM TRACING: CPT

## 2024-09-19 PROCEDURE — 71046 X-RAY EXAM CHEST 2 VIEWS: CPT

## 2024-09-19 PROCEDURE — 87635 SARS-COV-2 COVID-19 AMP PRB: CPT

## 2024-09-19 PROCEDURE — 96374 THER/PROPH/DIAG INJ IV PUSH: CPT

## 2024-09-19 PROCEDURE — 82248 BILIRUBIN DIRECT: CPT

## 2024-09-19 RX ORDER — IPRATROPIUM BROMIDE AND ALBUTEROL SULFATE 2.5; .5 MG/3ML; MG/3ML
1 SOLUTION RESPIRATORY (INHALATION)
Status: DISCONTINUED | OUTPATIENT
Start: 2024-09-19 | End: 2024-09-19

## 2024-09-19 RX ORDER — LEVALBUTEROL INHALATION SOLUTION 0.63 MG/3ML
0.63 SOLUTION RESPIRATORY (INHALATION) ONCE
Status: COMPLETED | OUTPATIENT
Start: 2024-09-19 | End: 2024-09-19

## 2024-09-19 RX ORDER — METHYLPREDNISOLONE SODIUM SUCCINATE 125 MG/2ML
125 INJECTION, POWDER, LYOPHILIZED, FOR SOLUTION INTRAMUSCULAR; INTRAVENOUS ONCE
Status: COMPLETED | OUTPATIENT
Start: 2024-09-19 | End: 2024-09-19

## 2024-09-19 RX ADMIN — LEVALBUTEROL 0.63 MG: 0.63 SOLUTION RESPIRATORY (INHALATION) at 21:36

## 2024-09-19 RX ADMIN — METHYLPREDNISOLONE SODIUM SUCCINATE 125 MG: 125 INJECTION INTRAMUSCULAR; INTRAVENOUS at 21:16

## 2024-09-19 ASSESSMENT — PAIN - FUNCTIONAL ASSESSMENT: PAIN_FUNCTIONAL_ASSESSMENT: NONE - DENIES PAIN

## 2024-09-20 ENCOUNTER — APPOINTMENT (OUTPATIENT)
Age: 74
DRG: 191 | End: 2024-09-20
Attending: INTERNAL MEDICINE
Payer: MEDICARE

## 2024-09-20 PROBLEM — J96.11 CHRONIC HYPOXIC RESPIRATORY FAILURE: Status: ACTIVE | Noted: 2024-09-20

## 2024-09-20 PROBLEM — I21.4 NSTEMI (NON-ST ELEVATED MYOCARDIAL INFARCTION) (HCC): Status: ACTIVE | Noted: 2024-09-20

## 2024-09-20 PROBLEM — R07.2 PRECORDIAL PAIN: Status: ACTIVE | Noted: 2024-09-20

## 2024-09-20 PROBLEM — J44.9 CHRONIC OBSTRUCTIVE PULMONARY DISEASE (HCC): Status: ACTIVE | Noted: 2022-10-01

## 2024-09-20 LAB
ALBUMIN SERPL-MCNC: 4.3 G/DL (ref 3.5–5.2)
ALP SERPL-CCNC: 110 U/L (ref 40–129)
ALT SERPL-CCNC: 11 U/L (ref 0–40)
ANION GAP SERPL CALCULATED.3IONS-SCNC: 9 MMOL/L (ref 7–16)
AST SERPL-CCNC: 19 U/L (ref 0–39)
B PARAP IS1001 DNA NPH QL NAA+NON-PROBE: NOT DETECTED
B PERT DNA SPEC QL NAA+PROBE: NOT DETECTED
BASOPHILS # BLD: 0 K/UL (ref 0–0.2)
BASOPHILS NFR BLD: 0 % (ref 0–2)
BILIRUB SERPL-MCNC: 0.8 MG/DL (ref 0–1.2)
BUN SERPL-MCNC: 18 MG/DL (ref 6–23)
C PNEUM DNA NPH QL NAA+NON-PROBE: NOT DETECTED
CALCIUM SERPL-MCNC: 9 MG/DL (ref 8.6–10.2)
CHLORIDE SERPL-SCNC: 95 MMOL/L (ref 98–107)
CHOLEST SERPL-MCNC: 152 MG/DL
CO2 SERPL-SCNC: 30 MMOL/L (ref 22–29)
CREAT SERPL-MCNC: 0.6 MG/DL (ref 0.7–1.2)
ECHO BSA: 1.74 M2
ECHO EST RA PRESSURE: 3 MMHG
ECHO LA VOL A-L A2C: 44 ML (ref 18–58)
ECHO LA VOL A-L A4C: 36 ML (ref 18–58)
ECHO LA VOL MOD A2C: 42 ML (ref 18–58)
ECHO LA VOL MOD A4C: 34 ML (ref 18–58)
ECHO LA VOLUME AREA LENGTH: 43 ML
ECHO LA VOLUME INDEX A-L A2C: 25 ML/M2 (ref 16–34)
ECHO LA VOLUME INDEX A-L A4C: 20 ML/M2 (ref 16–34)
ECHO LA VOLUME INDEX AREA LENGTH: 24 ML/M2 (ref 16–34)
ECHO LA VOLUME INDEX MOD A2C: 24 ML/M2 (ref 16–34)
ECHO LA VOLUME INDEX MOD A4C: 19 ML/M2 (ref 16–34)
ECHO LV E' LATERAL VELOCITY: 8 CM/S
ECHO LV E' SEPTAL VELOCITY: 6 CM/S
ECHO LV EDV A2C: 65 ML
ECHO LV EDV A4C: 69 ML
ECHO LV EDV BP: 69 ML (ref 67–155)
ECHO LV EDV INDEX A4C: 39 ML/M2
ECHO LV EDV INDEX BP: 39 ML/M2
ECHO LV EDV NDEX A2C: 37 ML/M2
ECHO LV EF PHYSICIAN: 75 %
ECHO LV EJECTION FRACTION A2C: 76 %
ECHO LV EJECTION FRACTION A4C: 75 %
ECHO LV EJECTION FRACTION BIPLANE: 77 % (ref 55–100)
ECHO LV ESV A2C: 16 ML
ECHO LV ESV A4C: 17 ML
ECHO LV ESV BP: 16 ML (ref 22–58)
ECHO LV ESV INDEX A2C: 9 ML/M2
ECHO LV ESV INDEX A4C: 10 ML/M2
ECHO LV ESV INDEX BP: 9 ML/M2
ECHO LV FRACTIONAL SHORTENING: 40 % (ref 28–44)
ECHO LV INTERNAL DIMENSION DIASTOLE INDEX: 1.97 CM/M2
ECHO LV INTERNAL DIMENSION DIASTOLIC: 3.5 CM (ref 4.2–5.9)
ECHO LV INTERNAL DIMENSION SYSTOLIC INDEX: 1.18 CM/M2
ECHO LV INTERNAL DIMENSION SYSTOLIC: 2.1 CM
ECHO LV IVSD: 1.1 CM (ref 0.6–1)
ECHO LV IVSS: 1.2 CM
ECHO LV MASS 2D: 119 G (ref 88–224)
ECHO LV MASS INDEX 2D: 66.9 G/M2 (ref 49–115)
ECHO LV POSTERIOR WALL DIASTOLIC: 1.1 CM (ref 0.6–1)
ECHO LV POSTERIOR WALL SYSTOLIC: 1.3 CM
ECHO LV RELATIVE WALL THICKNESS RATIO: 0.63
ECHO MV A VELOCITY: 1.46 M/S
ECHO MV E DECELERATION TIME (DT): 293.9 MS
ECHO MV E VELOCITY: 0.93 M/S
ECHO MV E/A RATIO: 0.64
ECHO MV E/E' LATERAL: 11.63
ECHO MV E/E' RATIO (AVERAGED): 13.56
ECHO MV E/E' SEPTAL: 15.5
ECHO RIGHT VENTRICULAR SYSTOLIC PRESSURE (RVSP): 36 MMHG
ECHO RV INTERNAL DIMENSION: 2.9 CM
ECHO RV TAPSE: 2 CM (ref 1.7–?)
ECHO TV REGURGITANT MAX VELOCITY: 2.88 M/S
ECHO TV REGURGITANT PEAK GRADIENT: 33 MMHG
EKG ATRIAL RATE: 84 BPM
EKG ATRIAL RATE: 90 BPM
EKG P AXIS: 81 DEGREES
EKG P AXIS: 86 DEGREES
EKG P-R INTERVAL: 136 MS
EKG P-R INTERVAL: 136 MS
EKG Q-T INTERVAL: 346 MS
EKG Q-T INTERVAL: 352 MS
EKG QRS DURATION: 90 MS
EKG QRS DURATION: 92 MS
EKG QTC CALCULATION (BAZETT): 415 MS
EKG QTC CALCULATION (BAZETT): 420 MS
EKG R AXIS: 94 DEGREES
EKG R AXIS: 96 DEGREES
EKG T AXIS: 78 DEGREES
EKG T AXIS: 79 DEGREES
EKG VENTRICULAR RATE: 84 BPM
EKG VENTRICULAR RATE: 89 BPM
EOSINOPHIL # BLD: 0 K/UL (ref 0.05–0.5)
EOSINOPHILS RELATIVE PERCENT: 0 % (ref 0–6)
ERYTHROCYTE [DISTWIDTH] IN BLOOD BY AUTOMATED COUNT: 12.8 % (ref 11.5–15)
ERYTHROCYTE [DISTWIDTH] IN BLOOD BY AUTOMATED COUNT: 12.8 % (ref 11.5–15)
FLUAV RNA NPH QL NAA+NON-PROBE: NOT DETECTED
FLUBV RNA NPH QL NAA+NON-PROBE: NOT DETECTED
GFR, ESTIMATED: >90 ML/MIN/1.73M2
GLUCOSE SERPL-MCNC: 177 MG/DL (ref 74–99)
HADV DNA NPH QL NAA+NON-PROBE: NOT DETECTED
HCOV 229E RNA NPH QL NAA+NON-PROBE: NOT DETECTED
HCOV HKU1 RNA NPH QL NAA+NON-PROBE: NOT DETECTED
HCOV NL63 RNA NPH QL NAA+NON-PROBE: NOT DETECTED
HCOV OC43 RNA NPH QL NAA+NON-PROBE: NOT DETECTED
HCT VFR BLD AUTO: 36.2 % (ref 37–54)
HCT VFR BLD AUTO: 38.3 % (ref 37–54)
HDLC SERPL-MCNC: 75 MG/DL
HGB BLD-MCNC: 12.5 G/DL (ref 12.5–16.5)
HGB BLD-MCNC: 12.9 G/DL (ref 12.5–16.5)
HMPV RNA NPH QL NAA+NON-PROBE: NOT DETECTED
HPIV1 RNA NPH QL NAA+NON-PROBE: NOT DETECTED
HPIV2 RNA NPH QL NAA+NON-PROBE: NOT DETECTED
HPIV3 RNA NPH QL NAA+NON-PROBE: NOT DETECTED
HPIV4 RNA NPH QL NAA+NON-PROBE: NOT DETECTED
LDLC SERPL CALC-MCNC: 68 MG/DL
LYMPHOCYTES NFR BLD: 0.07 K/UL (ref 1.5–4)
LYMPHOCYTES RELATIVE PERCENT: 1 % (ref 20–42)
M PNEUMO DNA NPH QL NAA+NON-PROBE: NOT DETECTED
MCH RBC QN AUTO: 33 PG (ref 26–35)
MCH RBC QN AUTO: 33.2 PG (ref 26–35)
MCHC RBC AUTO-ENTMCNC: 33.7 G/DL (ref 32–34.5)
MCHC RBC AUTO-ENTMCNC: 34.5 G/DL (ref 32–34.5)
MCV RBC AUTO: 96.3 FL (ref 80–99.9)
MCV RBC AUTO: 98 FL (ref 80–99.9)
MONOCYTES NFR BLD: 0 % (ref 2–12)
MONOCYTES NFR BLD: 0 K/UL (ref 0.1–0.95)
NEUTROPHILS NFR BLD: 99 % (ref 43–80)
NEUTS SEG NFR BLD: 7.03 K/UL (ref 1.8–7.3)
PARTIAL THROMBOPLASTIN TIME: 40.6 SEC (ref 24.5–35.1)
PLATELET # BLD AUTO: 193 K/UL (ref 130–450)
PLATELET # BLD AUTO: 203 K/UL (ref 130–450)
PMV BLD AUTO: 10 FL (ref 7–12)
PMV BLD AUTO: 9.3 FL (ref 7–12)
POTASSIUM SERPL-SCNC: 4.4 MMOL/L (ref 3.5–5)
PROT SERPL-MCNC: 6.5 G/DL (ref 6.4–8.3)
RBC # BLD AUTO: 3.76 M/UL (ref 3.8–5.8)
RBC # BLD AUTO: 3.91 M/UL (ref 3.8–5.8)
RBC # BLD: ABNORMAL 10*6/UL
RBC # BLD: ABNORMAL 10*6/UL
RSV RNA NPH QL NAA+NON-PROBE: NOT DETECTED
RV+EV RNA NPH QL NAA+NON-PROBE: DETECTED
SARS-COV-2 RNA NPH QL NAA+NON-PROBE: NOT DETECTED
SODIUM SERPL-SCNC: 134 MMOL/L (ref 132–146)
SPECIMEN DESCRIPTION: ABNORMAL
TRIGL SERPL-MCNC: 46 MG/DL
TROPONIN I SERPL HS-MCNC: 52 NG/L (ref 0–11)
VLDLC SERPL CALC-MCNC: 9 MG/DL
WBC OTHER # BLD: 10.4 K/UL (ref 4.5–11.5)
WBC OTHER # BLD: 7.1 K/UL (ref 4.5–11.5)

## 2024-09-20 PROCEDURE — 87070 CULTURE OTHR SPECIMN AEROBIC: CPT

## 2024-09-20 PROCEDURE — 6370000000 HC RX 637 (ALT 250 FOR IP): Performed by: NURSE PRACTITIONER

## 2024-09-20 PROCEDURE — G0378 HOSPITAL OBSERVATION PER HR: HCPCS

## 2024-09-20 PROCEDURE — 6360000002 HC RX W HCPCS: Performed by: STUDENT IN AN ORGANIZED HEALTH CARE EDUCATION/TRAINING PROGRAM

## 2024-09-20 PROCEDURE — 6360000004 HC RX CONTRAST MEDICATION: Performed by: INTERNAL MEDICINE

## 2024-09-20 PROCEDURE — 6360000002 HC RX W HCPCS

## 2024-09-20 PROCEDURE — 93005 ELECTROCARDIOGRAM TRACING: CPT

## 2024-09-20 PROCEDURE — 87205 SMEAR GRAM STAIN: CPT

## 2024-09-20 PROCEDURE — 96376 TX/PRO/DX INJ SAME DRUG ADON: CPT

## 2024-09-20 PROCEDURE — 2060000000 HC ICU INTERMEDIATE R&B

## 2024-09-20 PROCEDURE — C8924 2D TTE W OR W/O FOL W/CON,FU: HCPCS

## 2024-09-20 PROCEDURE — 6360000002 HC RX W HCPCS: Performed by: NURSE PRACTITIONER

## 2024-09-20 PROCEDURE — 85025 COMPLETE CBC W/AUTO DIFF WBC: CPT

## 2024-09-20 PROCEDURE — 96366 THER/PROPH/DIAG IV INF ADDON: CPT

## 2024-09-20 PROCEDURE — 0202U NFCT DS 22 TRGT SARS-COV-2: CPT

## 2024-09-20 PROCEDURE — 6370000000 HC RX 637 (ALT 250 FOR IP)

## 2024-09-20 PROCEDURE — 2700000000 HC OXYGEN THERAPY PER DAY

## 2024-09-20 PROCEDURE — 85730 THROMBOPLASTIN TIME PARTIAL: CPT

## 2024-09-20 PROCEDURE — 94640 AIRWAY INHALATION TREATMENT: CPT

## 2024-09-20 PROCEDURE — 96365 THER/PROPH/DIAG IV INF INIT: CPT

## 2024-09-20 PROCEDURE — 85027 COMPLETE CBC AUTOMATED: CPT

## 2024-09-20 PROCEDURE — 2580000003 HC RX 258: Performed by: NURSE PRACTITIONER

## 2024-09-20 PROCEDURE — 6370000000 HC RX 637 (ALT 250 FOR IP): Performed by: HOSPITALIST

## 2024-09-20 PROCEDURE — 93308 TTE F-UP OR LMTD: CPT | Performed by: INTERNAL MEDICINE

## 2024-09-20 PROCEDURE — 80061 LIPID PANEL: CPT

## 2024-09-20 PROCEDURE — B24BZZZ ULTRASONOGRAPHY OF HEART WITH AORTA: ICD-10-PCS | Performed by: INTERNAL MEDICINE

## 2024-09-20 PROCEDURE — 80053 COMPREHEN METABOLIC PANEL: CPT

## 2024-09-20 PROCEDURE — 93010 ELECTROCARDIOGRAM REPORT: CPT | Performed by: INTERNAL MEDICINE

## 2024-09-20 PROCEDURE — 99223 1ST HOSP IP/OBS HIGH 75: CPT | Performed by: INTERNAL MEDICINE

## 2024-09-20 RX ORDER — LISINOPRIL 20 MG/1
20 TABLET ORAL EVERY MORNING
Status: DISCONTINUED | OUTPATIENT
Start: 2024-09-20 | End: 2024-09-22 | Stop reason: HOSPADM

## 2024-09-20 RX ORDER — GABAPENTIN 300 MG/1
600 CAPSULE ORAL 2 TIMES DAILY
Status: DISCONTINUED | OUTPATIENT
Start: 2024-09-20 | End: 2024-09-20 | Stop reason: SDUPTHER

## 2024-09-20 RX ORDER — AMLODIPINE BESYLATE 5 MG/1
5 TABLET ORAL EVERY MORNING
Status: DISCONTINUED | OUTPATIENT
Start: 2024-09-20 | End: 2024-09-22 | Stop reason: HOSPADM

## 2024-09-20 RX ORDER — CHLORAL HYDRATE 500 MG
1 CAPSULE ORAL EVERY EVENING
Status: DISCONTINUED | OUTPATIENT
Start: 2024-09-20 | End: 2024-09-20 | Stop reason: CLARIF

## 2024-09-20 RX ORDER — TAMSULOSIN HYDROCHLORIDE 0.4 MG/1
0.8 CAPSULE ORAL NIGHTLY
Status: DISCONTINUED | OUTPATIENT
Start: 2024-09-20 | End: 2024-09-22 | Stop reason: HOSPADM

## 2024-09-20 RX ORDER — POTASSIUM CHLORIDE 1500 MG/1
40 TABLET, EXTENDED RELEASE ORAL PRN
Status: DISCONTINUED | OUTPATIENT
Start: 2024-09-20 | End: 2024-09-22 | Stop reason: HOSPADM

## 2024-09-20 RX ORDER — GABAPENTIN 300 MG/1
600 CAPSULE ORAL ONCE
Status: COMPLETED | OUTPATIENT
Start: 2024-09-20 | End: 2024-09-20

## 2024-09-20 RX ORDER — SENNOSIDES A AND B 8.6 MG/1
1 TABLET, FILM COATED ORAL DAILY PRN
Status: DISCONTINUED | OUTPATIENT
Start: 2024-09-20 | End: 2024-09-22 | Stop reason: HOSPADM

## 2024-09-20 RX ORDER — LEVALBUTEROL INHALATION SOLUTION 0.63 MG/3ML
1 SOLUTION RESPIRATORY (INHALATION) EVERY 6 HOURS PRN
Status: DISCONTINUED | OUTPATIENT
Start: 2024-09-20 | End: 2024-09-22 | Stop reason: HOSPADM

## 2024-09-20 RX ORDER — LEVALBUTEROL INHALATION SOLUTION 0.63 MG/3ML
0.63 SOLUTION RESPIRATORY (INHALATION)
Status: DISCONTINUED | OUTPATIENT
Start: 2024-09-20 | End: 2024-09-22 | Stop reason: HOSPADM

## 2024-09-20 RX ORDER — HYDROCHLOROTHIAZIDE 12.5 MG/1
12.5 TABLET ORAL DAILY
Status: DISCONTINUED | OUTPATIENT
Start: 2024-09-20 | End: 2024-09-22 | Stop reason: HOSPADM

## 2024-09-20 RX ORDER — SODIUM CHLORIDE 0.9 % (FLUSH) 0.9 %
10 SYRINGE (ML) INJECTION PRN
Status: DISCONTINUED | OUTPATIENT
Start: 2024-09-20 | End: 2024-09-22 | Stop reason: HOSPADM

## 2024-09-20 RX ORDER — TAMSULOSIN HYDROCHLORIDE 0.4 MG/1
0.4 CAPSULE ORAL ONCE
Status: COMPLETED | OUTPATIENT
Start: 2024-09-20 | End: 2024-09-20

## 2024-09-20 RX ORDER — GABAPENTIN 600 MG/1
600 TABLET ORAL DAILY
COMMUNITY

## 2024-09-20 RX ORDER — CETIRIZINE HYDROCHLORIDE 10 MG/1
10 TABLET ORAL DAILY
Status: DISCONTINUED | OUTPATIENT
Start: 2024-09-20 | End: 2024-09-22 | Stop reason: HOSPADM

## 2024-09-20 RX ORDER — BUDESONIDE 0.25 MG/2ML
250 INHALANT ORAL 2 TIMES DAILY
Status: DISCONTINUED | OUTPATIENT
Start: 2024-09-20 | End: 2024-09-22 | Stop reason: HOSPADM

## 2024-09-20 RX ORDER — MAGNESIUM SULFATE IN WATER 40 MG/ML
2000 INJECTION, SOLUTION INTRAVENOUS PRN
Status: DISCONTINUED | OUTPATIENT
Start: 2024-09-20 | End: 2024-09-22 | Stop reason: HOSPADM

## 2024-09-20 RX ORDER — ONDANSETRON 2 MG/ML
4 INJECTION INTRAMUSCULAR; INTRAVENOUS EVERY 6 HOURS PRN
Status: DISCONTINUED | OUTPATIENT
Start: 2024-09-20 | End: 2024-09-22 | Stop reason: HOSPADM

## 2024-09-20 RX ORDER — METHYLPREDNISOLONE SODIUM SUCCINATE 40 MG/ML
40 INJECTION, POWDER, LYOPHILIZED, FOR SOLUTION INTRAMUSCULAR; INTRAVENOUS EVERY 8 HOURS
Status: DISCONTINUED | OUTPATIENT
Start: 2024-09-20 | End: 2024-09-21

## 2024-09-20 RX ORDER — HEPARIN SODIUM 10000 [USP'U]/100ML
5-30 INJECTION, SOLUTION INTRAVENOUS CONTINUOUS
Status: DISCONTINUED | OUTPATIENT
Start: 2024-09-20 | End: 2024-09-20

## 2024-09-20 RX ORDER — GABAPENTIN 400 MG/1
1200 CAPSULE ORAL NIGHTLY
Status: DISCONTINUED | OUTPATIENT
Start: 2024-09-20 | End: 2024-09-22 | Stop reason: HOSPADM

## 2024-09-20 RX ORDER — ACETAMINOPHEN 325 MG/1
650 TABLET ORAL EVERY 6 HOURS PRN
Status: DISCONTINUED | OUTPATIENT
Start: 2024-09-20 | End: 2024-09-22 | Stop reason: HOSPADM

## 2024-09-20 RX ORDER — ONDANSETRON 4 MG/1
4 TABLET, ORALLY DISINTEGRATING ORAL EVERY 8 HOURS PRN
Status: DISCONTINUED | OUTPATIENT
Start: 2024-09-20 | End: 2024-09-22 | Stop reason: HOSPADM

## 2024-09-20 RX ORDER — ACETAMINOPHEN 650 MG/1
650 SUPPOSITORY RECTAL EVERY 6 HOURS PRN
Status: DISCONTINUED | OUTPATIENT
Start: 2024-09-20 | End: 2024-09-22 | Stop reason: HOSPADM

## 2024-09-20 RX ORDER — GABAPENTIN 300 MG/1
600 CAPSULE ORAL 2 TIMES DAILY
Status: DISCONTINUED | OUTPATIENT
Start: 2024-09-20 | End: 2024-09-20

## 2024-09-20 RX ORDER — LORAZEPAM 0.5 MG/1
0.5 TABLET ORAL 2 TIMES DAILY PRN
Status: DISCONTINUED | OUTPATIENT
Start: 2024-09-20 | End: 2024-09-22 | Stop reason: HOSPADM

## 2024-09-20 RX ORDER — HEPARIN SODIUM 1000 [USP'U]/ML
60 INJECTION, SOLUTION INTRAVENOUS; SUBCUTANEOUS PRN
Status: DISCONTINUED | OUTPATIENT
Start: 2024-09-20 | End: 2024-09-20

## 2024-09-20 RX ORDER — ASPIRIN 81 MG/1
324 TABLET, CHEWABLE ORAL ONCE
Status: COMPLETED | OUTPATIENT
Start: 2024-09-20 | End: 2024-09-20

## 2024-09-20 RX ORDER — AZITHROMYCIN 250 MG/1
250 TABLET, FILM COATED ORAL
Status: DISCONTINUED | OUTPATIENT
Start: 2024-09-20 | End: 2024-09-22 | Stop reason: HOSPADM

## 2024-09-20 RX ORDER — POTASSIUM CHLORIDE 7.45 MG/ML
10 INJECTION INTRAVENOUS PRN
Status: DISCONTINUED | OUTPATIENT
Start: 2024-09-20 | End: 2024-09-22 | Stop reason: HOSPADM

## 2024-09-20 RX ORDER — SODIUM CHLORIDE 0.9 % (FLUSH) 0.9 %
10 SYRINGE (ML) INJECTION EVERY 12 HOURS SCHEDULED
Status: DISCONTINUED | OUTPATIENT
Start: 2024-09-20 | End: 2024-09-22 | Stop reason: HOSPADM

## 2024-09-20 RX ORDER — ALBUTEROL SULFATE 0.83 MG/ML
2.5 SOLUTION RESPIRATORY (INHALATION) EVERY 6 HOURS PRN
Status: DISCONTINUED | OUTPATIENT
Start: 2024-09-20 | End: 2024-09-20 | Stop reason: SDUPTHER

## 2024-09-20 RX ORDER — HEPARIN SODIUM 1000 [USP'U]/ML
30 INJECTION, SOLUTION INTRAVENOUS; SUBCUTANEOUS PRN
Status: DISCONTINUED | OUTPATIENT
Start: 2024-09-20 | End: 2024-09-20

## 2024-09-20 RX ORDER — GABAPENTIN 300 MG/1
600 CAPSULE ORAL DAILY
Status: DISCONTINUED | OUTPATIENT
Start: 2024-09-20 | End: 2024-09-22 | Stop reason: HOSPADM

## 2024-09-20 RX ORDER — HEPARIN SODIUM 1000 [USP'U]/ML
60 INJECTION, SOLUTION INTRAVENOUS; SUBCUTANEOUS ONCE
Status: COMPLETED | OUTPATIENT
Start: 2024-09-20 | End: 2024-09-20

## 2024-09-20 RX ORDER — MAGNESIUM 30 MG
30 TABLET ORAL EVERY EVENING
COMMUNITY

## 2024-09-20 RX ORDER — SODIUM CHLORIDE 9 MG/ML
INJECTION, SOLUTION INTRAVENOUS PRN
Status: DISCONTINUED | OUTPATIENT
Start: 2024-09-20 | End: 2024-09-22 | Stop reason: HOSPADM

## 2024-09-20 RX ORDER — AMLODIPINE AND BENAZEPRIL HYDROCHLORIDE 5; 20 MG/1; MG/1
1 CAPSULE ORAL EVERY MORNING
Status: DISCONTINUED | OUTPATIENT
Start: 2024-09-20 | End: 2024-09-20 | Stop reason: CLARIF

## 2024-09-20 RX ORDER — ARFORMOTEROL TARTRATE 15 UG/2ML
15 SOLUTION RESPIRATORY (INHALATION) 2 TIMES DAILY
Status: DISCONTINUED | OUTPATIENT
Start: 2024-09-20 | End: 2024-09-22 | Stop reason: HOSPADM

## 2024-09-20 RX ORDER — TAMSULOSIN HYDROCHLORIDE 0.4 MG/1
0.4 CAPSULE ORAL NIGHTLY
Status: DISCONTINUED | OUTPATIENT
Start: 2024-09-20 | End: 2024-09-20

## 2024-09-20 RX ADMIN — LISINOPRIL 20 MG: 20 TABLET ORAL at 10:13

## 2024-09-20 RX ADMIN — HEPARIN SODIUM 3900 UNITS: 1000 INJECTION INTRAVENOUS; SUBCUTANEOUS at 01:45

## 2024-09-20 RX ADMIN — LEVALBUTEROL 0.63 MG: 0.63 SOLUTION RESPIRATORY (INHALATION) at 20:13

## 2024-09-20 RX ADMIN — SODIUM CHLORIDE, PRESERVATIVE FREE 10 ML: 5 INJECTION INTRAVENOUS at 10:12

## 2024-09-20 RX ADMIN — TAMSULOSIN HYDROCHLORIDE 0.4 MG: 0.4 CAPSULE ORAL at 04:44

## 2024-09-20 RX ADMIN — SODIUM CHLORIDE, PRESERVATIVE FREE 10 ML: 5 INJECTION INTRAVENOUS at 21:00

## 2024-09-20 RX ADMIN — ARFORMOTEROL TARTRATE 15 MCG: 15 SOLUTION RESPIRATORY (INHALATION) at 20:13

## 2024-09-20 RX ADMIN — CETIRIZINE HYDROCHLORIDE 10 MG: 10 TABLET, FILM COATED ORAL at 10:12

## 2024-09-20 RX ADMIN — AMLODIPINE BESYLATE 5 MG: 5 TABLET ORAL at 10:12

## 2024-09-20 RX ADMIN — METHYLPREDNISOLONE SODIUM SUCCINATE 40 MG: 40 INJECTION, POWDER, LYOPHILIZED, FOR SOLUTION INTRAMUSCULAR; INTRAVENOUS at 13:33

## 2024-09-20 RX ADMIN — LORAZEPAM 0.5 MG: 0.5 TABLET ORAL at 03:06

## 2024-09-20 RX ADMIN — HYDROCHLOROTHIAZIDE 12.5 MG: 12.5 TABLET ORAL at 10:11

## 2024-09-20 RX ADMIN — LORAZEPAM 0.5 MG: 0.5 TABLET ORAL at 20:59

## 2024-09-20 RX ADMIN — ARFORMOTEROL TARTRATE 15 MCG: 15 SOLUTION RESPIRATORY (INHALATION) at 09:16

## 2024-09-20 RX ADMIN — METHYLPREDNISOLONE SODIUM SUCCINATE 40 MG: 40 INJECTION, POWDER, LYOPHILIZED, FOR SOLUTION INTRAMUSCULAR; INTRAVENOUS at 20:59

## 2024-09-20 RX ADMIN — GABAPENTIN 600 MG: 300 CAPSULE ORAL at 04:44

## 2024-09-20 RX ADMIN — BUDESONIDE 250 MCG: 0.25 SUSPENSION RESPIRATORY (INHALATION) at 09:16

## 2024-09-20 RX ADMIN — AZITHROMYCIN 250 MG: 250 TABLET, FILM COATED ORAL at 10:12

## 2024-09-20 RX ADMIN — GABAPENTIN 600 MG: 300 CAPSULE ORAL at 10:11

## 2024-09-20 RX ADMIN — LEVALBUTEROL 0.63 MG: 0.63 SOLUTION RESPIRATORY (INHALATION) at 09:16

## 2024-09-20 RX ADMIN — PERFLUTREN 1.5 ML: 6.52 INJECTION, SUSPENSION INTRAVENOUS at 10:14

## 2024-09-20 RX ADMIN — LORAZEPAM 0.5 MG: 0.5 TABLET ORAL at 13:33

## 2024-09-20 RX ADMIN — TAMSULOSIN HYDROCHLORIDE 0.8 MG: 0.4 CAPSULE ORAL at 20:59

## 2024-09-20 RX ADMIN — GABAPENTIN 1200 MG: 400 CAPSULE ORAL at 20:59

## 2024-09-20 RX ADMIN — HEPARIN SODIUM 12 UNITS/KG/HR: 10000 INJECTION, SOLUTION INTRAVENOUS at 01:45

## 2024-09-20 RX ADMIN — ASPIRIN 81 MG CHEWABLE TABLET 324 MG: 81 TABLET CHEWABLE at 01:12

## 2024-09-20 RX ADMIN — BUDESONIDE 250 MCG: 0.25 SUSPENSION RESPIRATORY (INHALATION) at 20:13

## 2024-09-21 LAB
ALBUMIN SERPL-MCNC: 4 G/DL (ref 3.5–5.2)
ALP SERPL-CCNC: 84 U/L (ref 40–129)
ALT SERPL-CCNC: 12 U/L (ref 0–40)
ANION GAP SERPL CALCULATED.3IONS-SCNC: 9 MMOL/L (ref 7–16)
AST SERPL-CCNC: 16 U/L (ref 0–39)
BASOPHILS # BLD: 0 K/UL (ref 0–0.2)
BASOPHILS NFR BLD: 0 % (ref 0–2)
BILIRUB SERPL-MCNC: 0.5 MG/DL (ref 0–1.2)
BUN SERPL-MCNC: 41 MG/DL (ref 6–23)
CALCIUM SERPL-MCNC: 9.1 MG/DL (ref 8.6–10.2)
CHLORIDE SERPL-SCNC: 96 MMOL/L (ref 98–107)
CO2 SERPL-SCNC: 29 MMOL/L (ref 22–29)
CREAT SERPL-MCNC: 0.7 MG/DL (ref 0.7–1.2)
EOSINOPHIL # BLD: 0 K/UL (ref 0.05–0.5)
EOSINOPHILS RELATIVE PERCENT: 0 % (ref 0–6)
ERYTHROCYTE [DISTWIDTH] IN BLOOD BY AUTOMATED COUNT: 12.9 % (ref 11.5–15)
GFR, ESTIMATED: >90 ML/MIN/1.73M2
GLUCOSE SERPL-MCNC: 160 MG/DL (ref 74–99)
HCT VFR BLD AUTO: 31 % (ref 37–54)
HGB BLD-MCNC: 10.6 G/DL (ref 12.5–16.5)
LYMPHOCYTES NFR BLD: 0.21 K/UL (ref 1.5–4)
LYMPHOCYTES RELATIVE PERCENT: 3 % (ref 20–42)
MCH RBC QN AUTO: 33.1 PG (ref 26–35)
MCHC RBC AUTO-ENTMCNC: 34.2 G/DL (ref 32–34.5)
MCV RBC AUTO: 96.9 FL (ref 80–99.9)
MONOCYTES NFR BLD: 0.14 K/UL (ref 0.1–0.95)
MONOCYTES NFR BLD: 2 % (ref 2–12)
NEUTROPHILS NFR BLD: 96 % (ref 43–80)
NEUTS SEG NFR BLD: 7.75 K/UL (ref 1.8–7.3)
PLATELET # BLD AUTO: 195 K/UL (ref 130–450)
PMV BLD AUTO: 10.1 FL (ref 7–12)
POTASSIUM SERPL-SCNC: 4 MMOL/L (ref 3.5–5)
PROCALCITONIN SERPL-MCNC: 0.04 NG/ML (ref 0–0.08)
PROT SERPL-MCNC: 6.1 G/DL (ref 6.4–8.3)
RBC # BLD AUTO: 3.2 M/UL (ref 3.8–5.8)
RBC # BLD: ABNORMAL 10*6/UL
SODIUM SERPL-SCNC: 134 MMOL/L (ref 132–146)
WBC OTHER # BLD: 8.1 K/UL (ref 4.5–11.5)

## 2024-09-21 PROCEDURE — 94640 AIRWAY INHALATION TREATMENT: CPT

## 2024-09-21 PROCEDURE — 2700000000 HC OXYGEN THERAPY PER DAY

## 2024-09-21 PROCEDURE — 6360000002 HC RX W HCPCS

## 2024-09-21 PROCEDURE — 2060000000 HC ICU INTERMEDIATE R&B

## 2024-09-21 PROCEDURE — G0378 HOSPITAL OBSERVATION PER HR: HCPCS

## 2024-09-21 PROCEDURE — 6370000000 HC RX 637 (ALT 250 FOR IP)

## 2024-09-21 PROCEDURE — 80053 COMPREHEN METABOLIC PANEL: CPT

## 2024-09-21 PROCEDURE — 84145 PROCALCITONIN (PCT): CPT

## 2024-09-21 PROCEDURE — 96376 TX/PRO/DX INJ SAME DRUG ADON: CPT

## 2024-09-21 PROCEDURE — 94669 MECHANICAL CHEST WALL OSCILL: CPT

## 2024-09-21 PROCEDURE — 6360000002 HC RX W HCPCS: Performed by: NURSE PRACTITIONER

## 2024-09-21 PROCEDURE — 94668 MNPJ CHEST WALL SBSQ: CPT

## 2024-09-21 PROCEDURE — 94667 MNPJ CHEST WALL 1ST: CPT

## 2024-09-21 PROCEDURE — APPSS15 APP SPLIT SHARED TIME 0-15 MINUTES: Performed by: CLINICAL NURSE SPECIALIST

## 2024-09-21 PROCEDURE — 6370000000 HC RX 637 (ALT 250 FOR IP): Performed by: NURSE PRACTITIONER

## 2024-09-21 PROCEDURE — 2580000003 HC RX 258: Performed by: NURSE PRACTITIONER

## 2024-09-21 PROCEDURE — 85025 COMPLETE CBC W/AUTO DIFF WBC: CPT

## 2024-09-21 RX ORDER — METHYLPREDNISOLONE SODIUM SUCCINATE 40 MG/ML
40 INJECTION, POWDER, LYOPHILIZED, FOR SOLUTION INTRAMUSCULAR; INTRAVENOUS EVERY 12 HOURS
Status: DISCONTINUED | OUTPATIENT
Start: 2024-09-22 | End: 2024-09-22 | Stop reason: HOSPADM

## 2024-09-21 RX ORDER — BENZONATATE 100 MG/1
100 CAPSULE ORAL 3 TIMES DAILY
Status: DISCONTINUED | OUTPATIENT
Start: 2024-09-21 | End: 2024-09-22 | Stop reason: HOSPADM

## 2024-09-21 RX ADMIN — LEVALBUTEROL 0.63 MG: 0.63 SOLUTION RESPIRATORY (INHALATION) at 12:19

## 2024-09-21 RX ADMIN — LISINOPRIL 20 MG: 20 TABLET ORAL at 08:21

## 2024-09-21 RX ADMIN — TAMSULOSIN HYDROCHLORIDE 0.8 MG: 0.4 CAPSULE ORAL at 22:40

## 2024-09-21 RX ADMIN — LEVALBUTEROL 0.63 MG: 0.63 SOLUTION RESPIRATORY (INHALATION) at 09:08

## 2024-09-21 RX ADMIN — LEVALBUTEROL 0.63 MG: 0.63 SOLUTION RESPIRATORY (INHALATION) at 16:15

## 2024-09-21 RX ADMIN — BUDESONIDE 250 MCG: 0.25 SUSPENSION RESPIRATORY (INHALATION) at 09:08

## 2024-09-21 RX ADMIN — METHYLPREDNISOLONE SODIUM SUCCINATE 40 MG: 40 INJECTION, POWDER, LYOPHILIZED, FOR SOLUTION INTRAMUSCULAR; INTRAVENOUS at 16:43

## 2024-09-21 RX ADMIN — AMLODIPINE BESYLATE 5 MG: 5 TABLET ORAL at 08:21

## 2024-09-21 RX ADMIN — LORAZEPAM 0.5 MG: 0.5 TABLET ORAL at 08:21

## 2024-09-21 RX ADMIN — ARFORMOTEROL TARTRATE 15 MCG: 15 SOLUTION RESPIRATORY (INHALATION) at 09:08

## 2024-09-21 RX ADMIN — ARFORMOTEROL TARTRATE 15 MCG: 15 SOLUTION RESPIRATORY (INHALATION) at 21:40

## 2024-09-21 RX ADMIN — GABAPENTIN 600 MG: 300 CAPSULE ORAL at 08:21

## 2024-09-21 RX ADMIN — HYDROCHLOROTHIAZIDE 12.5 MG: 12.5 TABLET ORAL at 08:21

## 2024-09-21 RX ADMIN — BENZONATATE 100 MG: 100 CAPSULE ORAL at 16:26

## 2024-09-21 RX ADMIN — SODIUM CHLORIDE, PRESERVATIVE FREE 10 ML: 5 INJECTION INTRAVENOUS at 08:27

## 2024-09-21 RX ADMIN — LEVALBUTEROL 0.63 MG: 0.63 SOLUTION RESPIRATORY (INHALATION) at 21:39

## 2024-09-21 RX ADMIN — LORAZEPAM 0.5 MG: 0.5 TABLET ORAL at 22:40

## 2024-09-21 RX ADMIN — METHYLPREDNISOLONE SODIUM SUCCINATE 40 MG: 40 INJECTION, POWDER, LYOPHILIZED, FOR SOLUTION INTRAMUSCULAR; INTRAVENOUS at 05:52

## 2024-09-21 RX ADMIN — BUDESONIDE 250 MCG: 0.25 SUSPENSION RESPIRATORY (INHALATION) at 21:39

## 2024-09-21 RX ADMIN — BENZONATATE 100 MG: 100 CAPSULE ORAL at 22:39

## 2024-09-21 RX ADMIN — CETIRIZINE HYDROCHLORIDE 10 MG: 10 TABLET, FILM COATED ORAL at 08:21

## 2024-09-21 RX ADMIN — GABAPENTIN 1200 MG: 400 CAPSULE ORAL at 22:39

## 2024-09-21 RX ADMIN — SODIUM CHLORIDE, PRESERVATIVE FREE 10 ML: 5 INJECTION INTRAVENOUS at 22:40

## 2024-09-22 VITALS
WEIGHT: 145.5 LBS | BODY MASS INDEX: 22.84 KG/M2 | HEART RATE: 64 BPM | SYSTOLIC BLOOD PRESSURE: 108 MMHG | RESPIRATION RATE: 18 BRPM | OXYGEN SATURATION: 99 % | HEIGHT: 67 IN | DIASTOLIC BLOOD PRESSURE: 54 MMHG | TEMPERATURE: 97.9 F

## 2024-09-22 LAB
ALBUMIN SERPL-MCNC: 3.8 G/DL (ref 3.5–5.2)
ALP SERPL-CCNC: 80 U/L (ref 40–129)
ALT SERPL-CCNC: 15 U/L (ref 0–40)
ANION GAP SERPL CALCULATED.3IONS-SCNC: 9 MMOL/L (ref 7–16)
AST SERPL-CCNC: 21 U/L (ref 0–39)
BASOPHILS # BLD: 0 K/UL (ref 0–0.2)
BASOPHILS NFR BLD: 0 % (ref 0–2)
BILIRUB SERPL-MCNC: 0.4 MG/DL (ref 0–1.2)
BUN SERPL-MCNC: 42 MG/DL (ref 6–23)
CALCIUM SERPL-MCNC: 8.6 MG/DL (ref 8.6–10.2)
CHLORIDE SERPL-SCNC: 96 MMOL/L (ref 98–107)
CO2 SERPL-SCNC: 30 MMOL/L (ref 22–29)
CREAT SERPL-MCNC: 0.7 MG/DL (ref 0.7–1.2)
EOSINOPHIL # BLD: 0 K/UL (ref 0.05–0.5)
EOSINOPHILS RELATIVE PERCENT: 0 % (ref 0–6)
ERYTHROCYTE [DISTWIDTH] IN BLOOD BY AUTOMATED COUNT: 13.2 % (ref 11.5–15)
GFR, ESTIMATED: >90 ML/MIN/1.73M2
GLUCOSE SERPL-MCNC: 164 MG/DL (ref 74–99)
HCT VFR BLD AUTO: 29.7 % (ref 37–54)
HGB BLD-MCNC: 9.9 G/DL (ref 12.5–16.5)
LYMPHOCYTES NFR BLD: 0.61 K/UL (ref 1.5–4)
LYMPHOCYTES RELATIVE PERCENT: 6 % (ref 20–42)
MCH RBC QN AUTO: 32.8 PG (ref 26–35)
MCHC RBC AUTO-ENTMCNC: 33.3 G/DL (ref 32–34.5)
MCV RBC AUTO: 98.3 FL (ref 80–99.9)
MICROORGANISM SPEC CULT: NORMAL
MICROORGANISM/AGENT SPEC: NORMAL
MONOCYTES NFR BLD: 0.17 K/UL (ref 0.1–0.95)
MONOCYTES NFR BLD: 2 % (ref 2–12)
NEUTROPHILS NFR BLD: 92 % (ref 43–80)
NEUTS SEG NFR BLD: 9.22 K/UL (ref 1.8–7.3)
PLATELET # BLD AUTO: 202 K/UL (ref 130–450)
PMV BLD AUTO: 10.1 FL (ref 7–12)
POTASSIUM SERPL-SCNC: 4.1 MMOL/L (ref 3.5–5)
PROT SERPL-MCNC: 5.5 G/DL (ref 6.4–8.3)
RBC # BLD AUTO: 3.02 M/UL (ref 3.8–5.8)
RBC # BLD: ABNORMAL 10*6/UL
SODIUM SERPL-SCNC: 135 MMOL/L (ref 132–146)
SPECIMEN DESCRIPTION: NORMAL
WBC OTHER # BLD: 10 K/UL (ref 4.5–11.5)

## 2024-09-22 PROCEDURE — 80053 COMPREHEN METABOLIC PANEL: CPT

## 2024-09-22 PROCEDURE — 6370000000 HC RX 637 (ALT 250 FOR IP)

## 2024-09-22 PROCEDURE — 94640 AIRWAY INHALATION TREATMENT: CPT

## 2024-09-22 PROCEDURE — G0378 HOSPITAL OBSERVATION PER HR: HCPCS

## 2024-09-22 PROCEDURE — 85025 COMPLETE CBC W/AUTO DIFF WBC: CPT

## 2024-09-22 PROCEDURE — 2700000000 HC OXYGEN THERAPY PER DAY

## 2024-09-22 PROCEDURE — 96376 TX/PRO/DX INJ SAME DRUG ADON: CPT

## 2024-09-22 PROCEDURE — 2580000003 HC RX 258: Performed by: NURSE PRACTITIONER

## 2024-09-22 PROCEDURE — 94668 MNPJ CHEST WALL SBSQ: CPT

## 2024-09-22 PROCEDURE — 6360000002 HC RX W HCPCS

## 2024-09-22 PROCEDURE — 6370000000 HC RX 637 (ALT 250 FOR IP): Performed by: NURSE PRACTITIONER

## 2024-09-22 PROCEDURE — 94669 MECHANICAL CHEST WALL OSCILL: CPT

## 2024-09-22 RX ORDER — BENZONATATE 100 MG/1
100 CAPSULE ORAL 3 TIMES DAILY
Qty: 21 CAPSULE | Refills: 0 | Status: SHIPPED | OUTPATIENT
Start: 2024-09-22 | End: 2024-09-29

## 2024-09-22 RX ORDER — PREDNISONE 10 MG/1
TABLET ORAL
Qty: 30 TABLET | Refills: 0 | Status: SHIPPED | OUTPATIENT
Start: 2024-09-22

## 2024-09-22 RX ADMIN — LEVALBUTEROL 0.63 MG: 0.63 SOLUTION RESPIRATORY (INHALATION) at 14:46

## 2024-09-22 RX ADMIN — METHYLPREDNISOLONE SODIUM SUCCINATE 40 MG: 40 INJECTION INTRAMUSCULAR; INTRAVENOUS at 00:08

## 2024-09-22 RX ADMIN — SODIUM CHLORIDE, PRESERVATIVE FREE 10 ML: 5 INJECTION INTRAVENOUS at 07:23

## 2024-09-22 RX ADMIN — LORAZEPAM 0.5 MG: 0.5 TABLET ORAL at 07:22

## 2024-09-22 RX ADMIN — CETIRIZINE HYDROCHLORIDE 10 MG: 10 TABLET, FILM COATED ORAL at 07:22

## 2024-09-22 RX ADMIN — BENZONATATE 100 MG: 100 CAPSULE ORAL at 07:22

## 2024-09-22 RX ADMIN — HYDROCHLOROTHIAZIDE 12.5 MG: 12.5 TABLET ORAL at 07:22

## 2024-09-22 RX ADMIN — AMLODIPINE BESYLATE 5 MG: 5 TABLET ORAL at 07:22

## 2024-09-22 RX ADMIN — BENZONATATE 100 MG: 100 CAPSULE ORAL at 13:49

## 2024-09-22 RX ADMIN — GABAPENTIN 600 MG: 300 CAPSULE ORAL at 07:22

## 2024-09-22 RX ADMIN — METHYLPREDNISOLONE SODIUM SUCCINATE 40 MG: 40 INJECTION INTRAMUSCULAR; INTRAVENOUS at 13:49

## 2024-09-22 RX ADMIN — LISINOPRIL 20 MG: 20 TABLET ORAL at 07:22

## 2024-10-20 PROBLEM — R79.89 ELEVATED TROPONIN: Status: RESOLVED | Noted: 2021-06-07 | Resolved: 2024-10-20

## 2024-10-29 NOTE — PROGRESS NOTES
Internal Medicine  Progress Note  Nathan Crowley MD     Subjective:     Patient is alert. Patient reports about the same \"feels like I am just existing not really living\". Tolerating diet well no other c/o. Scheduled Meds:   oseltamivir  75 mg Oral BID    doxycycline hyclate  100 mg Oral 2 times per day    methylPREDNISolone  40 mg Intravenous Q12H    cloNIDine  0.1 mg Oral BID    gabapentin  600 mg Oral TID    guaiFENesin  400 mg Oral 4x daily    tamsulosin  0.8 mg Oral Daily    sodium chloride flush  5-40 mL Intravenous 2 times per day    enoxaparin  40 mg Subcutaneous Daily    budesonide  0.25 mg Nebulization BID    Arformoterol Tartrate  15 mcg Nebulization BID    amLODIPine  5 mg Oral Daily    And    lisinopril  20 mg Oral Daily     Continuous Infusions:   sodium chloride       PRN Meds:levalbuterol, LORazepam, sodium chloride flush, sodium chloride, ondansetron **OR** ondansetron, polyethylene glycol, acetaminophen **OR** acetaminophen    Objective:      Physical Exam:  Vitals:   /61   Pulse 79   Temp 98.3 °F (36.8 °C) (Oral)   Resp 20   Ht 5' 7\" (1.702 m)   Wt 183 lb 3.2 oz (83.1 kg)   SpO2 92%   BMI 28.69 kg/m²   I/O's  No intake or output data in the 24 hours ending 06/11/21 0644  Exam:  General appearance: alert, appears stated age and cooperative moderate distress  Head: Normocephalic, without obvious abnormality, atraumatic  Eyes: conjunctivae/corneas clear. PERRL, EOM's intact. Fundi benign.   Throat: normal findings: lips normal without lesions  Neck: no adenopathy, no carotid bruit, no JVD and supple, symmetrical, trachea midline  Back: negative  Lungs: clear to auscultation bilaterally  Chest wall: no tenderness  Heart: regular rate and rhythm  Abdomen: soft, non-tender; bowel sounds normal; no masses,  no organomegaly  Extremities: extremities normal, atraumatic, no cyanosis or edema  Pulses: 2+ and symmetric  Skin: Skin color, texture, turgor normal. No rashes or Pediatric Therapy at [facility]  Pediatric Speech Language Treatment Note    Patient: Ranjana Galvez Today's Date: 10/29/24   MRN: [id number] Time:  Start Time: 1205  Stop Time: 1235  Total time in clinic (min): 30 minutes   : 2021 Therapist: Iris Trujillo CCC-SLP   Age: 3 y.o. 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 Drooling  K11.7           SUBJECTIVE  Ranjana Galvez arrived to therapy session with Father who reported the following medical/social updates:  some behavioral concerns this week; patient doing well at school but acting out at home.    Others present in the treatment area include: parent.    Patient Observations:  Required no redirection and readily participated throughout session  Impressions based on observation and/or parent report           Authorization Tracking  POC/Progress Note Due Unit Limit Per Visit/Auth Auth Expiration Date PT/OT/ST + Visit Limit?   10/31/24                                Visit/Unit Tracking  Auth Status: Date of service 9/27/24 10/10/24 10/16/24 10/22/24 10/29/24          Visits Authorized: 99 Used 1 2 3 4 5          IE Date: 24 Remaining 98 [phone]            Goals:   Short Term Goals:   Goal Goal Status CPT Codes   Further assessment oral mechanism with palate appearance/shape.   [] New goal           [] Goal in progress   [x] Goal met  [] Goal modified  [] Goal targeted    [x] Goal not targeted [] Speech/Language Therapy  [] SGD Tx and Training  [] Cognitive Skills  [] Dysphagia/Feeding Therapy  [] Group  [] Other: (N/A)   Interventions Performed:      Patient will complete tongue elevation movements to reach palate with jaw stretch across 3 sessions.   [x] New goal           [] Goal in progress   [] Goal met  [] Goal modified  [x] Goal targeted    [] Goal not targeted [x] Speech/Language Therapy  [] SGD Tx and Training  [] Cognitive Skills  []  Dysphagia/Feeding Therapy  [] Group  [] Other: (Caregiver Training)   Interventions Performed: see below.    Noted drooling at rest prior to intervention.  Cueing to swallow and clear is helpful.     Patient will complete jaw/tongue dissociation exercises across 3 sessions. [x] New goal           [] Goal in progress   [] Goal met  [] Goal modified  [x] Goal targeted    [] Goal not targeted [x] Speech/Language Therapy  [] SGD Tx and Training  [] Cognitive Skills  [] Dysphagia/Feeding Therapy  [] Group  [] Other: (N/A)   Interventions Performed: Patient participated with oral motor activities for tongue elevation - use of tongue depressor for oral opening hold: patient produced alveolar sounds in isolation/syllables to 85% accuracy with tongue elevation.  Reduced tongue depressor to tactile cue only for open oral posture with success limited success. Tongue clicking - limited separation of jaw tongue mobility.   Continue carryover activities for oral awareness I.e., use of NUK (provided) or electric toothbrush for increasing oral awareness and stimulation.      Long Term Goals  Goal Goal Status   Patient will participate with OMT based activities to support tongue mobility.  [] New goal         [] Goal in progress   [] Goal met         [] Goal modified  [] Goal targeted  [] Goal not targeted   Interventions Performed:    Patient will complete HEP and demonstrate compliance and understanding.  [] New goal         [] Goal in progress   [] Goal met         [] Goal modified  [] Goal targeted  [] Goal not targeted   Interventions Performed:                 Patient and Family Training and Education:  Topics: Therapy Plan and Home Exercise Program  Also discussed possible support for behavior concerns with OT, PCIT; discuss with physician.    Methods: Discussion and Demonstration  Response: Demonstrated understanding  Recipient: Father    ASSESSMENT  Ranjana Galvez participated in the treatment session well.   Barriers  lesions  Lymph nodes: Cervical, supraclavicular, and axillary nodes normal.  Neurologic: Grossly normal      Imaging     Chest  Xray:  Results for orders placed during the hospital encounter of 20    XR CHEST STANDARD (2 VW)    Narrative  Patient MRN: [id number]  : 1950  Age:  71 years  Gender: Male  Order Date: 3/10/2020 12:00 PM  Exam: XR CHEST (2 VW)  Number of Images: 2 view  Indication:   persistent dyspnea and cough  persistent dyspnea and cough  Comparison: 2020    Findings: The heart is unremarkable. The lung fields are hyperinflated. Density is seen in the lung fields suggesting scar. The aorta is tortuous and ectatic. Impression  Findings of emphysematous changes. This study was dictated by Abelardo Altamirano PA-C and Taya Oquendo MD  reviewed and concurred with the findings. Results for orders placed during the hospital encounter of 21    XR CHEST PORTABLE    Narrative  EXAMINATION:  ONE XRAY VIEW OF THE CHEST    2021 1:14 pm    COMPARISON:  March 10, 2020    HISTORY:  ORDERING SYSTEM PROVIDED HISTORY: r/o pna  TECHNOLOGIST PROVIDED HISTORY:  Reason for exam:->r/o pna    FINDINGS:  No airspace opacity or pleural effusion. The heart is normal size. No  pneumothorax. No free air beneath the hemidiaphragms. Hyperinflation of both  lungs notable in the upper lobes. Impression  1. No pneumonia or pleural effusion. 2.  Emphysematous changes. Additional Imaging:  none    Lab Review   No results found for this or any previous visit (from the past 24 hour(s)). Assessment:     Principal Problem:    Acute respiratory failure with hypoxia (HCC)  Active Problems:    Type 2 diabetes mellitus (HCC)    Elevated troponin  Resolved Problems:    * No resolved hospital problems.  *      Plan:   Pt seems to Loly Moan in a perpetual state of moderate respiratory distress - certainly not getting any better  Charity Dougherty MD  2021  6:44 AM to engagement include: none.   Skilled pediatric speech language therapy intervention continues to be required at the recommended frequency due to deficits in oral management of salvia.   During today’s treatment session, Ranjana Galvez demonstrated progress in the areas of tongue jaw dissociation with support.      PLAN  Continue per plan of care. Continue carryover

## 2024-12-23 LAB — PSA SERPL-MCNC: 1.29 NG/ML (ref 0–4)

## 2025-03-06 ENCOUNTER — TELEPHONE (OUTPATIENT)
Dept: PALLATIVE CARE | Age: 75
End: 2025-03-06

## 2025-03-06 NOTE — TELEPHONE ENCOUNTER
Called and spoke with Torsten regarding referral for Palliative Care from Dr. West. Explained Palliative Care and location of clinic. Torsten shares his continued issues with COPD. Support provided through active listening. Balaji set 3/31/25.

## 2025-03-31 ENCOUNTER — OFFICE VISIT (OUTPATIENT)
Dept: PALLATIVE CARE | Age: 75
End: 2025-03-31
Payer: COMMERCIAL

## 2025-03-31 VITALS
DIASTOLIC BLOOD PRESSURE: 74 MMHG | TEMPERATURE: 97.7 F | OXYGEN SATURATION: 98 % | WEIGHT: 147.4 LBS | HEART RATE: 76 BPM | SYSTOLIC BLOOD PRESSURE: 121 MMHG | BODY MASS INDEX: 22.75 KG/M2

## 2025-03-31 DIAGNOSIS — Z51.5 PALLIATIVE CARE BY SPECIALIST: ICD-10-CM

## 2025-03-31 DIAGNOSIS — R06.09 DYSPNEA ON EXERTION: ICD-10-CM

## 2025-03-31 DIAGNOSIS — J96.11 CHRONIC HYPOXIC RESPIRATORY FAILURE: Primary | ICD-10-CM

## 2025-03-31 DIAGNOSIS — R04.2 HEMOPTYSIS: ICD-10-CM

## 2025-03-31 DIAGNOSIS — J43.2 CENTRILOBULAR EMPHYSEMA (HCC): ICD-10-CM

## 2025-03-31 PROCEDURE — 99202 OFFICE O/P NEW SF 15 MIN: CPT | Performed by: NURSE PRACTITIONER

## 2025-03-31 PROCEDURE — 3078F DIAST BP <80 MM HG: CPT | Performed by: NURSE PRACTITIONER

## 2025-03-31 PROCEDURE — 1123F ACP DISCUSS/DSCN MKR DOCD: CPT | Performed by: NURSE PRACTITIONER

## 2025-03-31 PROCEDURE — 3074F SYST BP LT 130 MM HG: CPT | Performed by: NURSE PRACTITIONER

## 2025-03-31 PROCEDURE — 1036F TOBACCO NON-USER: CPT | Performed by: NURSE PRACTITIONER

## 2025-03-31 PROCEDURE — 3023F SPIROM DOC REV: CPT | Performed by: NURSE PRACTITIONER

## 2025-03-31 PROCEDURE — 3017F COLORECTAL CA SCREEN DOC REV: CPT | Performed by: NURSE PRACTITIONER

## 2025-03-31 PROCEDURE — 99205 OFFICE O/P NEW HI 60 MIN: CPT | Performed by: NURSE PRACTITIONER

## 2025-03-31 PROCEDURE — G8427 DOCREV CUR MEDS BY ELIG CLIN: HCPCS | Performed by: NURSE PRACTITIONER

## 2025-03-31 PROCEDURE — G8420 CALC BMI NORM PARAMETERS: HCPCS | Performed by: NURSE PRACTITIONER

## 2025-03-31 NOTE — PROGRESS NOTES
Palliative Care Department  Provider: KELECHI De Los Santos - CNP    Referring Provider:  Dr. West    Location of Service:   [facility]    Chief Complaint: nAgel Luis Stanley is a 74 y.o. male with chief complaint of shortness of breath    Assessment/Plan      COPD:   Known to Dr. West locally   On supplemental O2  On nebulizer  Uses chest vest  S/p BLVR Sept 2021 with Dr. David Ledbetter in [place]  On prophylactic Azithromycin  Hx of pulmonary rehab, stopped d/t intolerance    Shortness of breath:  Often not severe  No indication for low dose morphine at this time    Anxiety/Sleep:   Ativan 0.5 mg using 1 tab in the am and 2 tabs pm, per PCP    Chronic pain syndrome/Bilateral Ulnar Contractures/Back pain:  Has seen hand surgeons previously  Hx of carpal tunnel surgery  On Gabapentin 600 mg TID    Fatigue/Weakness:  Functional status is decreased, mostly r/t activity intolerance due to SOB  Declined pulmonary rehab    Follow Up:  4 weeks.  They were encouraged to call with any questions, concerns, needs, or changes in symptoms.    Subjective:     HPI:  Angel Luis Stanley is a 74 y.o. male with advanced COPD, status post BL VR in September 2021, known locally to Dr. West, who was referred to [facility] symptomatic support.    Subjective/Events/Discussions:  Torsten presents today unaccompanied  Introduced myself, palliative medicine, we discussed the role palliative medicine may play in his care, including support for goals of care and symptomatic control  Primary symptom is that of fatigue and shortness of breath  Fatigue is primarily exacerbated by his shortness of breath  He additionally reports coughing up some blood recently, over the past 2 weeks  He has changed his nebulizer, and is no longer using xopenex   He also is not using the strong setting of his chest vest  No recent imaging, etiology of bleeding is unknown  He may benefit from a CT, which is not

## 2025-05-09 ENCOUNTER — HOSPITAL ENCOUNTER (OUTPATIENT)
Dept: CT IMAGING | Age: 75
Discharge: HOME | End: 2025-05-11
Attending: INTERNAL MEDICINE
Payer: COMMERCIAL

## 2025-05-09 DIAGNOSIS — J47.1 BRONCHIECTASIS WITH ACUTE EXACERBATION (HCC): ICD-10-CM

## 2025-05-09 DIAGNOSIS — J47.9 BRONCHIECTASIS WITHOUT COMPLICATION (HCC): ICD-10-CM

## 2025-05-09 DIAGNOSIS — J44.1 COPD EXACERBATION (HCC): ICD-10-CM

## 2025-05-09 PROCEDURE — 71250 CT THORAX DX C-: CPT

## 2025-05-13 ENCOUNTER — OFFICE VISIT (OUTPATIENT)
Dept: PALLATIVE CARE | Age: 75
End: 2025-05-13
Payer: COMMERCIAL

## 2025-05-13 VITALS
WEIGHT: 140.6 LBS | OXYGEN SATURATION: 100 % | HEART RATE: 66 BPM | DIASTOLIC BLOOD PRESSURE: 71 MMHG | BODY MASS INDEX: 21.7 KG/M2 | SYSTOLIC BLOOD PRESSURE: 109 MMHG | TEMPERATURE: 98.1 F

## 2025-05-13 DIAGNOSIS — R64 PULMONARY CACHEXIA DUE TO COPD (HCC): ICD-10-CM

## 2025-05-13 DIAGNOSIS — J43.2 CENTRILOBULAR EMPHYSEMA (HCC): ICD-10-CM

## 2025-05-13 DIAGNOSIS — R06.09 DYSPNEA ON EXERTION: ICD-10-CM

## 2025-05-13 DIAGNOSIS — Z51.5 PALLIATIVE CARE BY SPECIALIST: Primary | ICD-10-CM

## 2025-05-13 DIAGNOSIS — J44.9 PULMONARY CACHEXIA DUE TO COPD (HCC): ICD-10-CM

## 2025-05-13 PROCEDURE — 99211 OFF/OP EST MAY X REQ PHY/QHP: CPT | Performed by: NURSE PRACTITIONER

## 2025-05-13 NOTE — PROGRESS NOTES
finds less effective. He has reduced the frequency of his vest usage due to high-pressure settings causing discomfort. He now uses it once or twice a week and administers his last nebulizer treatment late at night around 10:00 or 10:30. He occasionally uses an emergency inhaler and monitors his oxygen levels with an oximeter, which typically read between 96 and 97. Despite these readings, he experiences shortness of breath after walking approximately 100 feet within his home. He recalls being diagnosed with a lung condition involving the malfunctioning of the lower part of his lungs, the name of which he can not remember. 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 Ledbetter at [facility]. Initially, the procedure was successful, with the middle lobe reducing in size. However, a year later, one part of the lung collapsed over a main valve, leading to hospitalization. This incident prompted him to switch pulmonary specialists.    He experiences fatigue around 3:30 or 4:00 PM daily. He recalls an incident where he felt extremely tired after shopping and had to rest until 9:30 PM. He also reports difficulty sleeping, which he initially managed with alcohol but discontinued due to concerns about liver disease. He is currently on lorazepam 0.5 mg, which he takes twice at night and once in the morning.    He has lost some weight and has a poor appetite. He plans to increase his food intake and discuss the possibility of mirtazapine with his daughter.    Supplemental Information  He has a history of carpal tunnel syndrome and Dupuytren's contracture, which limit his ability to exercise his arms. He was previously considered for a lung transplant but was deemed unsuitable.    SOCIAL HISTORY  The patient used to have a few drinks before bed to help with sleep but has since stopped due to concerns about liver disease and interactions with lorazepam.    MEDICATIONS  Current: levalbuterol, formoterol,

## 2025-06-03 ENCOUNTER — APPOINTMENT (OUTPATIENT)
Dept: GENERAL RADIOLOGY | Age: 75
DRG: 564 | End: 2025-06-03
Payer: COMMERCIAL

## 2025-06-03 ENCOUNTER — APPOINTMENT (OUTPATIENT)
Dept: CT IMAGING | Age: 75
DRG: 564 | End: 2025-06-03
Payer: COMMERCIAL

## 2025-06-03 ENCOUNTER — HOSPITAL ENCOUNTER (INPATIENT)
Age: 75
LOS: 4 days | Discharge: HOME | DRG: 564 | End: 2025-06-07
Attending: EMERGENCY MEDICINE | Admitting: INTERNAL MEDICINE
Payer: COMMERCIAL

## 2025-06-03 DIAGNOSIS — R26.2 AMBULATORY DYSFUNCTION: ICD-10-CM

## 2025-06-03 DIAGNOSIS — T79.6XXA TRAUMATIC RHABDOMYOLYSIS, INITIAL ENCOUNTER: Primary | ICD-10-CM
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
## 2025-06-03 PROBLEM — M62.82 RHABDOMYOLYSIS: Status: ACTIVE | Noted: 2025-06-03

## 2025-06-03 LAB
ALBUMIN SERPL-MCNC: 4 G/DL (ref 3.5–5.2)
ALP SERPL-CCNC: 77 U/L (ref 40–129)
ALT SERPL-CCNC: 16 U/L (ref 0–40)
ANION GAP SERPL CALCULATED.3IONS-SCNC: 11 MMOL/L (ref 7–16)
AST SERPL-CCNC: 30 U/L (ref 0–39)
B PARAP IS1001 DNA NPH QL NAA+NON-PROBE: NOT DETECTED
B PERT DNA SPEC QL NAA+PROBE: NOT DETECTED
BACTERIA URNS QL MICRO: ABNORMAL
BASOPHILS # BLD: 0.02 K/UL (ref 0–0.2)
BASOPHILS NFR BLD: 0 % (ref 0–2)
BILIRUB SERPL-MCNC: 1.3 MG/DL (ref 0–1.2)
BILIRUB UR QL STRIP: NEGATIVE
BUN SERPL-MCNC: 21 MG/DL (ref 6–23)
C PNEUM DNA NPH QL NAA+NON-PROBE: NOT DETECTED
CALCIUM SERPL-MCNC: 8.9 MG/DL (ref 8.6–10.2)
CASTS #/AREA URNS LPF: ABNORMAL /LPF
CHLORIDE SERPL-SCNC: 96 MMOL/L (ref 98–107)
CK SERPL-CCNC: 944 U/L (ref 20–200)
CLARITY UR: CLEAR
CO2 SERPL-SCNC: 28 MMOL/L (ref 22–29)
COLOR UR: YELLOW
CREAT SERPL-MCNC: 0.8 MG/DL (ref 0.7–1.2)
EOSINOPHIL # BLD: 0 K/UL (ref 0.05–0.5)
EOSINOPHILS RELATIVE PERCENT: 0 % (ref 0–6)
ERYTHROCYTE [DISTWIDTH] IN BLOOD BY AUTOMATED COUNT: 13.3 % (ref 11.5–15)
FLUAV RNA NPH QL NAA+NON-PROBE: NOT DETECTED
FLUBV RNA NPH QL NAA+NON-PROBE: NOT DETECTED
GFR, ESTIMATED: >90 ML/MIN/1.73M2
GLUCOSE SERPL-MCNC: 149 MG/DL (ref 74–99)
GLUCOSE UR STRIP-MCNC: NEGATIVE MG/DL
HADV DNA NPH QL NAA+NON-PROBE: NOT DETECTED
HCOV 229E RNA NPH QL NAA+NON-PROBE: NOT DETECTED
HCOV HKU1 RNA NPH QL NAA+NON-PROBE: NOT DETECTED
HCOV NL63 RNA NPH QL NAA+NON-PROBE: NOT DETECTED
HCOV OC43 RNA NPH QL NAA+NON-PROBE: NOT DETECTED
HCT VFR BLD AUTO: 31.8 % (ref 37–54)
HGB BLD-MCNC: 11 G/DL (ref 12.5–16.5)
HGB UR QL STRIP.AUTO: ABNORMAL
HMPV RNA NPH QL NAA+NON-PROBE: NOT DETECTED
HPIV1 RNA NPH QL NAA+NON-PROBE: NOT DETECTED
HPIV2 RNA NPH QL NAA+NON-PROBE: NOT DETECTED
HPIV3 RNA NPH QL NAA+NON-PROBE: DETECTED
HPIV4 RNA NPH QL NAA+NON-PROBE: NOT DETECTED
IMM GRANULOCYTES # BLD AUTO: 0.07 K/UL (ref 0–0.58)
IMM GRANULOCYTES NFR BLD: 1 % (ref 0–5)
KETONES UR STRIP-MCNC: 15 MG/DL
LEUKOCYTE ESTERASE UR QL STRIP: NEGATIVE
LYMPHOCYTES NFR BLD: 0.48 K/UL (ref 1.5–4)
LYMPHOCYTES RELATIVE PERCENT: 4 % (ref 20–42)
M PNEUMO DNA NPH QL NAA+NON-PROBE: NOT DETECTED
MCH RBC QN AUTO: 31.6 PG (ref 26–35)
MCHC RBC AUTO-ENTMCNC: 34.6 G/DL (ref 32–34.5)
MCV RBC AUTO: 91.4 FL (ref 80–99.9)
MONOCYTES NFR BLD: 0.97 K/UL (ref 0.1–0.95)
MONOCYTES NFR BLD: 8 % (ref 2–12)
NEUTROPHILS NFR BLD: 87 % (ref 43–80)
NEUTS SEG NFR BLD: 10.71 K/UL (ref 1.8–7.3)
NITRITE UR QL STRIP: NEGATIVE
PH UR STRIP: 6 [PH] (ref 5–8)
PLATELET, FLUORESCENCE: 135 K/UL (ref 130–450)
PMV BLD AUTO: 10.1 FL (ref 7–12)
POTASSIUM SERPL-SCNC: 3.6 MMOL/L (ref 3.5–5)
PROT SERPL-MCNC: 6.2 G/DL (ref 6.4–8.3)
PROT UR STRIP-MCNC: 30 MG/DL
RBC # BLD AUTO: 3.48 M/UL (ref 3.8–5.8)
RBC # BLD: NORMAL 10*6/UL
RBC #/AREA URNS HPF: ABNORMAL /HPF
RSV RNA NPH QL NAA+NON-PROBE: NOT DETECTED
RV+EV RNA NPH QL NAA+NON-PROBE: NOT DETECTED
SARS-COV-2 RNA NPH QL NAA+NON-PROBE: NOT DETECTED
SODIUM SERPL-SCNC: 135 MMOL/L (ref 132–146)
SP GR UR STRIP: 1.02 (ref 1–1.03)
SPECIMEN DESCRIPTION: ABNORMAL
TROPONIN I SERPL HS-MCNC: 44 NG/L (ref 0–22)
TROPONIN I SERPL HS-MCNC: 45 NG/L (ref 0–22)
UROBILINOGEN UR STRIP-ACNC: 1 EU/DL (ref 0–1)
WBC #/AREA URNS HPF: ABNORMAL /HPF
WBC OTHER # BLD: 12.3 K/UL (ref 4.5–11.5)

## 2025-06-03 PROCEDURE — 81001 URINALYSIS AUTO W/SCOPE: CPT

## 2025-06-03 PROCEDURE — 84484 ASSAY OF TROPONIN QUANT: CPT

## 2025-06-03 PROCEDURE — 99285 EMERGENCY DEPT VISIT HI MDM: CPT

## 2025-06-03 PROCEDURE — 6360000002 HC RX W HCPCS

## 2025-06-03 PROCEDURE — 73030 X-RAY EXAM OF SHOULDER: CPT

## 2025-06-03 PROCEDURE — 70450 CT HEAD/BRAIN W/O DYE: CPT

## 2025-06-03 PROCEDURE — 0202U NFCT DS 22 TRGT SARS-COV-2: CPT

## 2025-06-03 PROCEDURE — 80053 COMPREHEN METABOLIC PANEL: CPT

## 2025-06-03 PROCEDURE — 72125 CT NECK SPINE W/O DYE: CPT

## 2025-06-03 PROCEDURE — 93005 ELECTROCARDIOGRAM TRACING: CPT

## 2025-06-03 PROCEDURE — 82550 ASSAY OF CK (CPK): CPT

## 2025-06-03 PROCEDURE — 96374 THER/PROPH/DIAG INJ IV PUSH: CPT

## 2025-06-03 PROCEDURE — 71045 X-RAY EXAM CHEST 1 VIEW: CPT

## 2025-06-03 PROCEDURE — 1200000000 HC SEMI PRIVATE

## 2025-06-03 PROCEDURE — 94664 DEMO&/EVAL PT USE INHALER: CPT

## 2025-06-03 PROCEDURE — 85025 COMPLETE CBC W/AUTO DIFF WBC: CPT

## 2025-06-03 PROCEDURE — 2580000003 HC RX 258

## 2025-06-03 RX ORDER — 0.9 % SODIUM CHLORIDE 0.9 %
1000 INTRAVENOUS SOLUTION INTRAVENOUS ONCE
Status: COMPLETED | OUTPATIENT
Start: 2025-06-03 | End: 2025-06-03

## 2025-06-03 RX ORDER — METHYLPREDNISOLONE SODIUM SUCCINATE 125 MG/2ML
60 INJECTION INTRAMUSCULAR; INTRAVENOUS EVERY 6 HOURS
Status: DISCONTINUED | OUTPATIENT
Start: 2025-06-03 | End: 2025-06-04

## 2025-06-03 RX ORDER — 0.9 % SODIUM CHLORIDE 0.9 %
1000 INTRAVENOUS SOLUTION INTRAVENOUS ONCE
Status: COMPLETED | OUTPATIENT
Start: 2025-06-03 | End: 2025-06-04

## 2025-06-03 RX ORDER — SODIUM CHLORIDE 9 MG/ML
INJECTION, SOLUTION INTRAVENOUS PRN
Status: DISCONTINUED | OUTPATIENT
Start: 2025-06-03 | End: 2025-06-07 | Stop reason: HOSPADM

## 2025-06-03 RX ORDER — ONDANSETRON 2 MG/ML
4 INJECTION INTRAMUSCULAR; INTRAVENOUS EVERY 6 HOURS PRN
Status: DISCONTINUED | OUTPATIENT
Start: 2025-06-03 | End: 2025-06-07 | Stop reason: HOSPADM

## 2025-06-03 RX ORDER — ONDANSETRON 4 MG/1
4 TABLET, ORALLY DISINTEGRATING ORAL EVERY 8 HOURS PRN
Status: DISCONTINUED | OUTPATIENT
Start: 2025-06-03 | End: 2025-06-07 | Stop reason: HOSPADM

## 2025-06-03 RX ORDER — ENOXAPARIN SODIUM 100 MG/ML
40 INJECTION SUBCUTANEOUS DAILY
Status: DISCONTINUED | OUTPATIENT
Start: 2025-06-04 | End: 2025-06-07 | Stop reason: HOSPADM

## 2025-06-03 RX ORDER — SENNOSIDES 8.6 MG/1
1 TABLET ORAL DAILY PRN
Status: DISCONTINUED | OUTPATIENT
Start: 2025-06-03 | End: 2025-06-07 | Stop reason: HOSPADM

## 2025-06-03 RX ORDER — LEVALBUTEROL INHALATION SOLUTION 0.63 MG/3ML
0.63 SOLUTION RESPIRATORY (INHALATION) ONCE
Status: COMPLETED | OUTPATIENT
Start: 2025-06-03 | End: 2025-06-03

## 2025-06-03 RX ORDER — ACETAMINOPHEN 650 MG/1
650 SUPPOSITORY RECTAL EVERY 6 HOURS PRN
Status: DISCONTINUED | OUTPATIENT
Start: 2025-06-03 | End: 2025-06-07 | Stop reason: HOSPADM

## 2025-06-03 RX ORDER — SODIUM CHLORIDE 0.9 % (FLUSH) 0.9 %
10 SYRINGE (ML) INJECTION EVERY 12 HOURS SCHEDULED
Status: DISCONTINUED | OUTPATIENT
Start: 2025-06-03 | End: 2025-06-07 | Stop reason: HOSPADM

## 2025-06-03 RX ORDER — MAGNESIUM SULFATE IN WATER 40 MG/ML
2000 INJECTION, SOLUTION INTRAVENOUS PRN
Status: DISCONTINUED | OUTPATIENT
Start: 2025-06-03 | End: 2025-06-07 | Stop reason: HOSPADM

## 2025-06-03 RX ORDER — IPRATROPIUM BROMIDE AND ALBUTEROL SULFATE 2.5; .5 MG/3ML; MG/3ML
1 SOLUTION RESPIRATORY (INHALATION) ONCE
Status: DISCONTINUED | OUTPATIENT
Start: 2025-06-03 | End: 2025-06-03

## 2025-06-03 RX ORDER — POTASSIUM CHLORIDE 7.45 MG/ML
10 INJECTION INTRAVENOUS PRN
Status: DISCONTINUED | OUTPATIENT
Start: 2025-06-03 | End: 2025-06-07 | Stop reason: HOSPADM

## 2025-06-03 RX ORDER — SODIUM CHLORIDE 0.9 % (FLUSH) 0.9 %
10 SYRINGE (ML) INJECTION PRN
Status: DISCONTINUED | OUTPATIENT
Start: 2025-06-03 | End: 2025-06-07 | Stop reason: HOSPADM

## 2025-06-03 RX ORDER — POTASSIUM CHLORIDE 1500 MG/1
40 TABLET, EXTENDED RELEASE ORAL PRN
Status: DISCONTINUED | OUTPATIENT
Start: 2025-06-03 | End: 2025-06-07 | Stop reason: HOSPADM

## 2025-06-03 RX ORDER — SODIUM CHLORIDE 9 MG/ML
INJECTION, SOLUTION INTRAVENOUS CONTINUOUS
Status: DISCONTINUED | OUTPATIENT
Start: 2025-06-03 | End: 2025-06-04

## 2025-06-03 RX ORDER — ACETAMINOPHEN 325 MG/1
650 TABLET ORAL EVERY 6 HOURS PRN
Status: DISCONTINUED | OUTPATIENT
Start: 2025-06-03 | End: 2025-06-07 | Stop reason: HOSPADM

## 2025-06-03 RX ADMIN — SODIUM CHLORIDE 1000 ML: 0.9 INJECTION, SOLUTION INTRAVENOUS at 21:36

## 2025-06-03 RX ADMIN — LEVALBUTEROL 0.63 MG: 0.63 SOLUTION RESPIRATORY (INHALATION) at 19:55

## 2025-06-03 RX ADMIN — SODIUM CHLORIDE 1000 ML: 0.9 INJECTION, SOLUTION INTRAVENOUS at 20:05

## 2025-06-03 RX ADMIN — METHYLPREDNISOLONE SODIUM SUCCINATE 60 MG: 125 INJECTION, POWDER, LYOPHILIZED, FOR SOLUTION INTRAMUSCULAR; INTRAVENOUS at 20:04

## 2025-06-03 NOTE — ED PROVIDER NOTES
[facility] EMERGENCY DEPARTMENT  EMERGENCY DEPARTMENT ENCOUNTER        Pt Name: Angel Luis Stanley  MRN: [id number]  Birthdate 1950  Date of evaluation: 6/3/2025  Provider: Adithya Lux DO  PCP: Moises Gorman MD  Note Started: 7:55 PM EDT 6/3/25    CHIEF COMPLAINT       Chief Complaint   Patient presents with    Extremity Weakness    Fall     TWO FALLS    Fever     Started yesterday       HISTORY OF PRESENT ILLNESS: 1 or more Elements   History From: PATIENT     Limitations to history : None    Angel Luis Stanley is a 74 y.o. male with prior history of COPD on chronic oxygen supplement, type 2 diabetes, NSTEMI arriving from home with a complaint of generalized weakness.  Symptom onset yesterday.  He reports he may be having fevers as he usually runs 96 but recently has been 98.  He reports that earlier today around 9 AM he fell after he lost his balance and was on the ground in his hallway for 5 hours until his son helped him up when he got back home.  He denies any injuries from the fall.  He reports that he runs errands, drives, does housework and yard work and cares for himself and it is not normal that he was unable to get himself up off the floor.  He also reports urinary urgency and wetting his pants twice today.      Nursing Notes were all reviewed and agreed with or any disagreements were addressed in the HPI.    REVIEW OF SYSTEMS :      Review of Systems    POSITIVE (+): Weakness, urinary urgency  NEGATIVE (-): fevers, chills, nausea, vomiting, diarrhea, constipation, shortness of breath, chest pain, abdominal pain      SURGICAL HISTORY     Past Surgical History:   Procedure Laterality Date    BACK SURGERY      spinal fusion    BRONCHOSCOPY      HERNIA REPAIR      WRIST SURGERY Left 02/2024    WRIST SURGERY Right 02/2024       CURRENTMEDICATIONS       Previous Medications    AMLODIPINE-BENAZEPRIL (LOTREL) 5-20 MG PER CAPSULE    Take 1 capsule by mouth every morning       LABS:    Labs Reviewed   TROPONIN - Abnormal; Notable for the following components:       Result Value    Troponin, High Sensitivity 44 (*)     All other components within normal limits   COMPREHENSIVE METABOLIC PANEL W/ REFLEX TO MG FOR LOW K - Abnormal; Notable for the following components:    Chloride 96 (*)     Glucose 149 (*)     Total Protein 6.2 (*)     Total Bilirubin 1.3 (*)     All other components within normal limits   CBC WITH AUTO DIFFERENTIAL - Abnormal; Notable for the following components:    WBC 12.3 (*)     RBC 3.48 (*)     Hemoglobin 11.0 (*)     Hematocrit 31.8 (*)     MCHC 34.6 (*)     Neutrophils % 87 (*)     Lymphocytes % 4 (*)     Neutrophils Absolute 10.71 (*)     Lymphocytes Absolute 0.48 (*)     Monocytes Absolute 0.97 (*)     Eosinophils Absolute 0.00 (*)     All other components within normal limits   CK - Abnormal; Notable for the following components:    Total  (*)     All other components within normal limits   URINALYSIS WITH MICROSCOPIC - Abnormal; Notable for the following components:    Ketones, Urine 15 (*)     Urine Hgb LARGE (*)     Protein, UA 30 (*)     Casts UA 0 TO 2 FINE GRANULAR (*)     Bacteria, UA TRACE (*)     All other components within normal limits   TROPONIN - Abnormal; Notable for the following components:    Troponin, High Sensitivity 45 (*)     All other components within normal limits   RESPIRATORY PANEL, MOLECULAR, WITH COVID-19   COMPREHENSIVE METABOLIC PANEL W/ REFLEX TO MG FOR LOW K   CBC WITH AUTO DIFFERENTIAL   CK       When ordered only abnormal lab results are displayed. All other labs were within normal range or not returned as of this dictation.    RADIOLOGY:   Non-plain film images such as CT, Ultrasound and MRI are read by the radiologist. Plain radiographic images are visualized and preliminarily interpreted by the ED Provider with the below findings:    CT HEAD WO CONTRAST   Final Result   No acute intracranial abnormality.         CT

## 2025-06-04 LAB
ALBUMIN SERPL-MCNC: 3.5 G/DL (ref 3.5–5.2)
ALP SERPL-CCNC: 67 U/L (ref 40–129)
ALT SERPL-CCNC: 18 U/L (ref 0–40)
ANION GAP SERPL CALCULATED.3IONS-SCNC: 10 MMOL/L (ref 7–16)
AST SERPL-CCNC: 35 U/L (ref 0–39)
BASOPHILS # BLD: 0 K/UL (ref 0–0.2)
BASOPHILS NFR BLD: 0 % (ref 0–2)
BILIRUB SERPL-MCNC: 1 MG/DL (ref 0–1.2)
BUN SERPL-MCNC: 18 MG/DL (ref 6–23)
CALCIUM SERPL-MCNC: 8.4 MG/DL (ref 8.6–10.2)
CHLORIDE SERPL-SCNC: 99 MMOL/L (ref 98–107)
CK SERPL-CCNC: 1014 U/L (ref 20–200)
CO2 SERPL-SCNC: 26 MMOL/L (ref 22–29)
CREAT SERPL-MCNC: 0.6 MG/DL (ref 0.7–1.2)
EKG ATRIAL RATE: 88 BPM
EKG P AXIS: 58 DEGREES
EKG P-R INTERVAL: 136 MS
EKG Q-T INTERVAL: 350 MS
EKG QRS DURATION: 102 MS
EKG QTC CALCULATION (BAZETT): 423 MS
EKG R AXIS: 88 DEGREES
EKG T AXIS: 71 DEGREES
EKG VENTRICULAR RATE: 88 BPM
EOSINOPHIL # BLD: 0 K/UL (ref 0.05–0.5)
EOSINOPHILS RELATIVE PERCENT: 0 % (ref 0–6)
ERYTHROCYTE [DISTWIDTH] IN BLOOD BY AUTOMATED COUNT: 13.2 % (ref 11.5–15)
GFR, ESTIMATED: >90 ML/MIN/1.73M2
GLUCOSE SERPL-MCNC: 192 MG/DL (ref 74–99)
HCT VFR BLD AUTO: 29.1 % (ref 37–54)
HGB BLD-MCNC: 9.5 G/DL (ref 12.5–16.5)
IMM GRANULOCYTES # BLD AUTO: 0.06 K/UL (ref 0–0.58)
IMM GRANULOCYTES NFR BLD: 1 % (ref 0–5)
LYMPHOCYTES NFR BLD: 0.38 K/UL (ref 1.5–4)
LYMPHOCYTES RELATIVE PERCENT: 4 % (ref 20–42)
MAGNESIUM SERPL-MCNC: 2.2 MG/DL (ref 1.6–2.6)
MCH RBC QN AUTO: 30.8 PG (ref 26–35)
MCHC RBC AUTO-ENTMCNC: 32.6 G/DL (ref 32–34.5)
MCV RBC AUTO: 94.5 FL (ref 80–99.9)
MONOCYTES NFR BLD: 0.09 K/UL (ref 0.1–0.95)
MONOCYTES NFR BLD: 1 % (ref 2–12)
NEUTROPHILS NFR BLD: 95 % (ref 43–80)
NEUTS SEG NFR BLD: 9.5 K/UL (ref 1.8–7.3)
PLATELET # BLD AUTO: 123 K/UL (ref 130–450)
PMV BLD AUTO: 11 FL (ref 7–12)
POTASSIUM SERPL-SCNC: 3.5 MMOL/L (ref 3.5–5)
PROCALCITONIN SERPL-MCNC: 0.28 NG/ML (ref 0–0.08)
PROT SERPL-MCNC: 5.6 G/DL (ref 6.4–8.3)
RBC # BLD AUTO: 3.08 M/UL (ref 3.8–5.8)
RBC # BLD: ABNORMAL 10*6/UL
RBC # BLD: ABNORMAL 10*6/UL
SODIUM SERPL-SCNC: 135 MMOL/L (ref 132–146)
WBC OTHER # BLD: 10 K/UL (ref 4.5–11.5)

## 2025-06-04 PROCEDURE — 2500000003 HC RX 250 WO HCPCS: Performed by: PHYSICIAN ASSISTANT

## 2025-06-04 PROCEDURE — 83735 ASSAY OF MAGNESIUM: CPT

## 2025-06-04 PROCEDURE — 1200000000 HC SEMI PRIVATE

## 2025-06-04 PROCEDURE — 87081 CULTURE SCREEN ONLY: CPT

## 2025-06-04 PROCEDURE — 6370000000 HC RX 637 (ALT 250 FOR IP): Performed by: NURSE PRACTITIONER

## 2025-06-04 PROCEDURE — 82550 ASSAY OF CK (CPK): CPT

## 2025-06-04 PROCEDURE — 93010 ELECTROCARDIOGRAM REPORT: CPT | Performed by: INTERNAL MEDICINE

## 2025-06-04 PROCEDURE — 6360000002 HC RX W HCPCS: Performed by: NURSE PRACTITIONER

## 2025-06-04 PROCEDURE — 85025 COMPLETE CBC W/AUTO DIFF WBC: CPT

## 2025-06-04 PROCEDURE — 6360000002 HC RX W HCPCS: Performed by: PHYSICIAN ASSISTANT

## 2025-06-04 PROCEDURE — 87899 AGENT NOS ASSAY W/OPTIC: CPT

## 2025-06-04 PROCEDURE — 36415 COLL VENOUS BLD VENIPUNCTURE: CPT

## 2025-06-04 PROCEDURE — 6360000002 HC RX W HCPCS

## 2025-06-04 PROCEDURE — 6370000000 HC RX 637 (ALT 250 FOR IP): Performed by: PHYSICIAN ASSISTANT

## 2025-06-04 PROCEDURE — 97530 THERAPEUTIC ACTIVITIES: CPT

## 2025-06-04 PROCEDURE — 2580000003 HC RX 258: Performed by: PHYSICIAN ASSISTANT

## 2025-06-04 PROCEDURE — 94640 AIRWAY INHALATION TREATMENT: CPT

## 2025-06-04 PROCEDURE — 84145 PROCALCITONIN (PCT): CPT

## 2025-06-04 PROCEDURE — 97161 PT EVAL LOW COMPLEX 20 MIN: CPT

## 2025-06-04 PROCEDURE — 80053 COMPREHEN METABOLIC PANEL: CPT

## 2025-06-04 RX ORDER — GABAPENTIN 300 MG/1
600 CAPSULE ORAL EVERY MORNING
Status: DISCONTINUED | OUTPATIENT
Start: 2025-06-04 | End: 2025-06-07 | Stop reason: HOSPADM

## 2025-06-04 RX ORDER — METHYLPREDNISOLONE SODIUM SUCCINATE 40 MG/ML
40 INJECTION INTRAMUSCULAR; INTRAVENOUS EVERY 12 HOURS
Status: DISCONTINUED | OUTPATIENT
Start: 2025-06-04 | End: 2025-06-05

## 2025-06-04 RX ORDER — AMLODIPINE BESYLATE 5 MG/1
5 TABLET ORAL DAILY
Status: DISCONTINUED | OUTPATIENT
Start: 2025-06-04 | End: 2025-06-07 | Stop reason: HOSPADM

## 2025-06-04 RX ORDER — LISINOPRIL 20 MG/1
20 TABLET ORAL DAILY
Status: DISCONTINUED | OUTPATIENT
Start: 2025-06-04 | End: 2025-06-07 | Stop reason: HOSPADM

## 2025-06-04 RX ORDER — GABAPENTIN 600 MG/1
600 TABLET ORAL EVERY MORNING
COMMUNITY

## 2025-06-04 RX ORDER — METHYLPREDNISOLONE SODIUM SUCCINATE 40 MG/ML
40 INJECTION INTRAMUSCULAR; INTRAVENOUS EVERY 8 HOURS
Status: DISCONTINUED | OUTPATIENT
Start: 2025-06-04 | End: 2025-06-04

## 2025-06-04 RX ORDER — TAMSULOSIN HYDROCHLORIDE 0.4 MG/1
0.8 CAPSULE ORAL NIGHTLY
Status: DISCONTINUED | OUTPATIENT
Start: 2025-06-04 | End: 2025-06-07 | Stop reason: HOSPADM

## 2025-06-04 RX ORDER — GABAPENTIN 400 MG/1
1200 CAPSULE ORAL NIGHTLY
Status: DISCONTINUED | OUTPATIENT
Start: 2025-06-04 | End: 2025-06-07 | Stop reason: HOSPADM

## 2025-06-04 RX ORDER — LORAZEPAM 1 MG/1
1 TABLET ORAL NIGHTLY PRN
Status: DISCONTINUED | OUTPATIENT
Start: 2025-06-04 | End: 2025-06-07 | Stop reason: HOSPADM

## 2025-06-04 RX ORDER — AMLODIPINE AND BENAZEPRIL HYDROCHLORIDE 5; 20 MG/1; MG/1
1 CAPSULE ORAL EVERY MORNING
Status: DISCONTINUED | OUTPATIENT
Start: 2025-06-04 | End: 2025-06-04 | Stop reason: CLARIF

## 2025-06-04 RX ORDER — BUDESONIDE 0.5 MG/2ML
500 INHALANT ORAL
Status: DISCONTINUED | OUTPATIENT
Start: 2025-06-04 | End: 2025-06-07 | Stop reason: HOSPADM

## 2025-06-04 RX ORDER — GUAIFENESIN 400 MG/1
400 TABLET ORAL 4 TIMES DAILY PRN
Status: DISCONTINUED | OUTPATIENT
Start: 2025-06-04 | End: 2025-06-07

## 2025-06-04 RX ORDER — AZITHROMYCIN 250 MG/1
250 TABLET, FILM COATED ORAL
Status: DISCONTINUED | OUTPATIENT
Start: 2025-06-04 | End: 2025-06-07 | Stop reason: HOSPADM

## 2025-06-04 RX ORDER — ARFORMOTEROL TARTRATE 15 UG/2ML
15 SOLUTION RESPIRATORY (INHALATION)
Status: DISCONTINUED | OUTPATIENT
Start: 2025-06-04 | End: 2025-06-07 | Stop reason: HOSPADM

## 2025-06-04 RX ORDER — LEVALBUTEROL INHALATION SOLUTION 0.63 MG/3ML
0.63 SOLUTION RESPIRATORY (INHALATION) EVERY 6 HOURS PRN
Status: DISCONTINUED | OUTPATIENT
Start: 2025-06-04 | End: 2025-06-07 | Stop reason: HOSPADM

## 2025-06-04 RX ORDER — LEVALBUTEROL INHALATION SOLUTION 0.63 MG/3ML
0.63 SOLUTION RESPIRATORY (INHALATION) EVERY 6 HOURS
Status: DISCONTINUED | OUTPATIENT
Start: 2025-06-04 | End: 2025-06-04

## 2025-06-04 RX ORDER — LEVALBUTEROL INHALATION SOLUTION 0.63 MG/3ML
1 SOLUTION RESPIRATORY (INHALATION) EVERY 6 HOURS PRN
Status: DISCONTINUED | OUTPATIENT
Start: 2025-06-04 | End: 2025-06-07 | Stop reason: HOSPADM

## 2025-06-04 RX ORDER — SODIUM CHLORIDE 9 MG/ML
INJECTION, SOLUTION INTRAVENOUS CONTINUOUS
Status: DISCONTINUED | OUTPATIENT
Start: 2025-06-04 | End: 2025-06-05

## 2025-06-04 RX ADMIN — SODIUM CHLORIDE, PRESERVATIVE FREE 10 ML: 5 INJECTION INTRAVENOUS at 16:38

## 2025-06-04 RX ADMIN — ARFORMOTEROL TARTRATE 15 MCG: 15 SOLUTION RESPIRATORY (INHALATION) at 21:05

## 2025-06-04 RX ADMIN — LORAZEPAM 1 MG: 1 TABLET ORAL at 23:13

## 2025-06-04 RX ADMIN — AZITHROMYCIN 250 MG: 250 TABLET, FILM COATED ORAL at 16:42

## 2025-06-04 RX ADMIN — METHYLPREDNISOLONE SODIUM SUCCINATE 60 MG: 125 INJECTION, POWDER, LYOPHILIZED, FOR SOLUTION INTRAMUSCULAR; INTRAVENOUS at 09:35

## 2025-06-04 RX ADMIN — ENOXAPARIN SODIUM 40 MG: 100 INJECTION SUBCUTANEOUS at 09:35

## 2025-06-04 RX ADMIN — METHYLPREDNISOLONE SODIUM SUCCINATE 60 MG: 125 INJECTION, POWDER, LYOPHILIZED, FOR SOLUTION INTRAMUSCULAR; INTRAVENOUS at 02:42

## 2025-06-04 RX ADMIN — SODIUM CHLORIDE, PRESERVATIVE FREE 10 ML: 5 INJECTION INTRAVENOUS at 02:43

## 2025-06-04 RX ADMIN — SODIUM CHLORIDE, PRESERVATIVE FREE 10 ML: 5 INJECTION INTRAVENOUS at 23:14

## 2025-06-04 RX ADMIN — GABAPENTIN 1200 MG: 400 CAPSULE ORAL at 23:13

## 2025-06-04 RX ADMIN — LISINOPRIL 20 MG: 20 TABLET ORAL at 09:35

## 2025-06-04 RX ADMIN — BUDESONIDE 500 MCG: 0.5 SUSPENSION RESPIRATORY (INHALATION) at 08:27

## 2025-06-04 RX ADMIN — BUDESONIDE 500 MCG: 0.5 SUSPENSION RESPIRATORY (INHALATION) at 21:05

## 2025-06-04 RX ADMIN — LEVALBUTEROL 0.63 MG: 0.63 SOLUTION RESPIRATORY (INHALATION) at 13:00

## 2025-06-04 RX ADMIN — SODIUM CHLORIDE, PRESERVATIVE FREE 10 ML: 5 INJECTION INTRAVENOUS at 09:35

## 2025-06-04 RX ADMIN — ARFORMOTEROL TARTRATE 15 MCG: 15 SOLUTION RESPIRATORY (INHALATION) at 08:27

## 2025-06-04 RX ADMIN — TAMSULOSIN HYDROCHLORIDE 0.8 MG: 0.4 CAPSULE ORAL at 23:13

## 2025-06-04 RX ADMIN — AMLODIPINE BESYLATE 5 MG: 5 TABLET ORAL at 09:36

## 2025-06-04 RX ADMIN — METHYLPREDNISOLONE SODIUM SUCCINATE 40 MG: 40 INJECTION INTRAMUSCULAR; INTRAVENOUS at 16:38

## 2025-06-04 RX ADMIN — GABAPENTIN 600 MG: 300 CAPSULE ORAL at 09:35

## 2025-06-04 RX ADMIN — SODIUM CHLORIDE: 0.9 INJECTION, SOLUTION INTRAVENOUS at 02:37

## 2025-06-04 NOTE — PROGRESS NOTES
Occupational Therapy  Date:6/4/2025  Patient Name: Angel Luis Stanley  Room: 0508/0508-A     Occupational Therapy (OT) order received, patient's medical record reviewed, and OT evaluation attempted this afternoon; patient declined to participate in OOB activities due to experiencing fatigue and preferring to rest. OT evaluation to be re-attempted at later date, as able/appropriate.    Bambi Bhatti, OTR/L  License Number: [id number]

## 2025-06-04 NOTE — PROGRESS NOTES
4 Eyes Skin Assessment     NAME:  Angel Luis Stanley  YOB: 1950  MEDICAL RECORD NUMBER:  [id number]    The patient is being assessed for  Admission    I agree that at least one RN has performed a thorough Head to Toe Skin Assessment on the patient. ALL assessment sites listed below have been assessed.      Areas assessed by both nurses:    Head, Face, Ears, Shoulders, Back, Chest, Arms, Elbows, Hands, Sacrum. Buttock, Coccyx, Ischium, Legs. Feet and Heels, and Under Medical Devices         Does the Patient have a Wound? No noted wound(s)       Alexander Prevention initiated by RN: No  Wound Care Orders initiated by RN: No    Pressure Injury (Stage 3,4, Unstageable, DTI, NWPT, and Complex wounds) if present, place Wound referral order by RN under : No    New Ostomies, if present place, Ostomy referral order under : No     Nurse 1 eSignature: Electronically signed by Daniel Jeff RN on 6/4/25 at 2:27 AM EDT    **SHARE this note so that the co-signing nurse can place an eSignature**    Nurse 2 eSignature: Electronically signed by Annie Whyte RN on 6/4/25 at 3:18 AM EDT

## 2025-06-04 NOTE — PROGRESS NOTES
Physical Therapy  Facility/Department: [address] MED SURG  Physical Therapy Initial Assessment    Name: Angel Luis Stanley  : 1950  MRN: [id number]  Date of Service: 2025    Attending Provider:  Wesley Lauren DO    Evaluating PT:  Michael Orozco Jr., P.T.    Room #:  0508/0508-A  Diagnosis:  Rhabdomyolysis [M62.82]  Fall, initial encounter [W19.XXXA]  Ambulatory dysfunction [R26.2]  Traumatic rhabdomyolysis, initial encounter [T79.6XXA]  Pertinent PMHx/PSHx:  Asthma, COPD, L4-5 spinal fusion, L2 and 3 kyphoplasties, neuropathy LLE from foot to his hip and RLE from his foot to his knee.   Precautions:  falls, pt refuses bed alarm, pt is tangential with his thoughts.    SUBJECTIVE:    Pt lives with his wife in a 1 story home with 3 stairs and 2 rails + 1 more step to enter. Pt ambulated with no AD in the house, but admits to furniture walking and uses a cane outdoors PRN PTA.    OBJECTIVE:   Initial Evaluation  Date: 25 Treatment Short Term/ Long Term   Goals   Was pt agreeable to Eval/treatment? yes     Does pt have pain? C/o chronic pain B shoulders and back     Bed Mobility  Rolling: Independent  Supine to sit: Independent  Sit to supine: NA  Scooting: Independent  Independent   Transfers Sit to stand: SBA  Stand to sit: SBA  Stand pivot: SBA with ww  Independent   Ambulation   30 feet with ww SBA  150 feet with ww supervision   Stair negotiation: ascended and descended NA  If pt is out of isolation:  4 steps with 1-2 rails SBA   AM-PAC 6 Clicks        BLE ROM is WFL.   BLE strength is grossly 4/5.   Sensation:  Pt reports he has neuropathy LLE from his foot to his hip and RLE from his foot to his knee.   Balance: sitting is Independent and standing with ww is SBA  Endurance: fair-    ASSESSMENT:    Conditions Requiring Skilled Therapeutic Intervention:    [x]Decreased strength     []Decreased ROM  [x]Decreased functional mobility  [x]Decreased balance   [x]Decreased endurance   []Decreased

## 2025-06-04 NOTE — PROGRESS NOTES
[facility] Quality Flow/Interdisciplinary Rounds Progress Note        Quality Flow Rounds held on June 4, 2025    Disciplines Attending:  Bedside Nurse, , , and Nursing Unit Leadership    Angel Luis Stanley was admitted on 6/3/2025  7:10 PM    Anticipated Discharge Date:       Disposition:    Alexander Score:  Alexander Scale Score: 19    [facility] RISK OF UNPLANNED READMISSION 2.0             12.8 Total Score        Discussed patient goal for the day, patient clinical progression, and barriers to discharge.  The following Goal(s) of the Day/Commitment(s) have been identified:  Diagnostics - Report Results and Labs - Report Results      Marianne Schreiber RN  June 4, 2025

## 2025-06-04 NOTE — ACP (ADVANCE CARE PLANNING)
Advance Care Planning   Healthcare Decision Maker:    Primary Decision Maker: Yaneli Stanley - [facility] - [phone]    Click here to complete Healthcare Decision Makers including selection of the Healthcare Decision Maker Relationship (ie \"Primary\").

## 2025-06-04 NOTE — PROGRESS NOTES
Patient refused to have the bed alarm on. Explained, educated and reminded pt that he fell at home and the bed alarm is for his safety. Pt still refusing bed alarm.

## 2025-06-04 NOTE — CARE COORDINATION
Social Work discharge planning  Noted PT 20 today.  Pt reports living with his wife 1 story.   Pt is active with outpt Palliative Medicine.   He has a cane. He has a ww also if needed.  He has home 02 from [employer]. LVM for [facility] to confirm.  Pt has PCP, Dr Gorman.  He uses [facility] (by [employer]).  Pt denied need for hhc presently.  Electronically signed by TERE Guzman on 6/4/2025 at 1:00 PM     Addendum  Teri from [facility] confirmed pt has home 02 at 3L continuous.  Electronically signed by TERE Guzman on 6/4/2025 at 1:03 PM

## 2025-06-04 NOTE — ED NOTES
ED to Inpatient Handoff Report    Notified [facility] that electronic handoff available and patient ready for transport to room 513.    Safety Risks: Risk of falls    Patient in Restraints: no    Constant Observer or Patient : no    Telemetry Monitoring Ordered: No          Order to transfer to unit without monitor: NA    Last MEWS:  Time completed: 2222    Deterioration Index: 46.94    Vitals:    06/03/25 1955 06/03/25 2022 06/03/25 2122 06/03/25 2222   BP:  (!) 153/65 133/60 138/62   Pulse: 88 93 84 78   Resp: 29 (!) 33 30 27   Temp:    98.1 °F (36.7 °C)   SpO2:  96%         Opportunity for questions and clarification was provided.

## 2025-06-04 NOTE — CONSULTS
09/18/2023    Pneumococcal, PPSV23, PNEUMOVAX 23, (age 2y+), SC/IM, 0.5mL 10/20/2012, 03/06/2018, 03/06/2018    RSV, AREXVY, (age 60y+), PF, IM, 0.5mL 09/13/2023    TDaP, ADACEL (age 10y-64y), BOOSTRIX (age 10y+), IM, 0.5mL 06/14/2014, 10/22/2017        Home Meds: Medications Prior to Admission: gabapentin (NEURONTIN) 600 MG tablet, Take 1 tablet by mouth every morning.  budesonide (PULMICORT) 0.5 MG/2ML nebulizer suspension, Take 2 mLs by nebulization 2 times daily  arformoterol tartrate (BROVANA) 15 MCG/2ML NEBU, Take 1 ampule by nebulization in the morning and 1 ampule in the evening.  azithromycin (ZITHROMAX) 250 MG tablet, Take one tablet M, W, F  levalbuterol (XOPENEX HFA) 45 MCG/ACT inhaler, INHALE TWO PUFFS BY MOUTH FOUR TIMES A DAY AS NEEDED FOR 30 DAYS (Patient taking differently: Inhale 2 puffs into the lungs every 6 hours as needed for Shortness of Breath or Wheezing INHALE TWO PUFFS BY MOUTH FOUR TIMES A DAY AS NEEDED FOR 30 DAYS)  levalbuterol (XOPENEX) 0.63 MG/3ML nebulization, Take 3 mLs by nebulization every 6 hours as needed for Wheezing  dextromethorphan-guaiFENesin (MUCINEX DM)  MG per extended release tablet, Take 1 tablet by mouth in the morning and at bedtime (Patient taking differently: Take 1 tablet by mouth in the morning and at bedtime Not DM)  revefenacin (YUPELRI) 175 MCG/3ML SOLN, Inhale 3 mLs into the lungs daily  hydroCHLOROthiazide (HYDRODIURIL) 12.5 MG tablet, TAKE ONE TABLET (12.5MG) BY MOUTH EVERY  MORNING  OXYGEN, Inhale 6 L/min into the lungs continuous  zinc gluconate 50 MG tablet, Take 1 tablet by mouth every evening  B-Complex-C TABS, Take 1 tablet by mouth every evening  tamsulosin (FLOMAX) 0.4 MG capsule, Take 2 capsules by mouth nightly  Coenzyme Q10 (CO Q 10 PO), Take 1 capsule by mouth every evening  Omega-3 Fatty Acids (FISH OIL) 1000 MG CAPS, Take 1 capsule by mouth every evening  Flaxseed, Linseed, 1000 MG CAPS, Take 1,000 mg by mouth every  loss, tinnitus, ear drainage, epistaxis, sore throat, and hoarseness.  Cardiovascular: Denies palpitations, chest pain, and chest pressure.  Respiratory: Cough, dyspnea, URI symptoms  Gastrointestinal: Denies nausea, vomiting, poor appetite, diarrhea, heartburn or reflux  Genitourinary: Denies dysuria, frequency, urgency or hematuria  Musculoskeletal: Denies myalgias, muscle weakness, and bone pain  Neurological: Denies dizziness, vertigo, headache, and focal weakness  Psychological: Denies anxiety and depression  Endocrine: Denies heat intolerance and cold intolerance  Hematopoietic/Lymphatic: Denies bleeding problems and blood transfusions    OBJECTIVE:   /76   Pulse 71   Temp 98.2 °F (36.8 °C) (Oral)   Resp 18   Ht 1.702 m (5' 7\")   Wt 68.5 kg (151 lb 1.6 oz)   SpO2 100%   BMI 23.67 kg/m²   SpO2 Readings from Last 1 Encounters:   06/04/25 100%        I/O:    Intake/Output Summary (Last 24 hours) at 6/4/2025 1231  Last data filed at 6/4/2025 0913  Gross per 24 hour   Intake 310 ml   Output 600 ml   Net -290 ml                      Physical Exam:  General: The patient is lying in bed comfortably without any distress.  Breathing is not labored  HEENT: Pupils are equal round and reactive to light, there are no oral lesions and no post-nasal drip   Neck: supple without adenopathy  Cardiovascular: regular rate and rhythm without murmur or gallop  Respiratory: diminished  to auscultation bilaterally without wheezing or crackles.  Air entry is symmetric  Abdomen: soft, non-tender, non-distended, normal bowel sounds  Extremities: warm, no edema, clubbing   Skin: no rash or lesion  Neurologic: CN II-XII grossly intact, no focal deficits    Pulmonary Function Testing   INTERPRETATION  This is a good patient effort. FEV1/FVC ratio is 33%.  The FEV 1 is 0.82L, 29 % predicted. Post bronchodilator FEV1 is 0.82L, 29% predicted, 0% change. The FVC is  2.47L, 66 % predicted. Post bronchodilator FVC is 1.38L, 37%  were made as appropriate. I agree with the above documented exam, problem list and plan of care with the following additions:    On baseline O2. Add back MWF azithro. Continue steroids but cut back on dosing to 40 BID. Supportive care for viral URI.    Spike Nicholas MD

## 2025-06-04 NOTE — CARE COORDINATION
Internal Medicine On-call Care Coordination Note    I was called by the ED physician because they recommended admission for this patient and we cover their PCP.  The history as I understand it after discussion with the ED physician is as follows:    Presents after having 2 falls today, was down for 5 hours  CPK elevated  No injuries on imaging  Wife does not feel she can care for him at home, requesting placement  Started on IVF  Decision made for admission    I placed admission orders.  Including:    IVF  Repeat CPK  PT/OT    Dr. Lauren or his coverage will see the patient tomorrow for H&P.    Electronically signed by Xavier Mcmillan PA-C on 6/3/2025 at 10:34 PM

## 2025-06-04 NOTE — H&P
History and Physical      CHIEF COMPLAINT:  Rhabdomyolysis       HISTORY OF PRESENT ILLNESS:      The patient is a 74 y.o. male patient of Dr. Gorman with past medical history of COPD chronically on 6 L O2 via nasal cannula at home, hypertension, anxiety, type 2 diabetes who presents with traumatic rhabdomyolysis.  The patient states that for the past 3 days, he has been feeling progressively weak as well as having a sore throat, cough, and nasal congestion.  Yesterday, he was walking around his house, became very weak, and fell.  He states that he laid on the ground for 5 hours until his son-in-law came to help him up.  An ambulance was then called to bring him to the emergency department.  CT head and cervical spine did not show any acute process.  In the emergency department, the patient's CK was elevated at 944 and his urinalysis was consistent with rhabdomyolysis.  The patient was also found to have parainfluenza, which explains his symptoms and weakness.  Chest x-ray did not show any evidence of pneumonia.  The patient was given fluids in the emergency department and admitted to the hospital service for further management of traumatic rhabdomyolysis.    Past Medical History:    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       Past Surgical History:    Past Surgical History:   Procedure Laterality Date    BACK SURGERY      spinal fusion    BRONCHOSCOPY      HERNIA REPAIR      WRIST SURGERY Left 02/2024    WRIST SURGERY Right 02/2024       Medications Prior to Admission:    Medications Prior to Admission: gabapentin (NEURONTIN) 600 MG tablet, Take 1 tablet by mouth every morning.  budesonide (PULMICORT) 0.5 MG/2ML nebulizer suspension, Take 2 mLs by nebulization 2 times daily  arformoterol tartrate (BROVANA) 15 MCG/2ML NEBU, Take 1 ampule by nebulization in the morning and 1 ampule in the  4 06/04/2025 06:49 AM    MONOPCT 1 06/04/2025 06:49 AM    MYELOPCT 2.0 06/16/2021 10:03 AM    EOSPCT 0 06/04/2025 06:49 AM    BASOPCT 0 06/04/2025 06:49 AM    MONOSABS 0.09 06/04/2025 06:49 AM    LYMPHSABS 0.38 06/04/2025 06:49 AM    EOSABS 0.00 06/04/2025 06:49 AM    BASOSABS 0.00 06/04/2025 06:49 AM     CMP:    Lab Results   Component Value Date/Time     06/04/2025 06:49 AM    K 3.5 06/04/2025 06:49 AM    K 4.1 10/01/2022 06:02 AM    CL 99 06/04/2025 06:49 AM    CO2 26 06/04/2025 06:49 AM    BUN 18 06/04/2025 06:49 AM    CREATININE 0.6 06/04/2025 06:49 AM    GFRAA >60 10/08/2022 03:20 AM    LABGLOM >90 06/04/2025 06:49 AM    LABGLOM >60 05/14/2023 09:12 AM    GLUCOSE 192 06/04/2025 06:49 AM    CALCIUM 8.4 06/04/2025 06:49 AM    BILITOT 1.0 06/04/2025 06:49 AM    ALKPHOS 67 06/04/2025 06:49 AM    AST 35 06/04/2025 06:49 AM    ALT 18 06/04/2025 06:49 AM     PT/INR:    Lab Results   Component Value Date/Time    PROTIME 10.6 01/06/2023 12:08 PM    INR 1.0 01/06/2023 12:08 PM     Warfarin PT/INR:  No components found for: \"PTPATWAR\", \"PTINRWAR\"  PTT:    Lab Results   Component Value Date/Time    APTT 40.6 09/20/2024 01:12 AM   [APTT}  Last 3 Troponin:    Lab Results   Component Value Date/Time    TROPONINI <0.01 03/02/2020 12:25 PM    TROPONINI <0.01 04/27/2019 12:23 PM     U/A:    Lab Results   Component Value Date/Time    COLORU Yellow 06/03/2025 07:45 PM    PROTEINU 30 06/03/2025 07:45 PM    PHUR 6.0 06/03/2025 07:45 PM    PHUR 6.0 05/14/2023 09:18 AM    WBCUA 0 TO 5 06/03/2025 07:45 PM    RBCUA 0 TO 2 06/03/2025 07:45 PM    MUCUS Present 05/14/2023 09:18 AM    BACTERIA TRACE 06/03/2025 07:45 PM    CLARITYU Clear 05/14/2023 09:18 AM    LEUKOCYTESUR NEGATIVE 06/03/2025 07:45 PM    UROBILINOGEN 1.0 06/03/2025 07:45 PM    BILIRUBINUR NEGATIVE 06/03/2025 07:45 PM    BLOODU TRACE-INTACT 05/14/2023 09:18 AM    GLUCOSEU NEGATIVE 06/03/2025 07:45 PM     Results     Component Value Units   CBC auto differential  [[id number]] (Abnormal)    Collected: 06/03/25 1945    Updated: 06/03/25 2032    Order Status: Completed    Specimen Source: Blood     WBC 12.3 High  k/uL    RBC 3.48 Low  m/uL    Hemoglobin 11.0 Low  g/dL    Hematocrit 31.8 Low  %    MCV 91.4 fL    MCH 31.6 pg    MCHC 34.6 High  g/dL    RDW 13.3 %    MPV 10.1 fL    Platelet, Fluorescence 135 k/uL    Neutrophils % 87 High  %    Lymphocytes % 4 Low  %    Monocytes % 8 %    Eosinophils % 0 %    Basophils % 0 %    Immature Granulocytes % 1 %    Neutrophils Absolute 10.71 High  k/uL    Lymphocytes Absolute 0.48 Low  k/uL    Monocytes Absolute 0.97 High  k/uL    Eosinophils Absolute 0.00 Low  k/uL    Basophils Absolute 0.02 k/uL    Immature Granulocytes Absolute 0.07 k/uL    RBC Morphology Normal   Urinalysis with Microscopic [[id number]] (Abnormal)    Collected: 06/03/25 1945    Updated: 06/03/25 2024    Order Status: Completed    Specimen Source: Urine, clean catch     Color, UA Yellow    Turbidity UA Clear    Glucose, Ur NEGATIVE mg/dL    Bilirubin, Urine NEGATIVE    Ketones, Urine 15 Abnormal  mg/dL    Specific Gravity, UA 1.020    Urine Hgb LARGE Abnormal     pH, Urine 6.0    Protein, UA 30 Abnormal  mg/dL    Urobilinogen, Urine 1.0 EU/dL    Nitrite, Urine NEGATIVE    Leukocyte Esterase, Urine NEGATIVE    WBC, UA 0 TO 5 /HPF    RBC, UA 0 TO 2 /HPF    Casts UA 0 TO 2 FINE GRANULAR Abnormal  /LPF    Bacteria, UA TRACE Abnormal        Problem list:    Patient Active Problem List   Diagnosis    COPD with acute exacerbation (HCC)    Primary hypertension    Anxiety    Lactic acidosis    Hyperglycemia, drug-induced    Acute respiratory failure with hypoxia (HCC)    Type 2 diabetes mellitus (HCC)    Chronic obstructive pulmonary disease (HCC)    Anemia    Respiratory failure (HCC)    RSV (respiratory syncytial virus infection)    Allergy status to penicillin    Abnormal findings on diagnostic imaging of other specified body structures    Overweight with body mass index

## 2025-06-05 LAB
ALBUMIN SERPL-MCNC: 3.4 G/DL (ref 3.5–5.2)
ALP SERPL-CCNC: 59 U/L (ref 40–129)
ALT SERPL-CCNC: 16 U/L (ref 0–40)
ANION GAP SERPL CALCULATED.3IONS-SCNC: 11 MMOL/L (ref 7–16)
AST SERPL-CCNC: 27 U/L (ref 0–39)
BASOPHILS # BLD: 0.01 K/UL (ref 0–0.2)
BASOPHILS NFR BLD: 0 % (ref 0–2)
BILIRUB SERPL-MCNC: 0.5 MG/DL (ref 0–1.2)
BUN SERPL-MCNC: 26 MG/DL (ref 6–23)
CALCIUM SERPL-MCNC: 8.3 MG/DL (ref 8.6–10.2)
CHLORIDE SERPL-SCNC: 102 MMOL/L (ref 98–107)
CK SERPL-CCNC: 436 U/L (ref 20–200)
CO2 SERPL-SCNC: 24 MMOL/L (ref 22–29)
CREAT SERPL-MCNC: 0.7 MG/DL (ref 0.7–1.2)
EOSINOPHIL # BLD: 0 K/UL (ref 0.05–0.5)
EOSINOPHILS RELATIVE PERCENT: 0 % (ref 0–6)
ERYTHROCYTE [DISTWIDTH] IN BLOOD BY AUTOMATED COUNT: 13.2 % (ref 11.5–15)
GFR, ESTIMATED: >90 ML/MIN/1.73M2
GLUCOSE SERPL-MCNC: 175 MG/DL (ref 74–99)
HCT VFR BLD AUTO: 27.1 % (ref 37–54)
HGB BLD-MCNC: 8.9 G/DL (ref 12.5–16.5)
IMM GRANULOCYTES # BLD AUTO: 0.03 K/UL (ref 0–0.58)
IMM GRANULOCYTES NFR BLD: 0 % (ref 0–5)
LYMPHOCYTES NFR BLD: 0.57 K/UL (ref 1.5–4)
LYMPHOCYTES RELATIVE PERCENT: 6 % (ref 20–42)
MAGNESIUM SERPL-MCNC: 2.2 MG/DL (ref 1.6–2.6)
MCH RBC QN AUTO: 31.1 PG (ref 26–35)
MCHC RBC AUTO-ENTMCNC: 32.8 G/DL (ref 32–34.5)
MCV RBC AUTO: 94.8 FL (ref 80–99.9)
MONOCYTES NFR BLD: 0.2 K/UL (ref 0.1–0.95)
MONOCYTES NFR BLD: 2 % (ref 2–12)
NEUTROPHILS NFR BLD: 92 % (ref 43–80)
NEUTS SEG NFR BLD: 8.64 K/UL (ref 1.8–7.3)
PLATELET # BLD AUTO: 125 K/UL (ref 130–450)
PMV BLD AUTO: 10.6 FL (ref 7–12)
POTASSIUM SERPL-SCNC: 3.3 MMOL/L (ref 3.5–5)
PROT SERPL-MCNC: 5.4 G/DL (ref 6.4–8.3)
RBC # BLD AUTO: 2.86 M/UL (ref 3.8–5.8)
RBC # BLD: NORMAL 10*6/UL
S PNEUM AG SPEC QL: POSITIVE
SODIUM SERPL-SCNC: 137 MMOL/L (ref 132–146)
SPECIMEN SOURCE: ABNORMAL
WBC OTHER # BLD: 9.5 K/UL (ref 4.5–11.5)

## 2025-06-05 PROCEDURE — 94640 AIRWAY INHALATION TREATMENT: CPT

## 2025-06-05 PROCEDURE — 6360000002 HC RX W HCPCS: Performed by: PHYSICIAN ASSISTANT

## 2025-06-05 PROCEDURE — 87449 NOS EACH ORGANISM AG IA: CPT

## 2025-06-05 PROCEDURE — 87070 CULTURE OTHR SPECIMN AEROBIC: CPT

## 2025-06-05 PROCEDURE — 85025 COMPLETE CBC W/AUTO DIFF WBC: CPT

## 2025-06-05 PROCEDURE — 80053 COMPREHEN METABOLIC PANEL: CPT

## 2025-06-05 PROCEDURE — 1200000000 HC SEMI PRIVATE

## 2025-06-05 PROCEDURE — 83735 ASSAY OF MAGNESIUM: CPT

## 2025-06-05 PROCEDURE — 6360000002 HC RX W HCPCS: Performed by: NURSE PRACTITIONER

## 2025-06-05 PROCEDURE — 6370000000 HC RX 637 (ALT 250 FOR IP): Performed by: INTERNAL MEDICINE

## 2025-06-05 PROCEDURE — 6370000000 HC RX 637 (ALT 250 FOR IP): Performed by: NURSE PRACTITIONER

## 2025-06-05 PROCEDURE — 97165 OT EVAL LOW COMPLEX 30 MIN: CPT

## 2025-06-05 PROCEDURE — 87205 SMEAR GRAM STAIN: CPT

## 2025-06-05 PROCEDURE — 2500000003 HC RX 250 WO HCPCS: Performed by: PHYSICIAN ASSISTANT

## 2025-06-05 PROCEDURE — 36415 COLL VENOUS BLD VENIPUNCTURE: CPT

## 2025-06-05 PROCEDURE — 6370000000 HC RX 637 (ALT 250 FOR IP): Performed by: PHYSICIAN ASSISTANT

## 2025-06-05 PROCEDURE — 97535 SELF CARE MNGMENT TRAINING: CPT

## 2025-06-05 PROCEDURE — 82550 ASSAY OF CK (CPK): CPT

## 2025-06-05 RX ORDER — NYSTATIN 100000 [USP'U]/ML
0.5 SUSPENSION ORAL 4 TIMES DAILY
Status: DISCONTINUED | OUTPATIENT
Start: 2025-06-05 | End: 2025-06-06

## 2025-06-05 RX ORDER — PREDNISONE 20 MG/1
20 TABLET ORAL DAILY
Status: DISCONTINUED | OUTPATIENT
Start: 2025-06-06 | End: 2025-06-07 | Stop reason: HOSPADM

## 2025-06-05 RX ADMIN — BUDESONIDE 500 MCG: 0.5 SUSPENSION RESPIRATORY (INHALATION) at 18:48

## 2025-06-05 RX ADMIN — POTASSIUM CHLORIDE 40 MEQ: 1500 TABLET, EXTENDED RELEASE ORAL at 08:02

## 2025-06-05 RX ADMIN — GABAPENTIN 1200 MG: 400 CAPSULE ORAL at 21:10

## 2025-06-05 RX ADMIN — LISINOPRIL 20 MG: 20 TABLET ORAL at 08:03

## 2025-06-05 RX ADMIN — SODIUM CHLORIDE, PRESERVATIVE FREE 10 ML: 5 INJECTION INTRAVENOUS at 15:10

## 2025-06-05 RX ADMIN — AMLODIPINE BESYLATE 5 MG: 5 TABLET ORAL at 08:03

## 2025-06-05 RX ADMIN — SODIUM CHLORIDE, PRESERVATIVE FREE 10 ML: 5 INJECTION INTRAVENOUS at 08:03

## 2025-06-05 RX ADMIN — BUDESONIDE 500 MCG: 0.5 SUSPENSION RESPIRATORY (INHALATION) at 08:18

## 2025-06-05 RX ADMIN — METHYLPREDNISOLONE SODIUM SUCCINATE 40 MG: 40 INJECTION INTRAMUSCULAR; INTRAVENOUS at 05:15

## 2025-06-05 RX ADMIN — NYSTATIN 50000 UNITS: 100000 SUSPENSION ORAL at 15:10

## 2025-06-05 RX ADMIN — ARFORMOTEROL TARTRATE 15 MCG: 15 SOLUTION RESPIRATORY (INHALATION) at 08:18

## 2025-06-05 RX ADMIN — ARFORMOTEROL TARTRATE 15 MCG: 15 SOLUTION RESPIRATORY (INHALATION) at 18:48

## 2025-06-05 RX ADMIN — LORAZEPAM 1 MG: 1 TABLET ORAL at 21:14

## 2025-06-05 RX ADMIN — METHYLPREDNISOLONE SODIUM SUCCINATE 40 MG: 40 INJECTION INTRAMUSCULAR; INTRAVENOUS at 15:10

## 2025-06-05 RX ADMIN — TAMSULOSIN HYDROCHLORIDE 0.8 MG: 0.4 CAPSULE ORAL at 21:11

## 2025-06-05 RX ADMIN — GABAPENTIN 600 MG: 300 CAPSULE ORAL at 08:03

## 2025-06-05 RX ADMIN — SODIUM CHLORIDE, PRESERVATIVE FREE 10 ML: 5 INJECTION INTRAVENOUS at 21:12

## 2025-06-05 RX ADMIN — ENOXAPARIN SODIUM 40 MG: 100 INJECTION SUBCUTANEOUS at 08:03

## 2025-06-05 RX ADMIN — NYSTATIN 50000 UNITS: 100000 SUSPENSION ORAL at 21:11

## 2025-06-05 NOTE — PROGRESS NOTES
Subjective:    Still weak    Less dyspnea    No chest pain    Objective:    BP (!) 121/58   Pulse 65   Temp 97.7 °F (36.5 °C) (Oral)   Resp 18   Ht 1.702 m (5' 7\")   Wt 68.5 kg (151 lb)   SpO2 97%   BMI 23.65 kg/m²   Gen: Alert and in no apparent distress   Skin: Warm and dry  Neck: Supple, no JVD  Heart:  RRR, no murmurs, gallops, or rubs.  Lungs: Scattered wheezing bilaterally  Abd: bowel sounds present, nontender, nondistended, no masses  Extrem:  No clubbing, cyanosis, or edema, pulses intact    I/O last 3 completed shifts:  In: 2183 [P.O.:480; I.V.:1703]  Out: 600 [Urine:600]    Results     Component Value Units   Comprehensive Metabolic Panel w/ Reflex to MG [[id number]] (Abnormal)    Collected: 06/05/25 0540    Updated: 06/05/25 0711    Order Status: Completed    Specimen Source: Blood     Sodium 137 mmol/L    Potassium 3.3 Low  mmol/L    Chloride 102 mmol/L    CO2 24 mmol/L    Anion Gap 11 mmol/L    Glucose 175 High  mg/dL    BUN 26 High  mg/dL    Creatinine 0.7 mg/dL    Est, Glom Filt Rate >90 mL/min/1.73m2    Comment:       These results are not intended for use in patients <18 years of age.       eGFR results are calculated without a race factor using the 2021 CKD-EPI equation.  Careful clinical correlation is recommended, particularly when comparing to results  calculated using previous equations.  The CKD-EPI equation is less accurate in patients with extremes of muscle mass, extra-renal  metabolism of creatine, excessive creatine ingestion, or following therapy that affects  renal tubular secretion.       Calcium 8.3 Low  mg/dL    Total Protein 5.4 Low  g/dL    Albumin 3.4 Low  g/dL    Total Bilirubin 0.5 mg/dL    Alkaline Phosphatase 59 U/L    ALT 16 U/L    AST 27 U/L   CK [[id number]] (Abnormal)    Collected: 06/05/25 0540    Updated: 06/05/25 0711    Order Status: Completed    Specimen Type: Blood     Total  High  U/L   Magnesium [[id number]]    Collected: 06/05/25 0540    Updated:  the morning and 1 ampule in the evening. 4/22/25   Blanca West MD   azithromycin (ZITHROMAX) 250 MG tablet Take one tablet M, W, F 4/8/25   Blanca West MD   levalbuterol (XOPENEX HFA) 45 MCG/ACT inhaler INHALE TWO PUFFS BY MOUTH FOUR TIMES A DAY AS NEEDED FOR 30 DAYS  Patient taking differently: Inhale 2 puffs into the lungs every 6 hours as needed for Shortness of Breath or Wheezing INHALE TWO PUFFS BY MOUTH FOUR TIMES A DAY AS NEEDED FOR 30 DAYS 4/8/25   Blanca West MD   levalbuterol (XOPENEX) 0.63 MG/3ML nebulization Take 3 mLs by nebulization every 6 hours as needed for Wheezing 3/12/25   Tony Richmond APRN - CNS   dextromethorphan-guaiFENesin (MUCINEX DM)  MG per extended release tablet Take 1 tablet by mouth in the morning and at bedtime  Patient taking differently: Take 1 tablet by mouth in the morning and at bedtime Not DM    William Blackman MD   revefenacin (YUPELRI) 175 MCG/3ML SOLN Inhale 3 mLs into the lungs daily 9/25/24   Tony Richmond APRN - CNS   hydroCHLOROthiazide (HYDRODIURIL) 12.5 MG tablet TAKE ONE TABLET (12.5MG) BY MOUTH EVERY  MORNING 4/18/23   William Blackman MD   OXYGEN Inhale 6 L/min into the lungs continuous    William Blackman MD   zinc gluconate 50 MG tablet Take 1 tablet by mouth every evening    William Blackman MD   B-Complex-C TABS Take 1 tablet by mouth every evening    William Blackman MD   tamsulosin (FLOMAX) 0.4 MG capsule Take 2 capsules by mouth nightly    William Blackman MD   Coenzyme Q10 (CO Q 10 PO) Take 1 capsule by mouth every evening    William Blackman MD   Omega-3 Fatty Acids (FISH OIL) 1000 MG CAPS Take 1 capsule by mouth every evening    William Blackman MD   Flaxseed, Linseed, 1000 MG CAPS Take 1,000 mg by mouth every evening    William Blackman MD   amLODIPine-benazepril (LOTREL) 5-20 MG per capsule Take 1 capsule by mouth every morning    William Blackman MD   lorazepam (ATIVAN) 0.5 MG tablet

## 2025-06-05 NOTE — PROGRESS NOTES
Pt asked for his lorazepam to be ordered from his home medication list as it was not already ordered.     Call placed to OCTAVIO Diaz who told this RN it was acceptable to be ordered.    Emily was also notified that pt is refusing IV fluids despite elevated CK. CK is to be redrawn in the morning. Education provided to patient on importance of IV fluids for an elevated CK.     Electronically signed by Zenobia Franklin RN on 6/4/2025 at 11:09 PM

## 2025-06-05 NOTE — PROGRESS NOTES
Occupational Therapy  OCCUPATIONAL THERAPY INITIAL EVALUATION    [facility]   [address], [place]         Date:2025                                                  Patient Name: Angel Luis Stanley    MRN: [id number]    : 1950    Room: [address] [place] [address]      Evaluating OT: Arianna Holt OTR/L [id number]       Referring Provider:Xavier Mcmillan PA-C      Specific Provider Orders/Date: OT eval and treat 6/3/25      Diagnosis: Rhabdomyolysis /Para Influenza     Surgery: none      Pertinent Medical History: COPD,Asthma,Back Surgery ,Neuropathy            Precautions:  Fall Risk, contact isolation, O2    Assessment of current deficits:    [x] Functional mobility  [x]ADLs  [x] Strength               []Cognition    [x] Functional transfers   [x] IADLs         [] Safety Awareness   [x]Endurance    [] Fine Coordination              [x] Balance      [] Vision/perception   []Sensation     []Gross Motor Coordination  [] ROM  [] Delirium                   [] Motor Control     OT PLAN OF CARE   OT POC based on physician orders, patient diagnosis and results of clinical assessment    Frequency/Duration 2-5 days/wk for 2-4 weeks PRN   Specific OT Treatment Interventions to include:   * Instruction/training on adapted ADL techniques and AE recommendations to increase functional independence within precautions       * Training on energy conservation strategies, correct breathing pattern and techniques to improve independence/tolerance for self-care routine  * Functional transfer/mobility training/DME recommendations for increased independence, safety, and fall prevention  * Patient/Family education to increase follow through with safety techniques and functional independence  * Recommendation of environmental modifications for increased safety with functional transfers/mobility and ADLs  * Therapeutic exercise to improve motor endurance, ROM, and functional strength

## 2025-06-05 NOTE — PLAN OF CARE
Problem: Chronic Conditions and Co-morbidities  Goal: Patient's chronic conditions and co-morbidity symptoms are monitored and maintained or improved  Outcome: Progressing     Problem: Safety - Adult  Goal: Free from fall injury  Outcome: Progressing     Problem: Pain  Goal: Verbalizes/displays adequate comfort level or baseline comfort level  Outcome: Progressing     Problem: Respiratory - Adult  Goal: Achieves optimal ventilation and oxygenation  Outcome: Progressing     Problem: Skin/Tissue Integrity - Adult  Goal: Skin integrity remains intact  Outcome: Progressing  Goal: Oral mucous membranes remain intact  Outcome: Progressing     Problem: Musculoskeletal - Adult  Goal: Return mobility to safest level of function  Outcome: Progressing  Goal: Return ADL status to a safe level of function  Outcome: Progressing     Problem: Infection - Adult  Goal: Absence of infection at discharge  Outcome: Progressing  Goal: Absence of fever/infection during anticipated neutropenic period  Outcome: Progressing     Problem: Metabolic/Fluid and Electrolytes - Adult  Goal: Electrolytes maintained within normal limits  Outcome: Progressing  Goal: Hemodynamic stability and optimal renal function maintained  Outcome: Progressing

## 2025-06-05 NOTE — PROGRESS NOTES
Royer Hunt M.D.,[facility]  Linden Echols D.O., F.JOAQUÍNOJACKSON., [facility]  Awilda Currie M.D.  Blanca West M.D.   Blake Salinas D.O.  Spike Nicholas M.D.         Daily Pulmonary Progress Note    Patient:  Angel Luis Stanley 74 y.o. male MRN: [id number]            Synopsis     We are following patient for  Severe COPD on 6 L baseline with parainfluenza 3 viral infection     \"CC\" shortness of breath    Code status: Full      Subjective      Patient was seen and examined.  Shortness of breath with activity.  At baseline oxygen 5 L nasal cannula.  Felt increased fatigue with multiple trips to the bathroom while on IV fluids.  No further episodes of diarrhea reported.  No cough or mucus.  No fevers.  Total CK improved 436, will stop fluids, taking adequate p.o.    Review of Systems:  Constitutional: Denies fever, weight loss, night sweats, and fatigue  Skin: Denies pigmentation, dark lesions, and rashes   HEENT: Denies hearing loss, tinnitus, ear drainage, epistaxis, sore throat, and hoarseness.  Cardiovascular: Denies palpitations, chest pain, and chest pressure.  Respiratory: Improving cough and congestion, at baseline O2 requirement  Gastrointestinal: Denies nausea, vomiting, poor appetite, diarrhea, heartburn or reflux  Genitourinary: Denies dysuria, frequency, urgency or hematuria  Musculoskeletal: Denies myalgias, muscle weakness, and bone pain  Neurological: Denies dizziness, vertigo, headache, and focal weakness  Psychological: Denies anxiety and depression  Endocrine: Denies heat intolerance and cold intolerance  Hematopoietic/Lymphatic: Denies bleeding problems and blood transfusions    24-hour events:  None    Objective   OBJECTIVE:   BP (!) 121/58   Pulse 65   Temp 97.7 °F (36.5 °C) (Oral)   Resp 18   Ht 1.702 m (5' 7\")   Wt 68.5 kg (151 lb)   SpO2 97%   BMI 23.65 kg/m²   SpO2 Readings from Last 1 Encounters:   06/05/25 97%        I/O:    Intake/Output Summary (Last 24 hours) at 6/5/2025

## 2025-06-05 NOTE — PATIENT CARE CONFERENCE
P Quality Flow/Interdisciplinary Rounds Progress Note        Quality Flow Rounds held on June 5, 2025    Disciplines Attending:   and Nursing Unit Leadership    Angel Luis Stanley was admitted on 6/3/2025  7:10 PM    Anticipated Discharge Date:       Disposition:    Alexander Score:  Alexander Scale Score: 20    [facility] RISK OF UNPLANNED READMISSION 2.0             15.1 Total Score        Discussed patient goal for the day, patient clinical progression, and barriers to discharge.  The following Goal(s) of the Day/Commitment(s) have been identified:  Diagnostics - Report Results      Charis Conde RN  June 5, 2025

## 2025-06-06 PROBLEM — E44.0 MODERATE PROTEIN-CALORIE MALNUTRITION: Status: ACTIVE | Noted: 2025-06-06

## 2025-06-06 LAB
ALBUMIN SERPL-MCNC: 3.7 G/DL (ref 3.5–5.2)
ALP SERPL-CCNC: 72 U/L (ref 40–129)
ALT SERPL-CCNC: 23 U/L (ref 0–40)
ANION GAP SERPL CALCULATED.3IONS-SCNC: 13 MMOL/L (ref 7–16)
AST SERPL-CCNC: 28 U/L (ref 0–39)
BASOPHILS # BLD: 0 K/UL (ref 0–0.2)
BASOPHILS NFR BLD: 0 % (ref 0–2)
BILIRUB SERPL-MCNC: 0.4 MG/DL (ref 0–1.2)
BUN SERPL-MCNC: 29 MG/DL (ref 6–23)
CALCIUM SERPL-MCNC: 8.7 MG/DL (ref 8.6–10.2)
CHLORIDE SERPL-SCNC: 103 MMOL/L (ref 98–107)
CO2 SERPL-SCNC: 27 MMOL/L (ref 22–29)
CREAT SERPL-MCNC: 0.7 MG/DL (ref 0.7–1.2)
EOSINOPHIL # BLD: 0 K/UL (ref 0.05–0.5)
EOSINOPHILS RELATIVE PERCENT: 0 % (ref 0–6)
ERYTHROCYTE [DISTWIDTH] IN BLOOD BY AUTOMATED COUNT: 13.4 % (ref 11.5–15)
GFR, ESTIMATED: >90 ML/MIN/1.73M2
GLUCOSE SERPL-MCNC: 182 MG/DL (ref 74–99)
HCT VFR BLD AUTO: 27 % (ref 37–54)
HGB BLD-MCNC: 9.3 G/DL (ref 12.5–16.5)
IMM GRANULOCYTES # BLD AUTO: 0.06 K/UL (ref 0–0.58)
IMM GRANULOCYTES NFR BLD: 1 % (ref 0–5)
L PNEUMO1 AG UR QL IA.RAPID: NEGATIVE
LYMPHOCYTES NFR BLD: 0.5 K/UL (ref 1.5–4)
LYMPHOCYTES RELATIVE PERCENT: 5 % (ref 20–42)
MCH RBC QN AUTO: 31.2 PG (ref 26–35)
MCHC RBC AUTO-ENTMCNC: 34.4 G/DL (ref 32–34.5)
MCV RBC AUTO: 90.6 FL (ref 80–99.9)
MICROORGANISM SPEC CULT: NORMAL
MONOCYTES NFR BLD: 0.25 K/UL (ref 0.1–0.95)
MONOCYTES NFR BLD: 3 % (ref 2–12)
NEUTROPHILS NFR BLD: 92 % (ref 43–80)
NEUTS SEG NFR BLD: 8.7 K/UL (ref 1.8–7.3)
PLATELET, FLUORESCENCE: 144 K/UL (ref 130–450)
PMV BLD AUTO: 10.6 FL (ref 7–12)
POTASSIUM SERPL-SCNC: 3.7 MMOL/L (ref 3.5–5)
PROT SERPL-MCNC: 5.8 G/DL (ref 6.4–8.3)
RBC # BLD AUTO: 2.98 M/UL (ref 3.8–5.8)
RBC # BLD: NORMAL 10*6/UL
SODIUM SERPL-SCNC: 143 MMOL/L (ref 132–146)
SPECIMEN DESCRIPTION: NORMAL
WBC OTHER # BLD: 9.5 K/UL (ref 4.5–11.5)

## 2025-06-06 PROCEDURE — 1200000000 HC SEMI PRIVATE

## 2025-06-06 PROCEDURE — 2500000003 HC RX 250 WO HCPCS: Performed by: PHYSICIAN ASSISTANT

## 2025-06-06 PROCEDURE — 94640 AIRWAY INHALATION TREATMENT: CPT

## 2025-06-06 PROCEDURE — 6370000000 HC RX 637 (ALT 250 FOR IP): Performed by: INTERNAL MEDICINE

## 2025-06-06 PROCEDURE — 80053 COMPREHEN METABOLIC PANEL: CPT

## 2025-06-06 PROCEDURE — 6360000002 HC RX W HCPCS: Performed by: PHYSICIAN ASSISTANT

## 2025-06-06 PROCEDURE — 6370000000 HC RX 637 (ALT 250 FOR IP): Performed by: NURSE PRACTITIONER

## 2025-06-06 PROCEDURE — 94669 MECHANICAL CHEST WALL OSCILL: CPT

## 2025-06-06 PROCEDURE — 6370000000 HC RX 637 (ALT 250 FOR IP): Performed by: PHYSICIAN ASSISTANT

## 2025-06-06 PROCEDURE — 85025 COMPLETE CBC W/AUTO DIFF WBC: CPT

## 2025-06-06 RX ORDER — FLUCONAZOLE 100 MG/1
100 TABLET ORAL DAILY
Status: DISCONTINUED | OUTPATIENT
Start: 2025-06-06 | End: 2025-06-07 | Stop reason: HOSPADM

## 2025-06-06 RX ORDER — PREDNISONE 20 MG/1
20 TABLET ORAL DAILY
Qty: 2 TABLET | Refills: 0 | Status: SHIPPED | OUTPATIENT
Start: 2025-06-07 | End: 2025-06-09

## 2025-06-06 RX ADMIN — GABAPENTIN 1200 MG: 400 CAPSULE ORAL at 20:57

## 2025-06-06 RX ADMIN — TAMSULOSIN HYDROCHLORIDE 0.8 MG: 0.4 CAPSULE ORAL at 20:57

## 2025-06-06 RX ADMIN — FLUCONAZOLE 100 MG: 100 TABLET ORAL at 12:36

## 2025-06-06 RX ADMIN — ENOXAPARIN SODIUM 40 MG: 100 INJECTION SUBCUTANEOUS at 09:28

## 2025-06-06 RX ADMIN — ARFORMOTEROL TARTRATE 15 MCG: 15 SOLUTION RESPIRATORY (INHALATION) at 08:23

## 2025-06-06 RX ADMIN — LISINOPRIL 20 MG: 20 TABLET ORAL at 09:27

## 2025-06-06 RX ADMIN — AMLODIPINE BESYLATE 5 MG: 5 TABLET ORAL at 09:27

## 2025-06-06 RX ADMIN — PREDNISONE 20 MG: 20 TABLET ORAL at 09:28

## 2025-06-06 RX ADMIN — BUDESONIDE 500 MCG: 0.5 SUSPENSION RESPIRATORY (INHALATION) at 19:00

## 2025-06-06 RX ADMIN — ARFORMOTEROL TARTRATE 15 MCG: 15 SOLUTION RESPIRATORY (INHALATION) at 19:00

## 2025-06-06 RX ADMIN — NYSTATIN 50000 UNITS: 100000 SUSPENSION ORAL at 09:28

## 2025-06-06 RX ADMIN — SODIUM CHLORIDE, PRESERVATIVE FREE 10 ML: 5 INJECTION INTRAVENOUS at 20:58

## 2025-06-06 RX ADMIN — GABAPENTIN 600 MG: 300 CAPSULE ORAL at 09:27

## 2025-06-06 RX ADMIN — SODIUM CHLORIDE, PRESERVATIVE FREE 10 ML: 5 INJECTION INTRAVENOUS at 10:07

## 2025-06-06 RX ADMIN — AZITHROMYCIN 250 MG: 250 TABLET, FILM COATED ORAL at 16:48

## 2025-06-06 RX ADMIN — BUDESONIDE 500 MCG: 0.5 SUSPENSION RESPIRATORY (INHALATION) at 08:23

## 2025-06-06 RX ADMIN — LORAZEPAM 1 MG: 1 TABLET ORAL at 23:25

## 2025-06-06 NOTE — CONSULTS
Comprehensive Nutrition Assessment    Type and Reason for Visit:  Initial, Consult (ONS, pt request)    Nutrition Recommendations/Plan:   Continue current diet as ordered  Start high protein/low calorie Ensure ONS TID  Continue inpatient monitoring     Malnutrition Assessment:  Malnutrition Status:  Moderate malnutrition (06/06/25 1328)    Context:  Chronic Illness     Findings of the 6 clinical characteristics of malnutrition:  Energy Intake:  No decrease in energy intake  Weight Loss:  No weight loss     Body Fat Loss:  Mild body fat loss (moderate) Buccal region, Orbital   Muscle Mass Loss:  Mild muscle mass loss (moderate) Temples (temporalis), Clavicles (pectoralis & deltoids)  Fluid Accumulation:  No fluid accumulation     Strength:  Not Performed    Nutrition Assessment:    Pt admits w/ traumatic rhabdo 2/2 weakness/fall, acute COPD exacerbation, acute on chronic respiratory failure, and parainfluenza virus (+strep pneumo Ag). PMHx; COPD, NSTEMI. Pt reports ongoing generalized weakness, despite eating an adequate amount of food. Will add low calorie/high protein Ensure ONS TID to support increased nutrient needs in setting of COPD. Recommend continue ONS at d/c. Will continue to monitor.    Nutrition Related Findings:    A&O, +2L, no edema, abd WDL, denies N/V/D/C, BUN = 29, Glucose = 182 (steroids) Wound Type: None       Current Nutrition Intake & Therapies:    Average Meal Intake: 51-75%, %  Average Supplements Intake: None Ordered  ADULT DIET; Regular  ADULT ORAL NUTRITION SUPPLEMENT; Breakfast, Lunch, Dinner; Low Calorie/High Protein Oral Supplement    Anthropometric Measures:  Height: 170.2 cm (5' 7\")  Ideal Body Weight (IBW): 148 lbs (67 kg)    Admission Body Weight: 68.5 kg (151 lb 0.2 oz) (06/04, bed scale)  Current Body Weight: 68.5 kg (151 lb 0.2 oz) (06/06), 102 % IBW. Weight Source: Bed scale  Current BMI (kg/m2): 23.6  Usual Body Weight: 66.9 kg (147 lb 7.8 oz) (03/31/2025; per EMR  progress note)     % Weight Change (Calculated): 2.4  Weight Adjustment For: No Adjustment                 BMI Categories: Normal Weight (BMI 22.0 to 24.9) age over 65    Estimated Daily Nutrient Needs:  Energy Requirements Based On: Formula  Weight Used for Energy Requirements: Current  Energy (kcal/day): 4238-8281 (SF = 1.2-1.3)  Weight Used for Protein Requirements: Current  Protein (g/day):  (1.3-1.5 g/kg)  Method Used for Fluid Requirements: 1 ml/kcal  Fluid (ml/day): 7917-7689 ml    Nutrition Diagnosis:   in context of chronic illness, Moderate malnutrition related to catabolic illness as evidenced by criteria as identified in malnutrition assessment    Nutrition Interventions:   Food and/or Nutrient Delivery: Continue Current Diet, Start Oral Nutrition Supplement  Nutrition Education/Counseling: No recommendation at this time  Coordination of Nutrition Care: Continue to monitor while inpatient       Goals:  Goals: PO intake 75% or greater, by next RD assessment  Type of Goal: New goal       Nutrition Monitoring and Evaluation:   Behavioral-Environmental Outcomes: None Identified  Food/Nutrient Intake Outcomes: Supplement Intake, Food and Nutrient Intake  Physical Signs/Symptoms Outcomes: Biochemical Data, Nutrition Focused Physical Findings, Weight, Skin, Fluid Status or Edema, GI Status    Discharge Planning:    Continue Oral Nutrition Supplement     Melba Jerez  Contact: [phone]

## 2025-06-06 NOTE — PROGRESS NOTES
CLINICAL PHARMACY NOTE: MEDS TO BEDS    Total # of Prescriptions Filled: 1   The following medications were delivered to the patient:  Prednisone 20mg tabs    Additional Documentation:  To FRANCISCA HERNANDEZ

## 2025-06-06 NOTE — PROGRESS NOTES
At baseline from a pulmonary perspective.  With ambulation on 5 L patient maintain SpO2 98%.  His dyspnea is chronic due to end-stage lung disease.  He would benefit from palliative medicine to follow as outpatient to help with symptom management.  Continue daily chest vest, azithromycin every Monday Wednesday Friday and home regimen of Yupelri, Brovana, budesonide, Xopenex for discharge.  Pulmonary service to sign off please call again if needed.  Pulmonary meds reconciled for DC.  Follow-up to be arranged in office with Dr. West upon discharge  Electronically signed by KELECHI Garrison CNP on 6/6/2025 at 4:05 PM

## 2025-06-06 NOTE — PROGRESS NOTES
Subjective:    Patient was seen and examined at bedside.  He states that with movement, he does still become short of breath, but he is doing well at rest.  He denies any chest pain.  He is currently tolerating 5-1/2 L O2 via nasal cannula which is his baseline.    Objective:    BP (!) 147/82   Pulse 79   Temp 97.9 °F (36.6 °C) (Oral)   Resp 18   Ht 1.702 m (5' 7\")   Wt 68.5 kg (151 lb)   SpO2 97%   BMI 23.65 kg/m²   Gen: Alert and in no apparent distress   Skin: Warm and dry  Neck: Supple, no JVD  Heart:  RRR, no murmurs, gallops, or rubs.  Lungs: Scattered wheezing bilaterally with decreased lung sounds heard throughout   Abd: bowel sounds present, nontender, nondistended, no masses  Extrem:  No clubbing, cyanosis, or edema, pulses intact    I/O last 3 completed shifts:  In: 420 [P.O.:420]  Out: -     Results     Component Value Units   Comprehensive Metabolic Panel w/ Reflex to MG [[id number]] (Abnormal)    Collected: 06/05/25 0540    Updated: 06/05/25 0711    Order Status: Completed    Specimen Source: Blood     Sodium 137 mmol/L    Potassium 3.3 Low  mmol/L    Chloride 102 mmol/L    CO2 24 mmol/L    Anion Gap 11 mmol/L    Glucose 175 High  mg/dL    BUN 26 High  mg/dL    Creatinine 0.7 mg/dL    Est, Glom Filt Rate >90 mL/min/1.73m2    Comment:       These results are not intended for use in patients <18 years of age.       eGFR results are calculated without a race factor using the 2021 CKD-EPI equation.  Careful clinical correlation is recommended, particularly when comparing to results  calculated using previous equations.  The CKD-EPI equation is less accurate in patients with extremes of muscle mass, extra-renal  metabolism of creatine, excessive creatine ingestion, or following therapy that affects  renal tubular secretion.       Calcium 8.3 Low  mg/dL    Total Protein 5.4 Low  g/dL    Albumin 3.4 Low  g/dL    Total Bilirubin 0.5 mg/dL    Alkaline Phosphatase 59 U/L    ALT 16 U/L    AST 27 U/L

## 2025-06-06 NOTE — PATIENT CARE CONFERENCE
[facility] Quality Flow/Interdisciplinary Rounds Progress Note        Quality Flow Rounds held on June 6, 2025    Disciplines Attending:  Bedside Nurse, , , and Nursing Unit Leadership    Angel Luis Stanley was admitted on 6/3/2025  7:10 PM    Anticipated Discharge Date:       Disposition:    Alexander Score:  Alexander Scale Score: 20    [facility] RISK OF UNPLANNED READMISSION 2.0             14.5 Total Score        Discussed patient goal for the day, patient clinical progression, and barriers to discharge.  The following Goal(s) of the Day/Commitment(s) have been identified:  Occupational Therapy - Obtain Consult Order and Physical Therapy - Obtain Consult Order      Jonna Ding RN  June 6, 2025

## 2025-06-06 NOTE — PROGRESS NOTES
Pt was ambulated on 5L of O2,  stayed at 98% the entire time. Pt did complain of SOB while ambulating. At no time did pt appear in distress. Pt now resting in bed with no complaints. Electronically signed by Jesi Santiago RN on 6/6/2025 at 1:24 PM

## 2025-06-06 NOTE — CARE COORDINATION
Social Work discharge planning  Pt continues to anticipate discharge home.  PT [facility] 19 today.  Pt [facility] for 3 L 02 [facility]c [phone]. Pt on 5-6L today.  Pt will need NEW order for different liter flow for DME if >3L needed at discharge.   Electronically signed by TERE Guzman on 6/6/2025 at 2:20 PM       **ATTENTION BEDSIDE RN**    Please copy below template, fill in appropriate fields, and place in a progress note.    Testing MUST be completed within but no later than 48 hours of discharge.     Please ambulate patient for a MINIMUM of 6 minutes to ensure accuracy of test.      Pulse ox was _______ % on room air at rest.  Ambulated patient on room air.    Oxygen saturation was _______ % on room air while ambulating. (lowest saturation reached)  Oxygen applied.    Recovery pulse ox was _______% on ____ liters of oxygen while ambulating.

## 2025-06-07 VITALS
BODY MASS INDEX: 23.7 KG/M2 | HEART RATE: 72 BPM | TEMPERATURE: 98.6 F | HEIGHT: 67 IN | SYSTOLIC BLOOD PRESSURE: 138 MMHG | RESPIRATION RATE: 18 BRPM | DIASTOLIC BLOOD PRESSURE: 67 MMHG | WEIGHT: 151 LBS | OXYGEN SATURATION: 95 %

## 2025-06-07 LAB
ALBUMIN SERPL-MCNC: 3.5 G/DL (ref 3.5–5.2)
ALP SERPL-CCNC: 60 U/L (ref 40–129)
ALT SERPL-CCNC: 27 U/L (ref 0–40)
ANION GAP SERPL CALCULATED.3IONS-SCNC: 8 MMOL/L (ref 7–16)
AST SERPL-CCNC: 23 U/L (ref 0–39)
BASOPHILS # BLD: 0.01 K/UL (ref 0–0.2)
BASOPHILS NFR BLD: 0 % (ref 0–2)
BILIRUB SERPL-MCNC: 0.6 MG/DL (ref 0–1.2)
BUN SERPL-MCNC: 22 MG/DL (ref 6–23)
CALCIUM SERPL-MCNC: 8.6 MG/DL (ref 8.6–10.2)
CHLORIDE SERPL-SCNC: 100 MMOL/L (ref 98–107)
CO2 SERPL-SCNC: 31 MMOL/L (ref 22–29)
CREAT SERPL-MCNC: 0.5 MG/DL (ref 0.7–1.2)
EOSINOPHIL # BLD: 0 K/UL (ref 0.05–0.5)
EOSINOPHILS RELATIVE PERCENT: 0 % (ref 0–6)
ERYTHROCYTE [DISTWIDTH] IN BLOOD BY AUTOMATED COUNT: 13.3 % (ref 11.5–15)
GFR, ESTIMATED: >90 ML/MIN/1.73M2
GLUCOSE SERPL-MCNC: 104 MG/DL (ref 74–99)
HCT VFR BLD AUTO: 27.9 % (ref 37–54)
HGB BLD-MCNC: 9.1 G/DL (ref 12.5–16.5)
IMM GRANULOCYTES # BLD AUTO: 0.05 K/UL (ref 0–0.58)
IMM GRANULOCYTES NFR BLD: 1 % (ref 0–5)
LYMPHOCYTES NFR BLD: 1.73 K/UL (ref 1.5–4)
LYMPHOCYTES RELATIVE PERCENT: 20 % (ref 20–42)
MAGNESIUM SERPL-MCNC: 2.1 MG/DL (ref 1.6–2.6)
MCH RBC QN AUTO: 31 PG (ref 26–35)
MCHC RBC AUTO-ENTMCNC: 32.6 G/DL (ref 32–34.5)
MCV RBC AUTO: 94.9 FL (ref 80–99.9)
MICROORGANISM SPEC CULT: NORMAL
MICROORGANISM/AGENT SPEC: NORMAL
MONOCYTES NFR BLD: 0.75 K/UL (ref 0.1–0.95)
MONOCYTES NFR BLD: 9 % (ref 2–12)
NEUTROPHILS NFR BLD: 71 % (ref 43–80)
NEUTS SEG NFR BLD: 6.13 K/UL (ref 1.8–7.3)
PLATELET # BLD AUTO: 149 K/UL (ref 130–450)
PMV BLD AUTO: 10.7 FL (ref 7–12)
POTASSIUM SERPL-SCNC: 3.5 MMOL/L (ref 3.5–5)
PROT SERPL-MCNC: 5.2 G/DL (ref 6.4–8.3)
RBC # BLD AUTO: 2.94 M/UL (ref 3.8–5.8)
SODIUM SERPL-SCNC: 139 MMOL/L (ref 132–146)
SPECIMEN DESCRIPTION: NORMAL
WBC OTHER # BLD: 8.7 K/UL (ref 4.5–11.5)

## 2025-06-07 PROCEDURE — 6360000002 HC RX W HCPCS: Performed by: PHYSICIAN ASSISTANT

## 2025-06-07 PROCEDURE — 83735 ASSAY OF MAGNESIUM: CPT

## 2025-06-07 PROCEDURE — 6370000000 HC RX 637 (ALT 250 FOR IP): Performed by: NURSE PRACTITIONER

## 2025-06-07 PROCEDURE — 6370000000 HC RX 637 (ALT 250 FOR IP): Performed by: PHYSICIAN ASSISTANT

## 2025-06-07 PROCEDURE — 85025 COMPLETE CBC W/AUTO DIFF WBC: CPT

## 2025-06-07 PROCEDURE — 6370000000 HC RX 637 (ALT 250 FOR IP): Performed by: INTERNAL MEDICINE

## 2025-06-07 PROCEDURE — 94640 AIRWAY INHALATION TREATMENT: CPT

## 2025-06-07 PROCEDURE — 80053 COMPREHEN METABOLIC PANEL: CPT

## 2025-06-07 RX ORDER — FLUCONAZOLE 100 MG/1
100 TABLET ORAL DAILY
Qty: 6 TABLET | Refills: 0 | Status: SHIPPED | OUTPATIENT
Start: 2025-06-07 | End: 2025-06-13

## 2025-06-07 RX ORDER — GUAIFENESIN 400 MG/1
400 TABLET ORAL 3 TIMES DAILY
Status: DISCONTINUED | OUTPATIENT
Start: 2025-06-07 | End: 2025-06-07 | Stop reason: HOSPADM

## 2025-06-07 RX ADMIN — BUDESONIDE 500 MCG: 0.5 SUSPENSION RESPIRATORY (INHALATION) at 09:27

## 2025-06-07 RX ADMIN — FLUCONAZOLE 100 MG: 100 TABLET ORAL at 10:46

## 2025-06-07 RX ADMIN — GUAIFENESIN 400 MG: 400 TABLET ORAL at 10:46

## 2025-06-07 RX ADMIN — AMLODIPINE BESYLATE 5 MG: 5 TABLET ORAL at 10:46

## 2025-06-07 RX ADMIN — ENOXAPARIN SODIUM 40 MG: 100 INJECTION SUBCUTANEOUS at 10:46

## 2025-06-07 RX ADMIN — LISINOPRIL 20 MG: 20 TABLET ORAL at 10:46

## 2025-06-07 RX ADMIN — GABAPENTIN 600 MG: 300 CAPSULE ORAL at 10:46

## 2025-06-07 RX ADMIN — ARFORMOTEROL TARTRATE 15 MCG: 15 SOLUTION RESPIRATORY (INHALATION) at 09:26

## 2025-06-07 RX ADMIN — PREDNISONE 20 MG: 20 TABLET ORAL at 10:46

## 2025-06-07 NOTE — PROGRESS NOTES
Subjective:    Patient was seen and examined at bedside. He was seen on 5LNC, his baseline and states his breathing is ok. He complains of being stuffed up, mucinex scheduled.    Objective:    /62   Pulse 77   Temp 97.9 °F (36.6 °C) (Oral)   Resp 18   Ht 1.702 m (5' 7\")   Wt 68.5 kg (151 lb)   SpO2 98%   BMI 23.65 kg/m²   Gen: Alert and in no apparent distress   Skin: Warm and dry  Neck: Supple, no JVD  Heart:  RRR, no murmurs, gallops, or rubs.  Lungs: Scattered wheezing bilaterally with decreased lung sounds heard throughout   Abd: bowel sounds present, nontender, nondistended, no masses  Extrem:  No clubbing, cyanosis, 1+ edema, pulses intact    I/O last 3 completed shifts:  In: 780 [P.O.:780]  Out: -     Results     Component Value Units   Comprehensive Metabolic Panel w/ Reflex to MG [[id number]] (Abnormal)    Collected: 06/05/25 0540    Updated: 06/05/25 0711    Order Status: Completed    Specimen Source: Blood     Sodium 137 mmol/L    Potassium 3.3 Low  mmol/L    Chloride 102 mmol/L    CO2 24 mmol/L    Anion Gap 11 mmol/L    Glucose 175 High  mg/dL    BUN 26 High  mg/dL    Creatinine 0.7 mg/dL    Est, Glom Filt Rate >90 mL/min/1.73m2    Comment:       These results are not intended for use in patients <18 years of age.       eGFR results are calculated without a race factor using the 2021 CKD-EPI equation.  Careful clinical correlation is recommended, particularly when comparing to results  calculated using previous equations.  The CKD-EPI equation is less accurate in patients with extremes of muscle mass, extra-renal  metabolism of creatine, excessive creatine ingestion, or following therapy that affects  renal tubular secretion.       Calcium 8.3 Low  mg/dL    Total Protein 5.4 Low  g/dL    Albumin 3.4 Low  g/dL    Total Bilirubin 0.5 mg/dL    Alkaline Phosphatase 59 U/L    ALT 16 U/L    AST 27 U/L   CK [[id number]] (Abnormal)    Collected: 06/05/25 0540    Updated: 06/05/25 0711    Order  on Monday Wednesday Friday Blaise Sheth APRN - CNP   250 mg at 06/06/25 1648    levalbuterol (XOPENEX) nebulization 0.63 mg  0.63 mg Nebulization Q6H PRN Blaise Sheth APRN - CNP        LORazepam (ATIVAN) tablet 1 mg  1 mg Oral Nightly PRN Emily Skinner APRN - CNP   1 mg at 06/06/25 2325    sodium chloride flush 0.9 % injection 10 mL  10 mL IntraVENous 2 times per day Manish Mcmillanis, PA-C   10 mL at 06/06/25 2058    sodium chloride flush 0.9 % injection 10 mL  10 mL IntraVENous PRN Hipolito Xavier, PA-C   10 mL at 06/05/25 1510    0.9 % sodium chloride infusion   IntraVENous PRN Manish Mcmillanis, PA-C        potassium chloride (KLOR-CON M) extended release tablet 40 mEq  40 mEq Oral PRN Hipolito, Xavier, PA-C   40 mEq at 06/05/25 0802    Or    potassium bicarb-citric acid (EFFER-K) effervescent tablet 40 mEq  40 mEq Oral PRN Manish Mcmillanis, PA-C        Or    potassium chloride 10 mEq/100 mL IVPB (Peripheral Line)  10 mEq IntraVENous PRN Shoreham, Xavier, PA-C        potassium chloride 10 mEq/100 mL IVPB (Peripheral Line)  10 mEq IntraVENous PRN [place]ham, Xavier, PA-C        magnesium sulfate 2000 mg in 50 mL IVPB premix  2,000 mg IntraVENous PRN Hipolito, Xavier, PA-C        enoxaparin (LOVENOX) injection 40 mg  40 mg SubCUTAneous Daily Xavier Mcmillan, PA-C   40 mg at 06/06/25 0928    ondansetron (ZOFRAN-ODT) disintegrating tablet 4 mg  4 mg Oral Q8H PRN Manish Mcmillanis, PA-C        Or    ondansetron (ZOFRAN) injection 4 mg  4 mg IntraVENous Q6H PRN Hipolito, Xavier, PA-C        senna (SENOKOT) tablet 8.6 mg  1 tablet Oral Daily PRN Hipolito, Xavier, PA-C        acetaminophen (TYLENOL) tablet 650 mg  650 mg Oral Q6H PRN Hipolito Xavier, PA-C        Or    acetaminophen (TYLENOL) suppository 650 mg  650 mg Rectal Q6H PRN Xavier Mcmillan PA-C               Problem list:    Principal Problem:    Rhabdomyolysis  Active Problems:    Chronic obstructive pulmonary disease (HCC)    COPD with acute exacerbation (HCC)    Primary

## 2025-06-07 NOTE — DISCHARGE SUMMARY
=Internal Medicine Discharge Summary    NAME: Angel Luis Stanley :  1950  MRN:  [id number] PCP:Moises Gorman MD    ADMITTED: 6/3/2025   DISCHARGED: 2025  2:17 PM    ADMITTING PHYSICIAN: No att. providers found    PCP: Moises Gorman MD    CONSULTANT(S):   IP CONSULT TO INTERNAL MEDICINE  IP CONSULT TO PULMONOLOGY  IP CONSULT TO DIETITIAN     ADMITTING DIAGNOSIS:   Rhabdomyolysis [M62.82]  Fall, initial encounter [W19.XXXA]  Ambulatory dysfunction [R26.2]  Traumatic rhabdomyolysis, initial encounter [T79.6XXA]     Please see H&P for further details    DISCHARGE DIAGNOSES:   Active Hospital Problems    Diagnosis     Chronic obstructive pulmonary disease (HCC) [J44.9]      Priority: Medium    Moderate protein-calorie malnutrition [E44.0]     Rhabdomyolysis [M62.82]     Chronic hypoxic respiratory failure (HCC) [J96.11]     Type 2 diabetes mellitus (HCC) [E11.9]     Primary hypertension [I10]     Anxiety [F41.9]     COPD with acute exacerbation (HCC) [J44.1]        BRIEF HISTORY OF PRESENT ILLNESS: Angel Luis Stanley is a 74 y.o. male patient of Moises Gorman MD who  has a past medical history of Asthma, COPD (chronic obstructive pulmonary disease) (HCC), Hypertension, and Neuropathic pain. who originally had concerns including Extremity Weakness, Fall (TWO FALLS), and Fever (Started yesterday). at presentation on 6/3/2025, and was found to have Rhabdomyolysis [M62.82]  Fall, initial encounter [W19.XXXA]  Ambulatory dysfunction [R26.2]  Traumatic rhabdomyolysis, initial encounter [T79.6XXA] after workup.    Please see H&P for further details.    HOSPITAL COURSE:   The patient presented to the hospital with the chief complaint of Extremity Weakness, Fall (TWO FALLS), and Fever (Started yesterday)  . The patient was admitted to the hospital.     Assessment:     Traumatic rhabdomyolysis secondary to fall and prolonged immobility     Upper respiratory tract infection secondary to parainfluenza virus     Acute  aneurysm.  Pulmonary trunk appears nondilated.  No pericardial effusion no lymphadenopathy.  The esophagus is grossly unremarkable. Lungs/pleura: Advanced emphysematous changes.  No consolidation pneumothorax or pleural effusion.  Debris is seen within the bronchi.  No suspicious pulmonary nodule or mass. Upper Abdomen: No acute findings. Soft Tissues/Bones: No acute findings.  Osseous structures demonstrate degenerative change.     Advanced emphysematous changes.  No consolidation to suggest pneumonia.           DISPOSITION:  The patient's condition is stable.   The patient is being discharged to home    DISCHARGE MEDICATIONS:      Medication List        START taking these medications      fluconazole 100 MG tablet  Commonly known as: DIFLUCAN  Take 1 tablet by mouth daily for 6 doses     predniSONE 20 MG tablet  Commonly known as: DELTASONE  Take 1 tablet by mouth daily for 2 doses            CHANGE how you take these medications      LORazepam 0.5 MG tablet  Commonly known as: ATIVAN  What changed: additional instructions            CONTINUE taking these medications      amLODIPine-benazepril 5-20 MG per capsule  Commonly known as: LOTREL     arformoterol tartrate 15 MCG/2ML Nebu  Commonly known as: BROVANA  Take 1 ampule by nebulization in the morning and 1 ampule in the evening.     azithromycin 250 MG tablet  Commonly known as: ZITHROMAX  Take one tablet M, W, F     B-Complex-C Tabs     budesonide 0.5 MG/2ML nebulizer suspension  Commonly known as: PULMICORT  Take 2 mLs by nebulization 2 times daily     CO Q 10 PO     fish oil 1000 MG capsule     Flaxseed (Linseed) 1000 MG Caps     * gabapentin 600 MG tablet  Commonly known as: NEURONTIN     * gabapentin 600 MG tablet  Commonly known as: NEURONTIN     hydroCHLOROthiazide 12.5 MG tablet     levalbuterol 0.63 MG/3ML nebulization  Commonly known as: Xopenex  Take 3 mLs by nebulization every 6 hours as needed for Wheezing     levalbuterol 45 MCG/ACT

## 2025-06-07 NOTE — CARE COORDINATION
Referral info with demos and new O2 order sent to [facility]. Will follow for response.  Cali Zafar, MSN RN  [facility] Case Management  [phone]

## 2025-06-12 NOTE — PROGRESS NOTES
Physician Progress Note      PATIENT:               STEVEN BUSTOS  CSN #:                  [id number]  :                       1950  ADMIT DATE:       6/3/2025 7:10 PM  DISCH DATE:        2025 2:17 PM  RESPONDING  PROVIDER #:        Spike Nicholas MD          QUERY TEXT:    Acute on chronic respiratory failure with hypoxia and hypercapnia is   documented in the medical record PN by PulmonSpike Bowser MD   2025.SPO2 95, 96, RR 20-38.  Please provide additional clinical indicators   supportive of your documentation. Or please document if the diagnosis of   Acute on chronic respiratory failure with hypoxia and hypercapnia has been   ruled out after study.    The clinical indicators include:  -Age 74 yrs, male, COPD, HTN, chronic due to end-stage lung disease, Former   smoker.    -\"Pt presented for the evaluation of Extremity Weakness, Fever found to have   Ambulatory dysfunction, Traumatic rhabdomyolysis, initial encounter.\" (ED by   Adithya Lux, DO 6/3).  -\" Acute COPD exacerbation on 6 L O2 via nasal cannula chronically.   Respiratory: positive for cough.\" (H&P by Stephane Baker, DO ).  -\"Acute on chronic respiratory failure with hypoxia and hypercapnia, Oxygen   therapy weaned to 5 L, Respiratory: Cough, dyspnea, URI symptoms.\" (CN by   PulmonSpike Bowser MD ).  -\" Acute on chronic respiratory failure with hypoxia and hypercapnia, Oxygen   therapy 5 L keep SpO2 greater than equal to 88% prior baseline for 4-5 L.    Respiratory: diminished to auscultation bilaterally without wheezing or   crackles. Air entry is symmetric.\" (PN by PulmonSpike Bowser MD   ).  -\"Upper respiratory tract infection secondary to parainfluenza virus, Chronic   hypoxic respiratory failure.\" (DS by Emily Skinner, APRN - CNP ).    -SPO2  4L NC 6/3,  5-6L NC , 97-99 5-6L NC , 97, 98 5L NC   , 95-99 5L NC 7 (from labs 6/3-)    -RR 18-33 (from  labs 6/3-6/7)    -Treated with Supplemental oxygen 4-6L NC, Solumedrol, Aerosols, DuoNeb's,   Pulse oximetry, Pulmonology consult.    Valentin King.SABINE Garcia.CDS.  Options provided:  -- Acute on chronic respiratory failure with hypoxia and hypercapnia as   evidenced by, Please document evidence.  -- Acute hypoxic, hypercapnic Respiratory Failure ruled out after study and   Chronic hypoxic Respiratory Failure confirmed  -- Other - I will add my own diagnosis  -- Disagree - Not applicable / Not valid  -- Disagree - Clinically unable to determine / Unknown  -- Refer to Clinical Documentation Reviewer    PROVIDER RESPONSE TEXT:    This patient is in Acute on chronic respiratory failure with hypoxia and   hypercapnia as evidenced by Baseline O2 is 4-5 L, requiring up to 6 L when   presented    Query created by: Valentin Garcia on 6/12/2025 10:55 AM      Electronically signed by:  Spike Nicholas MD 6/12/2025 11:06 AM

## 2025-07-08 ENCOUNTER — OFFICE VISIT (OUTPATIENT)
Age: 75
End: 2025-07-08
Payer: COMMERCIAL

## 2025-07-08 VITALS
OXYGEN SATURATION: 100 % | TEMPERATURE: 98.2 F | HEART RATE: 70 BPM | BODY MASS INDEX: 22.27 KG/M2 | DIASTOLIC BLOOD PRESSURE: 49 MMHG | WEIGHT: 142.2 LBS | SYSTOLIC BLOOD PRESSURE: 138 MMHG

## 2025-07-08 DIAGNOSIS — J44.9 PULMONARY CACHEXIA DUE TO COPD (HCC): ICD-10-CM

## 2025-07-08 DIAGNOSIS — Z51.5 PALLIATIVE CARE BY SPECIALIST: Primary | ICD-10-CM

## 2025-07-08 DIAGNOSIS — J43.2 CENTRILOBULAR EMPHYSEMA (HCC): ICD-10-CM

## 2025-07-08 DIAGNOSIS — R06.09 DYSPNEA ON EXERTION: ICD-10-CM

## 2025-07-08 DIAGNOSIS — R64 PULMONARY CACHEXIA DUE TO COPD (HCC): ICD-10-CM

## 2025-07-08 PROCEDURE — G8420 CALC BMI NORM PARAMETERS: HCPCS | Performed by: NURSE PRACTITIONER

## 2025-07-08 PROCEDURE — 1123F ACP DISCUSS/DSCN MKR DOCD: CPT | Performed by: NURSE PRACTITIONER

## 2025-07-08 PROCEDURE — G8427 DOCREV CUR MEDS BY ELIG CLIN: HCPCS | Performed by: NURSE PRACTITIONER

## 2025-07-08 PROCEDURE — 1125F AMNT PAIN NOTED PAIN PRSNT: CPT | Performed by: NURSE PRACTITIONER

## 2025-07-08 PROCEDURE — 1036F TOBACCO NON-USER: CPT | Performed by: NURSE PRACTITIONER

## 2025-07-08 PROCEDURE — 1159F MED LIST DOCD IN RCRD: CPT | Performed by: NURSE PRACTITIONER

## 2025-07-08 PROCEDURE — 99213 OFFICE O/P EST LOW 20 MIN: CPT | Performed by: NURSE PRACTITIONER

## 2025-07-08 PROCEDURE — 3023F SPIROM DOC REV: CPT | Performed by: NURSE PRACTITIONER

## 2025-07-08 PROCEDURE — 3078F DIAST BP <80 MM HG: CPT | Performed by: NURSE PRACTITIONER

## 2025-07-08 PROCEDURE — 3017F COLORECTAL CA SCREEN DOC REV: CPT | Performed by: NURSE PRACTITIONER

## 2025-07-08 PROCEDURE — 3075F SYST BP GE 130 - 139MM HG: CPT | Performed by: NURSE PRACTITIONER

## 2025-07-08 NOTE — PROGRESS NOTES
Palliative Care Department  Provider: KELECHI De Los Santos - CNP    Referring Provider:  Dr. West    Location of Service:   [facility]    Chief Complaint: Angel Luis Stanley is a 74 y.o. male with chief complaint of shortness of breath    Assessment/Plan      COPD:   Known to Dr. West locally   On supplemental O2  On nebulizers  Uses chest vest  S/p BLVR Sept 2021 with Dr. David Ledbetter in [place]  On prophylactic Azithromycin  Hx of pulmonary rehab, stopped d/t intolerance    Shortness of breath:  Often not severe  No indication for low dose morphine at this time    Anxiety/Sleep:   Ativan 0.5 mg using 1 tab in the am and 2 tabs pm, per PCP    Chronic pain syndrome/Bilateral Ulnar Contractures/Back pain:  Has seen hand surgeons previously  Hx of carpal tunnel surgery  On Gabapentin 600 mg TID    Fatigue/Weakness:  Functional status is decreased, mostly r/t activity intolerance due to SOB  Declined pulmonary rehab  Encouraged increased physical activity as tolerated  States he may be starting PT soon  Reports he has a referral already    Pulmonary cachexia/Decreased Appetite/Weight Loss:  Encouraged utilize oral nutritional supplements  Now drinking ensure once per day  Option of mirtazapine discussed but declined    Follow Up:  3 months.  They were encouraged to call with any questions, concerns, needs, or changes in symptoms.    Subjective:     HPI:  Angel Luis Stanley is a 74 y.o. male with advanced COPD, status post BL VR in September 2021, known locally to Dr. West, who was referred to [facility] Pall[facility] Medicine symptomatic support.    Subjective/Events/Discussions:  History of Present Illness  The patient presents today for follow-up regarding symptoms related to his COPD.    He experienced a fainting episode at home, during which he was found on the floor by his wife. He remained in this position for approximately 6.5 hours until his son-in-law arrived and assisted

## 2025-09-05 ENCOUNTER — HOSPITAL ENCOUNTER (OUTPATIENT)
Dept: LAB | Age: 75
Discharge: HOME | End: 2025-09-05